# Patient Record
Sex: FEMALE | Race: WHITE | NOT HISPANIC OR LATINO | Employment: OTHER | URBAN - NONMETROPOLITAN AREA
[De-identification: names, ages, dates, MRNs, and addresses within clinical notes are randomized per-mention and may not be internally consistent; named-entity substitution may affect disease eponyms.]

---

## 2017-01-05 ENCOUNTER — APPOINTMENT (OUTPATIENT)
Dept: LAB | Facility: HOSPITAL | Age: 62
End: 2017-01-05
Payer: COMMERCIAL

## 2017-01-05 ENCOUNTER — GENERIC CONVERSION - ENCOUNTER (OUTPATIENT)
Dept: OTHER | Facility: OTHER | Age: 62
End: 2017-01-05

## 2017-01-05 ENCOUNTER — TRANSCRIBE ORDERS (OUTPATIENT)
Dept: ADMINISTRATIVE | Facility: HOSPITAL | Age: 62
End: 2017-01-05

## 2017-01-05 DIAGNOSIS — G89.4 CHRONIC PAIN SYNDROME: ICD-10-CM

## 2017-01-05 DIAGNOSIS — E55.9 VITAMIN D DEFICIENCY: ICD-10-CM

## 2017-01-05 DIAGNOSIS — E03.9 HYPOTHYROIDISM: ICD-10-CM

## 2017-01-05 DIAGNOSIS — E83.119 HEMOCHROMATOSIS: ICD-10-CM

## 2017-01-05 DIAGNOSIS — R16.0 HEPATOMEGALY, NOT ELSEWHERE CLASSIFIED: ICD-10-CM

## 2017-01-05 LAB
25(OH)D3 SERPL-MCNC: 32 NG/ML (ref 30–100)
ALBUMIN SERPL BCP-MCNC: 3.9 G/DL (ref 3.5–5)
ALP SERPL-CCNC: 61 U/L (ref 46–116)
ALT SERPL W P-5'-P-CCNC: 29 U/L (ref 12–78)
ANION GAP SERPL CALCULATED.3IONS-SCNC: 9 MMOL/L (ref 4–13)
AST SERPL W P-5'-P-CCNC: 19 U/L (ref 5–45)
BASOPHILS # BLD AUTO: 0.02 THOUSANDS/ΜL (ref 0–0.1)
BASOPHILS NFR BLD AUTO: 0 % (ref 0–1)
BILIRUB SERPL-MCNC: 0.5 MG/DL (ref 0.2–1)
BUN SERPL-MCNC: 15 MG/DL (ref 5–25)
CALCIUM SERPL-MCNC: 8.8 MG/DL (ref 8.3–10.1)
CHLORIDE SERPL-SCNC: 105 MMOL/L (ref 100–108)
CHOLEST SERPL-MCNC: 223 MG/DL (ref 50–200)
CO2 SERPL-SCNC: 29 MMOL/L (ref 21–32)
CREAT SERPL-MCNC: 0.92 MG/DL (ref 0.6–1.3)
EOSINOPHIL # BLD AUTO: 0.14 THOUSAND/ΜL (ref 0–0.61)
EOSINOPHIL NFR BLD AUTO: 3 % (ref 0–6)
ERYTHROCYTE [DISTWIDTH] IN BLOOD BY AUTOMATED COUNT: 12.6 % (ref 11.6–15.1)
GFR SERPL CREATININE-BSD FRML MDRD: >60 ML/MIN/1.73SQ M
GLUCOSE SERPL-MCNC: 85 MG/DL (ref 65–140)
HCT VFR BLD AUTO: 42.5 % (ref 34.8–46.1)
HDLC SERPL-MCNC: 54 MG/DL (ref 40–60)
HGB BLD-MCNC: 14.1 G/DL (ref 11.5–15.4)
LDLC SERPL CALC-MCNC: 146 MG/DL (ref 0–100)
LYMPHOCYTES # BLD AUTO: 1.42 THOUSANDS/ΜL (ref 0.6–4.47)
LYMPHOCYTES NFR BLD AUTO: 28 % (ref 14–44)
MCH RBC QN AUTO: 33.3 PG (ref 26.8–34.3)
MCHC RBC AUTO-ENTMCNC: 33.2 G/DL (ref 31.4–37.4)
MCV RBC AUTO: 101 FL (ref 82–98)
MONOCYTES # BLD AUTO: 0.61 THOUSAND/ΜL (ref 0.17–1.22)
MONOCYTES NFR BLD AUTO: 12 % (ref 4–12)
NEUTROPHILS # BLD AUTO: 2.91 THOUSANDS/ΜL (ref 1.85–7.62)
NEUTS SEG NFR BLD AUTO: 57 % (ref 43–75)
PLATELET # BLD AUTO: 202 THOUSANDS/UL (ref 149–390)
PMV BLD AUTO: 9.4 FL (ref 8.9–12.7)
POTASSIUM SERPL-SCNC: 4.6 MMOL/L (ref 3.5–5.3)
PROT SERPL-MCNC: 7.1 G/DL (ref 6.4–8.2)
RBC # BLD AUTO: 4.23 MILLION/UL (ref 3.81–5.12)
SODIUM SERPL-SCNC: 143 MMOL/L (ref 136–145)
TRIGL SERPL-MCNC: 114 MG/DL
TSH SERPL DL<=0.05 MIU/L-ACNC: 3.22 UIU/ML (ref 0.36–3.74)
WBC # BLD AUTO: 5.1 THOUSAND/UL (ref 4.31–10.16)

## 2017-01-05 PROCEDURE — 82306 VITAMIN D 25 HYDROXY: CPT

## 2017-01-05 PROCEDURE — 84443 ASSAY THYROID STIM HORMONE: CPT

## 2017-01-05 PROCEDURE — 36415 COLL VENOUS BLD VENIPUNCTURE: CPT

## 2017-01-05 PROCEDURE — 80053 COMPREHEN METABOLIC PANEL: CPT

## 2017-01-05 PROCEDURE — 85025 COMPLETE CBC W/AUTO DIFF WBC: CPT

## 2017-01-05 PROCEDURE — 80061 LIPID PANEL: CPT

## 2017-01-09 ENCOUNTER — ALLSCRIPTS OFFICE VISIT (OUTPATIENT)
Dept: OTHER | Facility: OTHER | Age: 62
End: 2017-01-09

## 2017-01-09 DIAGNOSIS — E55.9 VITAMIN D DEFICIENCY: ICD-10-CM

## 2017-01-09 DIAGNOSIS — E03.9 HYPOTHYROIDISM: ICD-10-CM

## 2017-01-09 DIAGNOSIS — M19.90 OSTEOARTHRITIS: ICD-10-CM

## 2017-06-30 ENCOUNTER — TRANSCRIBE ORDERS (OUTPATIENT)
Dept: ADMINISTRATIVE | Facility: HOSPITAL | Age: 62
End: 2017-06-30

## 2017-06-30 ENCOUNTER — APPOINTMENT (OUTPATIENT)
Dept: LAB | Facility: HOSPITAL | Age: 62
End: 2017-06-30
Payer: COMMERCIAL

## 2017-06-30 DIAGNOSIS — M19.90 OSTEOARTHRITIS: ICD-10-CM

## 2017-06-30 DIAGNOSIS — E03.9 HYPOTHYROIDISM: ICD-10-CM

## 2017-06-30 DIAGNOSIS — E55.9 VITAMIN D DEFICIENCY: ICD-10-CM

## 2017-06-30 LAB
25(OH)D3 SERPL-MCNC: 35.6 NG/ML (ref 30–100)
ALBUMIN SERPL BCP-MCNC: 3.7 G/DL (ref 3.5–5)
ALP SERPL-CCNC: 61 U/L (ref 46–116)
ALT SERPL W P-5'-P-CCNC: 21 U/L (ref 12–78)
ANION GAP SERPL CALCULATED.3IONS-SCNC: 8 MMOL/L (ref 4–13)
AST SERPL W P-5'-P-CCNC: 19 U/L (ref 5–45)
BASOPHILS # BLD AUTO: 0.02 THOUSANDS/ΜL (ref 0–0.1)
BASOPHILS NFR BLD AUTO: 0 % (ref 0–1)
BILIRUB SERPL-MCNC: 0.5 MG/DL (ref 0.2–1)
BUN SERPL-MCNC: 21 MG/DL (ref 5–25)
CALCIUM SERPL-MCNC: 8.7 MG/DL (ref 8.3–10.1)
CHLORIDE SERPL-SCNC: 109 MMOL/L (ref 100–108)
CHOLEST SERPL-MCNC: 205 MG/DL (ref 50–200)
CO2 SERPL-SCNC: 28 MMOL/L (ref 21–32)
CREAT SERPL-MCNC: 0.94 MG/DL (ref 0.6–1.3)
EOSINOPHIL # BLD AUTO: 0.14 THOUSAND/ΜL (ref 0–0.61)
EOSINOPHIL NFR BLD AUTO: 3 % (ref 0–6)
ERYTHROCYTE [DISTWIDTH] IN BLOOD BY AUTOMATED COUNT: 12.5 % (ref 11.6–15.1)
GFR SERPL CREATININE-BSD FRML MDRD: >60 ML/MIN/1.73SQ M
GLUCOSE P FAST SERPL-MCNC: 87 MG/DL (ref 65–99)
HCT VFR BLD AUTO: 42.3 % (ref 34.8–46.1)
HDLC SERPL-MCNC: 54 MG/DL (ref 40–60)
HGB BLD-MCNC: 14 G/DL (ref 11.5–15.4)
LDLC SERPL CALC-MCNC: 127 MG/DL (ref 0–100)
LYMPHOCYTES # BLD AUTO: 1.38 THOUSANDS/ΜL (ref 0.6–4.47)
LYMPHOCYTES NFR BLD AUTO: 26 % (ref 14–44)
MCH RBC QN AUTO: 33.3 PG (ref 26.8–34.3)
MCHC RBC AUTO-ENTMCNC: 33.1 G/DL (ref 31.4–37.4)
MCV RBC AUTO: 101 FL (ref 82–98)
MONOCYTES # BLD AUTO: 0.58 THOUSAND/ΜL (ref 0.17–1.22)
MONOCYTES NFR BLD AUTO: 11 % (ref 4–12)
NEUTROPHILS # BLD AUTO: 3.13 THOUSANDS/ΜL (ref 1.85–7.62)
NEUTS SEG NFR BLD AUTO: 60 % (ref 43–75)
PLATELET # BLD AUTO: 183 THOUSANDS/UL (ref 149–390)
PMV BLD AUTO: 9.9 FL (ref 8.9–12.7)
POTASSIUM SERPL-SCNC: 4.6 MMOL/L (ref 3.5–5.3)
PROT SERPL-MCNC: 7.1 G/DL (ref 6.4–8.2)
RBC # BLD AUTO: 4.2 MILLION/UL (ref 3.81–5.12)
SODIUM SERPL-SCNC: 145 MMOL/L (ref 136–145)
TRIGL SERPL-MCNC: 120 MG/DL
TSH SERPL DL<=0.05 MIU/L-ACNC: 3.9 UIU/ML (ref 0.36–3.74)
WBC # BLD AUTO: 5.25 THOUSAND/UL (ref 4.31–10.16)

## 2017-06-30 PROCEDURE — 80053 COMPREHEN METABOLIC PANEL: CPT

## 2017-06-30 PROCEDURE — 36415 COLL VENOUS BLD VENIPUNCTURE: CPT

## 2017-06-30 PROCEDURE — 82306 VITAMIN D 25 HYDROXY: CPT

## 2017-06-30 PROCEDURE — 84443 ASSAY THYROID STIM HORMONE: CPT

## 2017-06-30 PROCEDURE — 85025 COMPLETE CBC W/AUTO DIFF WBC: CPT

## 2017-06-30 PROCEDURE — 80061 LIPID PANEL: CPT

## 2017-07-03 ENCOUNTER — GENERIC CONVERSION - ENCOUNTER (OUTPATIENT)
Dept: OTHER | Facility: OTHER | Age: 62
End: 2017-07-03

## 2017-07-07 DIAGNOSIS — Z13.820 ENCOUNTER FOR SCREENING FOR OSTEOPOROSIS: ICD-10-CM

## 2017-07-07 DIAGNOSIS — Z12.31 ENCOUNTER FOR SCREENING MAMMOGRAM FOR MALIGNANT NEOPLASM OF BREAST: ICD-10-CM

## 2017-07-10 ENCOUNTER — ALLSCRIPTS OFFICE VISIT (OUTPATIENT)
Dept: OTHER | Facility: OTHER | Age: 62
End: 2017-07-10

## 2017-08-07 ENCOUNTER — TRANSCRIBE ORDERS (OUTPATIENT)
Dept: ADMINISTRATIVE | Facility: HOSPITAL | Age: 62
End: 2017-08-07

## 2017-08-07 DIAGNOSIS — Z12.31 ENCOUNTER FOR SCREENING MAMMOGRAM FOR MALIGNANT NEOPLASM OF BREAST: Primary | ICD-10-CM

## 2017-08-14 ENCOUNTER — APPOINTMENT (OUTPATIENT)
Dept: LAB | Facility: HOSPITAL | Age: 62
End: 2017-08-14
Payer: COMMERCIAL

## 2017-08-14 ENCOUNTER — GENERIC CONVERSION - ENCOUNTER (OUTPATIENT)
Dept: OTHER | Facility: OTHER | Age: 62
End: 2017-08-14

## 2017-08-14 DIAGNOSIS — E03.9 HYPOTHYROIDISM: ICD-10-CM

## 2017-08-14 LAB — TSH SERPL DL<=0.05 MIU/L-ACNC: 1.97 UIU/ML (ref 0.36–3.74)

## 2017-08-14 PROCEDURE — 36415 COLL VENOUS BLD VENIPUNCTURE: CPT

## 2017-08-14 PROCEDURE — 84443 ASSAY THYROID STIM HORMONE: CPT

## 2017-08-22 ENCOUNTER — HOSPITAL ENCOUNTER (OUTPATIENT)
Dept: BONE DENSITY | Facility: HOSPITAL | Age: 62
Discharge: HOME | End: 2017-08-22
Payer: COMMERCIAL

## 2017-08-22 ENCOUNTER — GENERIC CONVERSION - ENCOUNTER (OUTPATIENT)
Dept: OTHER | Facility: OTHER | Age: 62
End: 2017-08-22

## 2017-08-22 ENCOUNTER — HOSPITAL ENCOUNTER (OUTPATIENT)
Dept: MAMMOGRAPHY | Facility: HOSPITAL | Age: 62
Discharge: HOME | End: 2017-08-22
Payer: COMMERCIAL

## 2017-08-22 DIAGNOSIS — Z13.820 ENCOUNTER FOR SCREENING FOR OSTEOPOROSIS: ICD-10-CM

## 2017-08-22 DIAGNOSIS — Z12.31 ENCOUNTER FOR SCREENING MAMMOGRAM FOR MALIGNANT NEOPLASM OF BREAST: ICD-10-CM

## 2017-08-22 PROCEDURE — G0202 SCR MAMMO BI INCL CAD: HCPCS

## 2017-08-22 PROCEDURE — 77080 DXA BONE DENSITY AXIAL: CPT

## 2017-08-22 PROCEDURE — 77063 BREAST TOMOSYNTHESIS BI: CPT

## 2017-10-14 DIAGNOSIS — M19.90 OSTEOARTHRITIS: ICD-10-CM

## 2017-10-14 DIAGNOSIS — K59.00 CONSTIPATION: ICD-10-CM

## 2017-10-14 DIAGNOSIS — E78.5 HYPERLIPIDEMIA: ICD-10-CM

## 2017-10-14 DIAGNOSIS — E03.9 HYPOTHYROIDISM: ICD-10-CM

## 2017-10-14 DIAGNOSIS — E55.9 VITAMIN D DEFICIENCY: ICD-10-CM

## 2017-11-26 ENCOUNTER — TRANSCRIBE ORDERS (OUTPATIENT)
Dept: ADMINISTRATIVE | Facility: HOSPITAL | Age: 62
End: 2017-11-26

## 2017-11-26 ENCOUNTER — APPOINTMENT (OUTPATIENT)
Dept: LAB | Facility: HOSPITAL | Age: 62
End: 2017-11-26
Payer: COMMERCIAL

## 2017-11-26 DIAGNOSIS — M19.90 OSTEOARTHRITIS: ICD-10-CM

## 2017-11-26 DIAGNOSIS — E78.5 HYPERLIPIDEMIA: ICD-10-CM

## 2017-11-26 DIAGNOSIS — K59.00 CONSTIPATION: ICD-10-CM

## 2017-11-26 DIAGNOSIS — E03.9 HYPOTHYROIDISM: ICD-10-CM

## 2017-11-26 DIAGNOSIS — E55.9 VITAMIN D DEFICIENCY: ICD-10-CM

## 2017-11-26 LAB
25(OH)D3 SERPL-MCNC: 30.3 NG/ML (ref 30–100)
ALBUMIN SERPL BCP-MCNC: 3.7 G/DL (ref 3.5–5)
ALP SERPL-CCNC: 63 U/L (ref 46–116)
ALT SERPL W P-5'-P-CCNC: 29 U/L (ref 12–78)
ANION GAP SERPL CALCULATED.3IONS-SCNC: 7 MMOL/L (ref 4–13)
AST SERPL W P-5'-P-CCNC: 17 U/L (ref 5–45)
BASOPHILS # BLD AUTO: 0.02 THOUSANDS/ΜL (ref 0–0.1)
BASOPHILS NFR BLD AUTO: 0 % (ref 0–1)
BILIRUB SERPL-MCNC: 0.5 MG/DL (ref 0.2–1)
BUN SERPL-MCNC: 19 MG/DL (ref 5–25)
CALCIUM SERPL-MCNC: 9 MG/DL (ref 8.3–10.1)
CHLORIDE SERPL-SCNC: 105 MMOL/L (ref 100–108)
CHOLEST SERPL-MCNC: 219 MG/DL (ref 50–200)
CO2 SERPL-SCNC: 30 MMOL/L (ref 21–32)
CREAT SERPL-MCNC: 0.88 MG/DL (ref 0.6–1.3)
EOSINOPHIL # BLD AUTO: 0.17 THOUSAND/ΜL (ref 0–0.61)
EOSINOPHIL NFR BLD AUTO: 3 % (ref 0–6)
ERYTHROCYTE [DISTWIDTH] IN BLOOD BY AUTOMATED COUNT: 12.7 % (ref 11.6–15.1)
GFR SERPL CREATININE-BSD FRML MDRD: 70 ML/MIN/1.73SQ M
GLUCOSE P FAST SERPL-MCNC: 104 MG/DL (ref 65–99)
HCT VFR BLD AUTO: 42.5 % (ref 34.8–46.1)
HDLC SERPL-MCNC: 54 MG/DL (ref 40–60)
HGB BLD-MCNC: 14.4 G/DL (ref 11.5–15.4)
LDLC SERPL CALC-MCNC: 134 MG/DL (ref 0–100)
LYMPHOCYTES # BLD AUTO: 1.6 THOUSANDS/ΜL (ref 0.6–4.47)
LYMPHOCYTES NFR BLD AUTO: 28 % (ref 14–44)
MCH RBC QN AUTO: 33.9 PG (ref 26.8–34.3)
MCHC RBC AUTO-ENTMCNC: 33.9 G/DL (ref 31.4–37.4)
MCV RBC AUTO: 100 FL (ref 82–98)
MONOCYTES # BLD AUTO: 0.73 THOUSAND/ΜL (ref 0.17–1.22)
MONOCYTES NFR BLD AUTO: 13 % (ref 4–12)
NEUTROPHILS # BLD AUTO: 3.27 THOUSANDS/ΜL (ref 1.85–7.62)
NEUTS SEG NFR BLD AUTO: 56 % (ref 43–75)
PLATELET # BLD AUTO: 196 THOUSANDS/UL (ref 149–390)
PMV BLD AUTO: 8.9 FL (ref 8.9–12.7)
POTASSIUM SERPL-SCNC: 4.4 MMOL/L (ref 3.5–5.3)
PROT SERPL-MCNC: 7.2 G/DL (ref 6.4–8.2)
RBC # BLD AUTO: 4.25 MILLION/UL (ref 3.81–5.12)
SODIUM SERPL-SCNC: 142 MMOL/L (ref 136–145)
TRIGL SERPL-MCNC: 153 MG/DL
TSH SERPL DL<=0.05 MIU/L-ACNC: 3.26 UIU/ML (ref 0.36–3.74)
WBC # BLD AUTO: 5.79 THOUSAND/UL (ref 4.31–10.16)

## 2017-11-26 PROCEDURE — 80053 COMPREHEN METABOLIC PANEL: CPT

## 2017-11-26 PROCEDURE — 84443 ASSAY THYROID STIM HORMONE: CPT

## 2017-11-26 PROCEDURE — 85025 COMPLETE CBC W/AUTO DIFF WBC: CPT

## 2017-11-26 PROCEDURE — 82306 VITAMIN D 25 HYDROXY: CPT

## 2017-11-26 PROCEDURE — 80061 LIPID PANEL: CPT

## 2017-11-26 PROCEDURE — 36415 COLL VENOUS BLD VENIPUNCTURE: CPT

## 2017-11-27 ENCOUNTER — GENERIC CONVERSION - ENCOUNTER (OUTPATIENT)
Dept: OTHER | Facility: OTHER | Age: 62
End: 2017-11-27

## 2017-12-11 ENCOUNTER — ALLSCRIPTS OFFICE VISIT (OUTPATIENT)
Dept: OTHER | Facility: OTHER | Age: 62
End: 2017-12-11

## 2017-12-12 NOTE — PROGRESS NOTES
Assessment    1  Hypothyroidism (244 9) (E03 9)   2  Hyperlipidemia (272 4) (E78 5)   3  Osteoarthritis (715 90) (M19 90)   4  Constipation (564 00) (K59 00)    Plan  Constipation    · Lactulose 10 GM/15ML Oral Solution   · Linzess 145 MCG Oral Capsule; Take one capsule daily   · Call [phone] if: You have bowel movement accidents ; Status:Complete;   Done:11Cxx7598   · Call [phone] if: You have not had a normal bowel movement in 2 days  ;Status:Complete;   Done: 42OAW1588   · Call [phone] if: You have pain around the rectum ; Status:Complete;   Done:94Mbw9431   · Call [phone] if: You see any blood in the stool ; Status:Complete;   Done:47Tme5490   · Call [phone] if: Your constipation is getting worse ; Status:Complete;   Done:10Nde6465   · Call [phone] if: Your temperature is higher than 101F ; Status:Complete;   Done:13Imb3380   · Seek Immediate Medical Attention if: You have pain in your abdomen ; Status:Complete;  Done: 30AJV4798   · Seek Immediate Medical Attention if: You start vomiting ; Status:Complete;   Done:96Mvi8664   · Seek Immediate Medical Attention if: Your abdomen is getting large and round without anincrease in the amount of food you are eating ; Status:Complete;   Done: 98RLT3461   · Drink at least 6 glasses of water or juice a day ; Status:Complete;   Done: 55YQX7222   · Start eating more fiber ; Status:Complete;   Done: 95VTW7367  Constipation, Hemochromatosis, Hyperlipidemia, Hypothyroidism, Osteoarthritis,Vitamin D deficiency    · (1) CBC/PLT/DIFF; Status:Active; Requested for:84Sfn4679;    · (1) COMPREHENSIVE METABOLIC PANEL; Status:Active; Requested for:24Tys7356;    · (1) IRON PANEL; Status:Active; Requested for:42Mpx0978;    · (1) LIPID PANEL, FASTING; Status:Active; Requested for:88Vev5424;    · (1) TSH; Status:Active; Requested for:41Ghu1586;    · (1) VITAMIN D 25-HYDROXY; Status:Active;  Requested for:37Fzm0348;   Hyperlipidemia    · Begin or continue regular aerobic exercise  Gradually work up to at least 3 sessions of30 minutes of exercise a week ; Status:Complete;   Done: 80CWY5311   · Eat no more than 30 grams of fat per day ; Status:Complete;   Done: 38LRF3170   · There are many exercise options for seniors ; Status:Complete;   Done: 59SUD2888   · Call [phone] if: You have muscle cramps ; Status:Complete;   Done: 00NNF6595   · Call [phone] if: You have pain in the stomach area ; Status:Complete;   Done:52Hga2656   · Call 911 if: You experience a new kind of chest pain (angina) or pressure  ;Status:Complete;   Done: 11PSG8321   · Call 911 if: You have any symptoms of a stroke ; Status:Complete;   Done: 69SJT5550  Hypothyroidism    · Levothyroxine Sodium 100 MCG Oral Tablet; TAKE 1 TABLET BY MOUTH ONCEDAILY   · (1) TSH; Status:Active; Requested for:83Ckl4496; Influenza vaccine needed    · Stop: Fluzone Quadrivalent 0 5 ML Intramuscular Suspension PrefilledSyringe  Osteoarthritis    · Avoid lifting anything over 10 pounds ; Status:Complete;   Done: 44MMS0367   · Call [phone] if: The pain seems worse ; Status:Complete;   Done: 58SYG8476   · Call [phone] if: The symptoms seem worse ; Status:Complete;   Done:43Eft7362    Discussion/Summary    Reviewed recent laboratory testing with her  Cholesterol remains higher than goal  She would like to avoid statin medication  Watch diet more closely  Increase fiber  She is going to try to lose weight again  TSH is increased somewhat  Will increase dose of levothyroxine slightly to 100 mcg on a daily basis  Recheck TSH in about 2 months  Continues with overall myalgias and arthritis symptoms  Continue with the Cymbalta, gabapentin, and meloxicam  Discussed possibly increasing the gabapentin  She wishes to hold at present  Continues with constipation symptoms  Lactulose had improved her symptoms somewhat  She feels that the Aranda Furnish has been more effective  Will restart this   Stay well hydrated  She is up-to-date on her mammogram and bone density  She is up-to-date on her colonoscopy  Will have her follow up in about 4-6 months or sooner if needed  Possible side effects of new medications were reviewed with the patient/guardian today  The treatment plan was reviewed with the patient/guardian  The patient/guardian understands and agrees with the treatment plan      Chief Complaint  CC patient here today for routine visit  No refills  whole body aches  History of Present Illness  She presents for routine follow-up  She has been having increasing pain throughout her body  Very achy  Much more tired  Feels that her arthritis and generalized pain is worsening overall  Is not sure that the Cymbalta, gabapentin, and meloxicam ours effective  Has been taking them regularly  Is unsure whether some of these medications may be making her more tired  Continues to take her levothyroxine without difficulty  Feels that some of her aching this is related to her gaining weight back  Had felt better when she had lost weight  Still continues with issues with constipation despite the lactulose  Lactulose has helped her symptoms somewhat but feels that the Linzess was more effective  She would like to try this again if possible  Denies any chest pain or palpitations  Denies any significant shortness of breath  Denies any nausea or vomiting  Denies any current urinary symptoms  Usually sleeps relatively well  Review of Systems   Constitutional: feeling tired, but-- no fever-- and-- no chills  Eyes: no eyesight problems  ENT: no sore throat-- and-- no hoarseness  Cardiovascular: no chest pain-- and-- no palpitations  Respiratory: no shortness of breath-- and-- no shortness of breath during exertion  Gastrointestinal: no nausea-- and-- no diarrhea  Musculoskeletal: arthralgias-- and-- joint stiffness  Neurological: no headache  Psychiatric: no sleep disturbances  ROS reviewed        Active Problems  1  Adenomatous colon polyp (211 3) (D12 6)   2  Baker's cyst of knee (727 51) (M71 20)   3  Colon, diverticulosis (562 10) (K57 30)   4  Constipation (564 00) (K59 00)   5  Esophagitis (530 10) (K20 9)   6  Gastroesophageal reflux disease (530 81) (K21 9)   7  Hemochromatosis (275 03) (E83 119)   8  Hot flashes (627 2) (N95 1)   9  Hyperlipidemia (272 4) (E78 5)   10  Hypothyroidism (244 9) (E03 9)   11  Liver enlargement (789 1) (R16 0)   12  Lower back pain (724 2) (M54 5)   13  Myalgia And Myositis (729 1)   14  Nonalcoholic steatohepatitis (571 8) (K75 81)   15  Osteoarthritis (715 90) (M19 90)   16  Pain syndrome, chronic (338 4) (G89 4)   17  Peripheral neuropathy (356 9) (G62 9)   18  Post-menopausal (V49 81) (Z78 0)   19  Right knee pain (719 46) (M25 561)   20  Splenic cyst (289 59) (D73 4)   21  Tendonitis of elbow, left (727 09) (M77 8)   22  Vitamin D deficiency (268 9) (E55 9)    Past Medical History  1  History of Acute Osteomyelitis Of The Ankle (730 07)   2  History of Contusion of face (920) (S00 83XA)   3  History of Contusion of hand, left (923 20) (S60 222A)   4  History of Contusion of knee, left (924 11) (S80 02XA)   5  History of Contusion of knee, right (924 11) (S80 01XA)   6  History of screening mammography (V15 89) (Z92 89)   7  History of Laceration of thumb, right (883 0) (S61 011A)   8  History of Osteoarthritis (715 90) (M19 90)   9  History of Rheumatoid arthritis (714 0) (M06 9)   10  History of Screening for osteoporosis (V82 81) (Z13 820)   11  History of Urinary tract infection (599 0) (N39 0)   12  History of Visit for pre-operative examination (V72 84) (R80 574)    The active problems and past medical history were reviewed and updated today  Surgical History    1  History of Cholecystectomy Laparoscopic   2  History of ERCP With Insertion Of Tube Into Bile / Pancreatic Duct   3  History of Facial Surgery   4  History of Foot Surgery   5   History of Knee Replacement   6  History of Shoulder Surgery    The surgical history was reviewed and updated today  Family History  Mother    1  Family history of Dementia   2  Family history of Diabetes Mellitus (V18 0)  Father    3  Family history of Coronary Artery Disease (V17 49)  Sister    4  Family history of Hemochromatosis  Family History    5  Family history of Diabetes Mellitus (V18 0)   6  Family history of Hypertension (V17 49)    The family history was reviewed and updated today  Social History     · Former smoker (V58 29) (W89 353)   · Stopped Drinking Alcohol  The social history was reviewed and updated today  Current Meds   1  Jesse Low Dose 81 MG Oral Tablet Delayed Release; TAKE 1 TABLET DAILY; Therapy: 32QSS9025 to Recorded   2  DULoxetine HCl - 20 MG Oral Capsule Delayed Release Particles; take 3 capsules daily; Therapy: 70WPB4308 to (Last Rx:75Exb6990)  Requested for: 23Wyx3887 Ordered   3  Gabapentin 100 MG Oral Capsule; take 1 capsule three times a day; Therapy: 98PNJ3400 to (Last Rx:96Lld7681)  Requested for: 78Lwl4399 Ordered   4  Lactulose 10 GM/15ML Oral Solution; TAKE 1-2 TABLESPOONFUL DAILY AS NEEDED FOR CONSTIPATION; Therapy: 80DCG1653 to (Last Rx:20Cag9817)  Requested for: 12ECY3266 Ordered   5  Levothyroxine Sodium 88 MCG Oral Tablet; take 1 tablet by mouth once daily; Therapy: 49ZUP1831 to (Evaluate:97Dga7390)  Requested for: 77Tch7151; Last Rx:83Vzb0533 Ordered   6  Meloxicam 7 5 MG Oral Tablet; TAKE 1 TABLET TWICE A DAY WITH FOOD; Therapy: 48MFS2372 to (Last Rx:41Pzj4423)  Requested for: 76Fle5728 Ordered   7  Vitamin D 2000 UNIT Oral Capsule; take one capsule by mouth daily; Therapy: 68NCS3075 to (Last Rx:99Ixv1070) Ordered    The medication list was reviewed and updated today  Allergies  1   No Known Drug Allergies    Vitals  Vital Signs    Recorded: 95RAJ5768 07:07AM   Temperature 96 7 F, Temporal   Heart Rate 78   Respiration 16   Systolic 704, LUE, Sitting   Diastolic 78, LUE, Sitting   Height 5 ft 5 in   Weight 290 lb    BMI Calculated 48 26   BSA Calculated 2 32   O2 Saturation 97     Results/Data  (1) VITAMIN D 25-HYDROXY 58SSV1745 07:52AM [employer] Order Number: [id number]     Test Name Result Flag Reference   VIT D 25-HYDROX 30 3 ng/mL  30 0-100 0     This assay is a certified procedure of the CDC Vitamin D Standardization Certification Program (VDSCP)   Deficiency <20ng/ml  Insufficiency 20-30ng/ml  Sufficient  ng/ml   *Patients undergoing fluorescein dye angiography may retain small amounts of fluorescein in the body for 48-72 hours post procedure  Samples containing fluorescein can produce falsely elevated Vitamin D values  If the patient had this procedure, a specimen should be resubmitted post fluorescein clearance  (1) TSH 27TYV6389 07:52AM [employer] Order Number: [id number]     Test Name Result Flag Reference   TSH 3 256 uIU/mL  0 358-3 740     Patients undergoing fluorescein dye angiography may retain small amounts of fluorescein in the body for 48-72 hours post procedure  Samples containing fluorescein can produce falsely depressed TSH values  If the patient had this procedure,a specimen should be resubmitted post fluorescein clearance  The recommended reference ranges for TSH during pregnancy are as follows: First trimester 0 1 to 2 5 uIU/mL Second trimester  0 2 to 3 0 uIU/mL Third trimester 0 3 to 3 0 uIU/m     (1) LIPID PANEL, FASTING 26Nov2017 07:52AM [employer] Order Number: [id number]     Test Name Result Flag Reference   CHOLESTEROL 219 mg/dL H    HDL,DIRECT 54 mg/dL  40-60   Specimen collection should occur prior to Metamizole administration due to the potential for falsley depressed results     LDL CHOLESTEROL CALCULATED 134 mg/dL H 0-100     Triglyceride:       Normal <150 mg/dl  Borderline High 150-199 mg/dl  High 200-499 mg/dl  Very High >499 mg/dl   Cholesterol: Desirable <200 mg/dl   Borderline High 200-239 mg/dl   High >239 mg/dl   HDL Cholesterol:      High>59 mg/dL   Low <41 mg/dL   This screening LDL is a calculated result  It does not have the accuracy of the Direct Measured LDL in the monitoring of patients with hyperlipidemia and/or statin therapy  Direct Measure LDL (QXG453) must be ordered separately in these patients  TRIGLYCERIDES 153 mg/dL H <=150   Specimen collection should occur prior to N-Acetylcysteine or Metamizole administration due to the potential for falsely depressed results  (1) COMPREHENSIVE METABOLIC PANEL 20PIT0356 88:21OU Kathy Bernal Order Number: [id number]     Test Name Result Flag Reference   SODIUM 142 mmol/L  136-145   POTASSIUM 4 4 mmol/L  3 5-5 3   CHLORIDE 105 mmol/L  100-108   CARBON DIOXIDE 30 mmol/L  21-32   ANION GAP (CALC) 7 mmol/L  4-13   BLOOD UREA NITROGEN 19 mg/dL  5-25   CREATININE 0 88 mg/dL  0 60-1 30   Standardized to IDMS reference method   CALCIUM 9 0 mg/dL  8 3-10 1   BILI, TOTAL 0 50 mg/dL  0 20-1 00   ALK PHOSPHATAS 63 U/L     ALT (SGPT) 29 U/L  12-78   Specimen collection should occur prior to Sulfasalazine administration due to the potential for falsely depressed results  AST(SGOT) 17 U/L  5-45   Specimen collection should occur prior to Sulfasalazine administration due to the potential for falsely depressed results  ALBUMIN 3 7 g/dL  3 5-5 0   TOTAL PROTEIN 7 2 g/dL  6 4-8 2   eGFR 70 ml/min/1 73sq [facility] Disease Education Program recommendations are as follows: GFR calculation is accurate only with a steady state creatinine Chronic Kidney disease less than 60 ml/min/1 73 sq  meters Kidney failure less than 15 ml/min/1 73 sq  meters  GLUCOSE FASTING 104 mg/dL H 65-99   Specimen collection should occur prior to Sulfasalazine administration due to the potential for falsely depressed results   Specimen collection should occur prior to Sulfapyridine administration due to the potential for falsely elevated results  (1) CBC/PLT/DIFF 16QIR4831 07:52AM Av Villeda Order Number: [id number]     Test Name Result Flag Reference   WBC COUNT 5 79 Thousand/uL  4 31-10 16   RBC COUNT 4 25 Million/uL  3 81-5 12   HEMOGLOBIN 14 4 g/dL  11 5-15 4   HEMATOCRIT 42 5 %  34 8-46  1    fL H 82-98   MCH 33 9 pg  26 8-34 3   MCHC 33 9 g/dL  31 4-37 4   RDW 12 7 %  11 6-15 1   MPV 8 9 fL  8 9-12 7   PLATELET COUNT 978 Thousands/uL  149-390   NEUTROPHILS RELATIVE PERCENT 56 %  43-75   LYMPHOCYTES RELATIVE PERCENT 28 %  14-44   MONOCYTES RELATIVE PERCENT 13 % H 4-12   EOSINOPHILS RELATIVE PERCENT 3 %  0-6   BASOPHILS RELATIVE PERCENT 0 %  0-1   NEUTROPHILS ABSOLUTE COUNT 3 27 Thousands/? ??L  1 85-7 62   LYMPHOCYTES ABSOLUTE COUNT 1 60 Thousands/? ??L  0 60-4 47   MONOCYTES ABSOLUTE COUNT 0 73 Thousand/? ??L  0 17-1 22   EOSINOPHILS ABSOLUTE COUNT 0 17 Thousand/? ??L  0 00-0 61   BASOPHILS ABSOLUTE COUNT 0 02 Thousands/? ??L  0 00-0 10     * DXA BONE DENSITY SPINE HIP AND PELVIS 72Zkt6071 09:41AM Av Villeda Order Number: [id number]   - Patient Instructions: To schedule this appointment, please contact Central Scheduling at [phone]  Test Name Result Flag Reference   DXA BONE DENSITY SPINE HIP AND PELVIS (Report)       CENTRAL DXA SCAN   CLINICAL HISTORY:  60year old post-menopausal  female risk factors include estrogen deficient, follow-up  Hypothyroidism  TECHNIQUE: Bone densitometry was performed using a H[employer] Discovery A bone densitometer  Regions of interest appear properly placed  There are no obvious fractures or other confounding variables which could limit the study  COMPARISON: 2014   RESULTS:   LUMBAR SPINE: L1-L4:  BMD 1 062 gm/cm2  T-score 0 1  Z-score 1 8   LEFT TOTAL HIP:  BMD 1 113 gm/cm2  T-score 1 4  Z-score 2 5   LEFT FEMORAL NECK:  BMD 0 902 gm/cm2  T-score 0 5  Z-score 1 9        IMPRESSION:  1  Based on the [facility] classification, this study is normal and the patient is considered at low risk for fracture  2  The 10 year risk of hip fracture is 0 1 %, with the 10 year risk of major osteoporotic fracture being 5 2 %, as calculated by the WHO fracture risk assessment tool (FRAX)  The current NOF guidelines recommend treating patients with FRAX 10 year risk  score of >3% for hip fracture and >20% for major osteoporotic fracture  3  Since the prior study, there has been decrease in lumbar BMD of -0 039 Gm/cm2 or -3 6%  This decrease exceeds our own least significant change and, therefore, is statistically significant within 95% confidence level  4  A daily intake of calcium of at least 1200 mg and vitamin D, 800-1000 IU, as well as weight bearing and muscle strengthening exercise, fall prevention and avoidance of tobacco and excessive alcohol intake  5  Repeat DXA in 18 - 24 months, on the same machine, as clinically indicated  WHO CLASSIFICATION:  Normal (a T-score of -1 0 or higher)  Low bone mineral density (a T-score of less than -1 0 but higher than -2 5)  Osteoporosis (a T-score of -2 5 or less)  Severe osteoporosis (a T-score of -2 5 or less with a fragility fracture)            Workstation performed: [id number]   Signed by:  Brandon Jerry MD  8/22/17     MAMMO SCREENING BILATERAL W 3D & CAD 18Ngx3248 09:06AM Graylon Yahir     Test Name Result Flag Reference   MAMMO SCREENING BILATERAL W 3D & CAD (Report)       Patient History:  Patient is postmenopausal   Family history of breast cancer in maternal aunt, breast cancer   in maternal cousin, endometrial cancer in mother  Benign excisional biopsy, 1995  Patient has never smoked  Patient's BMI is 49 8  Reason for exam: screening, asymptomatic  Mammo Screening Bilateral W DBT and CAD: August 22, 2017 - Check   In #: [de-identified]  2D/3D Procedure  3D views: Bilateral MLO view(s) were taken  2D views: Bilateral CC view(s) were taken   MLO view(s) were   taken of the right breast     Technologist: ANA Houes (R)(M)  Prior study comparison: August 15, 2016, mammo screening   bilateral W DBT and CAD performed at [address] [facility]  August 10, 2015, bilateral digital screening mammogram   performed at [facility]  June 18, 2014,   bilateral digital screening mammogram performed at [address] [place] [address]  May 29, 2013, bilateral digital   screening mammogram performed at [facility]  February 26, 2011, bilateral digital screening mammogram  performed at [address] [facility]  The breast tissue is heterogeneously dense, potentially limiting   the sensitivity of mammography  Patient risk, included in this   report, assists in determining the appropriate screening regimen   (such as 3-D mammography or the inclusion of automated breast   ultrasound or MRI)  3-D mammography may also remain indicated as   screening  A combination of mediolateral oblique 3D tomographic slices as   well as standard two-dimensional orthogonal images were obtained  No dominant soft tissue mass, architectural distortion or   suspicious calcifications are noted in either breast   The skin   and nipple structures are within normal limits  Scattered benign  appearing calcifications are noted  No significant changes when compared with prior studies  ACR BI-RADSï¾® Assessments: BiRad:2 - Benign   Recommendation:  Routine screening mammogram of both breasts in 1 year  A   reminder letter will be scheduled  The patient is scheduled in a reminder system  8-10% of cancers will be missed on mammography  Management of a   palpable abnormality must be based on clinical grounds  Patients   will be notified of their results via letter from our facility  Accredited by [employer] of [employer] and FDA     Transcription Location: ANA [address]: [id number]   Risk Value(s):  Vincent 10 Year: 3 800%, [place] Lifetime: 8 500%,   Myriad Table: 1 5%, JOSE MANUEL 5 Year: 1 6%, NCI Lifetime: 6 9%       Health Management  Constipation   COLONOSCOPY; every 3 years; Last 66VAA1751; Next Due: 00YRE0333; Overdue  Post-menopausal   * Dexa Scan Axial Skel (Hip, Pelvis, Spine); every 2 years; Last 10LSJ7124; Next Due: 87EYK8149; Franck White 23; every 2 years; Next Due: 78DKD1781;  Overdue    Signatures   Electronically signed by : SENG Moody ; Dec 11 2017  9:10PM EST                       (Author)

## 2018-01-10 NOTE — RESULT NOTES
Verified Results  (1) VITAMIN D 25-HYDROXY [id number] 07:52AM Sakina Kelley Order Number: [id number]     Test Name Result Flag Reference   VIT D 25-HYDROX 30 3 ng/mL  30 0-100 0   This assay is a certified procedure of the CDC Vitamin D Standardization Certification Program (VDSCP)     Deficiency <20ng/ml   Insufficiency 20-30ng/ml   Sufficient  ng/ml     *Patients undergoing fluorescein dye angiography may retain small amounts of fluorescein in the body for 48-72 hours post procedure  Samples containing fluorescein can produce falsely elevated Vitamin D values  If the patient had this procedure, a specimen should be resubmitted post fluorescein clearance  (1) TSH [id number] 07:52AM Sakina Kelley Order Number: [id number]     Test Name Result Flag Reference   TSH 3 256 uIU/mL  0 358-3 740   Patients undergoing fluorescein dye angiography may retain small amounts of fluorescein in the body for 48-72 hours post procedure  Samples containing fluorescein can produce falsely depressed TSH values  If the patient had this procedure,a specimen should be resubmitted post fluorescein clearance  The recommended reference ranges for TSH during pregnancy are as follows:  First trimester 0 1 to 2 5 uIU/mL  Second trimester  0 2 to 3 0 uIU/mL  Third trimester 0 3 to 3 0 uIU/m     (1) LIPID PANEL, FASTING 26Nov2017 07:52AM Sakina Kelley Order Number: [id number]     Test Name Result Flag Reference   CHOLESTEROL 219 mg/dL H    HDL,DIRECT 54 mg/dL  40-60   Specimen collection should occur prior to Metamizole administration due to the potential for falsley depressed results     LDL CHOLESTEROL CALCULATED 134 mg/dL H 0-100   Triglyceride:        Normal <150 mg/dl   Borderline High 150-199 mg/dl   High 200-499 mg/dl   Very High >499 mg/dl      Cholesterol:       Desirable <200 mg/dl    Borderline High 200-239 mg/dl    High >239 mg/dl      HDL Cholesterol: High>59 mg/dL    Low <41 mg/dL      This screening LDL is a calculated result  It does not have the accuracy of the Direct Measured LDL in the monitoring of patients with hyperlipidemia and/or statin therapy  Direct Measure LDL (CWZ096) must be ordered separately in these patients  TRIGLYCERIDES 153 mg/dL H <=150   Specimen collection should occur prior to N-Acetylcysteine or Metamizole administration due to the potential for falsely depressed results  (1) COMPREHENSIVE METABOLIC PANEL [id number] 76:63MW Zahida Pompa Order Number: [id number]     Test Name Result Flag Reference   SODIUM 142 mmol/L  136-145   POTASSIUM 4 4 mmol/L  3 5-5 3   CHLORIDE 105 mmol/L  100-108   CARBON DIOXIDE 30 mmol/L  21-32   ANION GAP (CALC) 7 mmol/L  4-13   BLOOD UREA NITROGEN 19 mg/dL  5-25   CREATININE 0 88 mg/dL  0 60-1 30   Standardized to IDMS reference method   CALCIUM 9 0 mg/dL  8 3-10 1   BILI, TOTAL 0 50 mg/dL  0 20-1 00   ALK PHOSPHATAS 63 U/L     ALT (SGPT) 29 U/L  12-78   Specimen collection should occur prior to Sulfasalazine administration due to the potential for falsely depressed results  AST(SGOT) 17 U/L  5-45   Specimen collection should occur prior to Sulfasalazine administration due to the potential for falsely depressed results  ALBUMIN 3 7 g/dL  3 5-5 0   TOTAL PROTEIN 7 2 g/dL  6 4-8 2   eGFR 70 ml/min/1 73sq m     [facility] Disease Education Program recommendations are as follows:  GFR calculation is accurate only with a steady state creatinine  Chronic Kidney disease less than 60 ml/min/1 73 sq  meters  Kidney failure less than 15 ml/min/1 73 sq  meters  GLUCOSE FASTING 104 mg/dL H 65-99   Specimen collection should occur prior to Sulfasalazine administration due to the potential for falsely depressed results  Specimen collection should occur prior to Sulfapyridine administration due to the potential for falsely elevated results       (1) CBC/PLT/DIFF [id number] 07: Dontae Thompson Order Number: [id number]     Test Name Result Flag Reference   WBC COUNT 5 79 Thousand/uL  4 31-10 16   RBC COUNT 4 25 Million/uL  3 81-5 12   HEMOGLOBIN 14 4 g/dL  11 5-15 4   HEMATOCRIT 42 5 %  34 8-46  1    fL H 82-98   MCH 33 9 pg  26 8-34 3   MCHC 33 9 g/dL  31 4-37 4   RDW 12 7 %  11 6-15 1   MPV 8 9 fL  8 9-12 7   PLATELET COUNT 129 Thousands/uL  149-390   NEUTROPHILS RELATIVE PERCENT 56 %  43-75   LYMPHOCYTES RELATIVE PERCENT 28 %  14-44   MONOCYTES RELATIVE PERCENT 13 % H 4-12   EOSINOPHILS RELATIVE PERCENT 3 %  0-6   BASOPHILS RELATIVE PERCENT 0 %  0-1   NEUTROPHILS ABSOLUTE COUNT 3 27 Thousands/? ??L  1 85-7 62   LYMPHOCYTES ABSOLUTE COUNT 1 60 Thousands/? ??L  0 60-4 47   MONOCYTES ABSOLUTE COUNT 0 73 Thousand/? ??L  0 17-1 22   EOSINOPHILS ABSOLUTE COUNT 0 17 Thousand/? ??L  0 00-0 61   BASOPHILS ABSOLUTE COUNT 0 02 Thousands/? ??L  0 00-0 10

## 2018-01-10 NOTE — RESULT NOTES
Verified Results  (1) TSH [id number] 07:47AM Kyle Mckay Order Number: [id number]     Test Name Result Flag Reference   TSH 1 971 uIU/mL  0 358-3 740   Patients undergoing fluorescein dye angiography may retain small amounts of fluorescein in the body for 48-72 hours post procedure  Samples containing fluorescein can produce falsely depressed TSH values  If the patient had this procedure,a specimen should be resubmitted post fluorescein clearance            The recommended reference ranges for TSH during pregnancy are as follows:  First trimester 0 1 to 2 5 uIU/mL  Second trimester  0 2 to 3 0 uIU/mL  Third trimester 0 3 to 3 0 uIU/m

## 2018-01-10 NOTE — RESULT NOTES
Verified Results  * DXA BONE DENSITY SPINE HIP AND PELVIS [id number] 09:41AM Derek Durán Order Number: [id number]    - Patient Instructions: To schedule this appointment, please contact Central Scheduling at [phone]  Test Name Result Flag Reference   DXA BONE DENSITY SPINE HIP AND PELVIS (Report)     CENTRAL DXA SCAN     CLINICAL HISTORY:  60year old post-menopausal  female risk factors include estrogen deficient, follow-up  Hypothyroidism  TECHNIQUE: Bone densitometry was performed using a [employer] Discovery A bone densitometer  Regions of interest appear properly placed  There are no obvious fractures or other confounding variables which could limit the study  COMPARISON: 2014     RESULTS:    LUMBAR SPINE: L1-L4:   BMD 1 062 gm/cm2   T-score 0 1   Z-score 1 8     LEFT TOTAL HIP:   BMD 1 113 gm/cm2   T-score 1 4   Z-score 2 5     LEFT FEMORAL NECK:   BMD 0 902 gm/cm2   T-score 0 5   Z-score 1 9             IMPRESSION:   1  Based on the [facility] classification, this study is normal and the patient is considered at low risk for fracture  2  The 10 year risk of hip fracture is 0 1 %, with the 10 year risk of major osteoporotic fracture being 5 2 %, as calculated by the WHO fracture risk assessment tool (FRAX)  The current NOF guidelines recommend treating patients with FRAX 10 year risk   score of >3% for hip fracture and >20% for major osteoporotic fracture  3  Since the prior study, there has been decrease in lumbar BMD of -0 039 Gm/cm2 or -3 6%  This decrease exceeds our own least significant change and, therefore, is statistically significant within 95% confidence level  4  A daily intake of calcium of at least 1200 mg and vitamin D, 800-1000 IU, as well as weight bearing and muscle strengthening exercise, fall prevention and avoidance of tobacco and excessive alcohol intake  5  Repeat DXA in 18 - 24 months, on the same machine, as clinically indicated       WHO CLASSIFICATION:   Normal (a T-score of -1 0 or higher)   Low bone mineral density (a T-score of less than -1 0 but higher than -2 5)   Osteoporosis (a T-score of -2 5 or less)   Severe osteoporosis (a T-score of -2 5 or less with a fragility fracture)                    Workstation performed: [id number]     Signed by:   Katherne Lesch Raynard Farrow, MD   8/22/17     MAMMO SCREENING BILATERAL W 3D & CAD [id number] 09:06AM Roscoe Eden     Test Name Result Flag Reference   MAMMO SCREENING BILATERAL W 3D & CAD (Report)     Patient History:   Patient is postmenopausal    Family history of breast cancer in maternal aunt, breast cancer    in maternal cousin, endometrial cancer in mother  Benign excisional biopsy, 1995  Patient has never smoked  Patient's BMI is 49 8  Reason for exam: screening, asymptomatic  Mammo Screening Bilateral W DBT and CAD: August 22, 2017 - Check    In #: [de-identified]   2D/3D Procedure   3D views: Bilateral MLO view(s) were taken  2D views: Bilateral CC view(s) were taken  MLO view(s) were    taken of the right breast        Technologist: ANA Hays (R)(M)   Prior study comparison: August 15, 2016, mammo screening    bilateral W DBT and CAD performed at [address] [place] [address]  August 10, 2015, bilateral digital screening mammogram    performed at [address] [place] [address]  June 18, 2014,    bilateral digital screening mammogram performed at [address] [place] [address]  May 29, 2013, bilateral digital    screening mammogram performed at [address] [place] [address]  February 26, 2011, bilateral digital screening mammogram   performed at [address] [place] [address]  The breast tissue is heterogeneously dense, potentially limiting    the sensitivity of mammography   Patient risk, included in this    report, assists in determining the appropriate screening regimen    (such as 3-D mammography or the inclusion of automated breast    ultrasound or MRI)  3-D mammography may also remain indicated as    screening  A combination of mediolateral oblique 3D tomographic slices as    well as standard two-dimensional orthogonal images were obtained  No dominant soft tissue mass, architectural distortion or    suspicious calcifications are noted in either breast   The skin    and nipple structures are within normal limits  Scattered benign   appearing calcifications are noted  No significant changes when compared with prior studies  ACR BI-RADSï¾® Assessments: BiRad:2 - Benign     Recommendation:   Routine screening mammogram of both breasts in 1 year  A    reminder letter will be scheduled  The patient is scheduled in a reminder system  8-10% of cancers will be missed on mammography  Management of a    palpable abnormality must be based on clinical grounds  Patients    will be notified of their results via letter from our facility  Accredited by [employer] and FDA  Transcription Location: [address] [place] [address]: [id number]     Risk Value(s):   Tyrer-Cuzick 10 Year: 3 800%, Tyrer-Cuzick Lifetime: 8 500%,    Myriad Table: 1 5%, JOSE MANUEL 5 Year: 1 6%, NCI Lifetime: 6 9%       Plan  Post-menopausal    · * Dexa Scan Axial Skel (Hip, Pelvis, Spine) ; every 2 years;  Last [id number];  Status:Active

## 2018-01-12 VITALS
TEMPERATURE: 97.1 F | RESPIRATION RATE: 16 BRPM | HEART RATE: 74 BPM | BODY MASS INDEX: 45.82 KG/M2 | DIASTOLIC BLOOD PRESSURE: 90 MMHG | WEIGHT: 275 LBS | OXYGEN SATURATION: 96 % | HEIGHT: 65 IN | SYSTOLIC BLOOD PRESSURE: 142 MMHG

## 2018-01-12 NOTE — RESULT NOTES
Verified Results  (1) CBC/PLT/DIFF [id number] 07:03AM Mai Garcia Order Number: [id number]     Test Name Result Flag Reference   WBC COUNT 5 25 Thousand/uL  4 31-10 16   RBC COUNT 4 20 Million/uL  3 81-5 12   HEMOGLOBIN 14 0 g/dL  11 5-15 4   HEMATOCRIT 42 3 %  34 8-46  1    fL H 82-98   MCH 33 3 pg  26 8-34 3   MCHC 33 1 g/dL  31 4-37 4   RDW 12 5 %  11 6-15 1   MPV 9 9 fL  8 9-12 7   PLATELET COUNT 460 Thousands/uL  149-390   NEUTROPHILS RELATIVE PERCENT 60 %  43-75   LYMPHOCYTES RELATIVE PERCENT 26 %  14-44   MONOCYTES RELATIVE PERCENT 11 %  4-12   EOSINOPHILS RELATIVE PERCENT 3 %  0-6   BASOPHILS RELATIVE PERCENT 0 %  0-1   NEUTROPHILS ABSOLUTE COUNT 3 13 Thousands/? ??L  1 85-7 62   LYMPHOCYTES ABSOLUTE COUNT 1 38 Thousands/? ??L  0 60-4 47   MONOCYTES ABSOLUTE COUNT 0 58 Thousand/? ??L  0 17-1 22   EOSINOPHILS ABSOLUTE COUNT 0 14 Thousand/? ??L  0 00-0 61   BASOPHILS ABSOLUTE COUNT 0 02 Thousands/? ??L  0 00-0 10   - Patient Instructions: This bloodwork is non-fasting  Please drink two glasses of water morning of bloodwork       (1) COMPREHENSIVE METABOLIC PANEL [id number] 56:87VC Mai Garcia Order Number: [id number]     Test Name Result Flag Reference   SODIUM 145 mmol/L  136-145   POTASSIUM 4 6 mmol/L  3 5-5 3   CHLORIDE 109 mmol/L H 100-108   CARBON DIOXIDE 28 mmol/L  21-32   ANION GAP (CALC) 8 mmol/L  4-13   BLOOD UREA NITROGEN 21 mg/dL  5-25   CREATININE 0 94 mg/dL  0 60-1 30   Standardized to IDMS reference method   CALCIUM 8 7 mg/dL  8 3-10 1   BILI, TOTAL 0 50 mg/dL  0 20-1 00   ALK PHOSPHATAS 61 U/L     ALT (SGPT) 21 U/L  12-78   AST(SGOT) 19 U/L  5-45   ALBUMIN 3 7 g/dL  3 5-5 0   TOTAL PROTEIN 7 1 g/dL  6 4-8 2   eGFR Non-African American      >60 0 ml/min/1 73sq [facility] Disease Education Program recommendations are as follows:  GFR calculation is accurate only with a steady state creatinine  Chronic Kidney disease less than 60 ml/min/1 73 sq  meters  Kidney failure less than 15 ml/min/1 73 sq  meters  GLUCOSE FASTING 87 mg/dL  65-99     (1) LIPID PANEL, FASTING [id number] 07:03AM Jose Elida Order Number: [id number]     Test Name Result Flag Reference   CHOLESTEROL 205 mg/dL H    HDL,DIRECT 54 mg/dL  40-60   Specimen collection should occur prior to Metamizole administration due to the potential for falsely depressed results  LDL CHOLESTEROL CALCULATED 127 mg/dL H 0-100   - Patient Instructions: This is a fasting blood test  Water,black tea or black  coffee only after 9:00pm the night before test   Drink 2 glasses of water the morning of test       Triglyceride:         Normal              <150 mg/dl       Borderline High    150-199 mg/dl       High               200-499 mg/dl       Very High          >499 mg/dl  Cholesterol:         Desirable        <200 mg/dl      Borderline High  200-239 mg/dl      High             >239 mg/dl  HDL Cholesterol:        High    >59 mg/dL      Low     <41 mg/dL  LDL CALCULATED:    This screening LDL is a calculated result  It does not have the accuracy of the Direct Measured LDL in the monitoring of patients with hyperlipidemia and/or statin therapy  Direct Measure LDL (KOZ665) must be ordered separately in these patients  TRIGLYCERIDES 120 mg/dL  <=150   Specimen collection should occur prior to N-Acetylcysteine or Metamizole administration due to the potential for falsely depressed results  (1) TSH [id number] 07:03AM Jose Torresena Order Number: [id number]     Test Name Result Flag Reference   TSH 3 899 uIU/mL H 0 358-3 740   - Patient Instructions: This bloodwork is non-fasting  Please drink two glasses of water morning of bloodwork  Patients undergoing fluorescein dye angiography may retain small amounts of fluorescein in the body for 48-72 hours post procedure  Samples containing fluorescein can produce falsely depressed TSH values   If the patient had this procedure,a specimen should be resubmitted post fluorescein clearance  The recommended reference ranges for TSH during pregnancy are as follows:  First trimester 0 1 to 2 5 uIU/mL  Second trimester  0 2 to 3 0 uIU/mL  Third trimester 0 3 to 3 0 uIU/m     (1) VITAMIN D 25-HYDROXY [id number] 07:03AM Shivam Pearl Order Number: [id number]     Test Name Result Flag Reference   VIT D 25-HYDROX 35 6 ng/mL  30 0-100 0   This assay is a certified procedure of the CDC Vitamin D Standardization Certification Program (VDSCP)     Deficiency <20ng/ml   Insufficiency 20-30ng/ml   Sufficient  ng/ml     *Patients undergoing fluorescein dye angiography may retain small amounts of fluorescein in the body for 48-72 hours post procedure  Samples containing fluorescein can produce falsely elevated Vitamin D values  If the patient had this procedure, a specimen should be resubmitted post fluorescein clearance

## 2018-01-13 NOTE — MISCELLANEOUS
To Whom It May Concern:       Shreya Rothman has significant joint and muscle issues  Because of these issues she is very sensitive to drafty areas especially near events or doors  Please allow her to be away  from any areas that have drafts or rapidly moving air or significant changes in temperature  Sincerely,                    Davia Saint, M D  Electronically signed Germaine NDIAYE    Apr 28 2016  5:32AM EST Author

## 2018-01-13 NOTE — RESULT NOTES
Verified Results  (1) CBC/PLT/DIFF [id number] 07:55AM Neil [employer]     Test Name Result Flag Reference   WBC COUNT 6 32 Thousand/uL  4 31-10 16   RBC COUNT 4 23 Million/uL  3 81-5 12   HEMOGLOBIN 14 0 g/dL  11 5-15 4   HEMATOCRIT 42 0 %  34 8-46  1   MCV 99 fL H 82-98   MCH 33 1 pg  26 8-34 3   MCHC 33 3 g/dL  31 4-37 4   RDW 12 9 %  11 6-15 1   MPV 10 0 fL  8 9-12 7   PLATELET COUNT 635 Thousands/uL  149-390   NEUTROPHILS RELATIVE PERCENT 58 %  43-75   LYMPHOCYTES RELATIVE PERCENT 21 %  14-44   MONOCYTES RELATIVE PERCENT 9 %  4-12   EOSINOPHILS RELATIVE PERCENT 12 % H 0-6   BASOPHILS RELATIVE PERCENT 0 %  0-1   NEUTROPHILS ABSOLUTE COUNT 3 68 Thousands/?L  1 85-7 62   LYMPHOCYTES ABSOLUTE COUNT 1 32 Thousands/?L  0 60-4 47   MONOCYTES ABSOLUTE COUNT 0 54 Thousand/?L  0 17-1 22   EOSINOPHILS ABSOLUTE COUNT 0 76 Thousand/?L H 0 00-0 61   BASOPHILS ABSOLUTE COUNT 0 02 Thousands/?L  0 00-0 10     (1) COMPREHENSIVE METABOLIC PANEL [id number] 00:47EK Neil Hernandez     Test Name Result Flag Reference   GLUCOSE,RANDM 100 mg/dL     If the patient is fasting, the ADA then defines impaired fasting glucose as > 100 mg/dL and diabetes as > or equal to 123 mg/dL     SODIUM 143 mmol/L  136-145   POTASSIUM 4 2 mmol/L  3 5-5 3   CHLORIDE 107 mmol/L  100-108   CARBON DIOXIDE 28 mmol/L  21-32   ANION GAP (CALC) 8 mmol/L  4-13   BLOOD UREA NITROGEN 20 mg/dL  5-25   CREATININE 0 90 mg/dL  0 60-1 30   Standardized to IDMS reference method   CALCIUM 8 8 mg/dL  8 3-10 1   BILI, TOTAL 0 30 mg/dL  0 20-1 00   ALK PHOSPHATAS 80 U/L     ALT (SGPT) 28 U/L  12-78   AST(SGOT) 17 U/L  5-45   ALBUMIN 3 7 g/dL  3 5-5 0   TOTAL PROTEIN 7 1 g/dL  6 4-8 2   eGFR Non-African American      >60 0 ml/min/1 73sq [facility] Disease Education Program recommendations are as follows:  GFR calculation is accurate only with a steady state creatinine  Chronic Kidney disease less than 60 ml/min/1 73 sq  meters  Kidney failure less than 15 ml/min/1 73 sq  meters  (1) TSH [id number] 07:55AM Infomous Luciano     Test Name Result Flag Reference   TSH 2 887 uIU/mL  0 358-3 740   Patients undergoing fluorescein dye angiography may retain small amounts of fluorescein in the body for 48-72 hours post procedure  Samples containing fluorescein can produce falsely depressed TSH values  If the patient had this procedure,a specimen should be resubmitted post fluorescein clearance          The recommended reference ranges for TSH during pregnancy are as follows:  First trimester 0 1 to 2 5 uIU/mL  Second trimester  0 2 to 3 0 uIU/mL  Third trimester 0 3 to 3 0 uIU/m     (1) VITAMIN D 25-HYDROXY [id number] 07:55AM [employer]     Test Name Result Flag Reference   VIT D 25-HYDROX 30 9 ng/mL  30 0-100 0

## 2018-01-14 VITALS
DIASTOLIC BLOOD PRESSURE: 70 MMHG | OXYGEN SATURATION: 98 % | BODY MASS INDEX: 45.54 KG/M2 | SYSTOLIC BLOOD PRESSURE: 128 MMHG | HEART RATE: 72 BPM | TEMPERATURE: 97.8 F | WEIGHT: 273.31 LBS | HEIGHT: 65 IN

## 2018-01-17 NOTE — RESULT NOTES
Verified Results  MAMMO SCREENING BILATERAL W 3D & CAD [id number] 07:50AM Hi Marrero Order Number: [id number]     Test Name Result Flag Reference   MAMMO SCREENING BILATERAL W 3D & CAD (Report)     Patient History:   Patient is postmenopausal    Family history of endometrial cancer in mother, breast cancer in    maternal aunt, and breast cancer in maternal cousin  Patient has never smoked  Patient's BMI is 49 8  Reason for exam: screening (asymptomatic)  Mammo Screening Bilateral W DBT and CAD: August 15, 2016 - Check    In #: [de-identified]   2D/3D Procedure   3D views: Bilateral MLO view(s) were taken  XCCL view(s) were    taken of the right breast    2D views: Bilateral CC view(s) were taken  MLO view(s) were    taken of the right breast        Technologist: ANA Saeed (ANA)(M)   Prior study comparison: August 10, 2015, bilateral digital    screening mammogram performed at [address] [place] [address]  June 18, 2014, bilateral digital screening mammogram    performed at [address] [place] [address]  May 29, 2013,    bilateral digital screening mammogram performed at [address] [place] [address]  February 26, 2011, bilateral digital    screening mammogram performed at [address] [place] [address]  February 6, 2009, bilateral DIGITAL SCREENING MAMMOGRAM    performed at [address] [place] [address]  There are scattered fibroglandular densities  A combination of    mediolateral oblique 3-D tomographic slices as well as standard    two-dimensional orthogonal images were obtained  No dominant soft tissue mass, architectural distortion or    suspicious calcifications are noted  The skin and nipple    contours are within normal limits  No evidence of malignancy  No significant changes   when compared with prior studies  ASSESSMENT: BiRad:1 - Negative     Recommendation:   Routine screening mammogram of both breasts in 1 year   A    reminder letter will be sent  8-10% of cancers will be missed on mammography  Management of a    palpable abnormality must be based on clinical grounds  Patients    will be notified of their results via letter from our facility  Accredited by [employer] and FDA  Transcription Location: ANA [address]: [id number]     Risk Value(s):   Tyrer-Cuzick 10 Year: 3 826%, Tyrer-Cuzick Lifetime: 8 804%,    Myriad Table: 1 5%, JOSE MANUEL 5 Year: 1 2%, NCI Lifetime: 5 7%   Signed by:    Dash Azar MD   8/15/16

## 2018-01-18 NOTE — RESULT NOTES
Verified Results  (1) CBC/PLT/DIFF [id number] 08:26AM [facility] Order Number: [id number]     Test Name Result Flag Reference   WBC COUNT 5 10 Thousand/uL  4 31-10 16   RBC COUNT 4 23 Million/uL  3 81-5 12   HEMOGLOBIN 14 1 g/dL  11 5-15 4   HEMATOCRIT 42 5 %  34 8-46  1    fL H 82-98   MCH 33 3 pg  26 8-34 3   MCHC 33 2 g/dL  31 4-37 4   RDW 12 6 %  11 6-15 1   MPV 9 4 fL  8 9-12 7   PLATELET COUNT 761 Thousands/uL  149-390   NEUTROPHILS RELATIVE PERCENT 57 %  43-75   LYMPHOCYTES RELATIVE PERCENT 28 %  14-44   MONOCYTES RELATIVE PERCENT 12 %  4-12   EOSINOPHILS RELATIVE PERCENT 3 %  0-6   BASOPHILS RELATIVE PERCENT 0 %  0-1   NEUTROPHILS ABSOLUTE COUNT 2 91 Thousands/?L  1 85-7 62   LYMPHOCYTES ABSOLUTE COUNT 1 42 Thousands/?L  0 60-4 47   MONOCYTES ABSOLUTE COUNT 0 61 Thousand/?L  0 17-1 22   EOSINOPHILS ABSOLUTE COUNT 0 14 Thousand/?L  0 00-0 61   BASOPHILS ABSOLUTE COUNT 0 02 Thousands/?L  0 00-0 10     (1) COMPREHENSIVE METABOLIC PANEL [id number] 86:20OP [facility] Order Number: [id number]     Test Name Result Flag Reference   GLUCOSE,RANDM 85 mg/dL     If the patient is fasting, the ADA then defines impaired fasting glucose as > 100 mg/dL and diabetes as > or equal to 123 mg/dL     SODIUM 143 mmol/L  136-145   POTASSIUM 4 6 mmol/L  3 5-5 3   CHLORIDE 105 mmol/L  100-108   CARBON DIOXIDE 29 mmol/L  21-32   ANION GAP (CALC) 9 mmol/L  4-13   BLOOD UREA NITROGEN 15 mg/dL  5-25   CREATININE 0 92 mg/dL  0 60-1 30   Standardized to IDMS reference method   CALCIUM 8 8 mg/dL  8 3-10 1   BILI, TOTAL 0 50 mg/dL  0 20-1 00   ALK PHOSPHATAS 61 U/L     ALT (SGPT) 29 U/L  12-78   AST(SGOT) 19 U/L  5-45   ALBUMIN 3 9 g/dL  3 5-5 0   TOTAL PROTEIN 7 1 g/dL  6 4-8 2   eGFR Non-African American      >60 0 ml/min/1 73sq m   [employer] Disease Education Program recommendations are as follows:  GFR calculation is accurate only with a steady state creatinine  Chronic Kidney disease less than 60 ml/min/1 73 sq  meters  Kidney failure less than 15 ml/min/1 73 sq  meters  (1) LIPID PANEL, FASTING [id number] 08:26AM Earl De Anda Order Number: [id number]     Test Name Result Flag Reference   CHOLESTEROL 223 mg/dL H    HDL,DIRECT 54 mg/dL  40-60   Specimen collection should occur prior to Metamizole administration due to the potential for falsely depressed results  LDL CHOLESTEROL CALCULATED 146 mg/dL H 0-100   Triglyceride:         Normal              <150 mg/dl       Borderline High    150-199 mg/dl       High               200-499 mg/dl       Very High          >499 mg/dl  Cholesterol:         Desirable        <200 mg/dl      Borderline High  200-239 mg/dl      High             >239 mg/dl  HDL Cholesterol:        High    >59 mg/dL      Low     <41 mg/dL  LDL CALCULATED:    This screening LDL is a calculated result  It does not have the accuracy of the Direct Measured LDL in the monitoring of patients with hyperlipidemia and/or statin therapy  Direct Measure LDL (NTL706) must be ordered separately in these patients  TRIGLYCERIDES 114 mg/dL  <=150   Specimen collection should occur prior to N-Acetylcysteine or Metamizole administration due to the potential for falsely depressed results  (1) TSH [id number] 08:26AM Earl De Anda Order Number: [id number]     Test Name Result Flag Reference   TSH 3 216 uIU/mL  0 358-3 740   Patients undergoing fluorescein dye angiography may retain small amounts of fluorescein in the body for 48-72 hours post procedure  Samples containing fluorescein can produce falsely depressed TSH values  If the patient had this procedure,a specimen should be resubmitted post fluorescein clearance            The recommended reference ranges for TSH during pregnancy are as follows:  First trimester 0 1 to 2 5 uIU/mL  Second trimester  0 2 to 3 0 uIU/mL  Third trimester 0 3 to 3 0 uIU/m (1) VITAMIN D 25-HYDROXY [id number] 08:26AM Mai Garcia Order Number: [id number]     Test Name Result Flag Reference   VIT D 25-HYDROX 32 0 ng/mL  30 0-100 0   This assay is a certified procedure of the CDC Vitamin D Standardization Certification Program (VDSCP)     Deficiency <20ng/ml   Insufficiency 20-30ng/ml   Sufficient  ng/ml     *Patients undergoing fluorescein dye angiography may retain small amounts of fluorescein in the body for 48-72 hours post procedure  Samples containing fluorescein can produce falsely elevated Vitamin D values  If the patient had this procedure, a specimen should be resubmitted post fluorescein clearance

## 2018-01-22 VITALS
TEMPERATURE: 96.7 F | WEIGHT: 290 LBS | DIASTOLIC BLOOD PRESSURE: 78 MMHG | RESPIRATION RATE: 16 BRPM | OXYGEN SATURATION: 97 % | SYSTOLIC BLOOD PRESSURE: 130 MMHG | HEART RATE: 78 BPM | HEIGHT: 65 IN | BODY MASS INDEX: 48.32 KG/M2

## 2018-02-11 DIAGNOSIS — E03.9 HYPOTHYROIDISM: ICD-10-CM

## 2018-02-13 ENCOUNTER — APPOINTMENT (OUTPATIENT)
Dept: LAB | Facility: HOSPITAL | Age: 63
End: 2018-02-13
Payer: COMMERCIAL

## 2018-02-13 DIAGNOSIS — E03.9 HYPOTHYROIDISM: ICD-10-CM

## 2018-02-13 LAB — TSH SERPL DL<=0.05 MIU/L-ACNC: 2.17 UIU/ML (ref 0.36–3.74)

## 2018-02-13 PROCEDURE — 84443 ASSAY THYROID STIM HORMONE: CPT

## 2018-02-13 PROCEDURE — 36415 COLL VENOUS BLD VENIPUNCTURE: CPT

## 2018-04-11 DIAGNOSIS — E83.119 HEMOCHROMATOSIS: ICD-10-CM

## 2018-04-11 DIAGNOSIS — E55.9 VITAMIN D DEFICIENCY: ICD-10-CM

## 2018-04-11 DIAGNOSIS — M19.90 OSTEOARTHRITIS: ICD-10-CM

## 2018-04-11 DIAGNOSIS — K59.00 CONSTIPATION: ICD-10-CM

## 2018-04-11 DIAGNOSIS — E03.9 HYPOTHYROIDISM: ICD-10-CM

## 2018-04-11 DIAGNOSIS — E78.5 HYPERLIPIDEMIA: ICD-10-CM

## 2018-04-24 DIAGNOSIS — M19.90 ARTHRITIS: Primary | ICD-10-CM

## 2018-04-24 DIAGNOSIS — E08.00 DIABETES MELLITUS DUE TO UNDERLYING CONDITION WITH HYPEROSMOLARITY WITHOUT COMA, WITHOUT LONG-TERM CURRENT USE OF INSULIN (HCC): Primary | ICD-10-CM

## 2018-04-24 RX ORDER — GABAPENTIN 100 MG/1
100 CAPSULE ORAL 3 TIMES DAILY
Qty: 270 CAPSULE | Refills: 3 | Status: SHIPPED | OUTPATIENT
Start: 2018-04-24 | End: 2018-06-13

## 2018-04-24 RX ORDER — MELOXICAM 7.5 MG/1
7.5 TABLET ORAL DAILY
Qty: 90 TABLET | Refills: 1 | Status: SHIPPED | OUTPATIENT
Start: 2018-04-24 | End: 2018-06-13 | Stop reason: SDUPTHER

## 2018-04-24 RX ORDER — MELOXICAM 7.5 MG/1
1 TABLET ORAL
COMMUNITY
Start: 2014-09-23 | End: 2018-04-24 | Stop reason: SDUPTHER

## 2018-06-09 ENCOUNTER — APPOINTMENT (OUTPATIENT)
Dept: LAB | Facility: HOSPITAL | Age: 63
End: 2018-06-09
Payer: COMMERCIAL

## 2018-06-09 ENCOUNTER — TRANSCRIBE ORDERS (OUTPATIENT)
Dept: ADMINISTRATIVE | Facility: HOSPITAL | Age: 63
End: 2018-06-09

## 2018-06-09 DIAGNOSIS — E78.5 HYPERLIPIDEMIA: ICD-10-CM

## 2018-06-09 DIAGNOSIS — E55.9 VITAMIN D DEFICIENCY: ICD-10-CM

## 2018-06-09 DIAGNOSIS — E83.119 HEMOCHROMATOSIS: ICD-10-CM

## 2018-06-09 DIAGNOSIS — K59.00 CONSTIPATION: ICD-10-CM

## 2018-06-09 DIAGNOSIS — E03.9 HYPOTHYROIDISM: ICD-10-CM

## 2018-06-09 DIAGNOSIS — M19.90 OSTEOARTHRITIS: ICD-10-CM

## 2018-06-09 LAB
25(OH)D3 SERPL-MCNC: 52 NG/ML (ref 30–100)
ALBUMIN SERPL BCP-MCNC: 3.8 G/DL (ref 3.5–5)
ALP SERPL-CCNC: 64 U/L (ref 46–116)
ALT SERPL W P-5'-P-CCNC: 33 U/L (ref 12–78)
ANION GAP SERPL CALCULATED.3IONS-SCNC: 10 MMOL/L (ref 4–13)
AST SERPL W P-5'-P-CCNC: 23 U/L (ref 5–45)
BASOPHILS # BLD AUTO: 0.02 THOUSANDS/ΜL (ref 0–0.1)
BASOPHILS NFR BLD AUTO: 1 % (ref 0–1)
BILIRUB SERPL-MCNC: 0.6 MG/DL (ref 0.2–1)
BUN SERPL-MCNC: 18 MG/DL (ref 5–25)
CALCIUM SERPL-MCNC: 8.9 MG/DL (ref 8.3–10.1)
CHLORIDE SERPL-SCNC: 106 MMOL/L (ref 100–108)
CHOLEST SERPL-MCNC: 165 MG/DL (ref 50–200)
CO2 SERPL-SCNC: 27 MMOL/L (ref 21–32)
CREAT SERPL-MCNC: 0.81 MG/DL (ref 0.6–1.3)
EOSINOPHIL # BLD AUTO: 0.14 THOUSAND/ΜL (ref 0–0.61)
EOSINOPHIL NFR BLD AUTO: 3 % (ref 0–6)
ERYTHROCYTE [DISTWIDTH] IN BLOOD BY AUTOMATED COUNT: 13 % (ref 11.6–15.1)
FERRITIN SERPL-MCNC: 138 NG/ML (ref 8–388)
GFR SERPL CREATININE-BSD FRML MDRD: 77 ML/MIN/1.73SQ M
GLUCOSE P FAST SERPL-MCNC: 90 MG/DL (ref 65–99)
HCT VFR BLD AUTO: 41.2 % (ref 34.8–46.1)
HDLC SERPL-MCNC: 54 MG/DL (ref 40–60)
HGB BLD-MCNC: 14.1 G/DL (ref 11.5–15.4)
IRON SATN MFR SERPL: 37 %
IRON SERPL-MCNC: 98 UG/DL (ref 50–170)
LDLC SERPL CALC-MCNC: 95 MG/DL (ref 0–100)
LYMPHOCYTES # BLD AUTO: 1.2 THOUSANDS/ΜL (ref 0.6–4.47)
LYMPHOCYTES NFR BLD AUTO: 29 % (ref 14–44)
MCH RBC QN AUTO: 34 PG (ref 26.8–34.3)
MCHC RBC AUTO-ENTMCNC: 34.2 G/DL (ref 31.4–37.4)
MCV RBC AUTO: 99 FL (ref 82–98)
MONOCYTES # BLD AUTO: 0.52 THOUSAND/ΜL (ref 0.17–1.22)
MONOCYTES NFR BLD AUTO: 13 % (ref 4–12)
NEUTROPHILS # BLD AUTO: 2.23 THOUSANDS/ΜL (ref 1.85–7.62)
NEUTS SEG NFR BLD AUTO: 54 % (ref 43–75)
NONHDLC SERPL-MCNC: 111 MG/DL
PLATELET # BLD AUTO: 169 THOUSANDS/UL (ref 149–390)
PMV BLD AUTO: 9.8 FL (ref 8.9–12.7)
POTASSIUM SERPL-SCNC: 4.5 MMOL/L (ref 3.5–5.3)
PROT SERPL-MCNC: 6.9 G/DL (ref 6.4–8.2)
RBC # BLD AUTO: 4.15 MILLION/UL (ref 3.81–5.12)
SODIUM SERPL-SCNC: 143 MMOL/L (ref 136–145)
TIBC SERPL-MCNC: 265 UG/DL (ref 250–450)
TRIGL SERPL-MCNC: 80 MG/DL
TSH SERPL DL<=0.05 MIU/L-ACNC: 1.77 UIU/ML (ref 0.36–3.74)
WBC # BLD AUTO: 4.11 THOUSAND/UL (ref 4.31–10.16)

## 2018-06-09 PROCEDURE — 84443 ASSAY THYROID STIM HORMONE: CPT

## 2018-06-09 PROCEDURE — 83540 ASSAY OF IRON: CPT

## 2018-06-09 PROCEDURE — 36415 COLL VENOUS BLD VENIPUNCTURE: CPT

## 2018-06-09 PROCEDURE — 82728 ASSAY OF FERRITIN: CPT

## 2018-06-09 PROCEDURE — 80061 LIPID PANEL: CPT

## 2018-06-09 PROCEDURE — 83550 IRON BINDING TEST: CPT

## 2018-06-09 PROCEDURE — 85025 COMPLETE CBC W/AUTO DIFF WBC: CPT

## 2018-06-09 PROCEDURE — 80053 COMPREHEN METABOLIC PANEL: CPT

## 2018-06-09 PROCEDURE — 82306 VITAMIN D 25 HYDROXY: CPT

## 2018-06-12 ENCOUNTER — TELEPHONE (OUTPATIENT)
Dept: INTERNAL MEDICINE CLINIC | Facility: CLINIC | Age: 63
End: 2018-06-12

## 2018-06-12 NOTE — TELEPHONE ENCOUNTER
Received a call from [facility] requesting a letter for her to give to work requesting that the [employer] is not on all the time  She state it causes her bones to hurt  [facility] did say that she had a note done previously for this in the past        She will be in the office tomorrow morning  Please advise

## 2018-06-13 ENCOUNTER — OFFICE VISIT (OUTPATIENT)
Dept: INTERNAL MEDICINE CLINIC | Facility: CLINIC | Age: 63
End: 2018-06-13
Payer: COMMERCIAL

## 2018-06-13 VITALS
TEMPERATURE: 97.9 F | WEIGHT: 270 LBS | OXYGEN SATURATION: 97 % | HEART RATE: 66 BPM | DIASTOLIC BLOOD PRESSURE: 60 MMHG | SYSTOLIC BLOOD PRESSURE: 110 MMHG | HEIGHT: 65 IN | BODY MASS INDEX: 44.98 KG/M2

## 2018-06-13 DIAGNOSIS — K59.00 CONSTIPATION, UNSPECIFIED CONSTIPATION TYPE: ICD-10-CM

## 2018-06-13 DIAGNOSIS — G89.4 PAIN SYNDROME, CHRONIC: ICD-10-CM

## 2018-06-13 DIAGNOSIS — E78.2 MIXED HYPERLIPIDEMIA: ICD-10-CM

## 2018-06-13 DIAGNOSIS — M15.9 PRIMARY OSTEOARTHRITIS INVOLVING MULTIPLE JOINTS: Primary | ICD-10-CM

## 2018-06-13 DIAGNOSIS — M19.90 ARTHRITIS: ICD-10-CM

## 2018-06-13 DIAGNOSIS — E03.9 HYPOTHYROIDISM, UNSPECIFIED TYPE: ICD-10-CM

## 2018-06-13 DIAGNOSIS — K75.81 NONALCOHOLIC STEATOHEPATITIS: ICD-10-CM

## 2018-06-13 PROBLEM — E78.5 HYPERLIPIDEMIA: Status: ACTIVE | Noted: 2017-01-09

## 2018-06-13 PROCEDURE — 99214 OFFICE O/P EST MOD 30 MIN: CPT | Performed by: FAMILY MEDICINE

## 2018-06-13 PROCEDURE — 3008F BODY MASS INDEX DOCD: CPT | Performed by: FAMILY MEDICINE

## 2018-06-13 RX ORDER — GABAPENTIN 100 MG/1
100 CAPSULE ORAL 3 TIMES DAILY
Qty: 270 CAPSULE | Refills: 1 | Status: SHIPPED | OUTPATIENT
Start: 2018-06-13 | End: 2018-12-18 | Stop reason: SDUPTHER

## 2018-06-13 RX ORDER — MELOXICAM 7.5 MG/1
TABLET ORAL
Qty: 180 TABLET | Refills: 1 | Status: SHIPPED | OUTPATIENT
Start: 2018-06-13 | End: 2018-12-18 | Stop reason: SDUPTHER

## 2018-06-13 RX ORDER — LEVOTHYROXINE SODIUM 0.1 MG/1
1 TABLET ORAL DAILY
COMMUNITY
Start: 2017-07-10 | End: 2018-08-24 | Stop reason: SDUPTHER

## 2018-06-13 RX ORDER — DULOXETIN HYDROCHLORIDE 20 MG/1
3 CAPSULE, DELAYED RELEASE ORAL DAILY
COMMUNITY
Start: 2015-03-16 | End: 2018-12-18 | Stop reason: SDUPTHER

## 2018-06-13 RX ORDER — MULTIVIT-MIN/IRON/FOLIC ACID/K 18-600-40
1 CAPSULE ORAL DAILY
COMMUNITY
Start: 2014-12-04 | End: 2020-05-12

## 2018-06-13 NOTE — LETTER
To Whom It May Concern:     Due to her significant osteoarthritis and her chronic pain, air conditioning worsens her symptoms significantly  She is managed by medications but environmental adjustments, such as limiting exposure to cold air, is also necessary for adequate pain control  Thank you for your consideration             Sincerely,             SENG Ruffin

## 2018-06-13 NOTE — PROGRESS NOTES
Assessment/Plan:    No problem-specific Assessment & Plan notes found for this encounter  Diagnoses and all orders for this visit:    Primary osteoarthritis involving multiple joints  -     CBC and differential; Future  -     Comprehensive metabolic panel; Future  -     Lipid panel; Future  -     TSH, 3rd generation; Future    Arthritis  -     meloxicam (MOBIC) 7 5 mg tablet; Take one pill twice daily as needed for pain  -     CBC and differential; Future  -     Comprehensive metabolic panel; Future  -     Lipid panel; Future  -     TSH, 3rd generation; Future    Pain syndrome, chronic  -     CBC and differential; Future  -     Comprehensive metabolic panel; Future  -     Lipid panel; Future  -     TSH, 3rd generation; Future  -     gabapentin (NEURONTIN) 100 mg capsule; Take 1 capsule (100 mg total) by mouth 3 (three) times a day    Mixed hyperlipidemia  -     CBC and differential; Future  -     Comprehensive metabolic panel; Future  -     Lipid panel; Future  -     TSH, 3rd generation; Future    Hypothyroidism, unspecified type  -     CBC and differential; Future  -     Comprehensive metabolic panel; Future  -     Lipid panel; Future  -     TSH, 3rd generation; Future    Nonalcoholic steatohepatitis  -     CBC and differential; Future  -     Comprehensive metabolic panel; Future  -     Lipid panel; Future  -     TSH, 3rd generation; Future    Constipation, unspecified constipation type  -     CBC and differential; Future  -     Comprehensive metabolic panel; Future  -     Lipid panel; Future  -     TSH, 3rd generation; Future        Orders and recommendations as noted above  Recent laboratory testing was reviewed with her  Cholesterol is significantly improved with the dietary changes  Continue watch diet  Continue to increase fiber in diet  Increase activity level of tolerates  Continue Cymbalta, Neurontin and Mobic for the chronic pain complaints  Try to remain as active as possible    Vitamin-D level is stable  Continue with the vitamin-D supplementation as previously  Continue with aspirin on a daily basis  TSH is stable  Continue same dose of the levothyroxine  Reviewed her medication list with her  She will be due for her mammogram again in August   She is up-to-date on her bone density  She is up-to-date on her colonoscopy  Will have her follow up in about 6 months with laboratory testing prior to that visit  Follow up sooner if needed  Letter was given for work regarding her intolerance to air conditioning with her joint symptoms  Subjective:      Patient ID: Maykel Hitchcock is a 58 y o  female  She presents for routine follow-up  Has generally been doing relatively well  Has been trying to be more active and watching her diet more closely  Has lost about 20 lb over the last 2 months  Has been eating more chopped knots and has noticed this is helped with her constipation symptoms  Is now having a bowel movement every few days  Is not taking anything over-the-counter for this on a regular basis  Still continues with pain especially into her back and legs  This is worse when she is in air conditioning or with changes in temperature  Continues with the Mobic on a daily basis  Continues with Neurontin and Cymbalta  Denies any recent falls  Has been trying to increase her fiber and watch her cholesterol intake  Denies any chest pain or palpitations  Denies any significant shortness of breath  Appetite has been generally stable  Denies any nausea, vomiting, or diarrhea  Denies any significant headaches  Energy level has been stable and actually improved over the past 6 weeks or so with the dietary changes and weight loss  Denies any recent illnesses  Some burning and pain into her legs times  Denies any weakness          The following portions of the patient's history were reviewed and updated as appropriate:   She  has a past medical history of Disease of thyroid gland; Fibromyalgia; Osteoarthritis; Osteomyelitis of ankle ([facility] [address] ); Rheumatoid arthritis ([facility] [address] ); and RSD (reflex sympathetic dystrophy)  She   Patient Active Problem List    Diagnosis Date Noted    Hyperlipidemia 01/09/2017    Osteoarthritis 01/09/2017    Baker's cyst of knee 08/12/2015    Tendonitis of elbow, left 08/12/2015    Hot flashes due to menopause 04/07/2015    Adenomatous colon polyp 12/12/2014    Gastroesophageal reflux disease 10/10/2014    Pain syndrome, chronic 09/04/2014    Peripheral neuropathy 06/03/2014    Esophagitis 04/29/2014    Hemochromatosis 02/28/2014    Colon, diverticulosis 02/12/2014    Constipation 02/12/2014    Hypothyroidism 02/12/2014    Lower back pain 23/70/0679    Nonalcoholic steatohepatitis 70/84/2746    Splenic cyst 02/12/2014    Vitamin D deficiency 02/12/2014     She  has a past surgical history that includes Carpal tunnel release; Joint replacement; Cholecystectomy; Facial reconstruction surgery; Ankle Fusion (Right); ERCP; and Shoulder surgery  Her family history includes Coronary artery disease in her father; Dementia in her mother; Diabetes in her family and mother; Hemochromatosis in her sister  She  reports that she has quit smoking  She does not have any smokeless tobacco history on file  She reports that she does not drink alcohol or use drugs  Current Outpatient Prescriptions   Medication Sig Dispense Refill    aspirin 81 MG tablet Take 1 tablet by mouth daily      Cholecalciferol (VITAMIN D) 2000 units CAPS Take 1 capsule by mouth daily      DULoxetine (CYMBALTA) 20 mg capsule Take 3 capsules by mouth daily      levothyroxine 100 mcg tablet Take 1 tablet by mouth daily      meloxicam (MOBIC) 7 5 mg tablet Take one pill twice daily as needed for pain 180 tablet 1    gabapentin (NEURONTIN) 100 mg capsule Take 1 capsule (100 mg total) by mouth 3 (three) times a day 270 capsule 1     No current facility-administered medications for this visit  Current Outpatient Prescriptions on File Prior to Visit   Medication Sig    [DISCONTINUED] gabapentin (NEURONTIN) 100 mg capsule Take 1 capsule (100 mg total) by mouth 3 (three) times a day    [DISCONTINUED] meloxicam (MOBIC) 7 5 mg tablet Take 1 tablet (7 5 mg total) by mouth daily    [DISCONTINUED] Pregabalin (LYRICA PO) Take 1 tablet by mouth 3 (three) times a day   [DISCONTINUED] Levothyroxine Sodium (SYNTHROID PO) Take 1 tablet by mouth daily   [DISCONTINUED] oxyCODONE-acetaminophen (PERCOCET) 5-325 mg per tablet Take 1 tablet by mouth every 6 (six) hours as needed for moderate pain for up to 20 doses  Max Daily Amount: 4 tablets     No current facility-administered medications on file prior to visit  She has No Known Allergies       Review of Systems   Constitutional: Negative for activity change, appetite change, chills and fever  HENT: Negative for congestion and rhinorrhea  Eyes: Negative for visual disturbance  Respiratory: Negative for chest tightness and shortness of breath  Cardiovascular: Negative for chest pain and palpitations  Gastrointestinal: Negative for abdominal pain, blood in stool, diarrhea, nausea and vomiting  Endocrine: Negative for polydipsia, polyphagia and polyuria  Genitourinary: Negative for dysuria, frequency and urgency  Musculoskeletal: Positive for arthralgias, back pain and neck stiffness  Negative for gait problem  Skin: Negative for color change  Neurological: Negative for dizziness and headaches  Hematological: Does not bruise/bleed easily  Psychiatric/Behavioral: Negative for confusion and sleep disturbance  The patient is not nervous/anxious            Objective:      /60 (BP Location: Left arm, Patient Position: Sitting, Cuff Size: Large)   Pulse 66   Temp 97 9 °F (36 6 °C) (Temporal)   Ht 5' 4 5" (1 638 m)   Wt 122 kg (270 lb)   SpO2 97%   BMI 45 63 kg/m²          Physical Exam   Constitutional: She is oriented to person, place, and time  She is cooperative  No distress  HENT:   Head: Normocephalic and atraumatic  Mouth/Throat: Oropharynx is clear and moist    Slight cerumen   Eyes: Conjunctivae are normal  Pupils are equal, round, and reactive to light  Right eye exhibits no discharge  Left eye exhibits no discharge  Cardiovascular: Normal rate, regular rhythm and normal heart sounds  Exam reveals no gallop and no friction rub  No murmur heard  Pulmonary/Chest: No respiratory distress  She has no wheezes  She has no rales  Abdominal: Bowel sounds are normal  She exhibits no distension  There is no tenderness  Musculoskeletal: She exhibits no edema  Degenerative changes bilateral knees and ankles   Lymphadenopathy:     She has no cervical adenopathy  Neurological: She is alert and oriented to person, place, and time  Skin: Skin is warm and dry  Psychiatric: She has a normal mood and affect  Her behavior is normal    Nursing note and vitals reviewed  Below is the patient's most recent value for Albumin, ALT, AST, BUN, Calcium, Chloride, Cholesterol, CO2, Creatinine, GFR, Glucose, HDL, Hematocrit, Hemoglobin, Hemoglobin A1C, LDL, Magnesium, Phosphorus, Platelets, Potassium, PSA, Sodium, Triglycerides, and WBC  Lab Results   Component Value Date    ALT 33 06/09/2018    AST 23 06/09/2018    BUN 18 06/09/2018    CALCIUM 8 9 06/09/2018     06/09/2018    CHOL 165 06/09/2018    CO2 27 06/09/2018    CREATININE 0 81 06/09/2018    HDL 54 06/09/2018    HCT 41 2 06/09/2018    HGB 14 1 06/09/2018    MG 2 1 03/10/2014    PHOS 2 5 03/10/2014     06/09/2018    K 4 5 06/09/2018     06/09/2018    TRIG 80 06/09/2018    WBC 4 11 (L) 06/09/2018     Note: for a comprehensive list of the patient's lab results, access the Results Review activity

## 2018-08-24 DIAGNOSIS — E03.9 HYPOTHYROIDISM, UNSPECIFIED TYPE: Primary | ICD-10-CM

## 2018-08-24 RX ORDER — LEVOTHYROXINE SODIUM 0.1 MG/1
100 TABLET ORAL DAILY
Qty: 90 TABLET | Refills: 1 | Status: SHIPPED | OUTPATIENT
Start: 2018-08-24 | End: 2018-12-18 | Stop reason: SDUPTHER

## 2018-08-24 NOTE — TELEPHONE ENCOUNTER
Patient called requesting a refill on Levothyroxine 100mcg, which she takes once daily  She would like this sent to [facility] in [facility]

## 2018-11-19 ENCOUNTER — TRANSCRIBE ORDERS (OUTPATIENT)
Dept: ADMINISTRATIVE | Facility: HOSPITAL | Age: 63
End: 2018-11-19

## 2018-11-19 DIAGNOSIS — Z12.39 SCREENING BREAST EXAMINATION: Primary | ICD-10-CM

## 2018-11-30 ENCOUNTER — HOSPITAL ENCOUNTER (OUTPATIENT)
Dept: MAMMOGRAPHY | Facility: HOSPITAL | Age: 63
Discharge: HOME | End: 2018-11-30
Payer: COMMERCIAL

## 2018-11-30 DIAGNOSIS — Z12.39 SCREENING BREAST EXAMINATION: ICD-10-CM

## 2018-11-30 PROCEDURE — 77063 BREAST TOMOSYNTHESIS BI: CPT

## 2018-11-30 PROCEDURE — 77067 SCR MAMMO BI INCL CAD: CPT

## 2018-12-03 ENCOUNTER — TRANSCRIBE ORDERS (OUTPATIENT)
Dept: ADMINISTRATIVE | Facility: HOSPITAL | Age: 63
End: 2018-12-03

## 2018-12-03 DIAGNOSIS — N63.20 BREAST MASS, LEFT: Primary | ICD-10-CM

## 2018-12-04 ENCOUNTER — HOSPITAL ENCOUNTER (OUTPATIENT)
Dept: MAMMOGRAPHY | Facility: HOSPITAL | Age: 63
Discharge: HOME | End: 2018-12-04
Payer: COMMERCIAL

## 2018-12-04 ENCOUNTER — HOSPITAL ENCOUNTER (OUTPATIENT)
Dept: ULTRASOUND IMAGING | Facility: HOSPITAL | Age: 63
Discharge: HOME | End: 2018-12-04
Payer: COMMERCIAL

## 2018-12-04 VITALS — WEIGHT: 270 LBS | BODY MASS INDEX: 44.98 KG/M2 | HEIGHT: 65 IN

## 2018-12-04 DIAGNOSIS — N63.20 BREAST MASS, LEFT: ICD-10-CM

## 2018-12-04 PROCEDURE — 77065 DX MAMMO INCL CAD UNI: CPT

## 2018-12-04 PROCEDURE — G0279 TOMOSYNTHESIS, MAMMO: HCPCS

## 2018-12-04 PROCEDURE — 76642 ULTRASOUND BREAST LIMITED: CPT

## 2018-12-09 ENCOUNTER — APPOINTMENT (OUTPATIENT)
Dept: LAB | Facility: HOSPITAL | Age: 63
End: 2018-12-09
Payer: COMMERCIAL

## 2018-12-09 DIAGNOSIS — G89.4 PAIN SYNDROME, CHRONIC: ICD-10-CM

## 2018-12-09 DIAGNOSIS — E03.9 HYPOTHYROIDISM, UNSPECIFIED TYPE: ICD-10-CM

## 2018-12-09 DIAGNOSIS — K75.81 NONALCOHOLIC STEATOHEPATITIS: ICD-10-CM

## 2018-12-09 DIAGNOSIS — K59.00 CONSTIPATION, UNSPECIFIED CONSTIPATION TYPE: ICD-10-CM

## 2018-12-09 DIAGNOSIS — M19.90 ARTHRITIS: ICD-10-CM

## 2018-12-09 DIAGNOSIS — M15.9 PRIMARY OSTEOARTHRITIS INVOLVING MULTIPLE JOINTS: ICD-10-CM

## 2018-12-09 DIAGNOSIS — E78.2 MIXED HYPERLIPIDEMIA: ICD-10-CM

## 2018-12-09 LAB
ALBUMIN SERPL BCP-MCNC: 3.6 G/DL (ref 3.5–5)
ALP SERPL-CCNC: 72 U/L (ref 46–116)
ALT SERPL W P-5'-P-CCNC: 27 U/L (ref 12–78)
ANION GAP SERPL CALCULATED.3IONS-SCNC: 7 MMOL/L (ref 4–13)
AST SERPL W P-5'-P-CCNC: 21 U/L (ref 5–45)
BASOPHILS # BLD AUTO: 0.03 THOUSANDS/ΜL (ref 0–0.1)
BASOPHILS NFR BLD AUTO: 1 % (ref 0–1)
BILIRUB SERPL-MCNC: 0.4 MG/DL (ref 0.2–1)
BUN SERPL-MCNC: 18 MG/DL (ref 5–25)
CALCIUM SERPL-MCNC: 8.8 MG/DL (ref 8.3–10.1)
CHLORIDE SERPL-SCNC: 108 MMOL/L (ref 100–108)
CHOLEST SERPL-MCNC: 187 MG/DL (ref 50–200)
CO2 SERPL-SCNC: 29 MMOL/L (ref 21–32)
CREAT SERPL-MCNC: 0.8 MG/DL (ref 0.6–1.3)
EOSINOPHIL # BLD AUTO: 0.17 THOUSAND/ΜL (ref 0–0.61)
EOSINOPHIL NFR BLD AUTO: 3 % (ref 0–6)
ERYTHROCYTE [DISTWIDTH] IN BLOOD BY AUTOMATED COUNT: 12.6 % (ref 11.6–15.1)
GFR SERPL CREATININE-BSD FRML MDRD: 78 ML/MIN/1.73SQ M
GLUCOSE P FAST SERPL-MCNC: 82 MG/DL (ref 65–99)
HCT VFR BLD AUTO: 41.5 % (ref 34.8–46.1)
HDLC SERPL-MCNC: 56 MG/DL (ref 40–60)
HGB BLD-MCNC: 13.8 G/DL (ref 11.5–15.4)
IMM GRANULOCYTES # BLD AUTO: 0.01 THOUSAND/UL (ref 0–0.2)
IMM GRANULOCYTES NFR BLD AUTO: 0 % (ref 0–2)
LDLC SERPL CALC-MCNC: 117 MG/DL (ref 0–100)
LYMPHOCYTES # BLD AUTO: 1.73 THOUSANDS/ΜL (ref 0.6–4.47)
LYMPHOCYTES NFR BLD AUTO: 32 % (ref 14–44)
MCH RBC QN AUTO: 33.9 PG (ref 26.8–34.3)
MCHC RBC AUTO-ENTMCNC: 33.3 G/DL (ref 31.4–37.4)
MCV RBC AUTO: 102 FL (ref 82–98)
MONOCYTES # BLD AUTO: 0.55 THOUSAND/ΜL (ref 0.17–1.22)
MONOCYTES NFR BLD AUTO: 10 % (ref 4–12)
NEUTROPHILS # BLD AUTO: 2.91 THOUSANDS/ΜL (ref 1.85–7.62)
NEUTS SEG NFR BLD AUTO: 54 % (ref 43–75)
NONHDLC SERPL-MCNC: 131 MG/DL
NRBC BLD AUTO-RTO: 0 /100 WBCS
PLATELET # BLD AUTO: 182 THOUSANDS/UL (ref 149–390)
PMV BLD AUTO: 9.7 FL (ref 8.9–12.7)
POTASSIUM SERPL-SCNC: 4.2 MMOL/L (ref 3.5–5.3)
PROT SERPL-MCNC: 6.8 G/DL (ref 6.4–8.2)
RBC # BLD AUTO: 4.07 MILLION/UL (ref 3.81–5.12)
SODIUM SERPL-SCNC: 144 MMOL/L (ref 136–145)
TRIGL SERPL-MCNC: 69 MG/DL
TSH SERPL DL<=0.05 MIU/L-ACNC: 2.43 UIU/ML (ref 0.36–3.74)
WBC # BLD AUTO: 5.4 THOUSAND/UL (ref 4.31–10.16)

## 2018-12-09 PROCEDURE — 80053 COMPREHEN METABOLIC PANEL: CPT

## 2018-12-09 PROCEDURE — 80061 LIPID PANEL: CPT

## 2018-12-09 PROCEDURE — 85025 COMPLETE CBC W/AUTO DIFF WBC: CPT

## 2018-12-09 PROCEDURE — 36415 COLL VENOUS BLD VENIPUNCTURE: CPT

## 2018-12-09 PROCEDURE — 84443 ASSAY THYROID STIM HORMONE: CPT

## 2018-12-18 ENCOUNTER — OFFICE VISIT (OUTPATIENT)
Dept: INTERNAL MEDICINE CLINIC | Facility: CLINIC | Age: 63
End: 2018-12-18
Payer: COMMERCIAL

## 2018-12-18 VITALS
BODY MASS INDEX: 38.15 KG/M2 | HEIGHT: 65 IN | OXYGEN SATURATION: 98 % | HEART RATE: 65 BPM | SYSTOLIC BLOOD PRESSURE: 100 MMHG | TEMPERATURE: 97.7 F | WEIGHT: 229 LBS | DIASTOLIC BLOOD PRESSURE: 60 MMHG

## 2018-12-18 DIAGNOSIS — E03.9 HYPOTHYROIDISM, UNSPECIFIED TYPE: ICD-10-CM

## 2018-12-18 DIAGNOSIS — N63.20 LEFT BREAST MASS: ICD-10-CM

## 2018-12-18 DIAGNOSIS — G89.4 PAIN SYNDROME, CHRONIC: ICD-10-CM

## 2018-12-18 DIAGNOSIS — K59.00 CONSTIPATION, UNSPECIFIED CONSTIPATION TYPE: ICD-10-CM

## 2018-12-18 DIAGNOSIS — E55.9 VITAMIN D DEFICIENCY: ICD-10-CM

## 2018-12-18 DIAGNOSIS — E78.2 MIXED HYPERLIPIDEMIA: Primary | ICD-10-CM

## 2018-12-18 DIAGNOSIS — M19.90 ARTHRITIS: ICD-10-CM

## 2018-12-18 PROCEDURE — 99214 OFFICE O/P EST MOD 30 MIN: CPT | Performed by: FAMILY MEDICINE

## 2018-12-18 RX ORDER — DULOXETIN HYDROCHLORIDE 20 MG/1
60 CAPSULE, DELAYED RELEASE ORAL DAILY
Qty: 90 CAPSULE | Refills: 1 | Status: SHIPPED | OUTPATIENT
Start: 2018-12-18 | End: 2020-05-12

## 2018-12-18 RX ORDER — GABAPENTIN 100 MG/1
100 CAPSULE ORAL 3 TIMES DAILY
Qty: 270 CAPSULE | Refills: 0 | Status: SHIPPED | OUTPATIENT
Start: 2018-12-18 | End: 2020-05-12

## 2018-12-18 RX ORDER — MELOXICAM 7.5 MG/1
TABLET ORAL
Qty: 180 TABLET | Refills: 0 | Status: SHIPPED | OUTPATIENT
Start: 2018-12-18 | End: 2020-05-12

## 2018-12-18 RX ORDER — LEVOTHYROXINE SODIUM 0.1 MG/1
100 TABLET ORAL DAILY
Qty: 90 TABLET | Refills: 3 | Status: SHIPPED | OUTPATIENT
Start: 2018-12-18 | End: 2020-05-12

## 2018-12-18 NOTE — PROGRESS NOTES
Assessment/Plan:    Hypothyroidism  TSH is stable  Continue levothyroxine 100 mcg on a daily basis  Continue follow TSH prior to next visit in about 6-9 months  Constipation  Constipation symptoms controlled  Continue with the dietary changes that have significantly help this  Continue with the crushed knots which have helped significantly  Stay well hydrated  Hyperlipidemia  Cholesterol relatively well controlled  LDL slightly higher than goal   Continue to watch diet  Increase fiber in diet  Continue to increase activity level  Pain syndrome, chronic  She is unsure whether the gabapentin and the Cymbalta are helping significantly  She would like to try trial off of these  Advised her that she can gradually try to wean one of these at a time  Discussed the weaning process with her  Watch for any worsening of symptoms  Weight loss and continued activity may help with this as well  Vitamin D deficiency  Continue with vitamin-D supplementation  Will continue follow vitamin-D levels about once to twice a year  Left breast mass  Follow-up with surgeon this afternoon for biopsy  Advised her to contact our office after she gets to results  Diagnoses and all orders for this visit:    Mixed hyperlipidemia  -     Comprehensive metabolic panel; Future  -     Lipid panel; Future  -     TSH, 3rd generation; Future    Pain syndrome, chronic  -     DULoxetine (CYMBALTA) 20 mg capsule; Take 3 capsules (60 mg total) by mouth daily  -     gabapentin (NEURONTIN) 100 mg capsule; Take 1 capsule (100 mg total) by mouth 3 (three) times a day    Hypothyroidism, unspecified type  -     levothyroxine 100 mcg tablet; Take 1 tablet (100 mcg total) by mouth daily  -     TSH, 3rd generation; Future    Arthritis  -     DULoxetine (CYMBALTA) 20 mg capsule; Take 3 capsules (60 mg total) by mouth daily  -     meloxicam (MOBIC) 7 5 mg tablet;  Take one pill twice daily as needed for pain    Vitamin D deficiency  - Vitamin D 25 hydroxy; Future    Constipation, unspecified constipation type    Left breast mass        Orders and recommendations as noted above  Recent laboratory testing reviewed with her  Continue to follow up with the breast specialist as scheduled  Proceed with biopsy today as scheduled  She is up-to-date on her bone density  She is up-to-date on her colonoscopy  Declines immunizations  Will have her follow up in about 5-6 months with laboratory testing prior to that visit  Follow up sooner if needed  Subjective:      Patient ID: Liban Spence is a 58 y o  female  She presents for routine follow-up  Has generally been doing relatively well  Has had some additional stressors recently  Mammogram was abnormal and is going for a biopsy with the surgeon in [address] today  She is dealing with this relatively well  Has had some additional stressors with her sister battling pancreatic cancer  She has been a major supporter for her as well  Work is going relatively well  Still has significant issues with pain especially into the neck and back area  She is unsure whether the gabapentin and the Cymbalta is doing much  She is considering trying a trial off of these  Has been watching her diet closely  Has lost about 70 lb over the past here  Has been doing the Slim-Fast for breakfast and lunch and normal supper  Appetite has been generally good  Denies any nausea, vomiting, or diarrhea  Constipation symptoms have been controlled  She continues to grind cashews and walnuts which have helped her significantly  Usually sleeps relatively well  Tolerating her levothyroxine without difficulty  Energy level has been good  Has been trying to exercise on an exercise bike on a nightly basis  Vision has been stable  Denies any nausea, vomiting, or diarrhea  Denies any chest pain or palpitations  Denies any significant shortness of breath  Denies any recent reflux symptoms    Weight loss has helped with this  Some stiffness into the knees at times  The following portions of the patient's history were reviewed and updated as appropriate:   She  has a past medical history of Disease of thyroid gland; Fibromyalgia; Osteoarthritis; Osteomyelitis of ankle ([address] ); Rheumatoid arthritis ([address] ); and RSD (reflex sympathetic dystrophy)  She   Patient Active Problem List    Diagnosis Date Noted    Left breast mass 12/18/2018    Hyperlipidemia 01/09/2017    Osteoarthritis 01/09/2017    Baker's cyst of knee 08/12/2015    Tendonitis of elbow, left 08/12/2015    Hot flashes due to menopause 04/07/2015    Adenomatous colon polyp 12/12/2014    Gastroesophageal reflux disease 10/10/2014    Pain syndrome, chronic 09/04/2014    Peripheral neuropathy 06/03/2014    Esophagitis 04/29/2014    Hemochromatosis 02/28/2014    Colon, diverticulosis 02/12/2014    Constipation 02/12/2014    Hypothyroidism 02/12/2014    Lower back pain 66/44/6136    Nonalcoholic steatohepatitis 33/20/8118    Splenic cyst 02/12/2014    Vitamin D deficiency 02/12/2014     She  has a past surgical history that includes Carpal tunnel release; Joint replacement; Cholecystectomy; Facial reconstruction surgery; Ankle Fusion (Right); ERCP; and Shoulder surgery  Her family history includes Breast cancer in her cousin and maternal aunt; Coronary artery disease in her father; Dementia in her mother; Diabetes in her family and mother; Hemochromatosis in her sister  She  reports that she has quit smoking  She does not have any smokeless tobacco history on file  She reports that she does not drink alcohol or use drugs    Current Outpatient Prescriptions   Medication Sig Dispense Refill    aspirin 81 MG tablet Take 1 tablet by mouth daily      Cholecalciferol (VITAMIN D) 2000 units CAPS Take 1 capsule by mouth daily      DULoxetine (CYMBALTA) 20 mg capsule Take 3 capsules (60 mg total) by mouth daily 90 capsule 1    gabapentin (NEURONTIN) 100 mg capsule Take 1 capsule (100 mg total) by mouth 3 (three) times a day 270 capsule 0    levothyroxine 100 mcg tablet Take 1 tablet (100 mcg total) by mouth daily 90 tablet 3    meloxicam (MOBIC) 7 5 mg tablet Take one pill twice daily as needed for pain 180 tablet 0     No current facility-administered medications for this visit  Current Outpatient Prescriptions on File Prior to Visit   Medication Sig    aspirin 81 MG tablet Take 1 tablet by mouth daily    Cholecalciferol (VITAMIN D) 2000 units CAPS Take 1 capsule by mouth daily     No current facility-administered medications on file prior to visit  She has No Known Allergies       Review of Systems   Constitutional: Positive for activity change  Negative for appetite change, chills, fatigue and fever  HENT: Negative for congestion and rhinorrhea  Eyes: Negative for visual disturbance  Respiratory: Negative for chest tightness, shortness of breath and wheezing  Cardiovascular: Negative for chest pain and palpitations  Gastrointestinal: Negative for abdominal pain, blood in stool, diarrhea, nausea and vomiting  Endocrine: Negative for polydipsia, polyphagia and polyuria  Genitourinary: Negative for dysuria, frequency and urgency  Musculoskeletal: Positive for arthralgias, back pain and neck stiffness  Negative for gait problem  Skin: Negative for color change  Left breast with mammogram abnormality as noted in HPI  Neurological: Negative for dizziness and headaches  Hematological: Does not bruise/bleed easily  Psychiatric/Behavioral: Negative for confusion and sleep disturbance  The patient is not nervous/anxious           As per HPI         Objective:      /60 (BP Location: Right arm, Patient Position: Sitting, Cuff Size: Large)   Pulse 65   Temp 97 7 °F (36 5 °C) (Temporal)   Ht 5' 4 5" (1 638 m)   Wt 104 kg (229 lb)   SpO2 98%   BMI 38 70 kg/m²          Physical Exam   Constitutional: She is oriented to person, place, and time  She is cooperative  No distress  HENT:   Head: Normocephalic and atraumatic  Mouth/Throat: Oropharynx is clear and moist    Slight cerumen   Eyes: Pupils are equal, round, and reactive to light  Conjunctivae are normal  Right eye exhibits no discharge  Left eye exhibits no discharge  Neck: No JVD present  Carotid bruit is not present  Cardiovascular: Normal rate, regular rhythm and normal heart sounds  Exam reveals no gallop and no friction rub  No murmur heard  Pulmonary/Chest: No respiratory distress  She has no wheezes  She has no rales  Abdominal: Bowel sounds are normal  She exhibits no distension  There is no tenderness  Musculoskeletal: She exhibits no edema  Degenerative changes bilateral knees and ankles   Lymphadenopathy:     She has no cervical adenopathy  Right: No supraclavicular adenopathy present  Left: No supraclavicular adenopathy present  Neurological: She is alert and oriented to person, place, and time  Skin: Skin is warm and dry  Psychiatric: Her behavior is normal  Her mood appears anxious  Nursing note and vitals reviewed  Below is the patient's most recent value for Albumin, ALT, AST, BUN, Calcium, Chloride, Cholesterol, CO2, Creatinine, GFR, Glucose, HDL, Hematocrit, Hemoglobin, Hemoglobin A1C, LDL, Magnesium, Phosphorus, Platelets, Potassium, PSA, Sodium, Triglycerides, and WBC     Lab Results   Component Value Date    ALT 27 12/09/2018    AST 21 12/09/2018    BUN 18 12/09/2018    CALCIUM 8 8 12/09/2018     12/09/2018    CHOL 190 11/26/2014    CO2 29 12/09/2018    CREATININE 0 80 12/09/2018    HDL 56 12/09/2018    HCT 41 5 12/09/2018    HGB 13 8 12/09/2018    MG 2 1 03/10/2014    PHOS 2 5 03/10/2014     12/09/2018    K 4 2 12/09/2018     06/23/2014    TRIG 69 12/09/2018    WBC 5 40 12/09/2018     Note: for a comprehensive list of the patient's lab results, access the Results Review activity

## 2018-12-18 NOTE — ASSESSMENT & PLAN NOTE
Continue with vitamin-D supplementation  Will continue follow vitamin-D levels about once to twice a year

## 2018-12-18 NOTE — ASSESSMENT & PLAN NOTE
She is unsure whether the gabapentin and the Cymbalta are helping significantly  She would like to try trial off of these  Advised her that she can gradually try to wean one of these at a time  Discussed the weaning process with her  Watch for any worsening of symptoms  Weight loss and continued activity may help with this as well

## 2018-12-18 NOTE — ASSESSMENT & PLAN NOTE
Follow-up with surgeon this afternoon for biopsy  Advised her to contact our office after she gets to results

## 2018-12-18 NOTE — ASSESSMENT & PLAN NOTE
Cholesterol relatively well controlled  LDL slightly higher than goal   Continue to watch diet  Increase fiber in diet  Continue to increase activity level

## 2018-12-18 NOTE — ASSESSMENT & PLAN NOTE
TSH is stable  Continue levothyroxine 100 mcg on a daily basis  Continue follow TSH prior to next visit in about 6-9 months

## 2018-12-18 NOTE — ASSESSMENT & PLAN NOTE
Constipation symptoms controlled  Continue with the dietary changes that have significantly help this  Continue with the crushed knots which have helped significantly  Stay well hydrated

## 2018-12-18 NOTE — PATIENT INSTRUCTIONS
Hyperlipidemia   WHAT YOU NEED TO KNOW:   What is hyperlipidemia? Hyperlipidemia is a high level of lipids (fats) in your blood  These lipids include cholesterol or triglycerides  Lipids are made by your body  They also come from the foods you eat  Your body needs lipids to work properly, but high levels increase your risk for heart disease, heart attack, and stroke  What increases my risk for hyperlipidemia? · Family history of high lipid levels    · Diet high in saturated fats, cholesterol, or calories     · High alcohol intake or smoking    · Lack of regular physical activity    · Medical conditions such as hypothyroidism, obesity, or type 2 diabetes    · Certain medicines, such as blood pressure medicines, hormones, and steroids  How is hyperlipidemia managed and treated? Your healthcare provider may first recommend that you make lifestyle changes to help decrease your lipid levels  You may also need to take medicine to lower your lipid levels  Some of the lifestyle changes you may need to make include the following:  · Maintain a healthy weight  Ask your healthcare provider how much you should weigh  Ask him or her to help you create a weight loss plan if you are overweight  Weight loss can decrease your cholesterol and triglyceride levels  · Exercise as directed  Exercise lowers your cholesterol levels and helps you maintain a healthy weight  Get 40 minutes or more of moderate exercise 3 to 4 days each week  You can split your exercise into four 10-minute workouts instead of 40 minutes at one time  Examples of moderate exercises include walking briskly, swimming, or riding a bike  Work with your healthcare provider to plan the best exercise program for you  · Do not smoke  Nicotine and other chemicals in cigarettes and cigars can increase your risk for a heart attack and stroke  Ask your healthcare provider for information if you currently smoke and need help to quit   E-cigarettes or smokeless tobacco still contain nicotine  Talk to your healthcare provider before you use these products  · Eat heart-healthy foods  Talk to your dietitian about a heart-healthy diet  The following will help you manage hyperlipidemia:     ¨ Decrease the total amount of fat you eat  Choose lean meats, fat-free or 1% fat milk, and low-fat dairy products, such as yogurt and cheese  Limit or do not eat red meat  Red meats are high in fat and cholesterol  ¨ Replace unhealthy fats with healthy fats  Unhealthy fats include saturated fat, trans fat, and cholesterol  Choose soft margarines that are low in saturated fat and have little or no trans fat  Monounsaturated fats are healthy fats  These are found in olive oil, canola oil, avocado, and nuts  Polyunsaturated fats are also healthy  These are found in fish, flaxseed, walnuts, and soybeans  ¨ Eat fruits and vegetables every day  They are low in calories and fat and a good source of essential vitamins  Include dark green, red, and orange vegetables  Examples include spinach, kale, broccoli, and carrots  ¨ Eat foods high in fiber  Choose whole grain, high-fiber foods  Good choices include whole-wheat breads or cereals, beans, peas, fruits, and vegetables  · Ask your healthcare provider if it is safe for you to drink alcohol  Alcohol can increase your cholesterol and triglyceride levels  A drink of alcohol is 12 ounces of beer, 5 ounces of wine, or 1½ ounces of liquor    Call 911 for any of the following:   · You have any of the following signs of a heart attack:      ¨ Squeezing, pressure, or pain in your chest that lasts longer than 5 minutes or returns    ¨ Discomfort or pain in your back, neck, jaw, stomach, or arm     ¨ Trouble breathing    ¨ Nausea or vomiting    ¨ Lightheadedness or a sudden cold sweat, especially with chest pain or trouble breathing    · You have any of the following signs of a stroke:      ¨ Numbness or drooping on one side of your face     ¨ Weakness in an arm or leg    ¨ Confusion or difficulty speaking    ¨ Dizziness, a severe headache, or vision loss  When should I contact my healthcare provider? · You have questions or concerns about your condition or care  CARE AGREEMENT:   You have the right to help plan your care  Learn about your health condition and how it may be treated  Discuss treatment options with your caregivers to decide what care you want to receive  You always have the right to refuse treatment  The above information is an  only  It is not intended as medical advice for individual conditions or treatments  Talk to your doctor, nurse or pharmacist before following any medical regimen to see if it is safe and effective for you  © 2017 [facility] Information is for End User's use only and may not be sold, redistributed or otherwise used for commercial purposes  All illustrations and images included in CareNotes® are the copyrighted property of A [employer] M [employer]  or [employer]

## 2019-08-12 ENCOUNTER — TELEPHONE (OUTPATIENT)
Dept: INTERNAL MEDICINE CLINIC | Facility: CLINIC | Age: 64
End: 2019-08-12

## 2019-08-12 NOTE — TELEPHONE ENCOUNTER
Per Darci Feldman- pt needs to schedule a f/u  I called and left a msg for pt to call us back to schedule

## 2019-08-12 NOTE — TELEPHONE ENCOUNTER
I called and spoke to patient asked her about making a follow up appointment but patient is dealing with sister in hospice and will call to schedule when appointment later

## 2020-05-12 ENCOUNTER — TELEMEDICINE (OUTPATIENT)
Dept: INTERNAL MEDICINE CLINIC | Facility: CLINIC | Age: 65
End: 2020-05-12
Payer: MEDICARE

## 2020-05-12 VITALS — WEIGHT: 220 LBS | BODY MASS INDEX: 36.65 KG/M2 | HEIGHT: 65 IN

## 2020-05-12 DIAGNOSIS — Z11.59 NEED FOR HEPATITIS C SCREENING TEST: ICD-10-CM

## 2020-05-12 DIAGNOSIS — G89.4 PAIN SYNDROME, CHRONIC: ICD-10-CM

## 2020-05-12 DIAGNOSIS — M15.9 PRIMARY OSTEOARTHRITIS INVOLVING MULTIPLE JOINTS: ICD-10-CM

## 2020-05-12 DIAGNOSIS — I83.813 VARICOSE VEINS OF BOTH LOWER EXTREMITIES WITH PAIN: ICD-10-CM

## 2020-05-12 DIAGNOSIS — Z78.0 ASYMPTOMATIC POSTMENOPAUSAL STATE: ICD-10-CM

## 2020-05-12 DIAGNOSIS — E83.110 HEREDITARY HEMOCHROMATOSIS (HCC): ICD-10-CM

## 2020-05-12 DIAGNOSIS — E55.9 VITAMIN D DEFICIENCY: ICD-10-CM

## 2020-05-12 DIAGNOSIS — M25.552 LEFT HIP PAIN: ICD-10-CM

## 2020-05-12 DIAGNOSIS — Z00.00 WELCOME TO MEDICARE PREVENTIVE VISIT: ICD-10-CM

## 2020-05-12 DIAGNOSIS — E78.2 MIXED HYPERLIPIDEMIA: ICD-10-CM

## 2020-05-12 DIAGNOSIS — M79.89 LEG SWELLING: ICD-10-CM

## 2020-05-12 DIAGNOSIS — Z12.31 ENCOUNTER FOR SCREENING MAMMOGRAM FOR BREAST CANCER: ICD-10-CM

## 2020-05-12 DIAGNOSIS — Z01.419 ENCOUNTER FOR GYNECOLOGICAL EXAMINATION: ICD-10-CM

## 2020-05-12 DIAGNOSIS — D12.6 ADENOMATOUS POLYP OF COLON, UNSPECIFIED PART OF COLON: ICD-10-CM

## 2020-05-12 DIAGNOSIS — E03.9 HYPOTHYROIDISM, UNSPECIFIED TYPE: Primary | ICD-10-CM

## 2020-05-12 DIAGNOSIS — R26.9 GAIT DISTURBANCE: ICD-10-CM

## 2020-05-12 DIAGNOSIS — Z00.00 MEDICARE ANNUAL WELLNESS VISIT, INITIAL: ICD-10-CM

## 2020-05-12 PROBLEM — N63.20 LEFT BREAST MASS: Status: RESOLVED | Noted: 2018-12-18 | Resolved: 2020-05-12

## 2020-05-12 PROCEDURE — 3008F BODY MASS INDEX DOCD: CPT | Performed by: FAMILY MEDICINE

## 2020-05-12 PROCEDURE — 1036F TOBACCO NON-USER: CPT | Performed by: FAMILY MEDICINE

## 2020-05-12 PROCEDURE — 1125F AMNT PAIN NOTED PAIN PRSNT: CPT | Performed by: FAMILY MEDICINE

## 2020-05-12 PROCEDURE — 99214 OFFICE O/P EST MOD 30 MIN: CPT | Performed by: FAMILY MEDICINE

## 2020-05-12 PROCEDURE — 1170F FXNL STATUS ASSESSED: CPT | Performed by: FAMILY MEDICINE

## 2020-05-12 PROCEDURE — G0402 INITIAL PREVENTIVE EXAM: HCPCS | Performed by: FAMILY MEDICINE

## 2020-05-12 PROCEDURE — 1160F RVW MEDS BY RX/DR IN RCRD: CPT | Performed by: FAMILY MEDICINE

## 2020-05-13 ENCOUNTER — APPOINTMENT (OUTPATIENT)
Dept: LAB | Facility: CLINIC | Age: 65
End: 2020-05-13
Payer: MEDICARE

## 2020-05-13 ENCOUNTER — TELEPHONE (OUTPATIENT)
Dept: INTERNAL MEDICINE CLINIC | Facility: CLINIC | Age: 65
End: 2020-05-13

## 2020-05-13 DIAGNOSIS — E78.2 MIXED HYPERLIPIDEMIA: ICD-10-CM

## 2020-05-13 DIAGNOSIS — G89.4 PAIN SYNDROME, CHRONIC: ICD-10-CM

## 2020-05-13 DIAGNOSIS — E83.110 HEREDITARY HEMOCHROMATOSIS (HCC): ICD-10-CM

## 2020-05-13 DIAGNOSIS — I83.813 VARICOSE VEINS OF BOTH LOWER EXTREMITIES WITH PAIN: ICD-10-CM

## 2020-05-13 DIAGNOSIS — M15.9 PRIMARY OSTEOARTHRITIS INVOLVING MULTIPLE JOINTS: Primary | ICD-10-CM

## 2020-05-13 DIAGNOSIS — M15.9 PRIMARY OSTEOARTHRITIS INVOLVING MULTIPLE JOINTS: ICD-10-CM

## 2020-05-13 DIAGNOSIS — Z11.59 NEED FOR HEPATITIS C SCREENING TEST: ICD-10-CM

## 2020-05-13 DIAGNOSIS — E03.9 HYPOTHYROIDISM, UNSPECIFIED TYPE: ICD-10-CM

## 2020-05-13 DIAGNOSIS — E55.9 VITAMIN D DEFICIENCY: ICD-10-CM

## 2020-05-13 DIAGNOSIS — M79.89 LEG SWELLING: ICD-10-CM

## 2020-05-13 LAB
25(OH)D3 SERPL-MCNC: 52.4 NG/ML (ref 30–100)
ALBUMIN SERPL BCP-MCNC: 3.8 G/DL (ref 3.5–5)
ALP SERPL-CCNC: 64 U/L (ref 46–116)
ALT SERPL W P-5'-P-CCNC: 26 U/L (ref 12–78)
ANION GAP SERPL CALCULATED.3IONS-SCNC: 2 MMOL/L (ref 4–13)
AST SERPL W P-5'-P-CCNC: 18 U/L (ref 5–45)
BASOPHILS # BLD AUTO: 0.04 THOUSANDS/ΜL (ref 0–0.1)
BASOPHILS NFR BLD AUTO: 1 % (ref 0–1)
BILIRUB SERPL-MCNC: 0.42 MG/DL (ref 0.2–1)
BUN SERPL-MCNC: 22 MG/DL (ref 5–25)
CALCIUM SERPL-MCNC: 9 MG/DL (ref 8.3–10.1)
CHLORIDE SERPL-SCNC: 110 MMOL/L (ref 100–108)
CHOLEST SERPL-MCNC: 172 MG/DL (ref 50–200)
CO2 SERPL-SCNC: 31 MMOL/L (ref 21–32)
CREAT SERPL-MCNC: 0.75 MG/DL (ref 0.6–1.3)
CRP SERPL QL: <3 MG/L
EOSINOPHIL # BLD AUTO: 0.1 THOUSAND/ΜL (ref 0–0.61)
EOSINOPHIL NFR BLD AUTO: 2 % (ref 0–6)
ERYTHROCYTE [DISTWIDTH] IN BLOOD BY AUTOMATED COUNT: 11.9 % (ref 11.6–15.1)
GFR SERPL CREATININE-BSD FRML MDRD: 83 ML/MIN/1.73SQ M
GLUCOSE SERPL-MCNC: 88 MG/DL (ref 65–140)
HCT VFR BLD AUTO: 42.1 % (ref 34.8–46.1)
HCV AB SER QL: NORMAL
HDLC SERPL-MCNC: 57 MG/DL
HGB BLD-MCNC: 13.9 G/DL (ref 11.5–15.4)
IMM GRANULOCYTES # BLD AUTO: 0.02 THOUSAND/UL (ref 0–0.2)
IMM GRANULOCYTES NFR BLD AUTO: 0 % (ref 0–2)
LDLC SERPL CALC-MCNC: 89 MG/DL (ref 0–100)
LYMPHOCYTES # BLD AUTO: 1.41 THOUSANDS/ΜL (ref 0.6–4.47)
LYMPHOCYTES NFR BLD AUTO: 29 % (ref 14–44)
MCH RBC QN AUTO: 33.3 PG (ref 26.8–34.3)
MCHC RBC AUTO-ENTMCNC: 33 G/DL (ref 31.4–37.4)
MCV RBC AUTO: 101 FL (ref 82–98)
MONOCYTES # BLD AUTO: 0.51 THOUSAND/ΜL (ref 0.17–1.22)
MONOCYTES NFR BLD AUTO: 11 % (ref 4–12)
NEUTROPHILS # BLD AUTO: 2.73 THOUSANDS/ΜL (ref 1.85–7.62)
NEUTS SEG NFR BLD AUTO: 57 % (ref 43–75)
NONHDLC SERPL-MCNC: 115 MG/DL
NRBC BLD AUTO-RTO: 0 /100 WBCS
PLATELET # BLD AUTO: 158 THOUSANDS/UL (ref 149–390)
PMV BLD AUTO: 10 FL (ref 8.9–12.7)
POTASSIUM SERPL-SCNC: 4.7 MMOL/L (ref 3.5–5.3)
PROT SERPL-MCNC: 7.1 G/DL (ref 6.4–8.2)
RBC # BLD AUTO: 4.17 MILLION/UL (ref 3.81–5.12)
SODIUM SERPL-SCNC: 143 MMOL/L (ref 136–145)
TRIGL SERPL-MCNC: 130 MG/DL
TSH SERPL DL<=0.05 MIU/L-ACNC: 3.21 UIU/ML (ref 0.36–3.74)
WBC # BLD AUTO: 4.81 THOUSAND/UL (ref 4.31–10.16)

## 2020-05-13 PROCEDURE — 84443 ASSAY THYROID STIM HORMONE: CPT

## 2020-05-13 PROCEDURE — 36415 COLL VENOUS BLD VENIPUNCTURE: CPT

## 2020-05-13 PROCEDURE — 86140 C-REACTIVE PROTEIN: CPT

## 2020-05-13 PROCEDURE — 85025 COMPLETE CBC W/AUTO DIFF WBC: CPT

## 2020-05-13 PROCEDURE — 80061 LIPID PANEL: CPT

## 2020-05-13 PROCEDURE — 86803 HEPATITIS C AB TEST: CPT

## 2020-05-13 PROCEDURE — 80053 COMPREHEN METABOLIC PANEL: CPT

## 2020-05-13 PROCEDURE — 82306 VITAMIN D 25 HYDROXY: CPT

## 2020-05-18 ENCOUNTER — HOSPITAL ENCOUNTER (OUTPATIENT)
Dept: NON INVASIVE DIAGNOSTICS | Facility: HOSPITAL | Age: 65
Discharge: HOME | End: 2020-05-18
Payer: MEDICARE

## 2020-05-18 DIAGNOSIS — I83.813 VARICOSE VEINS OF BOTH LOWER EXTREMITIES WITH PAIN: ICD-10-CM

## 2020-05-18 DIAGNOSIS — M79.89 LEG SWELLING: ICD-10-CM

## 2020-05-18 PROCEDURE — 93970 EXTREMITY STUDY: CPT | Performed by: SURGERY

## 2020-05-18 PROCEDURE — 93970 EXTREMITY STUDY: CPT

## 2020-05-29 ENCOUNTER — TELEPHONE (OUTPATIENT)
Dept: INTERNAL MEDICINE CLINIC | Facility: CLINIC | Age: 65
End: 2020-05-29

## 2020-06-08 ENCOUNTER — TELEMEDICINE (OUTPATIENT)
Dept: GASTROENTEROLOGY | Facility: MEDICAL CENTER | Age: 65
End: 2020-06-08
Payer: MEDICARE

## 2020-06-08 DIAGNOSIS — K57.30 COLON, DIVERTICULOSIS: Primary | ICD-10-CM

## 2020-06-08 DIAGNOSIS — Z86.010 PERSONAL HISTORY OF COLONIC POLYPS: ICD-10-CM

## 2020-06-08 DIAGNOSIS — D12.6 ADENOMATOUS POLYP OF COLON, UNSPECIFIED PART OF COLON: ICD-10-CM

## 2020-06-08 DIAGNOSIS — K75.81 NONALCOHOLIC STEATOHEPATITIS: ICD-10-CM

## 2020-06-08 PROCEDURE — 99202 OFFICE O/P NEW SF 15 MIN: CPT | Performed by: INTERNAL MEDICINE

## 2020-07-24 ENCOUNTER — HOSPITAL ENCOUNTER (OUTPATIENT)
Dept: NON INVASIVE DIAGNOSTICS | Facility: HOSPITAL | Age: 65
Discharge: HOME | End: 2020-07-24
Payer: MEDICARE

## 2020-07-24 ENCOUNTER — TELEPHONE (OUTPATIENT)
Dept: INTERNAL MEDICINE CLINIC | Facility: CLINIC | Age: 65
End: 2020-07-24

## 2020-07-24 ENCOUNTER — TELEPHONE (OUTPATIENT)
Dept: GASTROENTEROLOGY | Facility: CLINIC | Age: 65
End: 2020-07-24

## 2020-07-24 DIAGNOSIS — I83.813 VARICOSE VEINS OF BOTH LOWER EXTREMITIES WITH PAIN: Primary | ICD-10-CM

## 2020-07-24 DIAGNOSIS — I83.813 VARICOSE VEINS OF BOTH LOWER EXTREMITIES WITH PAIN: ICD-10-CM

## 2020-07-24 PROCEDURE — 93970 EXTREMITY STUDY: CPT

## 2020-07-24 NOTE — TELEPHONE ENCOUNTER
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

## 2020-07-24 NOTE — TELEPHONE ENCOUNTER
Patients GI provider:  Dr Tye Ramos    Number to return call: [phone]    Reason for call: Pt calling to sched her colon procedure    Scheduled procedure/appointment date if applicable: NA

## 2020-07-25 PROCEDURE — 93970 EXTREMITY STUDY: CPT | Performed by: SURGERY

## 2020-08-10 ENCOUNTER — ANESTHESIA (OUTPATIENT)
Dept: GASTROENTEROLOGY | Facility: HOSPITAL | Age: 65
End: 2020-08-10

## 2020-08-10 ENCOUNTER — ANESTHESIA EVENT (OUTPATIENT)
Dept: GASTROENTEROLOGY | Facility: HOSPITAL | Age: 65
End: 2020-08-10

## 2020-08-10 ENCOUNTER — HOSPITAL ENCOUNTER (OUTPATIENT)
Dept: GASTROENTEROLOGY | Facility: HOSPITAL | Age: 65
Setting detail: OUTPATIENT SURGERY
Discharge: HOME | End: 2020-08-10
Attending: INTERNAL MEDICINE
Payer: MEDICARE

## 2020-08-10 VITALS
OXYGEN SATURATION: 98 % | BODY MASS INDEX: 36.65 KG/M2 | TEMPERATURE: 98.4 F | RESPIRATION RATE: 20 BRPM | WEIGHT: 220 LBS | SYSTOLIC BLOOD PRESSURE: 118 MMHG | HEART RATE: 82 BPM | DIASTOLIC BLOOD PRESSURE: 64 MMHG | HEIGHT: 65 IN

## 2020-08-10 DIAGNOSIS — Z86.010 PERSONAL HISTORY OF COLONIC POLYPS: ICD-10-CM

## 2020-08-10 PROCEDURE — 45385 COLONOSCOPY W/LESION REMOVAL: CPT | Performed by: INTERNAL MEDICINE

## 2020-08-10 PROCEDURE — 88305 TISSUE EXAM BY PATHOLOGIST: CPT | Performed by: PATHOLOGY

## 2020-08-10 PROCEDURE — 45380 COLONOSCOPY AND BIOPSY: CPT | Performed by: INTERNAL MEDICINE

## 2020-08-10 RX ORDER — SODIUM CHLORIDE, SODIUM LACTATE, POTASSIUM CHLORIDE, CALCIUM CHLORIDE 600; 310; 30; 20 MG/100ML; MG/100ML; MG/100ML; MG/100ML
125 INJECTION, SOLUTION INTRAVENOUS CONTINUOUS
Status: DISCONTINUED | OUTPATIENT
Start: 2020-08-10 | End: 2020-08-14 | Stop reason: HOSPADM

## 2020-08-10 RX ORDER — PROPOFOL 10 MG/ML
INJECTION, EMULSION INTRAVENOUS AS NEEDED
Status: DISCONTINUED | OUTPATIENT
Start: 2020-08-10 | End: 2020-08-10

## 2020-08-10 RX ORDER — LIDOCAINE HYDROCHLORIDE 10 MG/ML
INJECTION, SOLUTION EPIDURAL; INFILTRATION; INTRACAUDAL; PERINEURAL AS NEEDED
Status: DISCONTINUED | OUTPATIENT
Start: 2020-08-10 | End: 2020-08-10

## 2020-08-10 RX ADMIN — SIMETHICONE: 20 SUSPENSION/ DROPS ORAL at 14:08

## 2020-08-10 RX ADMIN — LIDOCAINE HYDROCHLORIDE 50 MG: 10 INJECTION, SOLUTION EPIDURAL; INFILTRATION; INTRACAUDAL; PERINEURAL at 13:50

## 2020-08-10 RX ADMIN — PROPOFOL 50 MG: 10 INJECTION, EMULSION INTRAVENOUS at 14:09

## 2020-08-10 RX ADMIN — SODIUM CHLORIDE, SODIUM LACTATE, POTASSIUM CHLORIDE, AND CALCIUM CHLORIDE: .6; .31; .03; .02 INJECTION, SOLUTION INTRAVENOUS at 13:14

## 2020-08-10 RX ADMIN — PROPOFOL 50 MG: 10 INJECTION, EMULSION INTRAVENOUS at 14:00

## 2020-08-10 RX ADMIN — PROPOFOL 50 MG: 10 INJECTION, EMULSION INTRAVENOUS at 14:14

## 2020-08-10 RX ADMIN — PROPOFOL 50 MG: 10 INJECTION, EMULSION INTRAVENOUS at 14:19

## 2020-08-10 RX ADMIN — PROPOFOL 100 MG: 10 INJECTION, EMULSION INTRAVENOUS at 13:51

## 2020-08-10 RX ADMIN — PROPOFOL 50 MG: 10 INJECTION, EMULSION INTRAVENOUS at 13:56

## 2020-08-10 RX ADMIN — SODIUM CHLORIDE, SODIUM LACTATE, POTASSIUM CHLORIDE, AND CALCIUM CHLORIDE 125 ML/HR: .6; .31; .03; .02 INJECTION, SOLUTION INTRAVENOUS at 11:54

## 2020-08-10 NOTE — DISCHARGE INSTRUCTIONS
Colonoscopy   WHAT YOU NEED TO KNOW:   A colonoscopy is a procedure to examine the inside of your colon (intestine) with a scope  Polyps or tissue growths may have been removed during your colonoscopy  It is normal to feel bloated and to have some abdominal discomfort  You should be passing gas  If you have hemorrhoids or you had polyps removed, you may have a small amount of bleeding  DISCHARGE INSTRUCTIONS:   Seek care immediately if:   · You have a large amount of bright red blood in your bowel movements  · Your abdomen is hard and firm and you have severe pain  · You have sudden trouble breathing  Contact your healthcare provider if:   · You develop a rash or hives  · You have a fever within 24 hours of your procedure  · You have not had a bowel movement for 3 days after your procedure  · You have questions or concerns about your condition or care  Activity:   · Do not lift, strain, or run  for 3 days after your procedure  · Rest after your procedure  You have been given medicine to relax you  Do not  drive or make important decisions until the day after your procedure  Return to your normal activity as directed  · Relieve gas and discomfort from bloating  by lying on your right side with a heating pad on your abdomen  You may need to take short walks to help the gas move out  Eat small meals until bloating is relieved  If you had polyps removed: For 7 days after your procedure:  · Do not  take aspirin  · Do not  go on long car rides  Help prevent constipation:   · Eat a variety of healthy foods  Healthy foods include fruit, vegetables, whole-grain breads, low-fat dairy products, beans, lean meat, and fish  Ask if you need to be on a special diet  Your healthcare provider may recommend that you eat high-fiber foods such as cooked beans  Fiber helps you have regular bowel movements  · Drink liquids as directed    Adults should drink between 9 and 13 eight-ounce cups of liquid every day  Ask what amount is best for you  For most people, good liquids to drink are water, juice, and milk  · Exercise as directed  Talk to your healthcare provider about the best exercise plan for you  Exercise can help prevent constipation, decrease your blood pressure and improve your health  Follow up with your healthcare provider as directed:  Write down your questions so you remember to ask them during your visits  © 2017 [address] [employer] Information is for End User's use only and may not be sold, redistributed or otherwise used for commercial purposes  All illustrations and images included in CareNotes® are the copyrighted property of [employer]  or [employer]  The above information is an  only  It is not intended as medical advice for individual conditions or treatments  Talk to your doctor, nurse or pharmacist before following any medical regimen to see if it is safe and effective for you  Colorectal Polyps   WHAT YOU NEED TO KNOW:   Colorectal polyps are small growths of tissue in the lining of the colon and rectum  Most polyps are hyperplastic polyps and are usually benign (noncancerous)  Certain types of polyps, called adenomatous polyps, may turn into cancer  DISCHARGE INSTRUCTIONS:   Follow up with your healthcare provider or gastroenterologist as directed: You may need to return for more tests, such as another colonoscopy  Write down your questions so you remember to ask them during your visits  Reduce your risk for colorectal polyps:   · Eat a variety of healthy foods:  Healthy foods include fruit, vegetables, whole-grain breads, low-fat dairy products, beans, lean meat, and fish  Ask if you need to be on a special diet  · Maintain a healthy weight:  Ask your healthcare provider if you need to lose weight and how much you need to lose   Ask for help with a weight loss program     · Exercise:  Begin to exercise slowly and do more as you get stronger  Talk with your healthcare provider before you start an exercise program      · Limit alcohol:  Your risk for polyps increases the more you drink  · Do not smoke: If you smoke, it is never too late to quit  Ask for information about how to stop  For support and more information:   · Aquilino Baires ([facility])  [address] , [place] [address]  Phone: 6- 580 - [phone]  Web Address: www digestive  niddk nih gov  Contact your healthcare provider or gastroenterologist if:   · You have a fever  · You have chills, a cough, or feel weak and achy  · You have abdominal pain that does not go away or gets worse after you take medicine  · Your abdomen is swollen  · You are losing weight without trying  · You have questions or concerns about your condition or care  Seek care immediately or call 911 if:   · You have sudden shortness of breath  · You have a fast heart rate, fast breathing, or are too dizzy to stand up  · You have severe abdominal pain  · You see blood in your bowel movement  © 2017 [address] [facility] Information is for End User's use only and may not be sold, redistributed or otherwise used for commercial purposes  All illustrations and images included in CareNotes® are the copyrighted property of [employer]  or [employer]  The above information is an  only  It is not intended as medical advice for individual conditions or treatments  Talk to your doctor, nurse or pharmacist before following any medical regimen to see if it is safe and effective for you

## 2020-08-10 NOTE — H&P
History and Physical - [facility]  Tasha Gentile 76 y o  female MRN: [id number]                  HPI: Tasha Gentile is a 76y o  year old female who presents for colonoscopy  She was seen by Dr Bal Burnett and was set up for colonoscopy  I did review his office note June 8, 2020  Patient denies any change in her bowel pattern  She has had chronic constipation is maintained on MiraLax  Occasionally she will have a loose bowel movement  There has been no bleeding  There has been no dysphagia, nausea, vomiting, or early satiety  She informed me that her sister had pancreatic cancer brothers no currently being evaluated for pancreatic lesion  REVIEW OF SYSTEMS: Per the HPI, and otherwise unremarkable      Historical Information   Past Medical History:   Diagnosis Date    Arthritis     Fibromyalgia     Osteoarthritis     Osteomyelitis of ankle (HCC)     Rheumatoid arthritis ([address] )     RSD (reflex sympathetic dystrophy)      Past Surgical History:   Procedure Laterality Date    ANKLE FUSION Right     CARPAL TUNNEL RELEASE      CHOLECYSTECTOMY      ERCP      with insertion of tube into bile/pancreatic duct    FACIAL RECONSTRUCTION SURGERY      JOINT REPLACEMENT      tkr left     SHOULDER SURGERY       Social History   Social History     Substance and Sexual Activity   Alcohol Use No     Social History     Substance and Sexual Activity   Drug Use No     Social History     Tobacco Use   Smoking Status Former Smoker   Smokeless Tobacco Never Used     Family History   Problem Relation Age of Onset    Dementia Mother     Diabetes Mother     Coronary artery disease Father     Hemochromatosis Sister     Cancer Sister     Diabetes Family     Breast cancer Maternal Aunt     Breast cancer Cousin     Cancer Brother     Hypertension Neg Hx        Meds/Allergies     (Not in a hospital admission)      No Known Allergies    Objective     /64   Pulse 64   Temp 98 5 °F (36 9 °C) (Temporal)   Resp 18   Ht 5' 4 5" (1 638 m)   Wt 99 8 kg (220 lb)   SpO2 95%   BMI 37 18 kg/m²       PHYSICAL EXAM    Gen: NAD  CV: RRR  CHEST: Clear  ABD: soft, NT/ND    Obese abdomen  EXT: no edema    LABS  Appointment on 05/13/2020   Component Date Value Ref Range Status    Hepatitis C Ab 05/13/2020 Non-reactive  Non-reactive Final    WBC 05/13/2020 4 81  4 31 - 10 16 Thousand/uL Final    RBC 05/13/2020 4 17  3 81 - 5 12 Million/uL Final    Hemoglobin 05/13/2020 13 9  11 5 - 15 4 g/dL Final    Hematocrit 05/13/2020 42 1  34 8 - 46 1 % Final    MCV 05/13/2020 101* 82 - 98 fL Final    MCH 05/13/2020 33 3  26 8 - 34 3 pg Final    MCHC 05/13/2020 33 0  31 4 - 37 4 g/dL Final    RDW 05/13/2020 11 9  11 6 - 15 1 % Final    MPV 05/13/2020 10 0  8 9 - 12 7 fL Final    Platelets 61/27/9870 158  149 - 390 Thousands/uL Final    nRBC 05/13/2020 0  /100 WBCs Final    Neutrophils Relative 05/13/2020 57  43 - 75 % Final    Immat GRANS % 05/13/2020 0  0 - 2 % Final    Lymphocytes Relative 05/13/2020 29  14 - 44 % Final    Monocytes Relative 05/13/2020 11  4 - 12 % Final    Eosinophils Relative 05/13/2020 2  0 - 6 % Final    Basophils Relative 05/13/2020 1  0 - 1 % Final    Neutrophils Absolute 05/13/2020 2 73  1 85 - 7 62 Thousands/µL Final    Immature Grans Absolute 05/13/2020 0 02  0 00 - 0 20 Thousand/uL Final    Lymphocytes Absolute 05/13/2020 1 41  0 60 - 4 47 Thousands/µL Final    Monocytes Absolute 05/13/2020 0 51  0 17 - 1 22 Thousand/µL Final    Eosinophils Absolute 05/13/2020 0 10  0 00 - 0 61 Thousand/µL Final    Basophils Absolute 05/13/2020 0 04  0 00 - 0 10 Thousands/µL Final    Sodium 05/13/2020 143  136 - 145 mmol/L Final    Potassium 05/13/2020 4 7  3 5 - 5 3 mmol/L Final    Chloride 05/13/2020 110* 100 - 108 mmol/L Final    CO2 05/13/2020 31  21 - 32 mmol/L Final    ANION GAP 05/13/2020 2* 4 - 13 mmol/L Final    BUN 05/13/2020 22  5 - 25 mg/dL Final    Creatinine 05/13/2020 0 75  0 60 - 1 30 mg/dL Final    Glucose 05/13/2020 88  65 - 140 mg/dL Final    Calcium 05/13/2020 9 0  8 3 - 10 1 mg/dL Final    AST 05/13/2020 18  5 - 45 U/L Final    ALT 05/13/2020 26  12 - 78 U/L Final    Alkaline Phosphatase 05/13/2020 64  46 - 116 U/L Final    Total Protein 05/13/2020 7 1  6 4 - 8 2 g/dL Final    Albumin 05/13/2020 3 8  3 5 - 5 0 g/dL Final    Total Bilirubin 05/13/2020 0 42  0 20 - 1 00 mg/dL Final    eGFR 05/13/2020 83  ml/min/1 73sq m Final    Cholesterol 05/13/2020 172  50 - 200 mg/dL Final    Triglycerides 05/13/2020 130  <=150 mg/dL Final    HDL, Direct 05/13/2020 57  >=40 mg/dL Final    LDL Calculated 05/13/2020 89  0 - 100 mg/dL Final    Non-HDL-Chol (CHOL-HDL) 05/13/2020 115  mg/dl Final    TSH 3RD GENERATON 05/13/2020 3 210  0 358 - 3 740 uIU/mL Final    CRP 05/13/2020 <3 0  <3 0 mg/L Final    Vit D, 25-Hydroxy 05/13/2020 52 4  30 0 - 100 0 ng/mL Final        RADIOLOGY         ASSESSMENT/PLAN:  This is a 76y o  year old female here for colonoscopy, and she is stable and optimized for her procedure  She should address her mildly elevated MCV of her primary care doctor  Consider checking B12 folate if not done

## 2020-08-10 NOTE — ANESTHESIA PREPROCEDURE EVALUATION
Procedure:  COLONOSCOPY    Relevant Problems   CARDIO   (+) Hyperlipidemia      ENDO   (+) Hypothyroidism      GI/HEPATIC   (+) Gastroesophageal reflux disease   (+) Nonalcoholic steatohepatitis      MUSCULOSKELETAL   (+) Lower back pain   (+) Osteoarthritis      NEURO/PSYCH   (+) Pain syndrome, chronic      Other   (+) Splenic cyst        Physical Exam    Airway    Mallampati score: II  TM Distance: >3 FB  Neck ROM: full     Dental   No notable dental hx     Cardiovascular  Rhythm: regular, Rate: normal, Cardiovascular exam normal    Pulmonary  Pulmonary exam normal Breath sounds clear to auscultation,     Other Findings        Anesthesia Plan  ASA Score- 2     Anesthesia Type- IV sedation with anesthesia with ASA Monitors  Additional Monitors:   Airway Plan:           Plan Factors-Exercise tolerance (METS): >4 METS  Chart reviewed  Induction- intravenous  Postoperative Plan- Plan for postoperative opioid use  Informed Consent- Anesthetic plan and risks discussed with patient  I personally reviewed this patient with the CRNA  Discussed and agreed on the Anesthesia Plan with the CRNA  Devon Oleary

## 2020-08-13 NOTE — RESULT ENCOUNTER NOTE
Please inform patient that colon polyp that was recovered is a benign growing type of polyp  I would recommend repeating colonoscopy in 1 year with a 2 day preparation as outlined in my procedure report  She should follow up with either me or Dr Yuliet Rose sometime next 3-4 months especially if her brother is diagnosed with pancreatic cancer    Thank you

## 2020-09-14 ENCOUNTER — TELEPHONE (OUTPATIENT)
Dept: OBGYN CLINIC | Facility: HOSPITAL | Age: 65
End: 2020-09-14

## 2020-10-21 ENCOUNTER — HOSPITAL ENCOUNTER (OUTPATIENT)
Dept: BONE DENSITY | Facility: HOSPITAL | Age: 65
Discharge: HOME | End: 2020-10-21
Payer: MEDICARE

## 2020-10-21 ENCOUNTER — HOSPITAL ENCOUNTER (OUTPATIENT)
Dept: RADIOLOGY | Facility: HOSPITAL | Age: 65
Discharge: HOME | End: 2020-10-21
Payer: MEDICARE

## 2020-10-21 ENCOUNTER — HOSPITAL ENCOUNTER (OUTPATIENT)
Dept: MAMMOGRAPHY | Facility: HOSPITAL | Age: 65
Discharge: HOME | End: 2020-10-21
Payer: MEDICARE

## 2020-10-21 VITALS — WEIGHT: 220 LBS | HEIGHT: 65 IN | BODY MASS INDEX: 36.65 KG/M2

## 2020-10-21 DIAGNOSIS — M25.552 LEFT HIP PAIN: ICD-10-CM

## 2020-10-21 DIAGNOSIS — Z12.31 ENCOUNTER FOR SCREENING MAMMOGRAM FOR BREAST CANCER: ICD-10-CM

## 2020-10-21 DIAGNOSIS — Z78.0 ASYMPTOMATIC POSTMENOPAUSAL STATE: ICD-10-CM

## 2020-10-21 DIAGNOSIS — M15.9 PRIMARY OSTEOARTHRITIS INVOLVING MULTIPLE JOINTS: ICD-10-CM

## 2020-10-21 PROCEDURE — 73522 X-RAY EXAM HIPS BI 3-4 VIEWS: CPT

## 2020-10-21 PROCEDURE — 77063 BREAST TOMOSYNTHESIS BI: CPT

## 2020-10-21 PROCEDURE — 77080 DXA BONE DENSITY AXIAL: CPT

## 2020-10-21 PROCEDURE — 77067 SCR MAMMO BI INCL CAD: CPT

## 2020-10-26 ENCOUNTER — TELEPHONE (OUTPATIENT)
Dept: INTERNAL MEDICINE CLINIC | Facility: CLINIC | Age: 65
End: 2020-10-26

## 2020-11-03 DIAGNOSIS — M16.0 PRIMARY OSTEOARTHRITIS OF BOTH HIPS: Primary | ICD-10-CM

## 2020-11-05 ENCOUNTER — CONSULT (OUTPATIENT)
Dept: OBGYN CLINIC | Facility: CLINIC | Age: 65
End: 2020-11-05
Payer: MEDICARE

## 2020-11-05 VITALS
TEMPERATURE: 98.8 F | DIASTOLIC BLOOD PRESSURE: 80 MMHG | WEIGHT: 221 LBS | BODY MASS INDEX: 36.82 KG/M2 | HEIGHT: 65 IN | SYSTOLIC BLOOD PRESSURE: 111 MMHG | HEART RATE: 80 BPM

## 2020-11-05 DIAGNOSIS — M70.61 TROCHANTERIC BURSITIS OF RIGHT HIP: ICD-10-CM

## 2020-11-05 DIAGNOSIS — M16.0 PRIMARY OSTEOARTHRITIS OF BOTH HIPS: ICD-10-CM

## 2020-11-05 DIAGNOSIS — M70.62 TROCHANTERIC BURSITIS OF LEFT HIP: Primary | ICD-10-CM

## 2020-11-05 PROCEDURE — 20610 DRAIN/INJ JOINT/BURSA W/O US: CPT | Performed by: ORTHOPAEDIC SURGERY

## 2020-11-05 PROCEDURE — 99203 OFFICE O/P NEW LOW 30 MIN: CPT | Performed by: ORTHOPAEDIC SURGERY

## 2020-11-05 RX ORDER — METHYLPREDNISOLONE ACETATE 40 MG/ML
2 INJECTION, SUSPENSION INTRA-ARTICULAR; INTRALESIONAL; INTRAMUSCULAR; SOFT TISSUE
Status: COMPLETED | OUTPATIENT
Start: 2020-11-05 | End: 2020-11-05

## 2020-11-05 RX ORDER — BUPIVACAINE HYDROCHLORIDE 2.5 MG/ML
4 INJECTION, SOLUTION INFILTRATION; PERINEURAL
Status: COMPLETED | OUTPATIENT
Start: 2020-11-05 | End: 2020-11-05

## 2020-11-05 RX ADMIN — BUPIVACAINE HYDROCHLORIDE 4 ML: 2.5 INJECTION, SOLUTION INFILTRATION; PERINEURAL at 08:15

## 2020-11-05 RX ADMIN — METHYLPREDNISOLONE ACETATE 2 ML: 40 INJECTION, SUSPENSION INTRA-ARTICULAR; INTRALESIONAL; INTRAMUSCULAR; SOFT TISSUE at 08:15

## 2020-12-29 ENCOUNTER — CLINICAL SUPPORT (OUTPATIENT)
Dept: BARIATRICS | Facility: CLINIC | Age: 65
End: 2020-12-29

## 2020-12-29 VITALS
DIASTOLIC BLOOD PRESSURE: 60 MMHG | WEIGHT: 247 LBS | SYSTOLIC BLOOD PRESSURE: 102 MMHG | HEART RATE: 62 BPM | TEMPERATURE: 98.2 F | BODY MASS INDEX: 41.15 KG/M2 | HEIGHT: 65 IN

## 2020-12-29 DIAGNOSIS — E66.01 MORBID OBESITY (HCC): Primary | ICD-10-CM

## 2020-12-29 DIAGNOSIS — E66.9 OBESITY, CLASS I, BMI 30-34.9: Primary | ICD-10-CM

## 2020-12-29 PROCEDURE — RECHECK: Performed by: DIETITIAN, REGISTERED

## 2021-01-07 ENCOUNTER — OFFICE VISIT (OUTPATIENT)
Dept: OBGYN CLINIC | Facility: CLINIC | Age: 66
End: 2021-01-07
Payer: MEDICARE

## 2021-01-07 VITALS — BODY MASS INDEX: 41.15 KG/M2 | HEIGHT: 65 IN | WEIGHT: 247 LBS

## 2021-01-07 DIAGNOSIS — M70.61 TROCHANTERIC BURSITIS OF RIGHT HIP: ICD-10-CM

## 2021-01-07 DIAGNOSIS — M70.62 TROCHANTERIC BURSITIS OF LEFT HIP: Primary | ICD-10-CM

## 2021-01-07 PROCEDURE — 99213 OFFICE O/P EST LOW 20 MIN: CPT | Performed by: ORTHOPAEDIC SURGERY

## 2021-01-07 PROCEDURE — 20610 DRAIN/INJ JOINT/BURSA W/O US: CPT | Performed by: ORTHOPAEDIC SURGERY

## 2021-01-07 RX ORDER — METHYLPREDNISOLONE ACETATE 40 MG/ML
2 INJECTION, SUSPENSION INTRA-ARTICULAR; INTRALESIONAL; INTRAMUSCULAR; SOFT TISSUE
Status: COMPLETED | OUTPATIENT
Start: 2021-01-07 | End: 2021-01-07

## 2021-01-07 RX ORDER — BUPIVACAINE HYDROCHLORIDE 2.5 MG/ML
4 INJECTION, SOLUTION INFILTRATION; PERINEURAL
Status: COMPLETED | OUTPATIENT
Start: 2021-01-07 | End: 2021-01-07

## 2021-01-07 RX ADMIN — METHYLPREDNISOLONE ACETATE 2 ML: 40 INJECTION, SUSPENSION INTRA-ARTICULAR; INTRALESIONAL; INTRAMUSCULAR; SOFT TISSUE at 08:48

## 2021-01-07 RX ADMIN — BUPIVACAINE HYDROCHLORIDE 4 ML: 2.5 INJECTION, SOLUTION INFILTRATION; PERINEURAL at 08:48

## 2021-01-07 NOTE — PROGRESS NOTES
Assessment/Plan:    No problem-specific Assessment & Plan notes found for this encounter  Diagnoses and all orders for this visit:    Trochanteric bursitis of left hip    Trochanteric bursitis of right hip          Both hips were injected with Depo-Medrol and Marcaine  She tolerated procedures quite well  Return back in 3 months for strength and motion check  Subjective:      Patient ID: Diana Noriega is a 76 y o  female  HPI       The patient has a history of degenerative joint disease of her bilateral hips with trochanteric bursitis  She desires to have her bursa injected again  She states the injections do not last for an extended period time but they did help  She states she is considering having bariatric surgery  The following portions of the patient's history were reviewed and updated as appropriate: allergies, current medications, past family history, past medical history, past social history, past surgical history and problem list     Review of Systems   Constitutional: Negative for chills, fever and unexpected weight change  HENT: Negative for hearing loss, nosebleeds and sore throat  Eyes: Negative for pain, redness and visual disturbance  Respiratory: Negative for cough, shortness of breath and wheezing  Cardiovascular: Negative for chest pain, palpitations and leg swelling  Gastrointestinal: Negative for abdominal pain, nausea and vomiting  Endocrine: Negative for polydipsia and polyuria  Genitourinary: Negative for dysuria and hematuria  Musculoskeletal: Positive for arthralgias and gait problem  Negative for back pain, joint swelling, myalgias, neck pain and neck stiffness  As noted in HPI   Skin: Negative for rash and wound  Neurological: Negative for dizziness, numbness and headaches  Psychiatric/Behavioral: Negative for decreased concentration and suicidal ideas  The patient is not nervous/anxious            Objective:      Ht 5' 4 5" (1 638 m) Wt 112 kg (247 lb)   BMI 41 74 kg/m²          Physical Exam        Spine was midline  There is no gross tenderness in her spine  There is a negative straight leg and a negative contralateral straight leg raising test   Bilateral lower extremities are neurovascular intact  Toes are pink and mobile  Compartments are soft  Range of motion of her hips for fairly intact  There is point tenderness along the trochanteric bursa  There is pain down the iliotibial band  No groin pain  Rotation intact  Large joint arthrocentesis: bilateral greater trochanteric bursa  Universal Protocol:  Consent: Verbal consent obtained  Risks and benefits: risks, benefits and alternatives were discussed  Consent given by: patient  Time out: Immediately prior to procedure a "time out" was called to verify the correct patient, procedure, equipment, support staff and site/side marked as required  Patient understanding: patient states understanding of the procedure being performed  Relevant documents: relevant documents present and verified  Test results: test results available and properly labeled  Site marked: the operative site was marked  Radiology Images displayed and confirmed   If images not available, report reviewed: imaging studies available  Patient identity confirmed: verbally with patient    Supporting Documentation  Indications: pain   Procedure Details  Location: hip - bilateral greater trochanteric bursa  Needle size: 22 G  Ultrasound guidance: no  Approach: lateral    Medications (Right): 4 mL bupivacaine 0 25 %; 2 mL methylPREDNISolone acetate 40 mg/mLMedications (Left): 4 mL bupivacaine 0 25 %; 2 mL methylPREDNISolone acetate 40 mg/mL   Patient tolerance: patient tolerated the procedure well with no immediate complications  Dressing:  Sterile dressing applied

## 2021-03-03 ENCOUNTER — OFFICE VISIT (OUTPATIENT)
Dept: GASTROENTEROLOGY | Facility: CLINIC | Age: 66
End: 2021-03-03
Payer: MEDICARE

## 2021-03-03 ENCOUNTER — TELEPHONE (OUTPATIENT)
Dept: SURGERY | Facility: CLINIC | Age: 66
End: 2021-03-03

## 2021-03-03 VITALS
HEIGHT: 65 IN | WEIGHT: 243 LBS | TEMPERATURE: 100.3 F | SYSTOLIC BLOOD PRESSURE: 138 MMHG | OXYGEN SATURATION: 98 % | BODY MASS INDEX: 40.48 KG/M2 | DIASTOLIC BLOOD PRESSURE: 80 MMHG | HEART RATE: 74 BPM

## 2021-03-03 DIAGNOSIS — Z80.0 FAMILY HISTORY OF MALIGNANT NEOPLASM OF PANCREAS: ICD-10-CM

## 2021-03-03 DIAGNOSIS — K75.81 NONALCOHOLIC STEATOHEPATITIS: ICD-10-CM

## 2021-03-03 DIAGNOSIS — D12.6 ADENOMATOUS POLYP OF COLON, UNSPECIFIED PART OF COLON: Primary | ICD-10-CM

## 2021-03-03 PROCEDURE — 99214 OFFICE O/P EST MOD 30 MIN: CPT | Performed by: PHYSICIAN ASSISTANT

## 2021-03-03 RX ORDER — MULTIVITAMIN
1 TABLET ORAL DAILY
COMMUNITY

## 2021-03-03 NOTE — PROGRESS NOTES
Assessment/Plan:     Diagnoses and all orders for this visit:    Adenomatous polyp of colon, unspecified part of colon  Patient was found to have 1 tubular adenomatous polyp on her endoscopy last August however the prep was poor and was recommended to be repeated in 1 year's time  Given her constipation has improved she likely will not need a 2 day prep  -     CT abdomen pelvis w contrast; Future  -     Colonoscopy; Future    Nonalcoholic steatohepatitis    She has a diagnosis of fatty liver disease  She states someone had told her this many years ago  Her LFTs are within normal limits  There is no recent imaging  Would recommend CT scan for further evaluation  -     CT abdomen pelvis w contrast; Future    Family history of malignant neoplasm of pancreas    She has a family history of pancreatic cancer in her sister  She has not had any recent imaging and therefore would recommend CT scan at this time for further evaluation  -     CT abdomen pelvis w contrast; Future      Will see her back after her procedure discuss all results  Subjective:      Patient ID: Pamela Gonzalez is a 78 y o  female  HPI       Patient is here to follow-up from her colonoscopy  She denies any heartburn, reflux, nausea, vomiting, abdominal pain  She does have a history of constipation and uses MiraLax as needed as well as a fiber supplement daily  She states her bowels have been much better recently and she typically moves them daily  Her colonoscopy was performed in August however was a poor prep likely secondary to her constipation  Was recommended that she have this repeated in 1 year's time  She was found to have  A tubular adenomatous polyp  She has a family history of pancreatic cancer in her sister who has since passed away  She has had no abdominal imaging recently      Patient Active Problem List   Diagnosis    Adenomatous colon polyp    Baker's cyst of knee    Colon, diverticulosis    Constipation  Hemochromatosis    Hot flashes due to menopause    Hyperlipidemia    Hypothyroidism    Lower back pain    Nonalcoholic steatohepatitis    Osteoarthritis    Pain syndrome, chronic    Peripheral neuropathy    Splenic cyst    Tendonitis of elbow, left    Vitamin D deficiency    Varicose veins of both lower extremities with pain    Left hip pain    Leg swelling    Trochanteric bursitis of left hip    Family history of malignant neoplasm of pancreas     No Known Allergies  Current Outpatient Medications on File Prior to Visit   Medication Sig    Calcium-Phosphorus-Vitamin D (VITAMIN D3/CALCIUM/PHOSPHORUS PO) Take by mouth    Multiple Vitamin (multivitamin) tablet Take 1 tablet by mouth daily     No current facility-administered medications on file prior to visit        Family History   Problem Relation Age of Onset    Dementia Mother     Diabetes Mother     Coronary artery disease Father     Hemochromatosis Sister     Cancer Sister     Pancreatic cancer Sister     Diabetes Family     Breast cancer Maternal Aunt     Breast cancer Cousin     Cancer Brother     No Known Problems Maternal Grandmother     No Known Problems Maternal Grandfather     No Known Problems Paternal Grandmother     No Known Problems Paternal Grandfather     Hypertension Neg Hx      Past Medical History:   Diagnosis Date    Arthritis     Fibromyalgia     GERD (gastroesophageal reflux disease)     Hyperlipemia     Hypothyroid     Osteoarthritis     Osteomyelitis of ankle (HCC)     Rheumatoid arthritis (HCC)     RSD (reflex sympathetic dystrophy)      Social History     Socioeconomic History    Marital status:      Spouse name: None    Number of children: None    Years of education: None    Highest education level: None   Occupational History    None   Social Needs    Financial resource strain: None    Food insecurity     Worry: None     Inability: None    Transportation needs     Medical: None Non-medical: None   Tobacco Use    Smoking status: Former Smoker     Quit date:      Years since quittin 1    Smokeless tobacco: Never Used   Substance and Sexual Activity    Alcohol use: No    Drug use: No    Sexual activity: None   Lifestyle    Physical activity     Days per week: None     Minutes per session: None    Stress: None   Relationships    Social connections     Talks on phone: None     Gets together: None     Attends [employer] service: None     Active member of club or organization: None     Attends meetings of clubs or organizations: None     Relationship status: None    Intimate partner violence     Fear of current or ex partner: None     Emotionally abused: None     Physically abused: None     Forced sexual activity: None   Other Topics Concern    None   Social History Narrative    None     Past Surgical History:   Procedure Laterality Date    ANKLE FUSION Right     BREAST BIOPSY Left 2018    US guided; benign     BREAST EXCISIONAL BIOPSY Left     benign     CARPAL TUNNEL RELEASE      CHOLECYSTECTOMY      ERCP      with insertion of tube into bile/pancreatic duct    FACIAL RECONSTRUCTION SURGERY      JOINT REPLACEMENT      tkr left     SHOULDER SURGERY      US GUIDED BREAST BIOPSY LEFT COMPLETE Left 2018    Duct ectasia, fibrosis, focal usual ductal hyperplasia         Review of Systems   All other systems reviewed and are negative  Objective:      /80   Pulse 74   Temp 100 3 °F (37 9 °C) (Tympanic)   Ht 5' 4 5" (1 638 m)   Wt 110 kg (243 lb)   SpO2 98%   BMI 41 07 kg/m²          Physical Exam  Constitutional:       Appearance: Normal appearance  She is well-developed  HENT:      Head: Normocephalic and atraumatic  Eyes:      Conjunctiva/sclera: Conjunctivae normal    Neck:      Musculoskeletal: Normal range of motion  Cardiovascular:      Rate and Rhythm: Normal rate and regular rhythm     Pulmonary:      Effort: Pulmonary effort is normal       Breath sounds: Normal breath sounds  Abdominal:      General: Bowel sounds are normal  There is no distension  Palpations: Abdomen is soft  Tenderness: There is no abdominal tenderness  Musculoskeletal: Normal range of motion  Skin:     General: Skin is warm and dry  Neurological:      Mental Status: She is alert and oriented to person, place, and time     Psychiatric:         Mood and Affect: Mood normal          Behavior: Behavior normal

## 2021-03-09 ENCOUNTER — HOSPITAL ENCOUNTER (OUTPATIENT)
Dept: CT IMAGING | Facility: HOSPITAL | Age: 66
Discharge: HOME | End: 2021-03-09
Payer: MEDICARE

## 2021-03-09 DIAGNOSIS — Z80.0 FAMILY HISTORY OF MALIGNANT NEOPLASM OF PANCREAS: ICD-10-CM

## 2021-03-09 DIAGNOSIS — D12.6 ADENOMATOUS POLYP OF COLON, UNSPECIFIED PART OF COLON: ICD-10-CM

## 2021-03-09 DIAGNOSIS — K75.81 NONALCOHOLIC STEATOHEPATITIS: ICD-10-CM

## 2021-03-09 PROCEDURE — 74177 CT ABD & PELVIS W/CONTRAST: CPT

## 2021-03-09 PROCEDURE — G1004 CDSM NDSC: HCPCS

## 2021-03-09 RX ADMIN — IOHEXOL 100 ML: 350 INJECTION, SOLUTION INTRAVENOUS at 07:54

## 2021-03-17 ENCOUNTER — TELEPHONE (OUTPATIENT)
Dept: GASTROENTEROLOGY | Facility: MEDICAL CENTER | Age: 66
End: 2021-03-17

## 2021-03-17 ENCOUNTER — TELEPHONE (OUTPATIENT)
Dept: INTERNAL MEDICINE CLINIC | Facility: CLINIC | Age: 66
End: 2021-03-17

## 2021-03-17 NOTE — TELEPHONE ENCOUNTER
GI should have contacted our office to tell us to deal with the results  Some splenic lesions  Most are unchanged in about 7 years  They are recommending possibly MRI in 6-12 months

## 2021-03-17 NOTE — TELEPHONE ENCOUNTER
----- Message from Jackie Alvarez PA-C sent at 3/17/2021 10:19 AM EDT -----  CT shows liver appears WNL's, no fatty liver  She does have small lesions in the spleen was are chronic but she can have a MRI in 6-9 months for further eval  I would recommend she f/u with her PCP for this    [facility]

## 2021-04-08 ENCOUNTER — OFFICE VISIT (OUTPATIENT)
Dept: OBGYN CLINIC | Facility: CLINIC | Age: 66
End: 2021-04-08
Payer: MEDICARE

## 2021-04-08 VITALS
DIASTOLIC BLOOD PRESSURE: 72 MMHG | BODY MASS INDEX: 40.48 KG/M2 | WEIGHT: 243 LBS | SYSTOLIC BLOOD PRESSURE: 118 MMHG | HEART RATE: 75 BPM | HEIGHT: 65 IN

## 2021-04-08 DIAGNOSIS — M70.61 TROCHANTERIC BURSITIS OF RIGHT HIP: ICD-10-CM

## 2021-04-08 DIAGNOSIS — M70.62 TROCHANTERIC BURSITIS OF LEFT HIP: Primary | ICD-10-CM

## 2021-04-08 PROCEDURE — 99213 OFFICE O/P EST LOW 20 MIN: CPT | Performed by: ORTHOPAEDIC SURGERY

## 2021-04-08 PROCEDURE — 20610 DRAIN/INJ JOINT/BURSA W/O US: CPT | Performed by: ORTHOPAEDIC SURGERY

## 2021-04-08 RX ORDER — BUPIVACAINE HYDROCHLORIDE 2.5 MG/ML
4 INJECTION, SOLUTION INFILTRATION; PERINEURAL
Status: COMPLETED | OUTPATIENT
Start: 2021-04-08 | End: 2021-04-08

## 2021-04-08 RX ORDER — METHYLPREDNISOLONE ACETATE 40 MG/ML
2 INJECTION, SUSPENSION INTRA-ARTICULAR; INTRALESIONAL; INTRAMUSCULAR; SOFT TISSUE
Status: COMPLETED | OUTPATIENT
Start: 2021-04-08 | End: 2021-04-08

## 2021-04-08 RX ADMIN — METHYLPREDNISOLONE ACETATE 2 ML: 40 INJECTION, SUSPENSION INTRA-ARTICULAR; INTRALESIONAL; INTRAMUSCULAR; SOFT TISSUE at 08:09

## 2021-04-08 RX ADMIN — BUPIVACAINE HYDROCHLORIDE 4 ML: 2.5 INJECTION, SOLUTION INFILTRATION; PERINEURAL at 08:09

## 2021-04-08 RX ADMIN — METHYLPREDNISOLONE ACETATE 2 ML: 40 INJECTION, SUSPENSION INTRA-ARTICULAR; INTRALESIONAL; INTRAMUSCULAR; SOFT TISSUE at 08:21

## 2021-04-08 RX ADMIN — BUPIVACAINE HYDROCHLORIDE 4 ML: 2.5 INJECTION, SOLUTION INFILTRATION; PERINEURAL at 08:21

## 2021-04-08 NOTE — PROGRESS NOTES
Assessment/Plan:  Assessment/Plan   Diagnoses and all orders for this visit:    Trochanteric bursitis of left hip  -     Large joint arthrocentesis    Trochanteric bursitis of right hip  -     Large joint arthrocentesis        Patient was provided with Marcaine and Depo-Medrol injections to the trochanteric bursae of the bilateral hips  Tolerated treatment well  He is cleared to resume activity as tolerated without limitations or restrictions  She will be seen for follow-up in 2-3 months, however symptoms recur prior to that time, she can contact the office to schedule  a sooner appointment  both hips were injected with Depo-Medrol and Marcaine  She tolerated procedures quite well  Return back in 3 months for strength and motion check  Subjective:   Patient ID: Alex Vigil is a 78 y o  female  HPI    Patient presents today for follow-up evaluation of bilateral trochanteric bursitis of the hips  She was last seen in regards to this issue on 1/7/2021, at which time she was provided bilateral CS injections  She states that she had significant relief following injections  Her symptoms began to return approximately 2 weeks ago  Today's presentation she expresses pain with walking, and lying on her sides  She denies any recent bruising, swelling, numbness, or tingling      The following portions of the patient's history were reviewed and updated as appropriate: allergies, current medications, past family history, past medical history, past social history, past surgical history and problem list     Past Medical History:   Diagnosis Date    Arthritis     Fibromyalgia     GERD (gastroesophageal reflux disease)     Hyperlipemia     Hypothyroid     Osteoarthritis     Osteomyelitis of ankle ([address]     Rheumatoid arthritis ([address]     RSD (reflex sympathetic dystrophy)      Past Surgical History:   Procedure Laterality Date    ANKLE FUSION Right     BREAST BIOPSY Left 12/18/2018    US guided; benign     BREAST EXCISIONAL BIOPSY Left     benign     CARPAL TUNNEL RELEASE      CHOLECYSTECTOMY      ERCP      with insertion of tube into bile/pancreatic duct    FACIAL RECONSTRUCTION SURGERY      JOINT REPLACEMENT      tkr left     SHOULDER SURGERY      US GUIDED BREAST BIOPSY LEFT COMPLETE Left 2018    Duct ectasia, fibrosis, focal usual ductal hyperplasia     Family History   Problem Relation Age of Onset    Dementia Mother     Diabetes Mother     Coronary artery disease Father     Hemochromatosis Sister     Cancer Sister     Pancreatic cancer Sister     Diabetes Family     Breast cancer Maternal Aunt     Breast cancer Cousin     Cancer Brother     No Known Problems Maternal Grandmother     No Known Problems Maternal Grandfather     No Known Problems Paternal Grandmother     No Known Problems Paternal Grandfather     Hypertension Neg Hx      Social History     Socioeconomic History    Marital status:      Spouse name: None    Number of children: None    Years of education: None    Highest education level: None   Occupational History    None   Social Needs    Financial resource strain: None    Food insecurity     Worry: None     Inability: None    Transportation needs     Medical: None     Non-medical: None   Tobacco Use    Smoking status: Former Smoker     Quit date:      Years since quittin 2    Smokeless tobacco: Never Used   Substance and Sexual Activity    Alcohol use: No    Drug use: No    Sexual activity: None   Lifestyle    Physical activity     Days per week: None     Minutes per session: None    Stress: None   Relationships    Social connections     Talks on phone: None     Gets together: None     Attends [employer] service: None     Active member of club or organization: None     Attends meetings of clubs or organizations: None     Relationship status: None    Intimate partner violence     Fear of current or ex partner: None Emotionally abused: None     Physically abused: None     Forced sexual activity: None   Other Topics Concern    None   Social History Narrative    None       Current Outpatient Medications:     Calcium-Phosphorus-Vitamin D (VITAMIN D3/CALCIUM/PHOSPHORUS PO), Take by mouth, Disp: , Rfl:     Multiple Vitamin (multivitamin) tablet, Take 1 tablet by mouth daily, Disp: , Rfl:     No Known Allergies    Review of Systems   Constitutional: Negative for chills, fever and unexpected weight change  HENT: Negative for hearing loss, nosebleeds and sore throat  Eyes: Negative for pain, redness and visual disturbance  Respiratory: Negative for cough, shortness of breath and wheezing  Cardiovascular: Negative for chest pain, palpitations and leg swelling  Gastrointestinal: Negative for abdominal pain, nausea and vomiting  Endocrine: Negative for polydipsia and polyuria  Genitourinary: Negative for dysuria and hematuria  Musculoskeletal:        As noted in HPI   Skin: Negative for rash and wound  Neurological: Negative for dizziness, numbness and headaches  Psychiatric/Behavioral: Negative for decreased concentration and suicidal ideas  The patient is not nervous/anxious          Objective:  /72 (BP Location: Right arm, Patient Position: Sitting, Cuff Size: Large)   Pulse 75   Ht 5' 4 5" (1 638 m)   Wt 110 kg (243 lb)   BMI 41 07 kg/m²     Ortho Exam    bilateral hips -    no obvious anatomical deformity   patient ambulates with smooth gait pattern and no assistive device   Skin is warm and dry to touch with no signs of erythema, ecchymosis, infection   exquisitely tender to palpation over right and left trochanteric bursa   no crepitus with passive motion   5/5 MMT throughout  -  CAROLIN  -  FADIR   2+ TP and DP pulses with brisk capillary refill to the toes   Sural, saphenous, tibial, superficial and deep peroneal motor and sensory distributions intact  Sensation light touch intact distally    Physical Exam  Constitutional:       Appearance: She is well-developed  HENT:      Right Ear: External ear normal       Left Ear: External ear normal       Nose: Nose normal    Eyes:      Conjunctiva/sclera: Conjunctivae normal       Pupils: Pupils are equal, round, and reactive to light  Neck:      Musculoskeletal: Normal range of motion  Pulmonary:      Effort: Pulmonary effort is normal    Musculoskeletal: Normal range of motion  Skin:     General: Skin is warm and dry  Neurological:      Mental Status: She is alert and oriented to person, place, and time  Psychiatric:         Behavior: Behavior normal          Thought Content: Thought content normal          Judgment: Judgment normal          Imaging:  No new imaging reviewed this visit     Large joint arthrocentesis: L greater trochanteric bursa  Universal Protocol:  Procedure performed by: Geri Uriarte, BARBARA, JI)  Consent: Verbal consent obtained  Risks and benefits: risks, benefits and alternatives were discussed  Consent given by: patient  Time out: Immediately prior to procedure a "time out" was called to verify the correct patient, procedure, equipment, support staff and site/side marked as required    Timeout called at: 4/8/2021 8:07 AM   Patient understanding: patient states understanding of the procedure being performed  Site marked: the operative site was marked  Patient identity confirmed: verbally with patient    Supporting Documentation  Indications: pain and joint swelling   Procedure Details  Location: hip - L greater trochanteric bursa  Preparation: Patient was prepped and draped in the usual sterile fashion  Needle size: 22 G  Ultrasound guidance: no  Approach: lateral  Medications administered: 4 mL bupivacaine 0 25 %; 2 mL methylPREDNISolone acetate 40 mg/mL    Patient tolerance: patient tolerated the procedure well with no immediate complications  Dressing:  Sterile dressing applied    Large joint arthrocentesis: R greater trochanteric bursa  Universal Protocol:  Procedure performed by: BARBARA Gaspar ATC)  Consent: Verbal consent obtained  Risks and benefits: risks, benefits and alternatives were discussed  Consent given by: patient  Time out: Immediately prior to procedure a "time out" was called to verify the correct patient, procedure, equipment, support staff and site/side marked as required    Timeout called at: 4/8/2021 8:07 AM   Patient understanding: patient states understanding of the procedure being performed  Site marked: the operative site was marked  Patient identity confirmed: verbally with patient    Supporting Documentation  Indications: pain and joint swelling   Procedure Details  Location: hip - R greater trochanteric bursa  Preparation: Patient was prepped and draped in the usual sterile fashion  Needle size: 22 G  Ultrasound guidance: no  Approach: lateral  Medications administered: 4 mL bupivacaine 0 25 %; 2 mL methylPREDNISolone acetate 40 mg/mL    Patient tolerance: patient tolerated the procedure well with no immediate complications  Dressing:  Sterile dressing applied          Scribe Attestation    I,:  Inna Szymanski am acting as a scribe while in the presence of the attending physician :       I,:  Ally Haider DO personally performed the services described in this documentation    as scribed in my presence :

## 2021-05-27 ENCOUNTER — TELEPHONE (OUTPATIENT)
Dept: GASTROENTEROLOGY | Facility: CLINIC | Age: 66
End: 2021-05-27

## 2021-05-27 ENCOUNTER — PREP FOR PROCEDURE (OUTPATIENT)
Dept: SURGERY | Facility: CLINIC | Age: 66
End: 2021-05-27

## 2021-05-27 DIAGNOSIS — D12.6 ADENOMATOUS POLYP OF COLON, UNSPECIFIED PART OF COLON: Primary | ICD-10-CM

## 2021-05-27 NOTE — TELEPHONE ENCOUNTER
Scheduled repeat colonoscopy as per recall with Dr Kusum Breaux 8/10/21  Miralax/Dulcolax directions sent via mail  Patient will contact our office to go over all directions

## 2021-05-28 ENCOUNTER — PREP FOR PROCEDURE (OUTPATIENT)
Dept: SURGERY | Facility: CLINIC | Age: 66
End: 2021-05-28

## 2021-06-10 ENCOUNTER — OFFICE VISIT (OUTPATIENT)
Dept: OBGYN CLINIC | Facility: CLINIC | Age: 66
End: 2021-06-10
Payer: MEDICARE

## 2021-06-10 VITALS
HEART RATE: 76 BPM | BODY MASS INDEX: 40.48 KG/M2 | WEIGHT: 243 LBS | DIASTOLIC BLOOD PRESSURE: 66 MMHG | SYSTOLIC BLOOD PRESSURE: 115 MMHG | HEIGHT: 65 IN

## 2021-06-10 DIAGNOSIS — M70.62 TROCHANTERIC BURSITIS OF LEFT HIP: Primary | ICD-10-CM

## 2021-06-10 DIAGNOSIS — M70.61 TROCHANTERIC BURSITIS OF RIGHT HIP: ICD-10-CM

## 2021-06-10 PROCEDURE — 99213 OFFICE O/P EST LOW 20 MIN: CPT | Performed by: ORTHOPAEDIC SURGERY

## 2021-06-10 PROCEDURE — 20610 DRAIN/INJ JOINT/BURSA W/O US: CPT | Performed by: ORTHOPAEDIC SURGERY

## 2021-06-10 RX ORDER — METHYLPREDNISOLONE ACETATE 40 MG/ML
2 INJECTION, SUSPENSION INTRA-ARTICULAR; INTRALESIONAL; INTRAMUSCULAR; SOFT TISSUE
Status: COMPLETED | OUTPATIENT
Start: 2021-06-10 | End: 2021-06-10

## 2021-06-10 RX ORDER — BUPIVACAINE HYDROCHLORIDE 2.5 MG/ML
4 INJECTION, SOLUTION INFILTRATION; PERINEURAL
Status: COMPLETED | OUTPATIENT
Start: 2021-06-10 | End: 2021-06-10

## 2021-06-10 RX ADMIN — BUPIVACAINE HYDROCHLORIDE 4 ML: 2.5 INJECTION, SOLUTION INFILTRATION; PERINEURAL at 08:35

## 2021-06-10 RX ADMIN — METHYLPREDNISOLONE ACETATE 2 ML: 40 INJECTION, SUSPENSION INTRA-ARTICULAR; INTRALESIONAL; INTRAMUSCULAR; SOFT TISSUE at 08:35

## 2021-06-10 RX ADMIN — BUPIVACAINE HYDROCHLORIDE 4 ML: 2.5 INJECTION, SOLUTION INFILTRATION; PERINEURAL at 08:31

## 2021-06-10 RX ADMIN — METHYLPREDNISOLONE ACETATE 2 ML: 40 INJECTION, SUSPENSION INTRA-ARTICULAR; INTRALESIONAL; INTRAMUSCULAR; SOFT TISSUE at 08:31

## 2021-06-10 NOTE — PROGRESS NOTES
Assessment/Plan:  Assessment/Plan   Diagnoses and all orders for this visit:    Trochanteric bursitis of left hip  -     Large joint arthrocentesis    Trochanteric bursitis of right hip  -     Large joint arthrocentesis          Discussed with patient that today's physical exam is consistent with recurrence of trochanteric bursitis of the bilateral hips  Patient was offered, and accepted,   Depo-Medrol and Marcaine injections to the  Bilateral trochanteric bursae for relief of pain and inflammation  Patient tolerated treatment well  She can resume all activities as tolerated without limitations or restrictions  She can continue NSAIDs / Tylenol as needed for pain and soreness  She will be seen for follow-up in 2-3 months for re-evaluation and consideration for repeat injections  Patient is in agreement with this treatment plan  the bilateral trochanteric bursa was injected with Depo-Medrol and Marcaine  She tolerated procedure quite well  Return back on 3 months for re-evaluation  If there are any problems sooner, she will not hesitate to let us know physical exam shows point tenderness along the trochanteric bursa  No groin pain  Full range of motion along the hips  Neurologically intact distally    Subjective:   Patient ID: Jany Sahni is a 78 y o  female  HPI    Patient presents for follow-up evaluation of trochanteric bursitis of the  bilateral hips  She was last seen in regards to this issue on 4/8/2021, at which time she received corticosteroid injection  She reports that she had significant relief following these injections, but notes that her pain began to return approximately 3 weeks ago  On today's presentation she reports point tenderness in the lateral aspects of the hips that is exacerbated with direct pressure, or with getting up from seated position  She denies any groin pain    She denies any associated bruising, swelling, numbness, tingling, or feelings of instability      The following portions of the patient's history were reviewed and updated as appropriate: allergies, current medications, past family history, past medical history, past social history, past surgical history and problem list     Past Medical History:   Diagnosis Date    Arthritis     Fibromyalgia     GERD (gastroesophageal reflux disease)     Hyperlipemia     Hypothyroid     Osteoarthritis     [address] [address] [address]     Rheumatoid arthritis [address] [address]     RSD (reflex sympathetic dystrophy)      Past Surgical History:   Procedure Laterality Date    ANKLE FUSION Right     BREAST BIOPSY Left 12/18/2018    US guided; benign     BREAST EXCISIONAL BIOPSY Left     benign     CARPAL TUNNEL RELEASE      CHOLECYSTECTOMY      ERCP      with insertion of tube into bile/pancreatic duct    FACIAL RECONSTRUCTION SURGERY      JOINT REPLACEMENT      tkr left     SHOULDER SURGERY      US GUIDED BREAST BIOPSY LEFT COMPLETE Left 12/18/2018    Duct ectasia, fibrosis, focal usual ductal hyperplasia     Family History   Problem Relation Age of Onset    Dementia Mother     Diabetes Mother     Coronary artery disease Father     Hemochromatosis Sister     Cancer Sister     Pancreatic cancer Sister     Diabetes Family     Breast cancer Maternal Aunt     Breast cancer Cousin     Cancer Brother     No Known Problems Maternal Grandmother     No Known Problems Maternal Grandfather     No Known Problems Paternal Grandmother     No Known Problems Paternal Grandfather     Hypertension Neg Hx      Social History     Socioeconomic History    Marital status:      Spouse name: None    Number of children: None    Years of education: None    Highest education level: None   Occupational History    None   Social Needs    Financial resource strain: None    Food insecurity     Worry: None     Inability: None    Transportation needs     Medical: None     Non-medical: None   Tobacco Use    Smoking status: Former Smoker     Quit date:      Years since quittin 4    Smokeless tobacco: Never Used   Substance and Sexual Activity    Alcohol use: No    Drug use: No    Sexual activity: None   Lifestyle    Physical activity     Days per week: None     Minutes per session: None    Stress: None   Relationships    Social connections     Talks on phone: None     Gets together: None     Attends Jew service: None     Active member of club or organization: None     Attends meetings of clubs or organizations: None     Relationship status: None    Intimate partner violence     Fear of current or ex partner: None     Emotionally abused: None     Physically abused: None     Forced sexual activity: None   Other Topics Concern    None   Social History Narrative    None       Current Outpatient Medications:     Calcium-Phosphorus-Vitamin D (VITAMIN D3/CALCIUM/PHOSPHORUS PO), Take by mouth, Disp: , Rfl:     Multiple Vitamin (multivitamin) tablet, Take 1 tablet by mouth daily, Disp: , Rfl:     No Known Allergies    Review of Systems   Constitutional: Negative for chills, fever and unexpected weight change  HENT: Negative for hearing loss, nosebleeds and sore throat  Eyes: Negative for pain, redness and visual disturbance  Respiratory: Negative for cough, shortness of breath and wheezing  Cardiovascular: Negative for chest pain, palpitations and leg swelling  Gastrointestinal: Negative for abdominal pain, nausea and vomiting  Endocrine: Negative for polydipsia and polyuria  Genitourinary: Negative for dysuria and hematuria  Musculoskeletal:        As noted in HPI   Skin: Negative for rash and wound  Neurological: Negative for dizziness, numbness and headaches  Psychiatric/Behavioral: Negative for decreased concentration and suicidal ideas  The patient is not nervous/anxious          Objective:  /66 Comment: unable to obtain  Pulse 76   Ht 5' 4 5" (1 638 m)   Wt 110 kg (243 lb)   BMI 41 07 kg/m²     Ortho Exam     Bilateral hips -    patient presents with no obvious anatomical deformity   ambulates with normal gait pattern and no assistive device   Skin is warm dry to touch with no signs of erythema, ecchymosis, or infection   Exquisite tenderness to palpation over right and left trochanteric bursa   This with passive motion   5/5 MMT throughout  -  CAROLIN  -  FADIR   2+ TP and DP pulses with brisk capillary refill to the toes   Sural, saphenous, tibial, superficial and deep peroneal motor and sensory distributions intact  Sensation light touch intact distally    Physical Exam  Constitutional:       Appearance: She is well-developed  HENT:      Right Ear: External ear normal       Left Ear: External ear normal       Nose: Nose normal    Eyes:      Conjunctiva/sclera: Conjunctivae normal       Pupils: Pupils are equal, round, and reactive to light  Neck:      Musculoskeletal: Normal range of motion  Pulmonary:      Effort: Pulmonary effort is normal    Musculoskeletal: Normal range of motion  Skin:     General: Skin is warm and dry  Neurological:      Mental Status: She is alert and oriented to person, place, and time  Psychiatric:         Behavior: Behavior normal          Thought Content: Thought content normal          Judgment: Judgment normal        Imaging:  No new imaging reviewed this visit      Large joint arthrocentesis: R greater trochanteric bursa  Universal Protocol:  Procedure performed by: BARBARA Ramirez, ATC)  Consent: Verbal consent obtained  Risks and benefits: risks, benefits and alternatives were discussed  Consent given by: patient  Time out: Immediately prior to procedure a "time out" was called to verify the correct patient, procedure, equipment, support staff and site/side marked as required    Timeout called at: 6/10/2021 8:30 AM   Patient understanding: patient states understanding of the procedure being performed  Site marked: the operative site was marked  Patient identity confirmed: verbally with patient    Supporting Documentation  Indications: pain and joint swelling   Procedure Details  Location: hip - R greater trochanteric bursa  Preparation: Patient was prepped and draped in the usual sterile fashion  Needle size: 22 G  Ultrasound guidance: no  Approach: lateral  Medications administered: 4 mL bupivacaine 0 25 %; 2 mL methylPREDNISolone acetate 40 mg/mL    Patient tolerance: patient tolerated the procedure well with no immediate complications  Dressing:  Sterile dressing applied    Large joint arthrocentesis: L greater trochanteric bursa  Universal Protocol:  Procedure performed by: Manpreet Dobbs, LAT, ATC)  Consent: Verbal consent obtained  Risks and benefits: risks, benefits and alternatives were discussed  Consent given by: patient  Time out: Immediately prior to procedure a "time out" was called to verify the correct patient, procedure, equipment, support staff and site/side marked as required    Timeout called at: 6/10/2021 8:30 AM   Patient understanding: patient states understanding of the procedure being performed  Site marked: the operative site was marked  Patient identity confirmed: verbally with patient    Supporting Documentation  Indications: pain and joint swelling   Procedure Details  Location: hip - L greater trochanteric bursa  Preparation: Patient was prepped and draped in the usual sterile fashion  Needle size: 22 G  Ultrasound guidance: no  Approach: lateral  Medications administered: 4 mL bupivacaine 0 25 %; 2 mL methylPREDNISolone acetate 40 mg/mL    Patient tolerance: patient tolerated the procedure well with no immediate complications  Dressing:  Sterile dressing applied          Scribe Attestation    I,:  Manpreet Dobbs am acting as a scribe while in the presence of the attending physician :       I,:  Preet Guevara DO personally performed the services described in this documentation    as scribed in my presence :

## 2021-06-14 NOTE — PROGRESS NOTES
Assessment/Plan:    No problem-specific Assessment & Plan notes found for this encounter  Diagnoses and all orders for this visit:    Varicose veins of both lower extremities with pain  -     CBC and differential; Future  -     Ambulatory referral to Vascular Surgery; Future    Hypothyroidism, unspecified type  -     CBC and differential; Future  -     TSH, 3rd generation; Future    Mixed hyperlipidemia  -     CBC and differential; Future  -     Comprehensive metabolic panel; Future  -     Lipid panel; Future  -     TSH, 3rd generation; Future    Vitamin D deficiency  -     CBC and differential; Future  -     Vitamin D 25 hydroxy; Future    Hereditary hemochromatosis ([facility] [address] )  -     CBC and differential; Future  -     Iron Panel (Includes Ferritin, Iron Sat%, Iron, and TIBC); Future    Visit for screening mammogram  -     Mammo screening bilateral w 3d & cad; Future    Chronic embolism and thrombosis of other specified deep vein of right lower extremity ([facility] [address] )    Obesity, morbid ([facility] [address] )     Orders recommendations as noted above  Referral given for vascular for the very large varicose veins which are increasingly painful  Try to remain as active as possible  Watch for any increased swelling or pain in the area of the varicosities  Slip given for laboratory testing  It has been quite awhile since she has had laboratory testing completed  Will be due for mammogram in the fall  Up-to-date on bone density  Has a history of hyperlipidemia  Will check lipid panel  Will check LFTs with her as well  Will check iron panel with her history of the hereditary hemochromatosis  Continue with vitamin-D supplementation  Will check vitamin-D level with upcoming laboratory testing  Will have her follow up in about 6-12 months depending on her laboratory testing  Follow up sooner if needed  Subjective:      Patient ID: Isis Akbar is a 78 y o  female        She presents for routine follow-up as well as Medicare wellness visit  She has generally been doing well  She has now retired  She has had some family issues with her granddaughter having some psychiatric issues and some issues with suicidal ideation  She wanted to be there for her family  Her legs have worsened with increased pain into the legs related to the varicose veins  Left leg is much worse than right  Denies any recent redness or increased swelling  Pain is constant  She has not had any recent laboratory testing  Continues to try to watch her diet  Has maintained her previous weight loss for the most part  Appetite has been stable  Denies any nausea, vomiting, or diarrhea  Denies any changes in bowel movements  Usually sleeps relatively well  Denies any vision changes  Denies any hearing problems  Denies any chest pain or palpitations  Denies any significant shortness of breath  Some stiffness and swelling into the left knee at times  No recent illnesses  She has not had any significant illnesses in years  She declines any immunizations including the COVID vaccination  The following portions of the patient's history were reviewed and updated as appropriate:   She  has a past medical history of Arthritis, Fibromyalgia, GERD (gastroesophageal reflux disease), Hyperlipemia, Hypothyroid, Osteoarthritis, Osteomyelitis of ankle ([address] ), Rheumatoid arthritis ([address] ), and RSD (reflex sympathetic dystrophy)    She   Patient Active Problem List    Diagnosis Date Noted    Chronic embolism and thrombosis of other specified deep vein of right lower extremity ([address] ) 06/15/2021    Obesity, morbid ([address] ) 06/15/2021    Family history of malignant neoplasm of pancreas 03/03/2021    Trochanteric bursitis of left hip 11/05/2020    Varicose veins of both lower extremities with pain 05/12/2020    Left hip pain 05/12/2020    Leg swelling 05/12/2020    Hyperlipidemia 01/09/2017    Osteoarthritis 01/09/2017    Baker's cyst of knee 08/12/2015    Tendonitis of elbow, left 08/12/2015    Hot flashes due to menopause 04/07/2015    Adenomatous colon polyp 12/12/2014    Pain syndrome, chronic 09/04/2014    Peripheral neuropathy 06/03/2014    Hemochromatosis 02/28/2014    Colon, diverticulosis 02/12/2014    Constipation 02/12/2014    Hypothyroidism 02/12/2014    Lower back pain 61/45/0718    Nonalcoholic steatohepatitis 48/24/3742    Splenic cyst 02/12/2014    Vitamin D deficiency 02/12/2014     She  has a past surgical history that includes Carpal tunnel release; Cholecystectomy; Facial reconstruction surgery; Ankle Fusion (Right); ERCP; Shoulder surgery; Joint replacement; Breast excisional biopsy (Left); Breast biopsy (Left, 12/18/2018); and US guided breast biopsy left complete (Left, 12/18/2018)  Her family history includes Breast cancer in her cousin and maternal aunt; Cancer in her brother and sister; Coronary artery disease in her father; Dementia in her mother; Diabetes in her family and mother; Hemochromatosis in her sister; No Known Problems in her maternal grandfather, maternal grandmother, paternal grandfather, and paternal grandmother; Pancreatic cancer in her sister  She  reports that she quit smoking about 23 years ago  She has never used smokeless tobacco  She reports that she does not drink alcohol and does not use drugs  Current Outpatient Medications   Medication Sig Dispense Refill    Calcium-Phosphorus-Vitamin D (VITAMIN D3/CALCIUM/PHOSPHORUS PO) Take by mouth      Multiple Vitamin (multivitamin) tablet Take 1 tablet by mouth daily       No current facility-administered medications for this visit  Current Outpatient Medications on File Prior to Visit   Medication Sig    Calcium-Phosphorus-Vitamin D (VITAMIN D3/CALCIUM/PHOSPHORUS PO) Take by mouth    Multiple Vitamin (multivitamin) tablet Take 1 tablet by mouth daily     No current facility-administered medications on file prior to visit       She has No Known Allergies       Review of Systems   Constitutional: Negative for activity change, appetite change, chills and fever  HENT: Negative for congestion and rhinorrhea  Eyes: Negative for visual disturbance  Respiratory: Negative for chest tightness and shortness of breath  Cardiovascular: Positive for leg swelling  Negative for chest pain and palpitations  Gastrointestinal: Negative for abdominal pain, blood in stool, diarrhea, nausea and vomiting  Endocrine: Negative for polydipsia, polyphagia and polyuria  Genitourinary: Negative for dysuria, frequency and urgency  Musculoskeletal: Positive for arthralgias, gait problem and joint swelling  Skin: Negative for color change  Neurological: Negative for dizziness and headaches  Hematological: Does not bruise/bleed easily  Psychiatric/Behavioral: Negative for confusion, decreased concentration, dysphoric mood and sleep disturbance  The patient is not nervous/anxious  Objective:      /74 (BP Location: Left arm, Patient Position: Sitting, Cuff Size: Large)   Pulse 66   Temp 97 6 °F (36 4 °C)   Ht 5' 4 5" (1 638 m)   Wt 112 kg (246 lb 4 8 oz)   SpO2 98%   BMI 41 62 kg/m²          Physical Exam  Vitals and nursing note reviewed  Constitutional:       General: She is not in acute distress  Appearance: She is well-developed and well-groomed  She is morbidly obese  HENT:      Head: Normocephalic and atraumatic  Eyes:      General:         Right eye: No discharge  Left eye: No discharge  Conjunctiva/sclera: Conjunctivae normal       Pupils: Pupils are equal, round, and reactive to light  Cardiovascular:      Rate and Rhythm: Normal rate and regular rhythm  Heart sounds: Normal heart sounds  No murmur heard  No friction rub  No gallop  Pulmonary:      Effort: No respiratory distress  Breath sounds: No wheezing or rales  Abdominal:      General: Bowel sounds are normal  There is no distension  Tenderness: There is no abdominal tenderness  Musculoskeletal:      Comments:   Postsurgical changes left knee   Lymphadenopathy:      Cervical: No cervical adenopathy  Skin:     General: Skin is warm and dry  Comments:  Varicose veins bilaterally with left leg being significantly worse than the right   Neurological:      Mental Status: She is alert and oriented to person, place, and time  Psychiatric:         Mood and Affect: Mood and affect normal          Speech: Speech normal          Behavior: Behavior normal  Behavior is cooperative  BMI Counseling: Body mass index is 41 62 kg/m²  The BMI is above normal  Nutrition recommendations include decreasing portion sizes, encouraging healthy choices of fruits and vegetables, decreasing fast food intake, consuming healthier snacks, limiting drinks that contain sugar, moderation in carbohydrate intake and reducing intake of cholesterol  Exercise recommendations include exercising 3-5 times per week  Below is the patient's most recent value for Albumin, ALT, AST, BUN, Calcium, Chloride, Cholesterol, CO2, Creatinine, GFR, Glucose, HDL, Hematocrit, Hemoglobin, Hemoglobin A1C, LDL, Magnesium, Phosphorus, Platelets, Potassium, PSA, Sodium, Triglycerides, and WBC  Lab Results   Component Value Date    ALT 26 05/13/2020    AST 18 05/13/2020    BUN 22 05/13/2020    CALCIUM 9 0 05/13/2020     (H) 05/13/2020    CHOL 190 11/26/2014    CO2 31 05/13/2020    CREATININE 0 75 05/13/2020    HDL 57 05/13/2020    HCT 42 1 05/13/2020    HGB 13 9 05/13/2020    MG 2 1 03/10/2014    PHOS 2 5 03/10/2014     05/13/2020    K 4 7 05/13/2020     06/23/2014    TRIG 130 05/13/2020    WBC 4 81 05/13/2020     Note: for a comprehensive list of the patient's lab results, access the Results Review activity

## 2021-06-15 ENCOUNTER — OFFICE VISIT (OUTPATIENT)
Dept: INTERNAL MEDICINE CLINIC | Facility: CLINIC | Age: 66
End: 2021-06-15
Payer: MEDICARE

## 2021-06-15 VITALS
WEIGHT: 246.3 LBS | OXYGEN SATURATION: 98 % | BODY MASS INDEX: 41.04 KG/M2 | TEMPERATURE: 97.6 F | DIASTOLIC BLOOD PRESSURE: 74 MMHG | HEIGHT: 65 IN | HEART RATE: 66 BPM | SYSTOLIC BLOOD PRESSURE: 122 MMHG

## 2021-06-15 DIAGNOSIS — Z12.31 VISIT FOR SCREENING MAMMOGRAM: ICD-10-CM

## 2021-06-15 DIAGNOSIS — I82.591 CHRONIC EMBOLISM AND THROMBOSIS OF OTHER SPECIFIED DEEP VEIN OF RIGHT LOWER EXTREMITY (HCC): ICD-10-CM

## 2021-06-15 DIAGNOSIS — E78.2 MIXED HYPERLIPIDEMIA: ICD-10-CM

## 2021-06-15 DIAGNOSIS — E03.9 HYPOTHYROIDISM, UNSPECIFIED TYPE: ICD-10-CM

## 2021-06-15 DIAGNOSIS — E83.110 HEREDITARY HEMOCHROMATOSIS (HCC): ICD-10-CM

## 2021-06-15 DIAGNOSIS — E55.9 VITAMIN D DEFICIENCY: ICD-10-CM

## 2021-06-15 DIAGNOSIS — E66.01 OBESITY, MORBID (HCC): ICD-10-CM

## 2021-06-15 DIAGNOSIS — Z00.00 MEDICARE ANNUAL WELLNESS VISIT, SUBSEQUENT: ICD-10-CM

## 2021-06-15 DIAGNOSIS — I83.813 VARICOSE VEINS OF BOTH LOWER EXTREMITIES WITH PAIN: Primary | ICD-10-CM

## 2021-06-15 PROCEDURE — G0438 PPPS, INITIAL VISIT: HCPCS | Performed by: FAMILY MEDICINE

## 2021-06-15 PROCEDURE — 1123F ACP DISCUSS/DSCN MKR DOCD: CPT | Performed by: FAMILY MEDICINE

## 2021-06-15 PROCEDURE — 99214 OFFICE O/P EST MOD 30 MIN: CPT | Performed by: FAMILY MEDICINE

## 2021-06-15 NOTE — PATIENT INSTRUCTIONS
Medicare Preventive Visit Patient Instructions  Thank you for completing your Welcome to Medicare Visit or Medicare Annual Wellness Visit today  Your next wellness visit will be due in one year (6/16/2022)  The screening/preventive services that you may require over the next 5-10 years are detailed below  Some tests may not apply to you based off risk factors and/or age  Screening tests ordered at today's visit but not completed yet may show as past due  Also, please note that scanned in results may not display below  Preventive Screenings:  Service Recommendations Previous Testing/Comments   Colorectal Cancer Screening  * Colonoscopy    * Fecal Occult Blood Test (FOBT)/Fecal Immunochemical Test (FIT)  * Fecal DNA/Cologuard Test  * Flexible Sigmoidoscopy Age: 54-65 years old   Colonoscopy: every 10 years (may be performed more frequently if at higher risk)  OR  FOBT/FIT: every 1 year  OR  Cologuard: every 3 years  OR  Sigmoidoscopy: every 5 years  Screening may be recommended earlier than age 48 if at higher risk for colorectal cancer  Also, an individualized decision between you and your healthcare provider will decide whether screening between the ages of 74-80 would be appropriate  Colonoscopy: 08/10/2020  FOBT/FIT: Not on file  Cologuard: Not on file  Sigmoidoscopy: Not on file    Screening Current     Breast Cancer Screening Age: 36 years old  Frequency: every 1-2 years  Not required if history of left and right mastectomy Mammogram: 10/21/2020    Screening Current   Cervical Cancer Screening Between the ages of 21-29, pap smear recommended once every 3 years  Between the ages of 33-67, can perform pap smear with HPV co-testing every 5 years     Recommendations may differ for women with a history of total hysterectomy, cervical cancer, or abnormal pap smears in past  Pap Smear: 07/16/2015    Screening Not Indicated   Hepatitis C Screening Once for adults born between 1945 and 1965  More frequently in patients at high risk for Hepatitis C Hep C Antibody: 05/13/2020    Screening Current   Diabetes Screening 1-2 times per year if you're at risk for diabetes or have pre-diabetes Fasting glucose: 82 mg/dL   A1C: No results in last 5 years        Cholesterol Screening Once every 5 years if you don't have a lipid disorder  May order more often based on risk factors  Lipid panel: 05/13/2020    Screening Not Indicated  History Lipid Disorder     Other Preventive Screenings Covered by Medicare:  1  Abdominal Aortic Aneurysm (AAA) Screening: covered once if your at risk  You're considered to be at risk if you have a family history of AAA  2  Lung Cancer Screening: covers low dose CT scan once per year if you meet all of the following conditions: (1) Age 50-69; (2) No signs or symptoms of lung cancer; (3) Current smoker or have quit smoking within the last 15 years; (4) You have a tobacco smoking history of at least 30 pack years (packs per day multiplied by number of years you smoked); (5) You get a written order from a healthcare provider  3  Glaucoma Screening: covered annually if you're considered high risk: (1) You have diabetes OR (2) Family history of glaucoma OR (3)  aged 48 and older OR (3)  American aged 72 and older  3  Osteoporosis Screening: covered every 2 years if you meet one of the following conditions: (1) You're estrogen deficient and at risk for osteoporosis based off medical history and other findings; (2) Have a vertebral abnormality; (3) On glucocorticoid therapy for more than 3 months; (4) Have primary hyperparathyroidism; (5) On osteoporosis medications and need to assess response to drug therapy  · Last bone density test (DXA Scan): 10/21/2020   5  HIV Screening: covered annually if you're between the age of 15-65  Also covered annually if you are younger than 13 and older than 72 with risk factors for HIV infection   For pregnant patients, it is covered up to 3 times per pregnancy  Immunizations:  Immunization Recommendations   Influenza Vaccine Annual influenza vaccination during flu season is recommended for all persons aged >= 6 months who do not have contraindications   Pneumococcal Vaccine (Prevnar and Pneumovax)  * Prevnar = PCV13  * Pneumovax = PPSV23   Adults 25-60 years old: 1-3 doses may be recommended based on certain risk factors  Adults 72 years old: Prevnar (PCV13) vaccine recommended followed by Pneumovax (PPSV23) vaccine  If already received PPSV23 since turning 65, then PCV13 recommended at least one year after PPSV23 dose  Hepatitis B Vaccine 3 dose series if at intermediate or high risk (ex: diabetes, end stage renal disease, liver disease)   Tetanus (Td) Vaccine - COST NOT COVERED BY MEDICARE PART B Following completion of primary series, a booster dose should be given every 10 years to maintain immunity against tetanus  Td may also be given as tetanus wound prophylaxis  Tdap Vaccine - COST NOT COVERED BY MEDICARE PART B Recommended at least once for all adults  For pregnant patients, recommended with each pregnancy  Shingles Vaccine (Shingrix) - COST NOT COVERED BY MEDICARE PART B  2 shot series recommended in those aged 48 and above     Health Maintenance Due:      Topic Date Due    Colorectal Cancer Screening  08/10/2021    Cervical Cancer Screening  06/15/2022 (Originally 7/16/2018)    MAMMOGRAM  10/21/2021    DXA SCAN  10/21/2022    Hepatitis C Screening  Completed     Immunizations Due:      Topic Date Due    Influenza Vaccine (Season Ended) 09/01/2021     Advance Directives   What are advance directives? Advance directives are legal documents that state your wishes and plans for medical care  These plans are made ahead of time in case you lose your ability to make decisions for yourself  Advance directives can apply to any medical decision, such as the treatments you want, and if you want to donate organs     What are the types of advance directives? There are many types of advance directives, and each state has rules about how to use them  You may choose a combination of any of the following:  · Living will: This is a written record of the treatment you want  You can also choose which treatments you do not want, which to limit, and which to stop at a certain time  This includes surgery, medicine, IV fluid, and tube feedings  · Durable power of  for healthcare [facility]): This is a written record that states who you want to make healthcare choices for you when you are unable to make them for yourself  This person, called a proxy, is usually a family member or a friend  You may choose more than 1 proxy  · Do not resuscitate (DNR) order:  A DNR order is used in case your heart stops beating or you stop breathing  It is a request not to have certain forms of treatment, such as CPR  A DNR order may be included in other types of advance directives  · Medical directive: This covers the care that you want if you are in a coma, near death, or unable to make decisions for yourself  You can list the treatments you want for each condition  Treatment may include pain medicine, surgery, blood transfusions, dialysis, IV or tube feedings, and a ventilator (breathing machine)  · Values history: This document has questions about your views, beliefs, and how you feel and think about life  This information can help others choose the care that you would choose  Why are advance directives important? An advance directive helps you control your care  Although spoken wishes may be used, it is better to have your wishes written down  Spoken wishes can be misunderstood, or not followed  Treatments may be given even if you do not want them  An advance directive may make it easier for your family to make difficult choices about your care     Weight Management   Why it is important to manage your weight:  Being overweight increases your risk of health conditions such as heart disease, high blood pressure, type 2 diabetes, and certain types of cancer  It can also increase your risk for osteoarthritis, sleep apnea, and other respiratory problems  Aim for a slow, steady weight loss  Even a small amount of weight loss can lower your risk of health problems  How to lose weight safely:  A safe and healthy way to lose weight is to eat fewer calories and get regular exercise  You can lose up about 1 pound a week by decreasing the number of calories you eat by 500 calories each day  Healthy meal plan for weight management:  A healthy meal plan includes a variety of foods, contains fewer calories, and helps you stay healthy  A healthy meal plan includes the following:  · Eat whole-grain foods more often  A healthy meal plan should contain fiber  Fiber is the part of grains, fruits, and vegetables that is not broken down by your body  Whole-grain foods are healthy and provide extra fiber in your diet  Some examples of whole-grain foods are whole-wheat breads and pastas, oatmeal, brown rice, and bulgur  · Eat a variety of vegetables every day  Include dark, leafy greens such as spinach, kale, neha greens, and mustard greens  Eat yellow and orange vegetables such as carrots, sweet potatoes, and winter squash  · Eat a variety of fruits every day  Choose fresh or canned fruit (canned in its own juice or light syrup) instead of juice  Fruit juice has very little or no fiber  · Eat low-fat dairy foods  Drink fat-free (skim) milk or 1% milk  Eat fat-free yogurt and low-fat cottage cheese  Try low-fat cheeses such as mozzarella and other reduced-fat cheeses  · Choose meat and other protein foods that are low in fat  Choose beans or other legumes such as split peas or lentils  Choose fish, skinless poultry (chicken or turkey), or lean cuts of red meat (beef or pork)  Before you cook meat or poultry, cut off any visible fat  · Use less fat and oil    Try baking foods instead of frying them  Add less fat, such as margarine, sour cream, regular salad dressing and mayonnaise to foods  Eat fewer high-fat foods  Some examples of high-fat foods include french fries, doughnuts, ice cream, and cakes  · Eat fewer sweets  Limit foods and drinks that are high in sugar  This includes candy, cookies, regular soda, and sweetened drinks  Exercise:  Exercise at least 30 minutes per day on most days of the week  Some examples of exercise include walking, biking, dancing, and swimming  You can also fit in more physical activity by taking the stairs instead of the elevator or parking farther away from stores  Ask your healthcare provider about the best exercise plan for you  © Copyright [employer] 2018 Information is for End User's use only and may not be sold, redistributed or otherwise used for commercial purposes   All illustrations and images included in CareNotes® are the copyrighted property of [employer]  or [address] [place] [address]

## 2021-06-15 NOTE — PROGRESS NOTES
Assessment and Plan:     Problem List Items Addressed This Visit        Endocrine    Hypothyroidism    Relevant Orders    CBC and differential    TSH, 3rd generation       Cardiovascular and Mediastinum    Varicose veins of both lower extremities with pain - Primary    Relevant Orders    CBC and differential    Ambulatory referral to Vascular Surgery    Chronic embolism and thrombosis of other specified deep vein of right lower extremity (HCC)       Other    Hemochromatosis    Relevant Orders    CBC and differential    Iron Panel (Includes Ferritin, Iron Sat%, Iron, and TIBC)    Hyperlipidemia    Relevant Orders    CBC and differential    Comprehensive metabolic panel    Lipid panel    TSH, 3rd generation    Vitamin D deficiency    Relevant Orders    CBC and differential    Vitamin D 25 hydroxy    Obesity, morbid ([address] )      Other Visit Diagnoses     Visit for screening mammogram        Relevant Orders    Mammo screening bilateral w 3d & cad    Medicare annual wellness visit, subsequent            BMI Counseling: Body mass index is 41 62 kg/m²  The BMI is above normal  Nutrition recommendations include decreasing portion sizes, encouraging healthy choices of fruits and vegetables, decreasing fast food intake, consuming healthier snacks, limiting drinks that contain sugar, moderation in carbohydrate intake and reducing intake of cholesterol  Exercise recommendations include exercising 3-5 times per week  Preventive health issues were discussed with patient, and age appropriate screening tests were ordered as noted in patient's After Visit Summary  Personalized health advice and appropriate referrals for health education or preventive services given if needed, as noted in patient's After Visit Summary       History of Present Illness:     Patient presents for Medicare Annual Wellness visit    Patient Care Team:  Vega White MD as PCP - General  MD Viet Patrick MD Silver Carrie, MD     Problem List:     Patient Active Problem List   Diagnosis    Adenomatous colon polyp    Baker's cyst of knee    Colon, diverticulosis    Constipation    Hemochromatosis    Hot flashes due to menopause    Hyperlipidemia    Hypothyroidism    Lower back pain    Nonalcoholic steatohepatitis    Osteoarthritis    Pain syndrome, chronic    Peripheral neuropathy    Splenic cyst    Tendonitis of elbow, left    Vitamin D deficiency    Varicose veins of both lower extremities with pain    Left hip pain    Leg swelling    Trochanteric bursitis of left hip    Family history of malignant neoplasm of pancreas    Chronic embolism and thrombosis of other specified deep vein of right lower extremity (HCC)    Obesity, morbid ([address] )      Past Medical and Surgical History:     Past Medical History:   Diagnosis Date    Arthritis     Fibromyalgia     GERD (gastroesophageal reflux disease)     Hyperlipemia     Hypothyroid     Osteoarthritis     Osteomyelitis of ankle (HCC)     Rheumatoid arthritis (HCC)     RSD (reflex sympathetic dystrophy)      Past Surgical History:   Procedure Laterality Date    ANKLE FUSION Right     BREAST BIOPSY Left 12/18/2018    US guided; benign     BREAST EXCISIONAL BIOPSY Left     benign     CARPAL TUNNEL RELEASE      CHOLECYSTECTOMY      ERCP      with insertion of tube into bile/pancreatic duct    FACIAL RECONSTRUCTION SURGERY      JOINT REPLACEMENT      tkr left     SHOULDER SURGERY      US GUIDED BREAST BIOPSY LEFT COMPLETE Left 12/18/2018    Duct ectasia, fibrosis, focal usual ductal hyperplasia      Family History:     Family History   Problem Relation Age of Onset    Dementia Mother     Diabetes Mother     Coronary artery disease Father     Hemochromatosis Sister     Cancer Sister     Pancreatic cancer Sister     Diabetes Family     Breast cancer Maternal Aunt     Breast cancer Cousin     Cancer Brother     No Known Problems Maternal Grandmother  No Known Problems Maternal Grandfather     No Known Problems Paternal Grandmother     No Known Problems Paternal Grandfather     Hypertension Neg Hx       Social History:     Social History     Socioeconomic History    Marital status:      Spouse name: None    Number of children: None    Years of education: None    Highest education level: None   Occupational History    None   Tobacco Use    Smoking status: Former Smoker     Quit date:      Years since quittin 4    Smokeless tobacco: Never Used   Vaping Use    Vaping Use: Never used   Substance and Sexual Activity    Alcohol use: No    Drug use: No    Sexual activity: None   Other Topics Concern    None   Social History Narrative    None     Social Determinants of Health     Financial Resource Strain:     Difficulty of Paying Living Expenses:    Food Insecurity:     Worried About Running Out of Food in the Last Year:     Ran Out of Food in the Last Year:    Transportation Needs:     Lack of Transportation (Medical):  Lack of Transportation (Non-Medical):    Physical Activity:     Days of Exercise per Week:     Minutes of Exercise per Session:    Stress:     Feeling of Stress :    Social Connections:     Frequency of Communication with Friends and Family:     Frequency of Social Gatherings with Friends and Family:     Attends [employer] Services:     Active Member of Clubs or Organizations:     Attends Club or Organization Meetings:     Marital Status:    Intimate Partner Violence:     Fear of Current or Ex-Partner:     Emotionally Abused:     Physically Abused:     Sexually Abused:       Medications and Allergies:     Current Outpatient Medications   Medication Sig Dispense Refill    Calcium-Phosphorus-Vitamin D (VITAMIN D3/CALCIUM/PHOSPHORUS PO) Take by mouth      Multiple Vitamin (multivitamin) tablet Take 1 tablet by mouth daily       No current facility-administered medications for this visit       No Known Allergies   Immunizations: There is no immunization history for the selected administration types on file for this patient  Health Maintenance:         Topic Date Due    Colorectal Cancer Screening  08/10/2021    Cervical Cancer Screening  06/15/2022 (Originally 7/16/2018)    MAMMOGRAM  10/21/2021    DXA SCAN  10/21/2022    Hepatitis C Screening  Completed         Topic Date Due    Influenza Vaccine (Season Ended) 09/01/2021      Medicare Health Risk Assessment:     /74 (BP Location: Left arm, Patient Position: Sitting, Cuff Size: Large)   Pulse 66   Temp 97 6 °F (36 4 °C)   Ht 5' 4 5" (1 638 m)   Wt 112 kg (246 lb 4 8 oz)   SpO2 98%   BMI 41 62 kg/m²      Janie Carballo is here for her Subsequent Wellness visit  Last Medicare Wellness visit information reviewed, patient interviewed and updates made to the record today  Health Risk Assessment:   Patient rates overall health as good  Patient feels that their physical health rating is same  Patient is very satisfied with their life  Eyesight was rated as same  Hearing was rated as same  Patient feels that their emotional and mental health rating is same  Patients states they are never, rarely angry  Patient states they are never, rarely unusually tired/fatigued  Pain experienced in the last 7 days has been some  Patient's pain rating has been 4/10  Patient states that she has experienced no weight loss or gain in last 6 months  Depression Screening:   PHQ-2 Score: 0      Fall Risk Screening: In the past year, patient has experienced: no history of falling in past year      Urinary Incontinence Screening:   Patient has not leaked urine accidently in the last six months  Home Safety:  Patient has trouble with stairs inside or outside of their home  Patient has working smoke alarms and has no working carbon monoxide detector  Home safety hazards include: none  Nutrition:   Current diet is Regular       Medications:   Patient is currently taking over-the-counter supplements  OTC medications include: see medication list  Patient is able to manage medications  Activities of Daily Living (ADLs)/Instrumental Activities of Daily Living (IADLs):   Walk and transfer into and out of bed and chair?: Yes  Dress and groom yourself?: Yes    Bathe or shower yourself?: Yes    Feed yourself? Yes  Do your laundry/housekeeping?: Yes  Manage your money, pay your bills and track your expenses?: Yes  Make your own meals?: Yes    Do your own shopping?: Yes    Previous Hospitalizations:   Any hospitalizations or ED visits within the last 12 months?: No      Advance Care Planning:   Living will: No    Durable POA for healthcare: No    Advanced directive: No    Five wishes given: Yes      Cognitive Screening:   Provider or family/friend/caregiver concerned regarding cognition?: No    PREVENTIVE SCREENINGS      Cardiovascular Screening:    General: Screening Not Indicated, History Lipid Disorder and Risks and Benefits Discussed    Due for: Lipid Panel      Diabetes Screening:     General: Risks and Benefits Discussed    Due for: Blood Glucose      Colorectal Cancer Screening:     General: Screening Current      Breast Cancer Screening:     General: Screening Current      Cervical Cancer Screening:    General: Screening Not Indicated      Osteoporosis Screening:    General: Screening Current      Abdominal Aortic Aneurysm (AAA) Screening:        General: Screening Not Indicated      Lung Cancer Screening:     General: Screening Not Indicated      Hepatitis C Screening:    General: Screening Current    Screening, Brief Intervention, and Referral to Treatment (SBIRT)    Screening  Typical number of drinks in a day: 0  Typical number of drinks in a week: 0  Interpretation: Low risk drinking behavior      AUDIT-C Screenin) How often did you have a drink containing alcohol in the past year? never  2) How many drinks did you have on a typical day when you were drinking in the past year? 0  3) How often did you have 6 or more drinks on one occasion in the past year? never    AUDIT-C Score: 0  Interpretation: Score 0-2 (female): Negative screen for alcohol misuse    Single Item Drug Screening:  How often have you used an illegal drug (including marijuana) or a prescription medication for non-medical reasons in the past year? never    Single Item Drug Screen Score: 0  Interpretation: Negative screen for possible drug use disorder    Brief Intervention  Alcohol & drug use screenings were reviewed  No concerns regarding substance use disorder identified         Ita Goff MD

## 2021-07-21 ENCOUNTER — APPOINTMENT (OUTPATIENT)
Dept: LAB | Facility: HOSPITAL | Age: 66
End: 2021-07-21
Payer: MEDICARE

## 2021-07-21 ENCOUNTER — CONSULT (OUTPATIENT)
Dept: VASCULAR SURGERY | Facility: HOSPITAL | Age: 66
End: 2021-07-21
Payer: MEDICARE

## 2021-07-21 VITALS
BODY MASS INDEX: 39.99 KG/M2 | SYSTOLIC BLOOD PRESSURE: 128 MMHG | DIASTOLIC BLOOD PRESSURE: 76 MMHG | HEART RATE: 84 BPM | WEIGHT: 240 LBS | HEIGHT: 65 IN | TEMPERATURE: 98.6 F

## 2021-07-21 DIAGNOSIS — I83.813 VARICOSE VEINS OF BOTH LOWER EXTREMITIES WITH PAIN: ICD-10-CM

## 2021-07-21 DIAGNOSIS — E03.9 HYPOTHYROIDISM, UNSPECIFIED TYPE: ICD-10-CM

## 2021-07-21 DIAGNOSIS — E83.110 HEREDITARY HEMOCHROMATOSIS (HCC): ICD-10-CM

## 2021-07-21 DIAGNOSIS — E55.9 VITAMIN D DEFICIENCY: ICD-10-CM

## 2021-07-21 DIAGNOSIS — E78.2 MIXED HYPERLIPIDEMIA: Primary | ICD-10-CM

## 2021-07-21 DIAGNOSIS — E78.2 MIXED HYPERLIPIDEMIA: ICD-10-CM

## 2021-07-21 LAB
25(OH)D3 SERPL-MCNC: 59.5 NG/ML (ref 30–100)
ALBUMIN SERPL BCP-MCNC: 4 G/DL (ref 3.5–5)
ALP SERPL-CCNC: 60 U/L (ref 46–116)
ALT SERPL W P-5'-P-CCNC: 22 U/L (ref 12–78)
ANION GAP SERPL CALCULATED.3IONS-SCNC: 6 MMOL/L (ref 4–13)
AST SERPL W P-5'-P-CCNC: 17 U/L (ref 5–45)
BASOPHILS # BLD AUTO: 0.04 THOUSANDS/ΜL (ref 0–0.1)
BASOPHILS NFR BLD AUTO: 1 % (ref 0–1)
BILIRUB SERPL-MCNC: 0.71 MG/DL (ref 0.2–1)
BUN SERPL-MCNC: 14 MG/DL (ref 5–25)
CALCIUM SERPL-MCNC: 9 MG/DL (ref 8.3–10.1)
CHLORIDE SERPL-SCNC: 107 MMOL/L (ref 100–108)
CHOLEST SERPL-MCNC: 218 MG/DL (ref 50–200)
CO2 SERPL-SCNC: 27 MMOL/L (ref 21–32)
CREAT SERPL-MCNC: 0.92 MG/DL (ref 0.6–1.3)
EOSINOPHIL # BLD AUTO: 0.08 THOUSAND/ΜL (ref 0–0.61)
EOSINOPHIL NFR BLD AUTO: 1 % (ref 0–6)
ERYTHROCYTE [DISTWIDTH] IN BLOOD BY AUTOMATED COUNT: 11.9 % (ref 11.6–15.1)
FERRITIN SERPL-MCNC: 194 NG/ML (ref 8–388)
GFR SERPL CREATININE-BSD FRML MDRD: 65 ML/MIN/1.73SQ M
GLUCOSE P FAST SERPL-MCNC: 103 MG/DL (ref 65–99)
HCT VFR BLD AUTO: 44.5 % (ref 34.8–46.1)
HDLC SERPL-MCNC: 57 MG/DL
HGB BLD-MCNC: 14.4 G/DL (ref 11.5–15.4)
IMM GRANULOCYTES # BLD AUTO: 0.01 THOUSAND/UL (ref 0–0.2)
IMM GRANULOCYTES NFR BLD AUTO: 0 % (ref 0–2)
IRON SATN MFR SERPL: 45 %
IRON SERPL-MCNC: 131 UG/DL (ref 50–170)
LDLC SERPL CALC-MCNC: 135 MG/DL (ref 0–100)
LYMPHOCYTES # BLD AUTO: 1.71 THOUSANDS/ΜL (ref 0.6–4.47)
LYMPHOCYTES NFR BLD AUTO: 27 % (ref 14–44)
MCH RBC QN AUTO: 33.3 PG (ref 26.8–34.3)
MCHC RBC AUTO-ENTMCNC: 32.4 G/DL (ref 31.4–37.4)
MCV RBC AUTO: 103 FL (ref 82–98)
MONOCYTES # BLD AUTO: 0.55 THOUSAND/ΜL (ref 0.17–1.22)
MONOCYTES NFR BLD AUTO: 9 % (ref 4–12)
NEUTROPHILS # BLD AUTO: 4 THOUSANDS/ΜL (ref 1.85–7.62)
NEUTS SEG NFR BLD AUTO: 62 % (ref 43–75)
NONHDLC SERPL-MCNC: 161 MG/DL
NRBC BLD AUTO-RTO: 0 /100 WBCS
PLATELET # BLD AUTO: 168 THOUSANDS/UL (ref 149–390)
PMV BLD AUTO: 9.3 FL (ref 8.9–12.7)
POTASSIUM SERPL-SCNC: 4.2 MMOL/L (ref 3.5–5.3)
PROT SERPL-MCNC: 7.6 G/DL (ref 6.4–8.2)
RBC # BLD AUTO: 4.33 MILLION/UL (ref 3.81–5.12)
SODIUM SERPL-SCNC: 140 MMOL/L (ref 136–145)
TIBC SERPL-MCNC: 289 UG/DL (ref 250–450)
TRIGL SERPL-MCNC: 130 MG/DL
TSH SERPL DL<=0.05 MIU/L-ACNC: 2.35 UIU/ML (ref 0.36–3.74)
WBC # BLD AUTO: 6.39 THOUSAND/UL (ref 4.31–10.16)

## 2021-07-21 PROCEDURE — 84443 ASSAY THYROID STIM HORMONE: CPT

## 2021-07-21 PROCEDURE — 83550 IRON BINDING TEST: CPT

## 2021-07-21 PROCEDURE — 82728 ASSAY OF FERRITIN: CPT

## 2021-07-21 PROCEDURE — 80061 LIPID PANEL: CPT

## 2021-07-21 PROCEDURE — 82306 VITAMIN D 25 HYDROXY: CPT

## 2021-07-21 PROCEDURE — 80053 COMPREHEN METABOLIC PANEL: CPT

## 2021-07-21 PROCEDURE — 83540 ASSAY OF IRON: CPT

## 2021-07-21 PROCEDURE — 99204 OFFICE O/P NEW MOD 45 MIN: CPT | Performed by: PHYSICIAN ASSISTANT

## 2021-07-21 PROCEDURE — 36415 COLL VENOUS BLD VENIPUNCTURE: CPT

## 2021-07-21 PROCEDURE — 85025 COMPLETE CBC W/AUTO DIFF WBC: CPT

## 2021-07-21 NOTE — ASSESSMENT & PLAN NOTE
78year old female former smoker w/ HTN, HLD, hypothyroidism, PÉREZ, OA, hx of DVT of the LLE s/p venogram, presenting with painful truncal varicose veins of the bilateral lower extremities, L>R      -Symptomatic varicose veins of the LLE  Patient has not been wearing compression stockings but has been elevating her legs at night   -Symptoms include swelling, pain, aching and pruritis   -Has been exercising and has lost 5 pounds  -Discussed pathophysiology and indications for treatment of varicose veins  -Recommend 3mo trial of compression stockings, lower extremity elevation, exercise and weight loss    -Will obtain reflux study in 3mo and bring patient back into the office for review w/surgeon

## 2021-07-21 NOTE — PATIENT INSTRUCTIONS
Varicose Veins   AMBULATORY CARE:   Varicose veins  are veins that become large, twisted, and swollen  They are common on the back of the calves, knees, and thighs  Varicose veins are caused by valves in your veins that do not work properly  This causes blood to collect and increase pressure in the veins of your legs  The increased pressure causes your veins to stretch, get larger, swell, and twist      Common symptoms include the following: Your symptoms may be worse after you stand or sit for long periods of time  You may have any of the following:  · Blue, purple, or bulging veins in your legs     · Pain, swelling, or muscle cramps in your legs    · Feeling of fatigue or heaviness in your legs    · Cramping in your legs    Seek care immediately if:   · You have a wound that does not heal or is infected  · You have an injury that has broken your skin and caused your varicose veins to bleed  · Your leg is swollen and hard  · You notice that your legs or feet are turning blue or black  · Your leg feels warm, tender, and painful  It may look swollen and red  Contact your healthcare provider if:   · You have pain in your leg that does not go away or gets worse  · You notice sudden large bruising on your legs  · You have a rash on your leg  · Your symptoms keep you from doing your daily activities  · You have questions or concerns about your condition or care  Treatment of varicose veins  aims to decrease symptoms, improve appearance, and prevent further problems  Treatment will depend on which veins are affected and how severe your condition is  You may need procedures to treat or remove your varicose veins  For example, your healthcare provider may inject a solution or use a laser to close the varicose veins  Surgery to remove long veins may also be done  Ask your healthcare provider for more information about procedures used to treat varicose veins    Manage varicose veins:   · Do not sit or stand for long periods of time  This can cause the blood to collect in your legs and make your symptoms worse  Bend or rotate your ankles several times every hour  Walk around for a few minutes every hour to get blood moving in your legs  · Do not cross your legs when you sit  This decreases blood flow to your feet and can make your symptoms worse  · Do not wear tight clothing or shoes  Do not wear high-heeled shoes  Do not wear clothes that are tight around the waist or knees  · Maintain a healthy weight  Being overweight or obese can make your varicose veins worse  Ask your healthcare provider how much you should weigh  Ask him or her to help you create a weight loss plan if you are overweight  · Wear pressure stockings as directed  The stockings are tight and put pressure on your legs  They improve blood flow and help prevent clots  · Elevate your legs  Keep them above the level of your heart for 15 to 30 minutes several times a day  You can also prop the end of your bed up slightly to elevate your legs while you sleep  This will help blood to flow back to your heart  · Get regular exercise  Talk to your healthcare provider about the best exercise plan for you  Exercise can improve blood flow to your legs and feet  Follow up with your healthcare provider as directed:  Write down your questions so you remember to ask them during your visits  © Copyright [employer] 2021 Information is for End User's use only and may not be sold, redistributed or otherwise used for commercial purposes  All illustrations and images included in CareNotes® are the copyrighted property of [employer]  or Prabhakar Bronson   The above information is an  only  It is not intended as medical advice for individual conditions or treatments  Talk to your doctor, nurse or pharmacist before following any medical regimen to see if it is safe and effective for you

## 2021-07-21 NOTE — PROGRESS NOTES
Assessment/Plan:    Varicose veins of both lower extremities with pain  78year old female former smoker w/ HTN, HLD, hypothyroidism, PÉREZ, OA, hx of DVT of the LLE s/p venogram, presenting with painful truncal varicose veins of the bilateral lower extremities, L>R      -Symptomatic varicose veins of the LLE  Patient has not been wearing compression stockings but has been elevating her legs at night   -Symptoms include swelling, pain, aching and pruritis   -Has been exercising and has lost 5 pounds  -Discussed pathophysiology and indications for treatment of varicose veins  -Recommend 3mo trial of compression stockings, lower extremity elevation, exercise and weight loss  -Will obtain reflux study in 3mo and bring patient back into the office for review w/surgeon    Hyperlipidemia  -Stable  -Encourage low cholesterol, low fat diet  -Medical management per PCP    Hypothyroidism  -Stable  -Regular TSH checks  -Medical management per PCP       Diagnoses and all orders for this visit:    Mixed hyperlipidemia    Varicose veins of both lower extremities with pain  -     Ambulatory referral to Vascular Surgery  -     Compression Stocking  -     VAS reflux lower limb venous duplex study with reflux assesment, unilateral; Future    Hypothyroidism, unspecified type          Subjective:      Patient ID: Catracho Alvarenga is a 78 y o  female  Patient is new and is referred by PCP  Patient presents Varicose Veins that cause burning pain, swelling, and itching Lt>Rt  Patient c/o her Lt foot feeling cold  Patient has a h/o DVT  Patient denies wearing compression  69-year-old female presents with symptomatic varicose veins of the left lower extremity  Patient does have varicosities of the right lower extremity however these  Remain asymptomatic  Patient's symptoms of the right lower extremity and include heaviness, aching, pain, itching and swelling    Patient has not been wearing compression stockings however does elevate her legs at night  Patient has been exercising regularly  Patient does have a history of DVT of the left lower extremity and had a venogram an outside facility in the past   Patient denies claudication or rest pain      The following portions of the patient's history were reviewed and updated as appropriate: allergies, current medications, past family history, past medical history, past social history, past surgical history and problem list     Review of Systems   Constitutional: Negative  HENT: Negative  Eyes: Negative  Respiratory: Negative  Cardiovascular: Positive for leg swelling (Lt>Rt)  Painful Veins   Gastrointestinal: Negative  Endocrine: Negative  Genitourinary: Negative  Musculoskeletal: Negative  Allergic/Immunologic: Negative  Neurological: Negative  Hematological: Negative  Psychiatric/Behavioral: Negative  I have reviewed and made appropriate changes to the review of systems input by the medical assistant      Vitals:    07/21/21 1040   BP: 128/76   BP Location: Right arm   Patient Position: Sitting   Cuff Size: Standard   Pulse: 84   Temp: 98 6 °F (37 °C)   TempSrc: Tympanic   Weight: 109 kg (240 lb)   Height: 5' 4 5" (1 638 m)       Patient Active Problem List   Diagnosis    Adenomatous colon polyp    Baker's cyst of knee    Colon, diverticulosis    Constipation    Hemochromatosis    Hot flashes due to menopause    Hyperlipidemia    Hypothyroidism    Lower back pain    Nonalcoholic steatohepatitis    Osteoarthritis    Pain syndrome, chronic    Peripheral neuropathy    Splenic cyst    Tendonitis of elbow, left    Vitamin D deficiency    Varicose veins of both lower extremities with pain    Left hip pain    Leg swelling    Trochanteric bursitis of left hip    Family history of malignant neoplasm of pancreas    Chronic embolism and thrombosis of other specified deep vein of right lower extremity (HCC)    Obesity, morbid (HCC) Past Surgical History:   Procedure Laterality Date    ANKLE FUSION Right     BREAST BIOPSY Left 2018    US guided; benign     BREAST EXCISIONAL BIOPSY Left     benign     CARPAL TUNNEL RELEASE      CHOLECYSTECTOMY      ERCP      with insertion of tube into bile/pancreatic duct    FACIAL RECONSTRUCTION SURGERY      JOINT REPLACEMENT      tkr left     SHOULDER SURGERY      US GUIDED BREAST BIOPSY LEFT COMPLETE Left 2018    Duct ectasia, fibrosis, focal usual ductal hyperplasia       Family History   Problem Relation Age of Onset    Dementia Mother     Diabetes Mother     Coronary artery disease Father     Hemochromatosis Sister     Cancer Sister     Pancreatic cancer Sister     Diabetes Family     Breast cancer Maternal Aunt     Breast cancer Cousin     Cancer Brother     No Known Problems Maternal Grandmother     No Known Problems Maternal Grandfather     No Known Problems Paternal Grandmother     No Known Problems Paternal Grandfather     Hypertension Neg Hx        Social History     Socioeconomic History    Marital status:      Spouse name: Not on file    Number of children: Not on file    Years of education: Not on file    Highest education level: Not on file   Occupational History    Not on file   Tobacco Use    Smoking status: Former Smoker     Quit date:      Years since quittin 5    Smokeless tobacco: Never Used   Vaping Use    Vaping Use: Never used   Substance and Sexual Activity    Alcohol use: No    Drug use: No    Sexual activity: Not on file   Other Topics Concern    Not on file   Social History Narrative    Not on file     Social Determinants of Health     Financial Resource Strain:     Difficulty of Paying Living Expenses:    Food Insecurity:     Worried About Running Out of Food in the Last Year:     [address] in the Last Year:    Transportation Needs:     Lack of Transportation (Medical):      Lack of Transportation (Non-Medical):    Physical Activity:     Days of Exercise per Week:     Minutes of Exercise per Session:    Stress:     Feeling of Stress :    Social Connections:     Frequency of Communication with Friends and Family:     Frequency of Social Gatherings with Friends and Family:     Attends [employer] Services:     Active Member of Clubs or Organizations:     Attends Club or Organization Meetings:     Marital Status:    Intimate Partner Violence:     Fear of Current or Ex-Partner:     Emotionally Abused:     Physically Abused:     Sexually Abused:        No Known Allergies      Current Outpatient Medications:     Calcium-Phosphorus-Vitamin D (VITAMIN D3/CALCIUM/PHOSPHORUS PO), Take by mouth, Disp: , Rfl:     Multiple Vitamin (multivitamin) tablet, Take 1 tablet by mouth daily, Disp: , Rfl:     Objective:      /76 (BP Location: Right arm, Patient Position: Sitting, Cuff Size: Standard)   Pulse 84   Temp 98 6 °F (37 °C) (Tympanic)   Ht 5' 4 5" (1 638 m)   Wt 109 kg (240 lb)   BMI 40 56 kg/m²          Physical Exam  Constitutional:       General: She is not in acute distress  Appearance: Normal appearance  She is not ill-appearing, toxic-appearing or diaphoretic  HENT:      Head: Normocephalic and atraumatic  Right Ear: External ear normal       Left Ear: External ear normal       Nose: Nose normal       Mouth/Throat:      Mouth: Mucous membranes are moist       Pharynx: Oropharynx is clear  Eyes:      General: No scleral icterus  Extraocular Movements: Extraocular movements intact  Conjunctiva/sclera: Conjunctivae normal    Neck:      Vascular: No carotid bruit  Cardiovascular:      Rate and Rhythm: Normal rate and regular rhythm  Pulses:           Radial pulses are 2+ on the right side and 2+ on the left side  Dorsalis pedis pulses are 2+ on the right side and 2+ on the left side  Heart sounds: Normal heart sounds     Pulmonary:      Effort: Pulmonary effort is normal  No respiratory distress  Breath sounds: Normal breath sounds  Abdominal:      General: Abdomen is flat  Palpations: Abdomen is soft  Tenderness: There is no abdominal tenderness  Musculoskeletal:         General: Normal range of motion  Cervical back: Normal range of motion and neck supple  Skin:     General: Skin is warm and dry  Capillary Refill: Capillary refill takes less than 2 seconds  Comments: Truncal varicosities of the left lower extremity  Chronic skin changes noted  No evidence of wounds or lesions   Neurological:      General: No focal deficit present  Mental Status: She is alert and oriented to person, place, and time  Mental status is at baseline  Cranial Nerves: No cranial nerve deficit  Sensory: No sensory deficit  Motor: No weakness     Psychiatric:         Mood and Affect: Mood normal          Behavior: Behavior normal

## 2021-08-09 ENCOUNTER — TELEPHONE (OUTPATIENT)
Dept: SURGERY | Facility: HOSPITAL | Age: 66
End: 2021-08-09

## 2021-08-10 ENCOUNTER — ANESTHESIA (OUTPATIENT)
Dept: PERIOP | Facility: HOSPITAL | Age: 66
End: 2021-08-10

## 2021-08-10 ENCOUNTER — ANESTHESIA EVENT (OUTPATIENT)
Dept: PERIOP | Facility: HOSPITAL | Age: 66
End: 2021-08-10

## 2021-08-10 ENCOUNTER — HOSPITAL ENCOUNTER (OUTPATIENT)
Dept: PERIOP | Facility: HOSPITAL | Age: 66
Setting detail: OUTPATIENT SURGERY
Discharge: HOME | End: 2021-08-10
Attending: INTERNAL MEDICINE | Admitting: INTERNAL MEDICINE
Payer: MEDICARE

## 2021-08-10 VITALS
TEMPERATURE: 98.2 F | OXYGEN SATURATION: 96 % | HEART RATE: 74 BPM | BODY MASS INDEX: 40.97 KG/M2 | RESPIRATION RATE: 20 BRPM | WEIGHT: 240 LBS | HEIGHT: 64 IN | SYSTOLIC BLOOD PRESSURE: 116 MMHG | DIASTOLIC BLOOD PRESSURE: 70 MMHG

## 2021-08-10 DIAGNOSIS — D12.6 ADENOMATOUS POLYP OF COLON, UNSPECIFIED PART OF COLON: ICD-10-CM

## 2021-08-10 PROCEDURE — 45378 DIAGNOSTIC COLONOSCOPY: CPT | Performed by: INTERNAL MEDICINE

## 2021-08-10 RX ORDER — PROPOFOL 10 MG/ML
INJECTION, EMULSION INTRAVENOUS CONTINUOUS PRN
Status: DISCONTINUED | OUTPATIENT
Start: 2021-08-10 | End: 2021-08-10

## 2021-08-10 RX ORDER — ONDANSETRON 2 MG/ML
4 INJECTION INTRAMUSCULAR; INTRAVENOUS ONCE AS NEEDED
Status: DISCONTINUED | OUTPATIENT
Start: 2021-08-10 | End: 2021-08-14 | Stop reason: HOSPADM

## 2021-08-10 RX ORDER — PROPOFOL 10 MG/ML
INJECTION, EMULSION INTRAVENOUS AS NEEDED
Status: DISCONTINUED | OUTPATIENT
Start: 2021-08-10 | End: 2021-08-10

## 2021-08-10 RX ORDER — LIDOCAINE HYDROCHLORIDE 10 MG/ML
INJECTION, SOLUTION EPIDURAL; INFILTRATION; INTRACAUDAL; PERINEURAL AS NEEDED
Status: DISCONTINUED | OUTPATIENT
Start: 2021-08-10 | End: 2021-08-10

## 2021-08-10 RX ORDER — SODIUM CHLORIDE, SODIUM LACTATE, POTASSIUM CHLORIDE, CALCIUM CHLORIDE 600; 310; 30; 20 MG/100ML; MG/100ML; MG/100ML; MG/100ML
125 INJECTION, SOLUTION INTRAVENOUS CONTINUOUS
Status: DISCONTINUED | OUTPATIENT
Start: 2021-08-10 | End: 2021-08-14 | Stop reason: HOSPADM

## 2021-08-10 RX ORDER — SODIUM CHLORIDE, SODIUM LACTATE, POTASSIUM CHLORIDE, CALCIUM CHLORIDE 600; 310; 30; 20 MG/100ML; MG/100ML; MG/100ML; MG/100ML
125 INJECTION, SOLUTION INTRAVENOUS CONTINUOUS
Status: CANCELLED | OUTPATIENT
Start: 2021-08-10

## 2021-08-10 RX ADMIN — PROPOFOL 90 MG: 10 INJECTION, EMULSION INTRAVENOUS at 12:00

## 2021-08-10 RX ADMIN — LIDOCAINE HYDROCHLORIDE 50 MG: 10 INJECTION, SOLUTION EPIDURAL; INFILTRATION; INTRACAUDAL; PERINEURAL at 12:00

## 2021-08-10 RX ADMIN — SODIUM CHLORIDE, SODIUM LACTATE, POTASSIUM CHLORIDE, AND CALCIUM CHLORIDE: .6; .31; .03; .02 INJECTION, SOLUTION INTRAVENOUS at 11:50

## 2021-08-10 RX ADMIN — PROPOFOL 120 MCG/KG/MIN: 10 INJECTION, EMULSION INTRAVENOUS at 12:00

## 2021-08-10 NOTE — H&P
History and Physical - [facility]  Anitra Hidalgo 78 y o  female MRN: [id number]                  HPI: Anitra Hidalgo is a 78y o  year old female who presents for CRC screening given prior polyps      REVIEW OF SYSTEMS: Per the HPI, and otherwise unremarkable      Historical Information   Past Medical History:   Diagnosis Date    Arthritis     Fibromyalgia     GERD (gastroesophageal reflux disease)     Hyperlipemia     Hypothyroid     Osteoarthritis     Osteomyelitis of ankle (HCC)     Rheumatoid arthritis (HCC)     RSD (reflex sympathetic dystrophy)      Past Surgical History:   Procedure Laterality Date    ANKLE FUSION Right     BREAST BIOPSY Left 2018    US guided; benign     BREAST EXCISIONAL BIOPSY Left     benign     CARPAL TUNNEL RELEASE      CHOLECYSTECTOMY      ERCP      with insertion of tube into bile/pancreatic duct    FACIAL RECONSTRUCTION SURGERY      JOINT REPLACEMENT      tkr left     SHOULDER SURGERY      US GUIDED BREAST BIOPSY LEFT COMPLETE Left 2018    Duct ectasia, fibrosis, focal usual ductal hyperplasia     Social History   Social History     Substance and Sexual Activity   Alcohol Use No     Social History     Substance and Sexual Activity   Drug Use No     Social History     Tobacco Use   Smoking Status Former Smoker    Quit date:     Years since quittin 6   Smokeless Tobacco Never Used     Family History   Problem Relation Age of Onset    Dementia Mother     Diabetes Mother     Coronary artery disease Father     Hemochromatosis Sister     Cancer Sister     Pancreatic cancer Sister     Diabetes Family     Breast cancer Maternal Aunt     Breast cancer Cousin     Cancer Brother     No Known Problems Maternal Grandmother     No Known Problems Maternal Grandfather     No Known Problems Paternal Grandmother     No Known Problems Paternal Grandfather     Hypertension Neg Hx        Meds/Allergies     (Not in a hospital admission)      No Known Allergies    Objective     Blood pressure 139/89, pulse 78, temperature 98 9 °F (37 2 °C), temperature source Tympanic, resp  rate 18, height 5' 4" (1 626 m), weight 109 kg (240 lb), SpO2 96 %  PHYSICAL EXAMINATION:    General Appearance:   Alert, cooperative, no distress   HEENT:  Normocephalic, atraumatic, anicteric  Neck supple, symmetrical, trachea midline  Lungs:   Equal chest rise and unlabored breathing, normal effort, no coughing  Cardiovascular:   No visualized JVD  Abdomen:   No abdominal distension  Skin:   No jaundice, rashes, or lesions  Musculoskeletal:   Normal range of motion visualized  Psych:  Normal affect and normal insight  Neuro:  Alert and appropriate  ASSESSMENT/PLAN:  This is a 78y o  year old female here for colonosocpy, and she is stable and optimized for her procedure

## 2021-08-10 NOTE — ANESTHESIA PREPROCEDURE EVALUATION
Procedure:  COLONOSCOPY    Relevant Problems   CARDIO   (+) Chronic embolism and thrombosis of other specified deep vein of right lower extremity (HCC)   (+) Hyperlipidemia      ENDO   (+) Hypothyroidism      GI/HEPATIC   (+) Nonalcoholic steatohepatitis      MUSCULOSKELETAL   (+) Lower back pain   (+) Osteoarthritis      NEURO/PSYCH   (+) Pain syndrome, chronic      Other   (+) Obesity, morbid (HCC)   (+) Splenic cyst        Physical Exam    Airway    Mallampati score: II  TM Distance: >3 FB  Neck ROM: full     Dental       Cardiovascular      Pulmonary      Other Findings        Anesthesia Plan  ASA Score- 3     Anesthesia Type- IV sedation with anesthesia with ASA Monitors  Additional Monitors:   Airway Plan:           Plan Factors-Exercise tolerance (METS): >4 METS  Chart reviewed  Existing labs reviewed  Patient summary reviewed  Induction- intravenous  Postoperative Plan-     Informed Consent- Anesthetic plan and risks discussed with patient  I personally reviewed this patient with the CRNA  Discussed and agreed on the Anesthesia Plan with the CRNA  Mauri Pizano

## 2021-08-12 ENCOUNTER — OFFICE VISIT (OUTPATIENT)
Dept: OBGYN CLINIC | Facility: CLINIC | Age: 66
End: 2021-08-12
Payer: MEDICARE

## 2021-08-12 VITALS
BODY MASS INDEX: 40.97 KG/M2 | HEART RATE: 67 BPM | SYSTOLIC BLOOD PRESSURE: 101 MMHG | HEIGHT: 64 IN | DIASTOLIC BLOOD PRESSURE: 62 MMHG | WEIGHT: 240 LBS

## 2021-08-12 DIAGNOSIS — M70.61 TROCHANTERIC BURSITIS OF RIGHT HIP: ICD-10-CM

## 2021-08-12 DIAGNOSIS — M70.62 TROCHANTERIC BURSITIS OF LEFT HIP: Primary | ICD-10-CM

## 2021-08-12 PROCEDURE — 20610 DRAIN/INJ JOINT/BURSA W/O US: CPT | Performed by: ORTHOPAEDIC SURGERY

## 2021-08-12 PROCEDURE — 99213 OFFICE O/P EST LOW 20 MIN: CPT | Performed by: ORTHOPAEDIC SURGERY

## 2021-08-12 RX ORDER — BUPIVACAINE HYDROCHLORIDE 2.5 MG/ML
4 INJECTION, SOLUTION INFILTRATION; PERINEURAL
Status: COMPLETED | OUTPATIENT
Start: 2021-08-12 | End: 2021-08-12

## 2021-08-12 RX ORDER — METHYLPREDNISOLONE ACETATE 40 MG/ML
2 INJECTION, SUSPENSION INTRA-ARTICULAR; INTRALESIONAL; INTRAMUSCULAR; SOFT TISSUE
Status: COMPLETED | OUTPATIENT
Start: 2021-08-12 | End: 2021-08-12

## 2021-08-12 RX ADMIN — METHYLPREDNISOLONE ACETATE 2 ML: 40 INJECTION, SUSPENSION INTRA-ARTICULAR; INTRALESIONAL; INTRAMUSCULAR; SOFT TISSUE at 08:19

## 2021-08-12 RX ADMIN — BUPIVACAINE HYDROCHLORIDE 4 ML: 2.5 INJECTION, SOLUTION INFILTRATION; PERINEURAL at 08:19

## 2021-08-12 NOTE — PROGRESS NOTES
Assessment/Plan:    No problem-specific Assessment & Plan notes found for this encounter  Diagnoses and all orders for this visit:    Trochanteric bursitis of left hip    Trochanteric bursitis of right hip           both hips were injected with Depo-Medrol and Marcaine  She tolerated procedures quite well  Return back  In 2 months for re-evaluation  If her condition changes, she will not hesitate to let us know  Subjective:      Patient ID: Aldair Mcmahon is a 78 y o  female  HPI       The patient has a history of trochanteric bursitis for bilateral hips  She presents for  Her by monthly injections  The injections do help her significantly  She denies any numbness or tingling  She denies any fever or chills  She denies any groin pain  She denies any hip pain  The following portions of the patient's history were reviewed and updated as appropriate: allergies, current medications, past social history, past surgical history and problem list     Review of Systems   Constitutional: Negative for chills, fever and unexpected weight change  HENT: Negative for hearing loss, nosebleeds and sore throat  Eyes: Negative for pain, redness and visual disturbance  Respiratory: Negative for cough, shortness of breath and wheezing  Cardiovascular: Negative for chest pain, palpitations and leg swelling  Gastrointestinal: Negative for abdominal pain, nausea and vomiting  Endocrine: Negative for polydipsia and polyuria  Genitourinary: Negative for dysuria and hematuria  Musculoskeletal: Positive for arthralgias, gait problem and myalgias  Negative for back pain, joint swelling, neck pain and neck stiffness  As noted in HPI   Skin: Negative for rash and wound  Neurological: Negative for dizziness, numbness and headaches  Psychiatric/Behavioral: Negative for decreased concentration and suicidal ideas  The patient is not nervous/anxious            Objective:      /62   Pulse 67 Ht 5' 4" (1 626 m)   Wt 109 kg (240 lb)   BMI 41 20 kg/m²          Physical Exam          Spine is midline  There is no gross tenderness in her spine  There is a negative straight leg and a negative contralateral straight leg raising test   Bilateral lower extremities are neurovascular intact  Toes are pink and mobile  Compartments are soft  Range of motion both her hips are fairly intact  No groin pain  There is trochanteric pain which radiates down the iliotibial band  Rotation fairly intact along the hips  Neurologically intact distally  Negative Homans  Large joint arthrocentesis: bilateral greater trochanteric bursa  Universal Protocol:  Consent: Verbal consent obtained  Risks and benefits: risks, benefits and alternatives were discussed  Consent given by: patient  Time out: Immediately prior to procedure a "time out" was called to verify the correct patient, procedure, equipment, support staff and site/side marked as required  Patient understanding: patient states understanding of the procedure being performed  Test results: test results available and properly labeled  Site marked: the operative site was marked  Radiology Images displayed and confirmed   If images not available, report reviewed: imaging studies available  Patient identity confirmed: verbally with patient    Supporting Documentation  Indications: pain   Procedure Details  Location: hip - bilateral greater trochanteric bursa  Needle size: 22 G  Ultrasound guidance: no  Approach: lateral    Medications (Right): 4 mL bupivacaine 0 25 %; 2 mL methylPREDNISolone acetate 40 mg/mLMedications (Left): 4 mL bupivacaine 0 25 %; 2 mL methylPREDNISolone acetate 40 mg/mL   Patient tolerance: patient tolerated the procedure well with no immediate complications  Dressing:  Sterile dressing applied

## 2021-10-14 ENCOUNTER — OFFICE VISIT (OUTPATIENT)
Dept: OBGYN CLINIC | Facility: CLINIC | Age: 66
End: 2021-10-14
Payer: MEDICARE

## 2021-10-14 VITALS
DIASTOLIC BLOOD PRESSURE: 64 MMHG | WEIGHT: 226 LBS | BODY MASS INDEX: 38.58 KG/M2 | HEART RATE: 69 BPM | HEIGHT: 64 IN | SYSTOLIC BLOOD PRESSURE: 97 MMHG

## 2021-10-14 DIAGNOSIS — M70.62 TROCHANTERIC BURSITIS OF LEFT HIP: Primary | ICD-10-CM

## 2021-10-14 DIAGNOSIS — M70.61 TROCHANTERIC BURSITIS OF RIGHT HIP: ICD-10-CM

## 2021-10-14 PROCEDURE — 20610 DRAIN/INJ JOINT/BURSA W/O US: CPT | Performed by: ORTHOPAEDIC SURGERY

## 2021-10-14 PROCEDURE — 99213 OFFICE O/P EST LOW 20 MIN: CPT | Performed by: ORTHOPAEDIC SURGERY

## 2021-10-14 RX ORDER — METHYLPREDNISOLONE ACETATE 40 MG/ML
2 INJECTION, SUSPENSION INTRA-ARTICULAR; INTRALESIONAL; INTRAMUSCULAR; SOFT TISSUE
Status: COMPLETED | OUTPATIENT
Start: 2021-10-14 | End: 2021-10-14

## 2021-10-14 RX ORDER — BUPIVACAINE HYDROCHLORIDE 2.5 MG/ML
4 INJECTION, SOLUTION INFILTRATION; PERINEURAL
Status: COMPLETED | OUTPATIENT
Start: 2021-10-14 | End: 2021-10-14

## 2021-10-14 RX ADMIN — METHYLPREDNISOLONE ACETATE 2 ML: 40 INJECTION, SUSPENSION INTRA-ARTICULAR; INTRALESIONAL; INTRAMUSCULAR; SOFT TISSUE at 08:25

## 2021-10-14 RX ADMIN — BUPIVACAINE HYDROCHLORIDE 4 ML: 2.5 INJECTION, SOLUTION INFILTRATION; PERINEURAL at 08:25

## 2021-10-22 ENCOUNTER — HOSPITAL ENCOUNTER (OUTPATIENT)
Dept: MAMMOGRAPHY | Facility: HOSPITAL | Age: 66
Discharge: HOME | End: 2021-10-22
Payer: MEDICARE

## 2021-10-22 VITALS — HEIGHT: 64 IN | BODY MASS INDEX: 38.58 KG/M2 | WEIGHT: 226 LBS

## 2021-10-22 DIAGNOSIS — Z12.31 VISIT FOR SCREENING MAMMOGRAM: ICD-10-CM

## 2021-10-22 PROCEDURE — 77067 SCR MAMMO BI INCL CAD: CPT

## 2021-10-22 PROCEDURE — 77063 BREAST TOMOSYNTHESIS BI: CPT

## 2021-10-25 ENCOUNTER — TELEPHONE (OUTPATIENT)
Dept: INTERNAL MEDICINE CLINIC | Facility: CLINIC | Age: 66
End: 2021-10-25

## 2021-10-27 ENCOUNTER — HOSPITAL ENCOUNTER (OUTPATIENT)
Dept: NON INVASIVE DIAGNOSTICS | Facility: HOSPITAL | Age: 66
Discharge: HOME | End: 2021-10-27
Payer: MEDICARE

## 2021-10-27 DIAGNOSIS — I83.813 VARICOSE VEINS OF BOTH LOWER EXTREMITIES WITH PAIN: ICD-10-CM

## 2021-10-27 PROCEDURE — 93971 EXTREMITY STUDY: CPT

## 2021-10-28 PROCEDURE — 93971 EXTREMITY STUDY: CPT | Performed by: SURGERY

## 2021-11-17 ENCOUNTER — OFFICE VISIT (OUTPATIENT)
Dept: VASCULAR SURGERY | Facility: HOSPITAL | Age: 66
End: 2021-11-17
Payer: MEDICARE

## 2021-11-17 ENCOUNTER — TELEPHONE (OUTPATIENT)
Dept: VASCULAR SURGERY | Facility: HOSPITAL | Age: 66
End: 2021-11-17

## 2021-11-17 VITALS
SYSTOLIC BLOOD PRESSURE: 137 MMHG | BODY MASS INDEX: 38.07 KG/M2 | WEIGHT: 223 LBS | RESPIRATION RATE: 18 BRPM | HEART RATE: 71 BPM | DIASTOLIC BLOOD PRESSURE: 65 MMHG | HEIGHT: 64 IN

## 2021-11-17 DIAGNOSIS — I83.813 VARICOSE VEINS OF BOTH LOWER EXTREMITIES WITH PAIN: Primary | ICD-10-CM

## 2021-11-17 PROCEDURE — 99214 OFFICE O/P EST MOD 30 MIN: CPT | Performed by: SURGERY

## 2021-11-17 RX ORDER — CHLORHEXIDINE GLUCONATE 0.12 MG/ML
15 RINSE ORAL ONCE
Status: CANCELLED | OUTPATIENT
Start: 2022-02-15 | End: 2021-11-17

## 2021-12-14 ENCOUNTER — PREP FOR PROCEDURE (OUTPATIENT)
Dept: VASCULAR SURGERY | Facility: CLINIC | Age: 66
End: 2021-12-14

## 2021-12-14 DIAGNOSIS — I83.813 VARICOSE VEINS OF BOTH LOWER EXTREMITIES WITH PAIN: Primary | ICD-10-CM

## 2021-12-23 ENCOUNTER — OFFICE VISIT (OUTPATIENT)
Dept: OBGYN CLINIC | Facility: CLINIC | Age: 66
End: 2021-12-23
Payer: MEDICARE

## 2021-12-23 VITALS
DIASTOLIC BLOOD PRESSURE: 62 MMHG | BODY MASS INDEX: 38.07 KG/M2 | HEART RATE: 82 BPM | WEIGHT: 223 LBS | SYSTOLIC BLOOD PRESSURE: 103 MMHG | HEIGHT: 64 IN

## 2021-12-23 DIAGNOSIS — M70.61 TROCHANTERIC BURSITIS OF RIGHT HIP: ICD-10-CM

## 2021-12-23 DIAGNOSIS — M70.62 TROCHANTERIC BURSITIS OF LEFT HIP: Primary | ICD-10-CM

## 2021-12-23 PROCEDURE — 99213 OFFICE O/P EST LOW 20 MIN: CPT | Performed by: PHYSICIAN ASSISTANT

## 2021-12-23 PROCEDURE — 20610 DRAIN/INJ JOINT/BURSA W/O US: CPT | Performed by: PHYSICIAN ASSISTANT

## 2021-12-23 RX ORDER — TRIAMCINOLONE ACETONIDE 40 MG/ML
80 INJECTION, SUSPENSION INTRA-ARTICULAR; INTRAMUSCULAR
Status: COMPLETED | OUTPATIENT
Start: 2021-12-23 | End: 2021-12-23

## 2021-12-23 RX ORDER — BUPIVACAINE HYDROCHLORIDE 5 MG/ML
3.5 INJECTION, SOLUTION PERINEURAL
Status: COMPLETED | OUTPATIENT
Start: 2021-12-23 | End: 2021-12-23

## 2021-12-23 RX ADMIN — TRIAMCINOLONE ACETONIDE 80 MG: 40 INJECTION, SUSPENSION INTRA-ARTICULAR; INTRAMUSCULAR at 09:37

## 2021-12-23 RX ADMIN — BUPIVACAINE HYDROCHLORIDE 3.5 ML: 5 INJECTION, SOLUTION PERINEURAL at 09:37

## 2022-02-04 ENCOUNTER — APPOINTMENT (OUTPATIENT)
Dept: LAB | Facility: HOSPITAL | Age: 67
End: 2022-02-04
Attending: SURGERY
Payer: MEDICARE

## 2022-02-04 ENCOUNTER — HOSPITAL ENCOUNTER (OUTPATIENT)
Dept: NON INVASIVE DIAGNOSTICS | Facility: HOSPITAL | Age: 67
Discharge: HOME | End: 2022-02-04
Attending: SURGERY
Payer: MEDICARE

## 2022-02-04 DIAGNOSIS — I83.813 VARICOSE VEINS OF BOTH LOWER EXTREMITIES WITH PAIN: ICD-10-CM

## 2022-02-04 LAB
ANION GAP SERPL CALCULATED.3IONS-SCNC: 6 MMOL/L (ref 4–13)
BUN SERPL-MCNC: 16 MG/DL (ref 5–25)
CALCIUM SERPL-MCNC: 9.2 MG/DL (ref 8.3–10.1)
CHLORIDE SERPL-SCNC: 107 MMOL/L (ref 100–108)
CO2 SERPL-SCNC: 27 MMOL/L (ref 21–32)
CREAT SERPL-MCNC: 0.77 MG/DL (ref 0.6–1.3)
ERYTHROCYTE [DISTWIDTH] IN BLOOD BY AUTOMATED COUNT: 12.5 % (ref 11.6–15.1)
GFR SERPL CREATININE-BSD FRML MDRD: 79 ML/MIN/1.73SQ M
GLUCOSE SERPL-MCNC: 95 MG/DL (ref 65–140)
HCT VFR BLD AUTO: 42.8 % (ref 34.8–46.1)
HGB BLD-MCNC: 14.2 G/DL (ref 11.5–15.4)
MCH RBC QN AUTO: 34.3 PG (ref 26.8–34.3)
MCHC RBC AUTO-ENTMCNC: 33.2 G/DL (ref 31.4–37.4)
MCV RBC AUTO: 103 FL (ref 82–98)
PLATELET # BLD AUTO: 187 THOUSANDS/UL (ref 149–390)
PMV BLD AUTO: 9.5 FL (ref 8.9–12.7)
POTASSIUM SERPL-SCNC: 4.2 MMOL/L (ref 3.5–5.3)
RBC # BLD AUTO: 4.14 MILLION/UL (ref 3.81–5.12)
SODIUM SERPL-SCNC: 140 MMOL/L (ref 136–145)
WBC # BLD AUTO: 6.15 THOUSAND/UL (ref 4.31–10.16)

## 2022-02-04 PROCEDURE — 36415 COLL VENOUS BLD VENIPUNCTURE: CPT

## 2022-02-04 PROCEDURE — 85027 COMPLETE CBC AUTOMATED: CPT

## 2022-02-04 PROCEDURE — 93005 ELECTROCARDIOGRAM TRACING: CPT

## 2022-02-04 PROCEDURE — 80048 BASIC METABOLIC PNL TOTAL CA: CPT

## 2022-02-07 ENCOUNTER — ANESTHESIA EVENT (OUTPATIENT)
Dept: PERIOP | Facility: HOSPITAL | Age: 67
End: 2022-02-07
Payer: MEDICARE

## 2022-02-07 NOTE — PRE-PROCEDURE INSTRUCTIONS
Pre-Surgery Instructions:   Medication Instructions    Calcium-Phosphorus-Vitamin D (VITAMIN D3/CALCIUM/PHOSPHORUS PO) Instructed patient per Anesthesia Guidelines  Stop 2/8    Multiple Vitamin (multivitamin) tablet Instructed patient per Anesthesia Guidelines  Stop 2/8   Covid screening negative as per patient  Reviewed pre op medicine and showering instructions with patient via phone call, verbalizes understanding  Advised patient to stop taking non prescribed vitamins and herbal meds 7 days pre op  Advised to stop taking NSAID's 3 days pre op but may take Tylenol products if needed        Advised NPO after MN (2/14) and surgical services will call (2/14) with scheduled time of hospital arrival

## 2022-02-08 LAB
ATRIAL RATE: 69 BPM
P AXIS: 17 DEGREES
PR INTERVAL: 148 MS
QRS AXIS: 41 DEGREES
QRSD INTERVAL: 88 MS
QT INTERVAL: 376 MS
QTC INTERVAL: 402 MS
T WAVE AXIS: 65 DEGREES
VENTRICULAR RATE: 69 BPM

## 2022-02-08 PROCEDURE — 93010 ELECTROCARDIOGRAM REPORT: CPT | Performed by: INTERNAL MEDICINE

## 2022-02-15 ENCOUNTER — HOSPITAL ENCOUNTER (OUTPATIENT)
Dept: NON INVASIVE DIAGNOSTICS | Facility: HOSPITAL | Age: 67
Discharge: HOME | End: 2022-02-15
Payer: MEDICARE

## 2022-02-15 ENCOUNTER — ANESTHESIA (OUTPATIENT)
Dept: PERIOP | Facility: HOSPITAL | Age: 67
End: 2022-02-15
Payer: MEDICARE

## 2022-02-15 ENCOUNTER — HOSPITAL ENCOUNTER (OUTPATIENT)
Facility: HOSPITAL | Age: 67
Setting detail: OUTPATIENT SURGERY
Discharge: HOME | End: 2022-02-15
Attending: SURGERY | Admitting: SURGERY
Payer: MEDICARE

## 2022-02-15 VITALS
RESPIRATION RATE: 16 BRPM | TEMPERATURE: 98.3 F | WEIGHT: 211.86 LBS | DIASTOLIC BLOOD PRESSURE: 78 MMHG | HEART RATE: 80 BPM | SYSTOLIC BLOOD PRESSURE: 128 MMHG | BODY MASS INDEX: 36.17 KG/M2 | HEIGHT: 64 IN | OXYGEN SATURATION: 96 %

## 2022-02-15 DIAGNOSIS — I83.812 VARICOSE VEINS OF LEG WITH PAIN, LEFT: ICD-10-CM

## 2022-02-15 DIAGNOSIS — I83.813 VARICOSE VEINS OF BOTH LOWER EXTREMITIES WITH PAIN: Primary | ICD-10-CM

## 2022-02-15 PROCEDURE — 36478 ENDOVENOUS LASER 1ST VEIN: CPT | Performed by: SURGERY

## 2022-02-15 PROCEDURE — NC001 PR NO CHARGE: Performed by: SURGERY

## 2022-02-15 PROCEDURE — 93971 EXTREMITY STUDY: CPT

## 2022-02-15 PROCEDURE — 99024 POSTOP FOLLOW-UP VISIT: CPT | Performed by: SURGERY

## 2022-02-15 PROCEDURE — 37765 STAB PHLEB VEINS XTR 10-20: CPT | Performed by: SURGERY

## 2022-02-15 RX ORDER — MAGNESIUM HYDROXIDE 1200 MG/15ML
LIQUID ORAL AS NEEDED
Status: DISCONTINUED | OUTPATIENT
Start: 2022-02-15 | End: 2022-02-15 | Stop reason: HOSPADM

## 2022-02-15 RX ORDER — ONDANSETRON 2 MG/ML
INJECTION INTRAMUSCULAR; INTRAVENOUS AS NEEDED
Status: DISCONTINUED | OUTPATIENT
Start: 2022-02-15 | End: 2022-02-15

## 2022-02-15 RX ORDER — OXYCODONE HYDROCHLORIDE AND ACETAMINOPHEN 5; 325 MG/1; MG/1
1 TABLET ORAL EVERY 4 HOURS PRN
Qty: 20 TABLET | Refills: 0 | Status: SHIPPED | OUTPATIENT
Start: 2022-02-15 | End: 2022-02-25

## 2022-02-15 RX ORDER — FENTANYL CITRATE 50 UG/ML
INJECTION, SOLUTION INTRAMUSCULAR; INTRAVENOUS AS NEEDED
Status: DISCONTINUED | OUTPATIENT
Start: 2022-02-15 | End: 2022-02-15

## 2022-02-15 RX ORDER — OXYCODONE HYDROCHLORIDE AND ACETAMINOPHEN 5; 325 MG/1; MG/1
1 TABLET ORAL EVERY 4 HOURS PRN
Status: DISCONTINUED | OUTPATIENT
Start: 2022-02-15 | End: 2022-02-15 | Stop reason: HOSPADM

## 2022-02-15 RX ORDER — ONDANSETRON 2 MG/ML
4 INJECTION INTRAMUSCULAR; INTRAVENOUS ONCE AS NEEDED
Status: DISCONTINUED | OUTPATIENT
Start: 2022-02-15 | End: 2022-02-15 | Stop reason: HOSPADM

## 2022-02-15 RX ORDER — DEXAMETHASONE SODIUM PHOSPHATE 4 MG/ML
INJECTION, SOLUTION INTRA-ARTICULAR; INTRALESIONAL; INTRAMUSCULAR; INTRAVENOUS; SOFT TISSUE AS NEEDED
Status: DISCONTINUED | OUTPATIENT
Start: 2022-02-15 | End: 2022-02-15

## 2022-02-15 RX ORDER — LIDOCAINE HYDROCHLORIDE 20 MG/ML
INJECTION, SOLUTION EPIDURAL; INFILTRATION; INTRACAUDAL; PERINEURAL AS NEEDED
Status: DISCONTINUED | OUTPATIENT
Start: 2022-02-15 | End: 2022-02-15

## 2022-02-15 RX ORDER — MEPERIDINE HYDROCHLORIDE 25 MG/ML
12.5 INJECTION INTRAMUSCULAR; INTRAVENOUS; SUBCUTANEOUS
Status: DISCONTINUED | OUTPATIENT
Start: 2022-02-15 | End: 2022-02-15 | Stop reason: HOSPADM

## 2022-02-15 RX ORDER — EPHEDRINE SULFATE 50 MG/ML
INJECTION INTRAVENOUS AS NEEDED
Status: DISCONTINUED | OUTPATIENT
Start: 2022-02-15 | End: 2022-02-15

## 2022-02-15 RX ORDER — CHLORHEXIDINE GLUCONATE 0.12 MG/ML
15 RINSE ORAL ONCE
Status: COMPLETED | OUTPATIENT
Start: 2022-02-15 | End: 2022-02-15

## 2022-02-15 RX ORDER — FENTANYL CITRATE/PF 50 MCG/ML
25 SYRINGE (ML) INJECTION
Status: DISCONTINUED | OUTPATIENT
Start: 2022-02-15 | End: 2022-02-15 | Stop reason: HOSPADM

## 2022-02-15 RX ORDER — PROPOFOL 10 MG/ML
INJECTION, EMULSION INTRAVENOUS AS NEEDED
Status: DISCONTINUED | OUTPATIENT
Start: 2022-02-15 | End: 2022-02-15

## 2022-02-15 RX ORDER — SODIUM CHLORIDE, SODIUM LACTATE, POTASSIUM CHLORIDE, CALCIUM CHLORIDE 600; 310; 30; 20 MG/100ML; MG/100ML; MG/100ML; MG/100ML
125 INJECTION, SOLUTION INTRAVENOUS CONTINUOUS
Status: DISCONTINUED | OUTPATIENT
Start: 2022-02-15 | End: 2022-02-15 | Stop reason: HOSPADM

## 2022-02-15 RX ORDER — HYDROMORPHONE HCL/PF 1 MG/ML
0.5 SYRINGE (ML) INJECTION
Status: DISCONTINUED | OUTPATIENT
Start: 2022-02-15 | End: 2022-02-15 | Stop reason: HOSPADM

## 2022-02-15 RX ORDER — KETAMINE HYDROCHLORIDE 50 MG/ML
INJECTION, SOLUTION, CONCENTRATE INTRAMUSCULAR; INTRAVENOUS AS NEEDED
Status: DISCONTINUED | OUTPATIENT
Start: 2022-02-15 | End: 2022-02-15

## 2022-02-15 RX ORDER — MIDAZOLAM HYDROCHLORIDE 2 MG/2ML
INJECTION, SOLUTION INTRAMUSCULAR; INTRAVENOUS AS NEEDED
Status: DISCONTINUED | OUTPATIENT
Start: 2022-02-15 | End: 2022-02-15

## 2022-02-15 RX ADMIN — SODIUM CHLORIDE, SODIUM LACTATE, POTASSIUM CHLORIDE, AND CALCIUM CHLORIDE: .6; .31; .03; .02 INJECTION, SOLUTION INTRAVENOUS at 08:26

## 2022-02-15 RX ADMIN — LIDOCAINE HYDROCHLORIDE 100 MG: 20 INJECTION, SOLUTION EPIDURAL; INFILTRATION; INTRACAUDAL; PERINEURAL at 07:58

## 2022-02-15 RX ADMIN — FENTANYL CITRATE 25 MCG: 50 INJECTION INTRAMUSCULAR; INTRAVENOUS at 08:43

## 2022-02-15 RX ADMIN — MIDAZOLAM 2 MG: 1 INJECTION INTRAMUSCULAR; INTRAVENOUS at 07:52

## 2022-02-15 RX ADMIN — FENTANYL CITRATE 25 MCG: 50 INJECTION INTRAMUSCULAR; INTRAVENOUS at 08:35

## 2022-02-15 RX ADMIN — OXYCODONE HYDROCHLORIDE AND ACETAMINOPHEN 1 TABLET: 5; 325 TABLET ORAL at 10:36

## 2022-02-15 RX ADMIN — PROPOFOL 200 MG: 10 INJECTION, EMULSION INTRAVENOUS at 07:58

## 2022-02-15 RX ADMIN — EPHEDRINE SULFATE 5 MG: 50 INJECTION, SOLUTION INTRAVENOUS at 08:16

## 2022-02-15 RX ADMIN — FENTANYL CITRATE 25 MCG: 50 INJECTION, SOLUTION INTRAMUSCULAR; INTRAVENOUS at 09:17

## 2022-02-15 RX ADMIN — FENTANYL CITRATE 25 MCG: 50 INJECTION INTRAMUSCULAR; INTRAVENOUS at 08:50

## 2022-02-15 RX ADMIN — ONDANSETRON 4 MG: 2 INJECTION INTRAMUSCULAR; INTRAVENOUS at 08:50

## 2022-02-15 RX ADMIN — FENTANYL CITRATE 25 MCG: 50 INJECTION INTRAMUSCULAR; INTRAVENOUS at 08:32

## 2022-02-15 RX ADMIN — FENTANYL CITRATE 25 MCG: 50 INJECTION, SOLUTION INTRAMUSCULAR; INTRAVENOUS at 09:05

## 2022-02-15 RX ADMIN — SODIUM CHLORIDE, SODIUM LACTATE, POTASSIUM CHLORIDE, AND CALCIUM CHLORIDE 125 ML/HR: .6; .31; .03; .02 INJECTION, SOLUTION INTRAVENOUS at 06:58

## 2022-02-15 RX ADMIN — FENTANYL CITRATE 25 MCG: 50 INJECTION INTRAMUSCULAR; INTRAVENOUS at 08:24

## 2022-02-15 RX ADMIN — FENTANYL CITRATE 50 MCG: 50 INJECTION INTRAMUSCULAR; INTRAVENOUS at 08:09

## 2022-02-15 RX ADMIN — KETAMINE HYDROCHLORIDE 50 MG: 50 INJECTION INTRAMUSCULAR; INTRAVENOUS at 07:58

## 2022-02-15 RX ADMIN — DEXAMETHASONE SODIUM PHOSPHATE 4 MG: 4 INJECTION INTRA-ARTICULAR; INTRALESIONAL; INTRAMUSCULAR; INTRAVENOUS; SOFT TISSUE at 07:58

## 2022-02-15 RX ADMIN — CHLORHEXIDINE GLUCONATE 0.12% ORAL RINSE 15 ML: 1.2 LIQUID ORAL at 06:47

## 2022-02-15 RX ADMIN — FENTANYL CITRATE 25 MCG: 50 INJECTION INTRAMUSCULAR; INTRAVENOUS at 08:46

## 2022-02-15 NOTE — ANESTHESIA POSTPROCEDURE EVALUATION
Post-Op Assessment Note    CV Status:  Stable    Pain management: adequate     Mental Status:  Awake   Hydration Status:  Stable   PONV Controlled:  None   Airway Patency:  Patent      Post Op Vitals Reviewed: Yes      Staff: Anesthesiologist         No complications documented      /60 (02/15/22 0950)    Temp 97 6 °F (36 4 °C) (02/15/22 0950)    Pulse 78 (02/15/22 0950)   Resp 12 (02/15/22 0950)    SpO2 98 % (02/15/22 0950)

## 2022-02-15 NOTE — INTERIM OP NOTE
EVLT left greater saphenous vein, MULTIPLE STAB PHLEBECTOMIES LEFT LEG  Postoperative Note  PATIENT NAME: Jyothi Kenny  : 1955  MRN: [id number]  AL [facility] 09    Surgery Date: 2/15/2022    Preop Diagnosis:  Varicose veins of both lower extremities with pain [I83 813]    Post-Op Diagnosis Codes:      * Varicose veins of both lower extremities with pain [I83 813]    Procedure(s) (LRB):  EVLT left greater saphenous vein (Left)  MULTIPLE STAB PHLEBECTOMIES LEFT LEG (Left)    Surgeon(s) and Role:     * Jenna Bradley DO - Primary    Specimens:  * No specimens in log *    Estimated Blood Loss:   Minimal    Anesthesia Type:   General     Findings:   Successful closure of left GSV from knee to SFJ  Post ablation L CFV easily compressible and free of thrombus  7W/207sec/1244J  Total stabs: 12  Complications:   None    SIGNATURE: Jenna Bradley DO   DATE: February 15, 2022   TIME: 8:59 AM

## 2022-02-15 NOTE — H&P
H & P    Plan:  L GSV EVLT + stab phlebectomies  Operative site and varicosities marked  /90   Pulse 71   Temp 98 1 °F (36 7 °C) (Temporal)   Resp 18   Ht 5' 4" (1 626 m)   Wt 96 1 kg (211 lb 13 8 oz)   SpO2 99%   BMI 36 37 kg/m²         Assessment/Plan:     Varicose veins of both lower extremities with pain  Dmitri Ng returns to the office to review results of her venous reflux study  She has had painful lower extremity varicosities despite trial of compression stockings  Venous reflux study does suggest left greater saphenous vein incompetence  Recommend left greater saphenous vein EVLT and stab phlebectomies  The procedure, risks, benefits, alternatives, anticipated postop course were discussed in detail  Patient was agreeable proceed  Written consent was obtained          Diagnoses and all orders for this visit:     Varicose veins of both lower extremities with pain  -     Case request operating room: ENDOVASCULAR LASER THERAPY (EVLT) + stab phlebectomies; Standing  -     Case request operating room: ENDOVASCULAR LASER THERAPY (EVLT) + stab phlebectomies            Subjective:       Patient ID: Shanell Snider is a 78 y o  female      Patient had a LEVDR on 10/27/21  Pt c/o LLE bulging veins, electrical pain, swelling, heaviness, and tiredness for about 20 years  Pt has been wearing compression stockings daily, elevating her legs, and walking 6 miles a day  Pt has noticed that the compression stocking and walking have helped         Waleska returns to the office review results of her venous reflux study  She reports symptoms persist despite trial of compression stockings    She continues to complain of leg heaviness, itching, and throbbing associated with her bulky left lower leg varicosities         The following portions of the patient's history were reviewed and updated as appropriate: allergies, current medications, past family history, past medical history, past social history, past surgical history and problem list      Review of Systems   Constitutional: Negative  HENT: Negative  Eyes: Negative  Respiratory: Negative  Cardiovascular: Positive for leg swelling  Painful veins   Gastrointestinal: Negative  Endocrine: Negative  Genitourinary: Negative  Musculoskeletal: Negative for arthralgias, gait problem and neck pain  LLE pain   Skin: Negative  Allergic/Immunologic: Negative  Neurological: Negative  Hematological: Negative  Psychiatric/Behavioral: Negative  I have personally reviewed the ROS entered by MA and agree as documented      Objective:        /65 (BP Location: Right arm, Patient Position: Sitting)   Pulse 71   Resp 18   Ht 5' 4" (1 626 m)   Wt 101 kg (223 lb)   BMI 38 28 kg/m²             Physical Exam  Constitutional:       General: She is not in acute distress  Appearance: She is well-developed  HENT:      Head: Normocephalic and atraumatic  Eyes:      General: No scleral icterus  Conjunctiva/sclera: Conjunctivae normal    Neck:      Trachea: No tracheal deviation  Cardiovascular:      Rate and Rhythm: Normal rate and regular rhythm  Heart sounds: Normal heart sounds  Pulmonary:      Effort: Pulmonary effort is normal       Breath sounds: Normal breath sounds  Abdominal:      General: There is no distension  Palpations: Abdomen is soft  There is no mass (no appreciable aortic pulsation/aneurysm)  Tenderness: There is no abdominal tenderness  There is no guarding or rebound  Musculoskeletal:         General: Normal range of motion  Cervical back: Normal range of motion and neck supple  Skin:     General: Skin is warm and dry  Comments: Bulky left lower leg varicosities  Venous stasis skin changes  No active ulceration   Neurological:      Mental Status: She is alert and oriented to person, place, and time     Psychiatric:         Mood and Affect: Mood normal  Behavior: Behavior normal          Thought Content: Thought content normal          Judgment: Judgment normal            venous reflux study:   CONCLUSION:     Impression:  LEFT LIMB:  Deep venous incompetence is noted in the common femoral vein  The great saphenous vein is incompetent  The great saphenous vein remains within the saphenous compartment in the thigh  The small saphenous vein is competent and does not communicate with the  popliteal vein  Varicosities of the small saphenous vein appear to communicate with  varicosities of the greater saphenous vein in the mid calf  There is evidence of an incompetent  in the mid calf    There is no evidence of deep vein thrombosis in the CFV, the proximal PFV, the  femoral vein and the popliteal vein

## 2022-02-15 NOTE — QUICK NOTE
Vascular Surgery    Paged by nursing in [facility] regarding increased bleeding and strike through on dressing after patient attempted to ambulate to the bathroom s/p L GSV EVLT w/stabs  Patient evaluated at bedside  Remains hemodynamically stable, complains of some pain of the LLE  Dressing with strike through at the posterior calf and behind the knee  Dressing taken down and pressure held for 15 minutes with resolution of venous bleeding  Leg rewrapped with pressure dressing  Continue to monitor  Would keep patient on bedrest x1 hour  Elevate LLE  Patient remains stable for discharge today

## 2022-02-15 NOTE — DISCHARGE INSTRUCTIONS
DISCHARGE INSTRUCTIONS  VARICOSE VEIN SURGERY    ACTIVITY:  On the day of your operation, take it easy  You can take short walks around the house  When sitting, the leg should be elevated  The preferred position is to have the leg at or above the level of the heart  Starting on the first day after surgery, light walking is encouraged as tolerated  After your ultrasound test, you can resume your normal activity, but no heavy lifting (do not lift more than 15 pounds) or strenuous exercise for 2 weeks  You should not drive a car until your bandages are removed and you are off all narcotic pain medication  You may ride in a car  DIET: Resume your normal diet  Good nutrition is important for healing of your incision  DRESSINGS/SURGICAL SITE:  When released from the hospital, you should have a compression bandage in place on the operated leg  This bandage should feel snug, but not too tight  If the bandage becomes blood soaked or painfully tight, elevate your leg and call the office ([phone])  You may have surgical glue on your incision sites  There are stitches present under the skin which will absorb on their own  The glue is used to cover the incision, assist in closure, and prevent contamination  This adhesive will darken and peel away on its own within one to two weeks  Do not pick at it  You should shower daily  Wash incisions daily with soap and water, but do not rub or scrub the incisions; rinse thoroughly and pat dry  If the operated leg becomes increasingly painful or swollen, or if there is increasing redness or pain around your incision, contact our office  Some bruising of the skin is common after varicose vein surgery  This can be lessened by elevation of the leg  Many patients will notice some numbness of the shin, ankle, calf, or the top of the foot  This usually improves with time but may be persistent    After surgery you can expect bruising, swelling and hard knots on your leg  As your body heals the bruising will fade and the swelling and knots will subside  Apply sunscreen with SPF 30 to incisions while sun bathing for up to one year after surgery to reduce the chances of your incisions darkening  FOLLOW UP STUDIES:  Your first post-operative appointment will be 2-3 days after your surgery  At this appointment your bandages will be removed, and you will be seen by a Vascular Surgeon, Nurse Practitioner, or Physician Assistant  An appointment for a follow up Doppler ultrasound study will be scheduled on the same day as your follow up appointment  FOLLOW UP APPOINTMENTS:  Making and keeping follow up appointments and ultrasound tests are important to your recovery  If you have difficulty making it to or keeping your follow up appointments, call the office  If you have increased pain, fever >101 5, increased drainage, redness or a bad smell at your surgery site, new coldness/numbness of your arm or leg, please call us immediately and GO directly to the ER  PLEASE CALL THE OFFICE IF YOU HAVE ANY QUESTIONS  [phone] Janie Fortune [phone] [facility] [phone]  [address] [place] [address] , [address], [place], [address]  [address], [address], [place], [address] [facility]  [address], [facility], [address] [place] [address]  [address] [facility], [address] [facility],[address], [facility], [address]    [address], [address], [address]  [facility] 19, [address] [facility], [address] [address] [facility]  [address] [facility], [address] [facility], [address]  [address] [facility], [address] [facility], [facility],[address], [address]  [address] [facility], [facility] [place], [address]  [address] [place], [place] [address]

## 2022-02-15 NOTE — OP NOTE
PERATIVE REPORT  PATIENT NAME: Isis Akbar    :  1955  MRN: [id number]  Pt Location: [facility] 09    SURGERY DATE: 2/15/2022    Surgeon(s) and Role:     * Jenna Bradley DO - Primary    Preop Diagnosis:  Varicose veins of both lower extremities with pain [I83 813]    Post-Op Diagnosis Codes: * Varicose veins of both lower extremities with pain [I83 813]    Procedure(s) (LRB):  EVLT left greater saphenous vein (Left)  MULTIPLE STAB PHLEBECTOMIES LEFT LEG (Left)    Specimen(s):  * No specimens in log *    Estimated Blood Loss:   Minimal    Drains:  * No LDAs found *    Anesthesia Type:   General    Operative Indications:  Varicose veins of both lower extremities with pain [I83 813]  Patient is 74 yo woman who presented to office with c/o painful left lower extremity varicosities  Despite trial of compression stocking her symptoms persisted  Venous reflux study demonstrated superficial venous reflux  Left GSV EVLT and stab phlebectomies recommended  Procedure, risks, benefits, alternatives, and anticipated postoperative course discussed in detail  All questions answered to his/her satisfaction  Patient was agreeable to proceed  Written consent was obtained  Operative Findings:  Successful closure of left GSV from knee to SFJ  Post ablation L CFV easily compressible and free of thrombus  7W/207sec/1244J  Total stabs: 12    Complications:   None      Procedure and Technique:  The patient was seen in the preop holding area  The patient's name, laterality and nature of procedure verified  The operative site as well as truncal varicosities marked  Patient was brought to the OR and positioned supine on the table  After adequate induction of general anesthesia via LMA the left leg was prepped and draped in usual fashion  The duplex ultrasound was brought to the field and the saphenous vein in the left leg was mapped from the knee to the groin   The vein was then cannulated  with the micropuncture needle and microsheath inserted  The J guidewire was advanced into the iliac vein under ultrasound guidance  The microsheath was exchanged out for the  laser sheath  The inner dilator and wire were removed  The laser fiber was then inserted through the sheath and tip positioned >2 5 cm from the saphenofemoral junction  Tumescent anesthesia was then carried out under ultrasound guidance  The laser was then activated at [facility] and withdrawn over the course of 207 seconds  Following ablation, the US was used to confirm successful closure of the treated segment of GSV  The CFV was easily compressible and free of thrombus  The patient was then placed in the Trendelenburg position and previously marked truncal varicosities on the left leg were avulsed through microphlebectomy incisions  A total of 12 stab incisions were performed  Hemostasis was secured with  Digital compression at each puncture site and the incisions were then re-approximated with steri strips  A sterile dressing consisting of gauze, kerlix,  Ace wrap and Coban from the foot to the groin  The patient was awakened, extubated and transferred to recovery in stable condition   I was present for the entire procedure and A qualified resident physician was not available   I was present for the entire procedure    Patient Disposition:  PACU       SIGNATURE: Angelito Ryan DO  DATE: February 15, 2022  TIME: 4:45 PM

## 2022-02-15 NOTE — ANESTHESIA PREPROCEDURE EVALUATION
Procedure:  EVLT (Left Leg Upper)  MULT STAB PHLEBS (Left Leg Upper)    Relevant Problems   CARDIO   (+) Chronic embolism and thrombosis of other specified deep vein of right lower extremity (HCC)   (+) Hyperlipidemia      ENDO   (+) Hypothyroidism      GI/HEPATIC   (+) Nonalcoholic steatohepatitis      MUSCULOSKELETAL   (+) Lower back pain   (+) Osteoarthritis      NEURO/PSYCH   (+) Pain syndrome, chronic      Other   (+) Hemochromatosis   (+) Peripheral neuropathy   (+) Splenic cyst        Physical Exam    Airway    Mallampati score: II  TM Distance: >3 FB  Neck ROM: full     Dental   upper dentures,     Cardiovascular  Cardiovascular exam normal    Pulmonary  Pulmonary exam normal     Other Findings        Anesthesia Plan  ASA Score- 2     Anesthesia Type- general with ASA Monitors  Additional Monitors:   Airway Plan:           Plan Factors-    Chart reviewed  Patient is not a current smoker  Patient instructed to abstain from smoking on day of procedure  Patient did not smoke on day of surgery  Obstructive sleep apnea risk education given perioperatively  Induction- intravenous  Postoperative Plan-     Informed Consent- Anesthetic plan and risks discussed with patient

## 2022-02-16 NOTE — PROGRESS NOTES
Assessment/Plan:    Varicose veins of both lower extremities with pain  Tommy Damon returns to the office for initial visit status post left EVLT and stab phlebectomies  She did require her dressing to be replaced in the recovery area following the procedure due to bleeding from the stab phlebectomy sites  She returns today in the office at which time the dressing has been removed  Minimal oozing from 1 of the phlebectomy sites  There is anticipated surrounding ecchymosis  She is scheduled for her post EVLT duplex later this afternoon  Patient prefers 6 week follow-up appointment at [facility] and all orders for this visit:    Varicose veins of both lower extremities with pain          Subjective:      Patient ID: Law Bassett is a 75 y o  female  Patient present for post op EVLT done on 2/15/22  Patient has a total of 12 stabs sites  Stab sites looks fine  Patient denies, fever chills or wounds  Pt has no issues or concerns  Tommy Damon returns to the office for initial postop visit following left greater saphenous vein EVLT and stab phlebectomy  Anticipated izabella phlebectomy site incision tenderness and ecchmyosis  The following portions of the patient's history were reviewed and updated as appropriate: allergies, current medications, past family history, past medical history, past social history, past surgical history and problem list     Review of Systems   Constitutional: Negative  HENT: Negative  Eyes: Negative  Respiratory: Negative  Cardiovascular: Negative  Gastrointestinal: Negative  Endocrine: Negative  Genitourinary: Negative  Musculoskeletal: Negative  Skin: Negative  Allergic/Immunologic: Negative  Neurological: Negative  Hematological: Negative  Psychiatric/Behavioral: Negative  I have personally reviewed the ROS entered by MA and agree as documented      Objective:      /70 (BP Location: Right arm, Patient Position: Sitting, Cuff Size: Adult)   Pulse 65   Ht 5' 4 5" (1 638 m)   Wt 98 3 kg (216 lb 11 2 oz)   BMI 36 62 kg/m²          Physical Exam  Constitutional:       General: She is not in acute distress  Appearance: She is well-developed  HENT:      Head: Normocephalic and atraumatic  Eyes:      General: No scleral icterus  Conjunctiva/sclera: Conjunctivae normal    Neck:      Trachea: No tracheal deviation  Cardiovascular:      Rate and Rhythm: Normal rate and regular rhythm  Heart sounds: Normal heart sounds  Pulmonary:      Effort: Pulmonary effort is normal       Breath sounds: Normal breath sounds  Abdominal:      General: There is no distension  Palpations: Abdomen is soft  There is no mass (no appreciable aortic pulsation/aneurysm)  Tenderness: There is no abdominal tenderness  There is no guarding or rebound  Musculoskeletal:         General: Normal range of motion  Cervical back: Normal range of motion and neck supple  Skin:     General: Skin is warm and dry  Comments: Steri strips in place and blood stained  No appreciable hematoma  Min oozing from one of the lower leg stab phlebectomy incision sites  Neurological:      Mental Status: She is alert and oriented to person, place, and time  Psychiatric:         Mood and Affect: Mood normal          Behavior: Behavior normal          Thought Content:  Thought content normal          Judgment: Judgment normal

## 2022-02-17 ENCOUNTER — HOSPITAL ENCOUNTER (OUTPATIENT)
Dept: NON INVASIVE DIAGNOSTICS | Facility: CLINIC | Age: 67
Discharge: HOME | End: 2022-02-17
Payer: MEDICARE

## 2022-02-17 ENCOUNTER — OFFICE VISIT (OUTPATIENT)
Dept: VASCULAR SURGERY | Facility: CLINIC | Age: 67
End: 2022-02-17

## 2022-02-17 VITALS
DIASTOLIC BLOOD PRESSURE: 70 MMHG | SYSTOLIC BLOOD PRESSURE: 108 MMHG | WEIGHT: 216.7 LBS | HEIGHT: 65 IN | HEART RATE: 65 BPM | BODY MASS INDEX: 36.1 KG/M2

## 2022-02-17 DIAGNOSIS — I83.813 VARICOSE VEINS OF BOTH LOWER EXTREMITIES WITH PAIN: ICD-10-CM

## 2022-02-17 DIAGNOSIS — I83.813 VARICOSE VEINS OF BOTH LOWER EXTREMITIES WITH PAIN: Primary | ICD-10-CM

## 2022-02-17 PROCEDURE — 99024 POSTOP FOLLOW-UP VISIT: CPT | Performed by: SURGERY

## 2022-02-17 PROCEDURE — 93971 EXTREMITY STUDY: CPT | Performed by: SURGERY

## 2022-02-17 PROCEDURE — 93971 EXTREMITY STUDY: CPT

## 2022-02-17 NOTE — ASSESSMENT & PLAN NOTE
Iggy Gamez returns to the office for initial visit status post left EVLT and stab phlebectomies  She did require her dressing to be replaced in the recovery area following the procedure due to bleeding from the stab phlebectomy sites  She returns today in the office at which time the dressing has been removed  Minimal oozing from 1 of the phlebectomy sites  There is anticipated surrounding ecchymosis  She is scheduled for her post EVLT duplex later this afternoon  Patient prefers 6 week follow-up appointment at [place]

## 2022-03-24 ENCOUNTER — OFFICE VISIT (OUTPATIENT)
Dept: OBGYN CLINIC | Facility: CLINIC | Age: 67
End: 2022-03-24
Payer: MEDICARE

## 2022-03-24 VITALS — WEIGHT: 216 LBS | BODY MASS INDEX: 35.99 KG/M2 | HEIGHT: 65 IN

## 2022-03-24 DIAGNOSIS — M70.61 TROCHANTERIC BURSITIS OF RIGHT HIP: ICD-10-CM

## 2022-03-24 DIAGNOSIS — M70.62 TROCHANTERIC BURSITIS OF LEFT HIP: Primary | ICD-10-CM

## 2022-03-24 PROCEDURE — 99213 OFFICE O/P EST LOW 20 MIN: CPT | Performed by: ORTHOPAEDIC SURGERY

## 2022-03-24 PROCEDURE — 20610 DRAIN/INJ JOINT/BURSA W/O US: CPT | Performed by: ORTHOPAEDIC SURGERY

## 2022-03-24 RX ORDER — BUPIVACAINE HYDROCHLORIDE 2.5 MG/ML
4 INJECTION, SOLUTION INFILTRATION; PERINEURAL
Status: COMPLETED | OUTPATIENT
Start: 2022-03-24 | End: 2022-03-24

## 2022-03-24 RX ORDER — TRIAMCINOLONE ACETONIDE 40 MG/ML
80 INJECTION, SUSPENSION INTRA-ARTICULAR; INTRAMUSCULAR
Status: COMPLETED | OUTPATIENT
Start: 2022-03-24 | End: 2022-03-24

## 2022-03-24 RX ADMIN — TRIAMCINOLONE ACETONIDE 80 MG: 40 INJECTION, SUSPENSION INTRA-ARTICULAR; INTRAMUSCULAR at 09:19

## 2022-03-24 RX ADMIN — BUPIVACAINE HYDROCHLORIDE 4 ML: 2.5 INJECTION, SOLUTION INFILTRATION; PERINEURAL at 09:19

## 2022-03-24 NOTE — PROGRESS NOTES
Assessment/Plan:    No problem-specific Assessment & Plan notes found for this encounter  Diagnoses and all orders for this visit:    Trochanteric bursitis of left hip    Trochanteric bursitis of right hip          Under aseptic technique, both hips were injected with Kenalog and Marcaine along the trochanteric region  She tolerated procedures quite well  Return back in 2 months evaluation  If her condition changes, she will not hesitate to let us know    Subjective:      Patient ID: Law Bassett is a 75 y o  female  HPI    The patient has a history of trochanteric bursitis of her bilateral hips  She presents for another set of corticosteroid injections  She does quite well with the injections  She denies any numbness or tingling  Unfortunate, these injections got delayed because of a recent vascular procedure    The following portions of the patient's history were reviewed and updated as appropriate: allergies, current medications, past family history, past medical history, past social history, past surgical history and problem list     Review of Systems   Constitutional: Negative for chills, fever and unexpected weight change  HENT: Negative for hearing loss, nosebleeds and sore throat  Eyes: Negative for pain, redness and visual disturbance  Respiratory: Negative for cough, shortness of breath and wheezing  Cardiovascular: Negative for chest pain, palpitations and leg swelling  Gastrointestinal: Negative for abdominal pain, nausea and vomiting  Endocrine: Negative for polydipsia and polyuria  Genitourinary: Negative for dysuria and hematuria  Musculoskeletal: Positive for arthralgias, gait problem and myalgias  Negative for back pain, joint swelling, neck pain and neck stiffness  As noted in HPI   Skin: Negative for rash and wound  Neurological: Negative for dizziness, numbness and headaches     Psychiatric/Behavioral: Negative for decreased concentration and suicidal ideas  The patient is not nervous/anxious  Objective:      Ht 5' 4 5" (1 638 m)   Wt 98 kg (216 lb)   BMI 36 50 kg/m²          Physical Exam        Bilateral lower extremities are neurovascular intact  Toes are pink and mobile  Compartments are soft  Range of motion her hips are intact  No groin pain  There is trochanteric pain  There is pain radiating down the iliotibial band  Remaining sensation, motor, deep tender reflexes were intact  Large joint arthrocentesis: bilateral greater trochanteric bursa  Universal Protocol:  Consent: Verbal consent obtained  Written consent not obtained  Risks and benefits: risks, benefits and alternatives were discussed  Consent given by: patient  Time out: Immediately prior to procedure a "time out" was called to verify the correct patient, procedure, equipment, support staff and site/side marked as required  Patient understanding: patient states understanding of the procedure being performed  Test results: test results available and properly labeled  Site marked: the operative site was marked  Radiology Images displayed and confirmed   If images not available, report reviewed: imaging studies available  Patient identity confirmed: verbally with patient    Supporting Documentation  Indications: pain   Procedure Details  Location: hip - bilateral greater trochanteric bursa  Preparation: Patient was prepped and draped in the usual sterile fashion  Needle size: 22 G  Ultrasound guidance: no  Approach: lateral    Medications (Right): 4 mL bupivacaine 0 25 %; 80 mg triamcinolone acetonide 40 mg/mLMedications (Left): 4 mL bupivacaine 0 25 %; 80 mg triamcinolone acetonide 40 mg/mL   Patient tolerance: patient tolerated the procedure well with no immediate complications  Dressing:  Sterile dressing applied

## 2022-05-23 ENCOUNTER — OFFICE VISIT (OUTPATIENT)
Dept: VASCULAR SURGERY | Facility: HOSPITAL | Age: 67
End: 2022-05-23
Payer: MEDICARE

## 2022-05-23 VITALS
SYSTOLIC BLOOD PRESSURE: 122 MMHG | TEMPERATURE: 98.6 F | WEIGHT: 197 LBS | DIASTOLIC BLOOD PRESSURE: 78 MMHG | BODY MASS INDEX: 32.82 KG/M2 | HEART RATE: 74 BPM | HEIGHT: 65 IN

## 2022-05-23 DIAGNOSIS — I83.813 VARICOSE VEINS OF BOTH LOWER EXTREMITIES WITH PAIN: Primary | ICD-10-CM

## 2022-05-23 DIAGNOSIS — E66.01 OBESITY, MORBID (HCC): ICD-10-CM

## 2022-05-23 DIAGNOSIS — Z01.419 ENCOUNTER FOR GYNECOLOGICAL EXAMINATION: ICD-10-CM

## 2022-05-23 PROCEDURE — 99213 OFFICE O/P EST LOW 20 MIN: CPT | Performed by: SURGERY

## 2022-05-23 NOTE — ASSESSMENT & PLAN NOTE
History of recent left greater saphenous vein EVLT and stab phlebectomies with good result  She now returns to the office for routine postoperative visit  The phlebectomy sites are well healed  She has bulky right lower leg varicosities which have been causing her increased pain  She wears compression stockings regularly with minimal relief  Will obtain a reflux study of the right lower extremity with return office visit to discuss results and possible intervention

## 2022-05-23 NOTE — PROGRESS NOTES
Assessment/Plan:    Varicose veins of both lower extremities with pain  History of recent left greater saphenous vein EVLT and stab phlebectomies with good result  She now returns to the office for routine postoperative visit  The phlebectomy sites are well healed  She has bulky right lower leg varicosities which have been causing her increased pain  She wears compression stockings regularly with minimal relief  Will obtain a reflux study of the right lower extremity with return office visit to discuss results and possible intervention  Diagnoses and all orders for this visit:    Varicose veins of both lower extremities with pain  -     VAS reflux lower limb venous duplex study with reflux assesment, unilateral; Future    Encounter for gynecological examination  -     Ambulatory referral to Obstetrics / Gynecology    Obesity, morbid ([facility] [address] )          Subjective:      Patient ID: Mina Horner is a 75 y o  female  Pt is here for a 4-6 week f/u s/p L EVLT GSV w/ multiple stab phlebs done on 2/15/22  Pt has been wearing compression as tolerated  Pt denies pain  Carley Rich returns to the office for routine scheduled visit status post left greater saphenous vein EVLT and stab phlebectomies  She is quite pleased with her results  She continues to complain of pain associated with the right lower extremity varicosities  The following portions of the patient's history were reviewed and updated as appropriate: allergies, current medications, past family history, past medical history, past social history, past surgical history and problem list     Review of Systems   Constitutional: Negative  HENT: Negative  Eyes: Negative  Respiratory: Negative  Cardiovascular: Negative  Gastrointestinal: Negative  Endocrine: Negative  Genitourinary: Negative  Musculoskeletal: Negative  Skin: Negative  Allergic/Immunologic: Negative  Neurological: Negative  Hematological: Negative  Psychiatric/Behavioral: Negative  I have personally reviewed the ROS entered by MA and agree as documented  Objective:      /78 (BP Location: Left arm, Patient Position: Sitting, Cuff Size: Standard)   Pulse 74   Temp 98 6 °F (37 °C) (Tympanic)   Ht 5' 4 5" (1 638 m)   Wt 89 4 kg (197 lb)   BMI 33 29 kg/m²          Physical Exam  Constitutional:       General: She is not in acute distress  Appearance: She is well-developed  HENT:      Head: Normocephalic and atraumatic  Eyes:      General: No scleral icterus  Conjunctiva/sclera: Conjunctivae normal    Neck:      Trachea: No tracheal deviation  Cardiovascular:      Rate and Rhythm: Normal rate and regular rhythm  Pulmonary:      Effort: Pulmonary effort is normal    Abdominal:      General: There is no distension  Palpations: Abdomen is soft  There is no mass (no appreciable aortic pulsation/aneurysm)  Tenderness: There is no abdominal tenderness  There is no guarding or rebound  Musculoskeletal:         General: Normal range of motion  Cervical back: Normal range of motion and neck supple  Skin:     General: Skin is warm and dry  Comments: Left lower extremity Phlebectomy sites are well healed   Neurological:      Mental Status: She is alert and oriented to person, place, and time  Psychiatric:         Mood and Affect: Mood normal          Behavior: Behavior normal          Thought Content:  Thought content normal          Judgment: Judgment normal

## 2022-05-26 ENCOUNTER — OFFICE VISIT (OUTPATIENT)
Dept: OBGYN CLINIC | Facility: CLINIC | Age: 67
End: 2022-05-26
Payer: MEDICARE

## 2022-05-26 ENCOUNTER — RA CDI HCC (OUTPATIENT)
Dept: OTHER | Facility: HOSPITAL | Age: 67
End: 2022-05-26

## 2022-05-26 VITALS — WEIGHT: 197 LBS | HEIGHT: 65 IN | BODY MASS INDEX: 32.82 KG/M2

## 2022-05-26 DIAGNOSIS — M70.61 TROCHANTERIC BURSITIS OF RIGHT HIP: ICD-10-CM

## 2022-05-26 DIAGNOSIS — M70.62 TROCHANTERIC BURSITIS OF LEFT HIP: Primary | ICD-10-CM

## 2022-05-26 PROCEDURE — 99213 OFFICE O/P EST LOW 20 MIN: CPT | Performed by: ORTHOPAEDIC SURGERY

## 2022-05-26 PROCEDURE — 20610 DRAIN/INJ JOINT/BURSA W/O US: CPT | Performed by: ORTHOPAEDIC SURGERY

## 2022-05-26 RX ORDER — BUPIVACAINE HYDROCHLORIDE 2.5 MG/ML
4 INJECTION, SOLUTION INFILTRATION; PERINEURAL
Status: COMPLETED | OUTPATIENT
Start: 2022-05-26 | End: 2022-05-26

## 2022-05-26 RX ORDER — TRIAMCINOLONE ACETONIDE 40 MG/ML
80 INJECTION, SUSPENSION INTRA-ARTICULAR; INTRAMUSCULAR
Status: COMPLETED | OUTPATIENT
Start: 2022-05-26 | End: 2022-05-26

## 2022-05-26 RX ADMIN — BUPIVACAINE HYDROCHLORIDE 4 ML: 2.5 INJECTION, SOLUTION INFILTRATION; PERINEURAL at 08:27

## 2022-05-26 RX ADMIN — TRIAMCINOLONE ACETONIDE 80 MG: 40 INJECTION, SUSPENSION INTRA-ARTICULAR; INTRAMUSCULAR at 08:27

## 2022-05-26 NOTE — PROGRESS NOTES
Assessment/Plan:    No problem-specific Assessment & Plan notes found for this encounter  Diagnoses and all orders for this visit:    Trochanteric bursitis of left hip    Trochanteric bursitis of right hip          Under aseptic technique, both trochanteric bursa were injected with Kenalog and Marcaine  She tolerated procedures quite well  Return back in 3 months evaluation  If her condition changes, she will not hesitate to let us know    Subjective:      Patient ID: Danya Campos is a 75 y o  female  HPI    The patient has a history of trochanteric bursitis of bilateral hips  She offers no major complaints of pain today  She denies any numbness or tingling  She denies any fever chills  She desires, however, to have her hips injected  She denies any groin or low back pain  The following portions of the patient's history were reviewed and updated as appropriate: allergies, current medications, past family history, past medical history, past social history, past surgical history and problem list     Review of Systems   Constitutional: Negative for chills, fever and unexpected weight change  HENT: Negative for hearing loss, nosebleeds and sore throat  Eyes: Negative for pain, redness and visual disturbance  Respiratory: Negative for cough, shortness of breath and wheezing  Cardiovascular: Negative for chest pain, palpitations and leg swelling  Gastrointestinal: Negative for abdominal pain, nausea and vomiting  Endocrine: Negative for polydipsia and polyuria  Genitourinary: Negative for dysuria and hematuria  Musculoskeletal: Positive for arthralgias, gait problem and myalgias  Negative for back pain, joint swelling, neck pain and neck stiffness  As noted in HPI   Skin: Negative for rash and wound  Neurological: Negative for dizziness, numbness and headaches  Psychiatric/Behavioral: Negative for decreased concentration and suicidal ideas   The patient is not nervous/anxious  Objective:      Ht 5' 4 5" (1 638 m)   Wt 89 4 kg (197 lb)   BMI 33 29 kg/m²          Physical Exam        Bilateral lower extremities are neurovascular act  Toes are pink and mobile  Compartments are soft  There is no low back pain  There is full range of motion along both her hips  There is no groin pain  There is trochanteric pain that does radiate down the iliotibial band  Remaining sensation, motor, and deep tender reflexes intact    Large joint arthrocentesis: bilateral greater trochanteric bursa  Universal Protocol:  Consent: Verbal consent obtained  Written consent not obtained  Risks and benefits: risks, benefits and alternatives were discussed  Consent given by: patient  Time out: Immediately prior to procedure a "time out" was called to verify the correct patient, procedure, equipment, support staff and site/side marked as required  Patient understanding: patient states understanding of the procedure being performed  Test results: test results available and properly labeled  Site marked: the operative site was marked  Radiology Images displayed and confirmed   If images not available, report reviewed: imaging studies available  Patient identity confirmed: verbally with patient    Supporting Documentation  Indications: pain   Procedure Details  Location: hip - bilateral greater trochanteric bursa  Needle size: 22 G  Ultrasound guidance: no  Approach: lateral    Medications (Right): 4 mL bupivacaine 0 25 %; 80 mg triamcinolone acetonide 40 mg/mLMedications (Left): 4 mL bupivacaine 0 25 %; 80 mg triamcinolone acetonide 40 mg/mL   Patient tolerance: patient tolerated the procedure well with no immediate complications  Dressing:  Sterile dressing applied

## 2022-05-26 NOTE — PROGRESS NOTES
[facility]  coding opportunities       Chart reviewed, no opportunity found:   [address]        Patients Insurance     Medicare Insurance: [employer]

## 2022-06-13 ENCOUNTER — OFFICE VISIT (OUTPATIENT)
Dept: OBGYN CLINIC | Facility: CLINIC | Age: 67
End: 2022-06-13
Payer: MEDICARE

## 2022-06-13 VITALS
SYSTOLIC BLOOD PRESSURE: 124 MMHG | BODY MASS INDEX: 31.25 KG/M2 | WEIGHT: 187.6 LBS | DIASTOLIC BLOOD PRESSURE: 72 MMHG | HEIGHT: 65 IN

## 2022-06-13 DIAGNOSIS — Z01.419 ROUTINE GYNECOLOGICAL EXAMINATION: Primary | ICD-10-CM

## 2022-06-13 DIAGNOSIS — Z78.0 POSTMENOPAUSAL: ICD-10-CM

## 2022-06-13 PROCEDURE — G0101 CA SCREEN;PELVIC/BREAST EXAM: HCPCS | Performed by: OBSTETRICS & GYNECOLOGY

## 2022-06-13 PROCEDURE — G0145 SCR C/V CYTO,THINLAYER,RESCR: HCPCS | Performed by: OBSTETRICS & GYNECOLOGY

## 2022-06-13 NOTE — PROGRESS NOTES
ASSESSMENT & PLAN: Mina Horner is a 75 y o  Tank Pickerel with normal gynecologic exam     1   Routine well woman exam done today  2  Pap and HPV:  The patient's last pap and hpv was 2018  It was normal     Pap without cotesting was done today  Current ASCCP Guidelines reviewed  3   Mammogram ordered by PCP  4  Colorectal cancer screening was notordered  5   The following were reviewed in today's visit: breast self exam, mammography screening ordered, menopause, osteoporosis, adequate intake of calcium and vitamin D, exercise, healthy diet and DEXA ordered  CC:  Annual Gynecologic Examination    HPI: Mina Horner is a 75 y o  Tank Pickerel who presents for annual gynecologic examination  She has the following concerns:  None     Health Maintenance:    She wears her seatbelt routinely  She does perform regular monthly self breast exams  She feels safe at home       Last PAP & HPV: 2018  Last mammogram: 2020   Last colonoscopy: 2021    Past Medical History:   Diagnosis Date    Arthritis     Colon polyp     DVT (deep venous thrombosis) (HCC)     Fibromyalgia     GERD (gastroesophageal reflux disease)     Hyperlipemia     Hypothyroid     Osteoarthritis     Osteomyelitis of ankle (HCC)     Rheumatoid arthritis (HCC)     RSD (reflex sympathetic dystrophy)        Past Surgical History:   Procedure Laterality Date    ANKLE FUSION Right     BREAST BIOPSY Left 12/18/2018    US guided; benign     BREAST EXCISIONAL BIOPSY Left     benign     CARPAL TUNNEL RELEASE      CHOLECYSTECTOMY      COLONOSCOPY      ERCP      with insertion of tube into bile/pancreatic duct    FACIAL RECONSTRUCTION SURGERY      JOINT REPLACEMENT      tkr left     KS ENDOVENOUS LASER, 1ST VEIN Left 2/15/2022    Procedure: EVLT left greater saphenous vein;  Surgeon: Bhavya Sosa DO;  Location: [facility];  Service: Vascular    KS PHLEB VEINS - EXTREM 20+ Left 2/15/2022    Procedure: MULTIPLE STAB PHLEBECTOMIES LEFT LEG;  Surgeon: Ryan Guthrie DO;  Location: [facility];  Service: Vascular    SHOULDER SURGERY Right     for frozen shoulder/RSD    US GUIDED BREAST BIOPSY LEFT COMPLETE Left 2018    Duct ectasia, fibrosis, focal usual ductal hyperplasia       Past OB/Gyn History:  OB History        1    Para   1    Term   1            AB        Living           SAB        IAB        Ectopic        Multiple        Live Births                   History of abnormal pap smears: No        Family History   Problem Relation Age of Onset    Dementia Mother     Diabetes Mother     Coronary artery disease Father     Hemochromatosis Sister    Emaline Folds Cancer Sister     Pancreatic cancer Sister     Pancreatitis Sister     Cancer Brother     Prostate cancer Brother     No Known Problems Maternal Grandmother     No Known Problems Maternal Grandfather     No Known Problems Paternal Grandmother     No Known Problems Paternal Grandfather     Breast cancer Maternal Aunt     Breast cancer Cousin     Diabetes Family     Hypertension Neg Hx        Social History:  Social History     Socioeconomic History    Marital status:      Spouse name: Not on file    Number of children: Not on file    Years of education: Not on file    Highest education level: Not on file   Occupational History    Not on file   Tobacco Use    Smoking status: Former Smoker     Quit date:      Years since quittin 4    Smokeless tobacco: Never Used   Vaping Use    Vaping Use: Never used   Substance and Sexual Activity    Alcohol use: No    Drug use: No    Sexual activity: Not Currently   Other Topics Concern    Not on file   Social History Narrative    Not on file     Social Determinants of Health     Financial Resource Strain: Not on file   Food Insecurity: Not on file   Transportation Needs: Not on file   Physical Activity: Not on file   Stress: Not on file   Social Connections: Not on file   Intimate Partner Violence: Not on file   Housing Stability: Not on file       No Known Allergies      Current Outpatient Medications:     Calcium-Phosphorus-Vitamin D (VITAMIN D3/CALCIUM/PHOSPHORUS PO), Take by mouth in the morning  , Disp: , Rfl:     Multiple Vitamin (multivitamin) tablet, Take 1 tablet by mouth daily, Disp: , Rfl:     Review of Systems  Constitutional :no fever, feels well, no tiredness, no recent weight gain or loss  ENT: no ear ache, no loss of hearing, no nosebleeds or nasal discharge, no sore throat or hoarseness  Cardiovascular: no complaints of slow or fast heart beat, no chest pain, no palpitations, no leg claudication or lower extremity edema  Respiratory: no complaints of shortness of shortness of breath, no GALVEZ  Breasts:no complaints of breast pain, breast lump, or nipple discharge  Gastrointestinal: no complaints of abdominal pain, constipation, nausea, vomiting, or diarrhea or bloody stools  Genitourinary : no complaints of dysuria, incontinence, pelvic pain, no dysmenorrhea, vaginal discharge or abnormal vaginal bleeding and as noted in HPI  Musculoskeletal: no complaints of arthralgia, no myalgia, no joint swelling or stiffness, no limb pain or swelling  Integumentary: no complaints of skin rash or lesion, itching or dry skin  Neurological: no complaints of headache, no confusion, no numbness or tingling, no dizziness or fainting    Objective      /72   Ht 5' 4 5" (1 638 m)   Wt 85 1 kg (187 lb 9 6 oz)   LMP  (LMP Unknown)   BMI 31 70 kg/m²   General:   appears stated age, cooperative, alert normal mood and affect   Lungs: Unlabored breathing     Breasts: normal appearance, no masses or tenderness   Abdomen: soft, non-tender, without masses or organomegaly   Vulva: normal   Vagina: vagina positive for atrophic mucosa   Urethra: normal   Cervix: Normal, no discharge  Nontender     Uterus: normal size, contour, position, consistency, mobility, non-tender   Adnexa: no mass, fullness, tenderness   Psychiatric orientation to person, place, and time: normal  mood and affect: normal

## 2022-06-16 ENCOUNTER — OFFICE VISIT (OUTPATIENT)
Dept: INTERNAL MEDICINE CLINIC | Facility: CLINIC | Age: 67
End: 2022-06-16
Payer: MEDICARE

## 2022-06-16 ENCOUNTER — APPOINTMENT (OUTPATIENT)
Dept: LAB | Facility: HOSPITAL | Age: 67
End: 2022-06-16
Payer: MEDICARE

## 2022-06-16 VITALS
SYSTOLIC BLOOD PRESSURE: 120 MMHG | OXYGEN SATURATION: 97 % | HEART RATE: 62 BPM | BODY MASS INDEX: 31.44 KG/M2 | HEIGHT: 65 IN | DIASTOLIC BLOOD PRESSURE: 78 MMHG | WEIGHT: 188.7 LBS | TEMPERATURE: 97.7 F

## 2022-06-16 DIAGNOSIS — Z00.00 MEDICARE ANNUAL WELLNESS VISIT, SUBSEQUENT: Primary | ICD-10-CM

## 2022-06-16 DIAGNOSIS — E83.110 HEREDITARY HEMOCHROMATOSIS (HCC): ICD-10-CM

## 2022-06-16 DIAGNOSIS — M15.9 PRIMARY OSTEOARTHRITIS INVOLVING MULTIPLE JOINTS: ICD-10-CM

## 2022-06-16 DIAGNOSIS — G62.9 PERIPHERAL POLYNEUROPATHY: ICD-10-CM

## 2022-06-16 DIAGNOSIS — I83.813 VARICOSE VEINS OF BOTH LOWER EXTREMITIES WITH PAIN: ICD-10-CM

## 2022-06-16 DIAGNOSIS — E03.9 HYPOTHYROIDISM, UNSPECIFIED TYPE: ICD-10-CM

## 2022-06-16 DIAGNOSIS — E55.9 VITAMIN D DEFICIENCY: ICD-10-CM

## 2022-06-16 DIAGNOSIS — E78.2 MIXED HYPERLIPIDEMIA: ICD-10-CM

## 2022-06-16 DIAGNOSIS — I82.591 CHRONIC EMBOLISM AND THROMBOSIS OF OTHER SPECIFIED DEEP VEIN OF RIGHT LOWER EXTREMITY (HCC): ICD-10-CM

## 2022-06-16 PROBLEM — E66.01 OBESITY, MORBID (HCC): Status: RESOLVED | Noted: 2021-06-15 | Resolved: 2022-06-16

## 2022-06-16 LAB
25(OH)D3 SERPL-MCNC: 61.7 NG/ML (ref 30–100)
ALBUMIN SERPL BCP-MCNC: 3.6 G/DL (ref 3.5–5)
ALP SERPL-CCNC: 50 U/L (ref 46–116)
ALT SERPL W P-5'-P-CCNC: 28 U/L (ref 12–78)
ANION GAP SERPL CALCULATED.3IONS-SCNC: 7 MMOL/L (ref 4–13)
AST SERPL W P-5'-P-CCNC: 14 U/L (ref 5–45)
BASOPHILS # BLD AUTO: 0.03 THOUSANDS/ΜL (ref 0–0.1)
BASOPHILS NFR BLD AUTO: 1 % (ref 0–1)
BILIRUB SERPL-MCNC: 0.81 MG/DL (ref 0.2–1)
BUN SERPL-MCNC: 19 MG/DL (ref 5–25)
CALCIUM SERPL-MCNC: 9.4 MG/DL (ref 8.3–10.1)
CHLORIDE SERPL-SCNC: 109 MMOL/L (ref 100–108)
CHOLEST SERPL-MCNC: 218 MG/DL
CO2 SERPL-SCNC: 28 MMOL/L (ref 21–32)
CREAT SERPL-MCNC: 0.9 MG/DL (ref 0.6–1.3)
EOSINOPHIL # BLD AUTO: 0.1 THOUSAND/ΜL (ref 0–0.61)
EOSINOPHIL NFR BLD AUTO: 2 % (ref 0–6)
ERYTHROCYTE [DISTWIDTH] IN BLOOD BY AUTOMATED COUNT: 13.8 % (ref 11.6–15.1)
FERRITIN SERPL-MCNC: 257 NG/ML (ref 8–388)
GFR SERPL CREATININE-BSD FRML MDRD: 65 ML/MIN/1.73SQ M
GLUCOSE P FAST SERPL-MCNC: 96 MG/DL (ref 65–99)
HCT VFR BLD AUTO: 43.5 % (ref 34.8–46.1)
HDLC SERPL-MCNC: 62 MG/DL
HGB BLD-MCNC: 14.3 G/DL (ref 11.5–15.4)
IMM GRANULOCYTES # BLD AUTO: 0.01 THOUSAND/UL (ref 0–0.2)
IMM GRANULOCYTES NFR BLD AUTO: 0 % (ref 0–2)
IRON SATN MFR SERPL: 45 % (ref 15–50)
IRON SERPL-MCNC: 136 UG/DL (ref 50–170)
LAB AP GYN PRIMARY INTERPRETATION: NORMAL
LDLC SERPL CALC-MCNC: 138 MG/DL (ref 0–100)
LYMPHOCYTES # BLD AUTO: 1.44 THOUSANDS/ΜL (ref 0.6–4.47)
LYMPHOCYTES NFR BLD AUTO: 25 % (ref 14–44)
Lab: NORMAL
MCH RBC QN AUTO: 33.8 PG (ref 26.8–34.3)
MCHC RBC AUTO-ENTMCNC: 32.9 G/DL (ref 31.4–37.4)
MCV RBC AUTO: 103 FL (ref 82–98)
MONOCYTES # BLD AUTO: 0.57 THOUSAND/ΜL (ref 0.17–1.22)
MONOCYTES NFR BLD AUTO: 10 % (ref 4–12)
NEUTROPHILS # BLD AUTO: 3.61 THOUSANDS/ΜL (ref 1.85–7.62)
NEUTS SEG NFR BLD AUTO: 62 % (ref 43–75)
NONHDLC SERPL-MCNC: 156 MG/DL
NRBC BLD AUTO-RTO: 0 /100 WBCS
PLATELET # BLD AUTO: 143 THOUSANDS/UL (ref 149–390)
PMV BLD AUTO: 10 FL (ref 8.9–12.7)
POTASSIUM SERPL-SCNC: 4.2 MMOL/L (ref 3.5–5.3)
PROT SERPL-MCNC: 6.8 G/DL (ref 6.4–8.2)
RBC # BLD AUTO: 4.23 MILLION/UL (ref 3.81–5.12)
SODIUM SERPL-SCNC: 144 MMOL/L (ref 136–145)
TIBC SERPL-MCNC: 303 UG/DL (ref 250–450)
TRIGL SERPL-MCNC: 89 MG/DL
TSH SERPL DL<=0.05 MIU/L-ACNC: 2.95 UIU/ML (ref 0.45–4.5)
WBC # BLD AUTO: 5.76 THOUSAND/UL (ref 4.31–10.16)

## 2022-06-16 PROCEDURE — 84443 ASSAY THYROID STIM HORMONE: CPT

## 2022-06-16 PROCEDURE — G0439 PPPS, SUBSEQ VISIT: HCPCS | Performed by: FAMILY MEDICINE

## 2022-06-16 PROCEDURE — 82306 VITAMIN D 25 HYDROXY: CPT

## 2022-06-16 PROCEDURE — 99214 OFFICE O/P EST MOD 30 MIN: CPT | Performed by: FAMILY MEDICINE

## 2022-06-16 PROCEDURE — 36415 COLL VENOUS BLD VENIPUNCTURE: CPT

## 2022-06-16 PROCEDURE — 82728 ASSAY OF FERRITIN: CPT

## 2022-06-16 PROCEDURE — 80061 LIPID PANEL: CPT

## 2022-06-16 PROCEDURE — 85025 COMPLETE CBC W/AUTO DIFF WBC: CPT

## 2022-06-16 PROCEDURE — 83540 ASSAY OF IRON: CPT

## 2022-06-16 PROCEDURE — 80053 COMPREHEN METABOLIC PANEL: CPT

## 2022-06-16 PROCEDURE — 83550 IRON BINDING TEST: CPT

## 2022-06-16 NOTE — PATIENT INSTRUCTIONS
Medicare Preventive Visit Patient Instructions  Thank you for completing your Welcome to Medicare Visit or Medicare Annual Wellness Visit today  Your next wellness visit will be due in one year (6/17/2023)  The screening/preventive services that you may require over the next 5-10 years are detailed below  Some tests may not apply to you based off risk factors and/or age  Screening tests ordered at today's visit but not completed yet may show as past due  Also, please note that scanned in results may not display below  Preventive Screenings:  Service Recommendations Previous Testing/Comments   Colorectal Cancer Screening  * Colonoscopy    * Fecal Occult Blood Test (FOBT)/Fecal Immunochemical Test (FIT)  * Fecal DNA/Cologuard Test  * Flexible Sigmoidoscopy Age: 54-65 years old   Colonoscopy: every 10 years (may be performed more frequently if at higher risk)  OR  FOBT/FIT: every 1 year  OR  Cologuard: every 3 years  OR  Sigmoidoscopy: every 5 years  Screening may be recommended earlier than age 48 if at higher risk for colorectal cancer  Also, an individualized decision between you and your healthcare provider will decide whether screening between the ages of 74-80 would be appropriate  Colonoscopy: 08/10/2021  FOBT/FIT: Not on file  Cologuard: Not on file  Sigmoidoscopy: Not on file    Screening Current     Breast Cancer Screening Age: 36 years old  Frequency: every 1-2 years  Not required if history of left and right mastectomy Mammogram: 10/22/2021    Screening Current   Cervical Cancer Screening Between the ages of 21-29, pap smear recommended once every 3 years  Between the ages of 33-67, can perform pap smear with HPV co-testing every 5 years     Recommendations may differ for women with a history of total hysterectomy, cervical cancer, or abnormal pap smears in past  Pap Smear: 06/13/2022    Screening Not Indicated   Hepatitis C Screening Once for adults born between 1945 and 1965  More frequently in patients at high risk for Hepatitis C Hep C Antibody: 05/13/2020    Screening Current   Diabetes Screening 1-2 times per year if you're at risk for diabetes or have pre-diabetes Fasting glucose: 103 mg/dL   A1C: No results in last 5 years    Screening Current   Cholesterol Screening Once every 5 years if you don't have a lipid disorder  May order more often based on risk factors  Lipid panel: 07/21/2021    Screening Not Indicated  History Lipid Disorder     Other Preventive Screenings Covered by Medicare:  1  Abdominal Aortic Aneurysm (AAA) Screening: covered once if your at risk  You're considered to be at risk if you have a family history of AAA  2  Lung Cancer Screening: covers low dose CT scan once per year if you meet all of the following conditions: (1) Age 50-69; (2) No signs or symptoms of lung cancer; (3) Current smoker or have quit smoking within the last 15 years; (4) You have a tobacco smoking history of at least 30 pack years (packs per day multiplied by number of years you smoked); (5) You get a written order from a healthcare provider  3  Glaucoma Screening: covered annually if you're considered high risk: (1) You have diabetes OR (2) Family history of glaucoma OR (3)  aged 48 and older OR (3)  American aged 72 and older  3  Osteoporosis Screening: covered every 2 years if you meet one of the following conditions: (1) You're estrogen deficient and at risk for osteoporosis based off medical history and other findings; (2) Have a vertebral abnormality; (3) On glucocorticoid therapy for more than 3 months; (4) Have primary hyperparathyroidism; (5) On osteoporosis medications and need to assess response to drug therapy  · Last bone density test (DXA Scan): 10/21/2020   5  HIV Screening: covered annually if you're between the age of 15-65  Also covered annually if you are younger than 13 and older than 72 with risk factors for HIV infection   For pregnant patients, it is covered up to 3 times per pregnancy  Immunizations:  Immunization Recommendations   Influenza Vaccine Annual influenza vaccination during flu season is recommended for all persons aged >= 6 months who do not have contraindications   Pneumococcal Vaccine (Prevnar and Pneumovax)  * Prevnar = PCV13  * Pneumovax = PPSV23   Adults 25-60 years old: 1-3 doses may be recommended based on certain risk factors  Adults 72 years old: Prevnar (PCV13) vaccine recommended followed by Pneumovax (PPSV23) vaccine  If already received PPSV23 since turning 65, then PCV13 recommended at least one year after PPSV23 dose  Hepatitis B Vaccine 3 dose series if at intermediate or high risk (ex: diabetes, end stage renal disease, liver disease)   Tetanus (Td) Vaccine - COST NOT COVERED BY MEDICARE PART B Following completion of primary series, a booster dose should be given every 10 years to maintain immunity against tetanus  Td may also be given as tetanus wound prophylaxis  Tdap Vaccine - COST NOT COVERED BY MEDICARE PART B Recommended at least once for all adults  For pregnant patients, recommended with each pregnancy  Shingles Vaccine (Shingrix) - COST NOT COVERED BY MEDICARE PART B  2 shot series recommended in those aged 48 and above     Health Maintenance Due:      Topic Date Due    Cervical Cancer Screening  07/16/2018    Colorectal Cancer Screening  08/10/2022    DXA SCAN  10/21/2022    Breast Cancer Screening: Mammogram  10/22/2022    Hepatitis C Screening  Completed     Immunizations Due:      Topic Date Due    COVID-19 Vaccine (1) Never done    DTaP,Tdap,and Td Vaccines (1 - Tdap) Never done    Pneumococcal Vaccine: 65+ Years (1 - PCV) Never done    Influenza Vaccine (Season Ended) 09/01/2022     Advance Directives   What are advance directives? Advance directives are legal documents that state your wishes and plans for medical care   These plans are made ahead of time in case you lose your ability to make decisions for yourself  Advance directives can apply to any medical decision, such as the treatments you want, and if you want to donate organs  What are the types of advance directives? There are many types of advance directives, and each state has rules about how to use them  You may choose a combination of any of the following:  · Living will: This is a written record of the treatment you want  You can also choose which treatments you do not want, which to limit, and which to stop at a certain time  This includes surgery, medicine, IV fluid, and tube feedings  · Durable power of  for healthcare [facility]): This is a written record that states who you want to make healthcare choices for you when you are unable to make them for yourself  This person, called a proxy, is usually a family member or a friend  You may choose more than 1 proxy  · Do not resuscitate (DNR) order:  A DNR order is used in case your heart stops beating or you stop breathing  It is a request not to have certain forms of treatment, such as CPR  A DNR order may be included in other types of advance directives  · Medical directive: This covers the care that you want if you are in a coma, near death, or unable to make decisions for yourself  You can list the treatments you want for each condition  Treatment may include pain medicine, surgery, blood transfusions, dialysis, IV or tube feedings, and a ventilator (breathing machine)  · Values history: This document has questions about your views, beliefs, and how you feel and think about life  This information can help others choose the care that you would choose  Why are advance directives important? An advance directive helps you control your care  Although spoken wishes may be used, it is better to have your wishes written down  Spoken wishes can be misunderstood, or not followed  Treatments may be given even if you do not want them   An advance directive may make it easier for your family to make difficult choices about your care  Weight Management   Why it is important to manage your weight:  Being overweight increases your risk of health conditions such as heart disease, high blood pressure, type 2 diabetes, and certain types of cancer  It can also increase your risk for osteoarthritis, sleep apnea, and other respiratory problems  Aim for a slow, steady weight loss  Even a small amount of weight loss can lower your risk of health problems  How to lose weight safely:  A safe and healthy way to lose weight is to eat fewer calories and get regular exercise  You can lose up about 1 pound a week by decreasing the number of calories you eat by 500 calories each day  Healthy meal plan for weight management:  A healthy meal plan includes a variety of foods, contains fewer calories, and helps you stay healthy  A healthy meal plan includes the following:  · Eat whole-grain foods more often  A healthy meal plan should contain fiber  Fiber is the part of grains, fruits, and vegetables that is not broken down by your body  Whole-grain foods are healthy and provide extra fiber in your diet  Some examples of whole-grain foods are whole-wheat breads and pastas, oatmeal, brown rice, and bulgur  · Eat a variety of vegetables every day  Include dark, leafy greens such as spinach, kale, neha greens, and mustard greens  Eat yellow and orange vegetables such as carrots, sweet potatoes, and winter squash  · Eat a variety of fruits every day  Choose fresh or canned fruit (canned in its own juice or light syrup) instead of juice  Fruit juice has very little or no fiber  · Eat low-fat dairy foods  Drink fat-free (skim) milk or 1% milk  Eat fat-free yogurt and low-fat cottage cheese  Try low-fat cheeses such as mozzarella and other reduced-fat cheeses  · Choose meat and other protein foods that are low in fat  Choose beans or other legumes such as split peas or lentils   Choose fish, skinless poultry (chicken or turkey), or lean cuts of red meat (beef or pork)  Before you cook meat or poultry, cut off any visible fat  · Use less fat and oil  Try baking foods instead of frying them  Add less fat, such as margarine, sour cream, regular salad dressing and mayonnaise to foods  Eat fewer high-fat foods  Some examples of high-fat foods include french fries, doughnuts, ice cream, and cakes  · Eat fewer sweets  Limit foods and drinks that are high in sugar  This includes candy, cookies, regular soda, and sweetened drinks  Exercise:  Exercise at least 30 minutes per day on most days of the week  Some examples of exercise include walking, biking, dancing, and swimming  You can also fit in more physical activity by taking the stairs instead of the elevator or parking farther away from stores  Ask your healthcare provider about the best exercise plan for you  © Copyright [employer] 2018 Information is for End User's use only and may not be sold, redistributed or otherwise used for commercial purposes   All illustrations and images included in CareNotes® are the copyrighted property of [employer]  or [address] [place] [address] [employer]

## 2022-06-16 NOTE — PROGRESS NOTES
Assessment and Plan:     Problem List Items Addressed This Visit        Endocrine    Hypothyroidism    Relevant Orders    CBC and differential (Completed)    TSH, 3rd generation (Completed)       Cardiovascular and Mediastinum    Varicose veins of both lower extremities with pain    Relevant Orders    CBC and differential (Completed)    Chronic embolism and thrombosis of other specified deep vein of right lower extremity (HCC)       Nervous and Auditory    Peripheral neuropathy    Relevant Orders    CBC and differential (Completed)       Musculoskeletal and Integument    Osteoarthritis    Relevant Orders    CBC and differential (Completed)       Other    Hemochromatosis    Relevant Orders    Iron Panel (Includes Ferritin, Iron Sat%, Iron, and TIBC) (Completed)    Hyperlipidemia    Relevant Orders    CBC and differential (Completed)    Comprehensive metabolic panel (Completed)    Lipid panel (Completed)    TSH, 3rd generation (Completed)    Vitamin D deficiency    Relevant Orders    CBC and differential (Completed)    Vitamin D 25 hydroxy (Completed)      Other Visit Diagnoses     Medicare annual wellness visit, subsequent    -  Primary        Orders recommendations as noted above  Continue follow-up with vascular regarding the legs  Continue with dietary changes and lifestyle changes for additional weight loss  Continue to walk regularly  Slip given for laboratory testing  Continue with mammograms yearly  Continue with bone densities every 2 years  Continue follow-up for regular colonoscopies  Declines immunizations  Coronavirus precautions discussed  Will have her follow-up in 1 year or sooner if needed depending on the results of her laboratory testing  BMI Counseling: Body mass index is 31 89 kg/m²   The BMI is above normal  Nutrition recommendations include encouraging healthy choices of fruits and vegetables, consuming healthier snacks, limiting drinks that contain sugar, moderation in carbohydrate intake and reducing intake of cholesterol  Exercise recommendations include exercising 3-5 times per week  Rationale for BMI follow-up plan is due to patient being overweight or obese  Depression Screening and Follow-up Plan: Patient was screened for depression during today's encounter  They screened negative with a PHQ-2 score of 0  Preventive health issues were discussed with patient, and age appropriate screening tests were ordered as noted in patient's After Visit Summary  Personalized health advice and appropriate referrals for health education or preventive services given if needed, as noted in patient's After Visit Summary  History of Present Illness:     Patient presents for a Medicare Wellness Visit    She presents for routine follow-up as well as Medicare wellness visit has generally done well  Had vascular procedure for the varicosities on the left leg and will likely proceed with the right leg as well  The continues to follow-up with vascular on a regular basis  Made significant lifestyle changes last year  Has lost about 70 lb  Has been watching her diet closely  Eating small but frequent meals  Exercising regularly with walking  Usually gets in at least [id number] steps a day  Often more than that  Persistent joint and muscle aches but these have improved with the weight loss  Bowels have become more normal   Has increase fiber in diet drastically  Drinking plenty of fluids  She states that her energy level is better  Did have some stressors over the last few years with her granddaughter going through significant depression  Patient Care Team:  Praneeth Sadler MD as PCP - General  MD Branden Cuenca MD Sherin Mcgill, MD     Review of Systems:     Review of Systems   Constitutional: Negative for activity change, appetite change, chills and fever  HENT: Negative for congestion and rhinorrhea  Eyes: Negative for visual disturbance     Respiratory: Negative for chest tightness and shortness of breath  Cardiovascular: Negative for chest pain and palpitations  Gastrointestinal: Negative for abdominal pain, blood in stool, diarrhea, nausea and vomiting  Endocrine: Negative for polydipsia, polyphagia and polyuria  Genitourinary: Negative for dysuria, frequency and urgency  Musculoskeletal: Negative for gait problem  Skin: Negative for color change  Neurological: Negative for dizziness and headaches  Hematological: Does not bruise/bleed easily  Psychiatric/Behavioral: Negative for confusion and sleep disturbance  The patient is not nervous/anxious           Problem List:     Patient Active Problem List   Diagnosis    Adenomatous colon polyp    Baker's cyst of knee    Colon, diverticulosis    Constipation    Hemochromatosis    Hot flashes due to menopause    Hyperlipidemia    Hypothyroidism    Lower back pain    Nonalcoholic steatohepatitis    Osteoarthritis    Pain syndrome, chronic    Peripheral neuropathy    Splenic cyst    Tendonitis of elbow, left    Vitamin D deficiency    Varicose veins of both lower extremities with pain    Left hip pain    Leg swelling    Trochanteric bursitis of left hip    Family history of malignant neoplasm of pancreas    Chronic embolism and thrombosis of other specified deep vein of right lower extremity (HCC)      Past Medical and Surgical History:     Past Medical History:   Diagnosis Date    Arthritis     Colon polyp     DVT (deep venous thrombosis) (HCC)     Fibromyalgia     GERD (gastroesophageal reflux disease)     Hyperlipemia     Hypothyroid     Osteoarthritis     Osteomyelitis of ankle (HCC)     Rheumatoid arthritis (HCC)     RSD (reflex sympathetic dystrophy)      Past Surgical History:   Procedure Laterality Date    ANKLE FUSION Right     BREAST BIOPSY Left 12/18/2018    US guided; benign     BREAST EXCISIONAL BIOPSY Left     benign     CARPAL TUNNEL RELEASE      CHOLECYSTECTOMY      COLONOSCOPY      ERCP      with insertion of tube into bile/pancreatic duct    FACIAL RECONSTRUCTION SURGERY      JOINT REPLACEMENT      tkr left     NM ENDOVENOUS LASER, 1ST VEIN Left 2/15/2022    Procedure: EVLT left greater saphenous vein;  Surgeon: Norma Chester DO;  Location: [facility];  Service: Vascular    NM PHLEB VEINS - EXTREM 20+ Left 2/15/2022    Procedure: MULTIPLE STAB PHLEBECTOMIES LEFT LEG;  Surgeon: Norma Chester DO;  Location: [facility];  Service: Vascular    SHOULDER SURGERY Right     for frozen shoulder/RSD    US GUIDED BREAST BIOPSY LEFT COMPLETE Left 2018    Duct ectasia, fibrosis, focal usual ductal hyperplasia      Family History:     Family History   Problem Relation Age of Onset    Dementia Mother     Diabetes Mother     Coronary artery disease Father     Hemochromatosis Sister     Cancer Sister     Pancreatic cancer Sister     Pancreatitis Sister     Cancer Brother     Prostate cancer Brother     No Known Problems Maternal Grandmother     No Known Problems Maternal Grandfather     No Known Problems Paternal Grandmother     No Known Problems Paternal Grandfather     Breast cancer Maternal Aunt     Breast cancer Cousin     Diabetes Family     Hypertension Neg Hx       Social History:     Social History     Socioeconomic History    Marital status:      Spouse name: None    Number of children: None    Years of education: None    Highest education level: None   Occupational History    None   Tobacco Use    Smoking status: Former Smoker     Quit date:      Years since quittin 4    Smokeless tobacco: Never Used   Vaping Use    Vaping Use: Never used   Substance and Sexual Activity    Alcohol use: No    Drug use: No    Sexual activity: Not Currently   Other Topics Concern    None   Social History Narrative    None     Social Determinants of Health     Financial Resource Strain: Not on file   Food Insecurity: Not on file   Transportation Needs: Not on file   Physical Activity: Not on file   Stress: Not on file   Social Connections: Not on file   Intimate Partner Violence: Not on file   Housing Stability: Not on file      Medications and Allergies:     Current Outpatient Medications   Medication Sig Dispense Refill    Calcium-Phosphorus-Vitamin D (VITAMIN D3/CALCIUM/PHOSPHORUS PO) Take by mouth in the morning        Multiple Vitamin (multivitamin) tablet Take 1 tablet by mouth daily       No current facility-administered medications for this visit  No Known Allergies   Immunizations: There is no immunization history for the selected administration types on file for this patient  Health Maintenance:         Topic Date Due    Colorectal Cancer Screening  08/10/2022    DXA SCAN  10/21/2022    Breast Cancer Screening: Mammogram  10/22/2022    Cervical Cancer Screening  06/13/2025    Hepatitis C Screening  Completed         Topic Date Due    COVID-19 Vaccine (1) Never done    DTaP,Tdap,and Td Vaccines (1 - Tdap) Never done    Pneumococcal Vaccine: 65+ Years (1 - PCV) Never done    Influenza Vaccine (Season Ended) 09/01/2022      Medicare Screening Tests and Risk Assessments:     Chris Rivera is here for her Subsequent Wellness visit  Last Medicare Wellness visit information reviewed, patient interviewed and updates made to the record today  Health Risk Assessment:   Patient rates overall health as good  Patient feels that their physical health rating is much better  Patient is satisfied with their life  Eyesight was rated as same  Hearing was rated as same  Patient feels that their emotional and mental health rating is same  Patients states they are never, rarely angry  Patient states they are never, rarely unusually tired/fatigued  Pain experienced in the last 7 days has been a lot  Patient's pain rating has been 5/10   Patient states that she has experienced no weight loss or gain in last 6 months  Depression Screening:   PHQ-2 Score: 0      Fall Risk Screening: In the past year, patient has experienced: no history of falling in past year      Urinary Incontinence Screening:   Patient has not leaked urine accidently in the last six months  Home Safety:  Patient does not have trouble with stairs inside or outside of their home  Patient has working smoke alarms and has no working carbon monoxide detector  Home safety hazards include: none  Nutrition:   Current diet is Regular  Medications:   Patient is currently taking over-the-counter supplements  OTC medications include: see medication list  Patient is able to manage medications  Activities of Daily Living (ADLs)/Instrumental Activities of Daily Living (IADLs):   Walk and transfer into and out of bed and chair?: Yes  Dress and groom yourself?: Yes    Bathe or shower yourself?: Yes    Feed yourself? Yes  Do your laundry/housekeeping?: Yes  Manage your money, pay your bills and track your expenses?: Yes  Make your own meals?: Yes    Do your own shopping?: Yes    Previous Hospitalizations:   Any hospitalizations or ED visits within the last 12 months?: Yes    How many hospitalizations have you had in the last year?: 1-2    Hospitalization Comments:  For leg procedure in Feb    Advance Care Planning:   Living will: No    Durable POA for healthcare: No    Advanced directive: No    Five wishes given: Yes      Cognitive Screening:   Provider or family/friend/caregiver concerned regarding cognition?: No    PREVENTIVE SCREENINGS      Cardiovascular Screening:    General: Screening Not Indicated, History Lipid Disorder and Risks and Benefits Discussed    Due for: Lipid Panel      Diabetes Screening:     General: Screening Current and Risks and Benefits Discussed    Due for: Blood Glucose      Colorectal Cancer Screening:     General: Screening Current      Breast Cancer Screening:     General: Screening Current      Cervical Cancer Screening:    General: Screening Not Indicated      Osteoporosis Screening:    General: Screening Current      Abdominal Aortic Aneurysm (AAA) Screening:        General: Screening Not Indicated      Lung Cancer Screening:     General: Screening Not Indicated      Hepatitis C Screening:    General: Screening Current    Screening, Brief Intervention, and Referral to Treatment (SBIRT)    Screening  Typical number of drinks in a day: 0  Typical number of drinks in a week: 0  Interpretation: Low risk drinking behavior  Single Item Drug Screening:  How often have you used an illegal drug (including marijuana) or a prescription medication for non-medical reasons in the past year? never    Single Item Drug Screen Score: 0  Interpretation: Negative screen for possible drug use disorder    Brief Intervention  Alcohol & drug use screenings were reviewed  No concerns regarding substance use disorder identified  No exam data present     Physical Exam:     /78 (BP Location: Left arm, Patient Position: Sitting, Cuff Size: Standard)   Pulse 62   Temp 97 7 °F (36 5 °C)   Ht 5' 4 5" (1 638 m)   Wt 85 6 kg (188 lb 11 2 oz)   LMP  (LMP Unknown)   SpO2 97%   BMI 31 89 kg/m²     Physical Exam  Vitals and nursing note reviewed  Constitutional:       General: She is not in acute distress  Appearance: She is well-developed, well-groomed and overweight  HENT:      Head: Normocephalic and atraumatic  Eyes:      Conjunctiva/sclera: Conjunctivae normal    Cardiovascular:      Rate and Rhythm: Normal rate and regular rhythm  Heart sounds: Murmur heard  Systolic murmur is present with a grade of 1/6  Pulmonary:      Effort: Pulmonary effort is normal  No respiratory distress  Breath sounds: Normal breath sounds  Abdominal:      Palpations: Abdomen is soft  Tenderness: There is no abdominal tenderness  Musculoskeletal:      Cervical back: Neck supple  Skin:     General: Skin is warm and dry  Neurological:      Mental Status: She is alert  Psychiatric:         Mood and Affect: Mood and affect normal          Speech: Speech normal          Behavior: Behavior is cooperative           Cognition and Memory: Cognition and memory normal           James Wheeler MD

## 2022-06-19 DIAGNOSIS — E78.2 MIXED HYPERLIPIDEMIA: Primary | ICD-10-CM

## 2022-06-19 RX ORDER — ATORVASTATIN CALCIUM 10 MG/1
10 TABLET, FILM COATED ORAL DAILY
Qty: 90 TABLET | Refills: 3 | Status: SHIPPED | OUTPATIENT
Start: 2022-06-19 | End: 2023-03-10

## 2022-06-20 ENCOUNTER — TELEPHONE (OUTPATIENT)
Dept: OBGYN CLINIC | Facility: CLINIC | Age: 67
End: 2022-06-20

## 2022-06-20 NOTE — TELEPHONE ENCOUNTER
The patient was made aware of her pap smear results being normal and she wanted to make sure that we knew that she was just placed on her Lipitor by her PCP    I did check her chart and it was already put in

## 2022-06-20 NOTE — TELEPHONE ENCOUNTER
----- Message from Shannan Kurtz MD sent at 6/16/2022  4:19 PM EDT -----  Please inform patient of normal results

## 2022-06-23 ENCOUNTER — HOSPITAL ENCOUNTER (OUTPATIENT)
Dept: NON INVASIVE DIAGNOSTICS | Facility: HOSPITAL | Age: 67
Discharge: HOME | End: 2022-06-23
Attending: SURGERY
Payer: MEDICARE

## 2022-06-23 DIAGNOSIS — I83.813 VARICOSE VEINS OF BOTH LOWER EXTREMITIES WITH PAIN: ICD-10-CM

## 2022-06-23 PROCEDURE — 93971 EXTREMITY STUDY: CPT | Performed by: SURGERY

## 2022-06-23 PROCEDURE — 93971 EXTREMITY STUDY: CPT

## 2022-07-11 ENCOUNTER — TELEPHONE (OUTPATIENT)
Dept: VASCULAR SURGERY | Facility: HOSPITAL | Age: 67
End: 2022-07-11

## 2022-07-11 ENCOUNTER — OFFICE VISIT (OUTPATIENT)
Dept: VASCULAR SURGERY | Facility: HOSPITAL | Age: 67
End: 2022-07-11
Payer: MEDICARE

## 2022-07-11 VITALS
BODY MASS INDEX: 31.16 KG/M2 | DIASTOLIC BLOOD PRESSURE: 76 MMHG | HEIGHT: 65 IN | SYSTOLIC BLOOD PRESSURE: 122 MMHG | WEIGHT: 187 LBS | HEART RATE: 71 BPM

## 2022-07-11 DIAGNOSIS — I83.813 VARICOSE VEINS OF BOTH LOWER EXTREMITIES WITH PAIN: Primary | ICD-10-CM

## 2022-07-11 PROCEDURE — 99214 OFFICE O/P EST MOD 30 MIN: CPT | Performed by: SURGERY

## 2022-07-11 RX ORDER — CEFAZOLIN SODIUM 2 G/50ML
2000 SOLUTION INTRAVENOUS ONCE
Status: CANCELLED | OUTPATIENT
Start: 2022-08-16 | End: 2022-07-11

## 2022-07-11 RX ORDER — CHLORHEXIDINE GLUCONATE 0.12 MG/ML
15 RINSE ORAL ONCE
Status: CANCELLED | OUTPATIENT
Start: 2022-08-16 | End: 2022-07-11

## 2022-07-11 NOTE — PROGRESS NOTES
Assessment/Plan:    Varicose veins of both lower extremities with pain  History of left greater saphenous vein EVLT and stab phlebectomies on 02/15/2022  Unfortunately she reports that she had some relief for approximately 1 month however her symptoms have returned  She describes water down the leg sensation as well as tightness and discoloration  She does have varicosities as well as clusters of reticular/spider veins throughout the left lower leg  She now returns to the office to discuss intervention for her right leg  She reports that the pain associated with varicosities are adversely affecting her overall quality life  She does have both deep and superficial venous incompetence  After a thorough discussion with patient regarding realistic expectations regarding her symptoms resolution following intervention she is agreeable to proceed with right greater saphenous vein EVLT and stab phlebectomies  The procedure, risks, benefits, alternatives, anticipated postop course were discussed in detail  Patient was agreeable to proceed  And written consent was obtained  Diagnoses and all orders for this visit:    Varicose veins of both lower extremities with pain  -     Case request operating room: ENDOVASCULAR LASER THERAPY (EVLT) + Stab phlebectomies; Standing  -     Basic metabolic panel; Future  -     CBC and Platelet; Future  -     Case request operating room: ENDOVASCULAR LASER THERAPY (EVLT) + Stab phlebectomies    Other orders  -     Diet NPO; Sips with meds; Standing  -     Void on call to OR; Standing  -     Insert peripheral IV; Standing  -     Nursing Communication CHG bath, have staff wash entire body (neck down) per pre op bathing protocol  Routine, evening prior to, and day of surgery ; Standing  -     Nursing Communication Swab both nares with Povidone-Iodine solution, EXCLUDE if patient has shellfish/Iodine allergy   Routine, day of surgery, on call to OR ; Standing  -     chlorhexidine (PERIDEX) 0 12 % oral rinse 15 mL  -     Place sequential compression device; Standing  -     ceFAZolin (ANCEF) IVPB (premix in dextrose) 2,000 mg 50 mL          Subjective:      Patient ID: Beulah Pate is a 75 y o  female  Pt is here tor ev LEVDR done on 6/23/22  Pt reports tightness and tingling in LLE  Pt c/o painful veins in RLE  Pt has h/o L GSV EVLT & stab phlebs done on 2/15/22  Pt has been wearing compression  Pt is taking atorvastatin  "Cookie" returns to the office discussed intervention of her right lower extremity varicosities  She previously underwent left greater saphenous vein EVLT and stab phlebectomies on 02/15/2022 with temporary relief lasting approximately 1 month  She continues to complain of water down leg sensation as well as tightness and discoloration  She does have some bulky right lower extremity varicosities along the medial aspect of the lower leg  She has worn compression stockings with minimal relief  Her symptoms worsen as the day progresses  She complains that the symptoms are adversely affecting her overall daily life activities  The following portions of the patient's history were reviewed and updated as appropriate: allergies, current medications, past family history, past medical history, past social history, past surgical history and problem list     Review of Systems   Constitutional: Negative  HENT: Negative  Eyes: Negative  Respiratory: Negative  Cardiovascular:        Painful Veins RLE   Gastrointestinal: Negative  Endocrine: Negative  Genitourinary: Negative  Musculoskeletal: Negative  Skin: Negative  Allergic/Immunologic: Negative  Neurological: Negative  Hematological: Negative  Psychiatric/Behavioral: Negative  I have personally reviewed the ROS entered by MA and agree as documented      Objective:      /76 (BP Location: Left arm, Patient Position: Sitting, Cuff Size: Standard)   Pulse 71   Ht 5' 4 5" (1 638 m)   Wt 84 8 kg (187 lb)   LMP  (LMP Unknown)   BMI 31 60 kg/m²          Physical Exam  Constitutional:       General: She is not in acute distress  Appearance: She is well-developed  HENT:      Head: Normocephalic and atraumatic  Eyes:      General: No scleral icterus  Conjunctiva/sclera: Conjunctivae normal    Neck:      Trachea: No tracheal deviation  Cardiovascular:      Rate and Rhythm: Normal rate and regular rhythm  Heart sounds: Normal heart sounds  Pulmonary:      Effort: Pulmonary effort is normal       Breath sounds: Normal breath sounds  Abdominal:      General: There is no distension  Palpations: Abdomen is soft  There is no mass (no appreciable aortic pulsation/aneurysm)  Tenderness: There is no abdominal tenderness  There is no guarding or rebound  Musculoskeletal:         General: Normal range of motion  Cervical back: Normal range of motion and neck supple  Skin:     General: Skin is warm and dry  Neurological:      Mental Status: She is alert and oriented to person, place, and time  Psychiatric:         Mood and Affect: Mood normal          Behavior: Behavior normal          Thought Content: Thought content normal          Judgment: Judgment normal        venous reflux study:  CONCLUSION:     Impression:  RIGHT LIMB:  Deep venous incompetence is noted in the proximal femoral vein and one of the  paired peroneal veins  The great saphenous vein is incompetent  The great saphenous vein remains within the saphenous compartment in the thigh  The small saphenous vein is competent and does not communicate with the  popliteal vein  There is a varicosity of the proximal GSV which communicates with the popliteal  vein  There is evidence of an incompetent perforators in the calf which connects the  gastrocnemius vein with varicosities of the GSV    There is no evidence of deep vein thrombosis in the CFV, the proximal PFV, the  femoral vein and the popliteal vein  Study performed with patient in steep Reverse Trendelenburg  EVLT Operative Scheduling Information:    Hospital:  [facility]    Physician:  [address]    Surgery:  Right greater saphenous vein EVLT and stab phlebectomies    Urgency:  Standard    Level:  Level 4: Outpatients to be scheduled for screening procedures and elective surgery that can be delayed for longer than one month without reasonable expectation of detriment to patient  Case Length:  Normal    Post-op Bed:  Outpatient    OR Table:  Standard    Equipment Needs:  None    Medication Instructions:  None    Hydration:  No    Contrast Allergy:  No    Venous Clinical Severity Scores (VCSS)  Item Absent   (0 points) Mild   (1 point) Moderate   (2 points) Severe   (3 points)   Pain [] None [] Occasional [] Daily [x] Daily limiting   Varicose veins [] None [] Few [x] Calf or thigh [] Calf and thigh   Venous edema [x] None [] Foot and ankle [] Above ankle, below knee [] To knee of above   Skin pigmentation [] None [x] Perimalleolar [] Diffuse, lower 1/3 calf [] Wider, above lower 1/3 calf   Inflammation [x] None [] Perimalleolar [] Diffuse, lower 1/3 calf [] Wider, above lower 1/3 calf   Induration [x] None [] Perimalleolar [] Diffuse, lower 1/3 calf [] Wider, above lower 1/3 calf   No  active ulcers [x] None [] 1 [] 2 [] ? 3   Active ulcer size [x] None [] <2 cm [] 2 - 6 cm [] >6 cm   Ulcer duration [] None [] <3 months [] 3 - 12 months [] >1 year   Compression therapy [] None [] Intermittent [] Most days [x] Fully comply   Total 9          CEAP Clinical Classification  [x] Symptomatic   [] Asymptomatic     [] Class 0 No visible or palpable signs of venous disease   [] Class 1 Telangiectasies or reticular veins   [] Class 2 Varicose veins; distinguished from reticular veins by a diameter of 3mm or more   [] Class 3 Edema   [x] Class 4 Changes in skin and subcutaneous tissue secondary to CVD    [] Class 4a Pigmentation or eczema   [] Class 4b Lipodermatosclerosis or atrophie dalton   [] Class 5 Healed venous ulcer   [] Class 6 Active venous ulcer

## 2022-07-11 NOTE — ASSESSMENT & PLAN NOTE
History of left greater saphenous vein EVLT and stab phlebectomies on 02/15/2022  Unfortunately she reports that she had some relief for approximately 1 month however her symptoms have returned  She describes water down the leg sensation as well as tightness and discoloration  She does have varicosities as well as clusters of reticular/spider veins throughout the left lower leg  She now returns to the office to discuss intervention for her right leg  She reports that the pain associated with varicosities are adversely affecting her overall quality life  She does have both deep and superficial venous incompetence  After a thorough discussion with patient regarding realistic expectations regarding her symptoms resolution following intervention she is agreeable to proceed with right greater saphenous vein EVLT and stab phlebectomies  The procedure, risks, benefits, alternatives, anticipated postop course were discussed in detail  Patient was agreeable to proceed  And written consent was obtained

## 2022-07-11 NOTE — LETTER
22  RE: Medicare ID: [id number]    To whom it may concern:    Patient, Isis Akbar (1955), has a routine procedure scheduled on 22 at [address] (Tax: [id number] / NPI: [id number]) [place] with Dr Daniel Nj // Mic Brooks (NPI: [id number])  We are requesting authorization for one (1) unit each of outpatient CPT code(s) [id number] AND 12838  Patient's VCSS = 9 and CEAP Classification = Class 4  Please review the attached clinical documentation and provide your determination  Please Note:  The right greater saphenous vein will be treated with CPT code 10876  The right lower extremity tributaries will be treated with CPT code 92587  Should you have any questions or concerns regarding the details of this case, please do not hesitate to reach out  Respectfully,      Jessica Riggs  Prior Authorization & Referral  Vascular Center  [address]  [address] [facility], [address] [place] [address]   P: [phone]  F: [phone]  maria [email or web address]  [email or web address]                                                        Please Do Not Copy        Prior [address] Outpatient Procedure  Medicare Part A Fax/Mail Cover Sheet  Complete all fields; attach supporting medical documentation and fax to [phone] or mail to the applicable address/number provided at the bottom of the page  Complete ONE (1) Medicare Fax/ Mail Cover Sheet for each prior authorization request for which documentation is being submitted      Beneficiary Last Name  [facility] First Name  Jessie Crowley ID                   [id number] Gender  [] Male [x] Female   1955    Facility/Agency NPIs                              [id number] CMS Certification Number       [id number]   Facility Name and Address  [address] // [place], [place], [address] [facility]   Provider's NPI [id number]   Provider's CMS Certification Number              [id number]   Provider's Name and Address  Farideh Si // [address], [address] [facility], [address] [place] [address]     Requestor Name  Beryle Row Phone Number   [phone]   Requestor Fax Number/Email address  [phone] / Jin [email or web address]  [email or web address] Procedure Code(s)   [id number] AND 3766  [right]   Paired Code(s) for Botulinum Toxin Injections                                                                                                  Not Applicable (N/A)    Diagnosis Codes (providers who submit using esMD must include diagnosis code(s)):       I83 813   Start Date of Authorization     08/16/22 State (location) [place] [address] Units of Service  One (1) unit of each code   Request Completed by: (please print and sign)  Anjelica Joseph Date  07/18/22        This document is intended solely for the use of the individual or entity to which it is addressed and may contain information that is privileged, confidential, and exempt from disclosure under applicable law  If the reader of this notice is not the intended recipient or individual responsible for delivering the message to the intended recipient, you are hereby advised that any dissemination, distribution or copying of this information is strictly prohibited  If you receive this communication in error, please advise us by telephone and destroy these papers

## 2022-07-11 NOTE — TELEPHONE ENCOUNTER
Verified patient's insurance   CONFIRMED - Patient's insurance is Medicare  Is patient requesting a call when authorization has been obtained? Patient did not request a call  Surgery Date: 8/16/22  Primary Surgeon: Maximiliano Santos // Karena Healy (NPI: [id number])  Assisting Surgeon: Not Applicable (N/A)  Facility: [facility] (Tax: [id number] / NPI: [id number])  Inpatient / Outpatient: Outpatient  Level: 4    Clearance Received: No clearance ordered  Consent Received: Yes, scanned into Epic on 7/11/22  Medication Hold / Last Dose: no med hold  VQI Spreadsheet: Not Applicable (N/A)  IR Notified: Not Applicable (N/A)  Rep  Notified: Not Applicable (N/A)  Equipment Needs: Not Applicable (N/A)  Vas Lab Requested: emailed 7/11/22  Patient Contacted: 7/11/22    Diagnosis: I83 813   Procedure/ CPT Code(s): (EVLT) Endovenous Laser Treatment WITH Stab Phlebectomies of the right upper leg // CPT: 45145, 54822     For varicose vein related procedures:   Last LEVDR: 6/23/22, patient's Shelly Loss was completed within 12-months of their procedure date  CEAP Classification: class 4   VCSS: 9    Post Operative Date/ Time: 8/19/22 for dressing removal  , [address] [place] with MICHAEL Swenson (NPI: [id number]) 215[address] duplex     *Please review medication hold(s), PATs, and check H&P with patient  *  PATIENT WAS MAILED SURGERY/SHOWERING/DISCHARGE/COVID INSTRUCTIONS AFTER REVIEWING WITH THEM VIA PHONE CALL

## 2022-07-11 NOTE — TELEPHONE ENCOUNTER
REMINDER: Under Reason For Call, comments MUST be formatted as:   (Surgeon's Initials) / (Procedure)      Special Instructions / FYI:     Procedure: Right greater saphenous vein EVLT and stab phlebectomies    Level: 4 - Route clearance(s) to [facility] Surgery Coordinator Pool    Allergies: Patient has no known allergies  Instructions Given: EVLT Packet with NO Bowel Prep General Instructions     Dialysis: Patient is not on dialysis  Return Visit Required Prior to Procedure: No     Consent: I certify that patient has signed, printed, timed, and dated their surgery consent  I certify that the patient's LEGAL NAME and DATE OF BIRTH are written in the upper left corner on BOTH sides of the consent  I certify that BOTH sides of the completed surgery consent have been scanned into the patient's Epic chart by myself on 7/11/2022  Yes, I have LABELED the consent in Epic as Consent for Vascular Procedure  For Surgical Clearances     Levels   1-3   ROUTE this encounter to [facility] Clearance Pool (AND)   [facility] Surgery Coordinator Pool     Level   4   ROUTE this encounter to [facility] Surgery Coordinator Pool       HYDRATION CLEARANCES   ONLY ROUTE TO  [facility] Clearance Pool     Patient does not require any pre operative clearance  Yes, I have ROUTED this encounter to [facility] Surgery Coordinator and/or [facility] Clearance Pool

## 2022-07-17 ENCOUNTER — APPOINTMENT (EMERGENCY)
Dept: RADIOLOGY | Facility: HOSPITAL | Age: 67
End: 2022-07-17
Payer: MEDICARE

## 2022-07-17 ENCOUNTER — APPOINTMENT (EMERGENCY)
Dept: NON INVASIVE DIAGNOSTICS | Facility: HOSPITAL | Age: 67
End: 2022-07-17
Payer: MEDICARE

## 2022-07-17 ENCOUNTER — HOSPITAL ENCOUNTER (EMERGENCY)
Facility: HOSPITAL | Age: 67
Discharge: HOME | End: 2022-07-17
Attending: EMERGENCY MEDICINE | Admitting: EMERGENCY MEDICINE
Payer: MEDICARE

## 2022-07-17 VITALS
DIASTOLIC BLOOD PRESSURE: 58 MMHG | WEIGHT: 188.93 LBS | BODY MASS INDEX: 31.93 KG/M2 | TEMPERATURE: 97.4 F | RESPIRATION RATE: 20 BRPM | HEART RATE: 65 BPM | OXYGEN SATURATION: 97 % | SYSTOLIC BLOOD PRESSURE: 124 MMHG

## 2022-07-17 DIAGNOSIS — T14.8XXA AVULSION FRACTURE: Primary | ICD-10-CM

## 2022-07-17 PROCEDURE — 93971 EXTREMITY STUDY: CPT | Performed by: SURGERY

## 2022-07-17 PROCEDURE — 73564 X-RAY EXAM KNEE 4 OR MORE: CPT

## 2022-07-17 PROCEDURE — 99284 EMERGENCY DEPT VISIT MOD MDM: CPT | Performed by: PHYSICIAN ASSISTANT

## 2022-07-17 PROCEDURE — 93971 EXTREMITY STUDY: CPT

## 2022-07-17 PROCEDURE — 99284 EMERGENCY DEPT VISIT MOD MDM: CPT

## 2022-07-17 NOTE — ED NOTES
Pt ambulated to the bathroom without any staff assistance  Patient denied any chest pain and or shortness of breath  Patient only complained of leg pain        Benjamin Choudhury  07/17/22 1012

## 2022-07-17 NOTE — ED PROVIDER NOTES
History  Chief Complaint   Patient presents with    Knee Pain     Pt states she stood up from sitting position hearing a "pop" and having immediate pain in right knee  Pt states the pain has increased since starting on Thursday  Pt denies any injury     Patient presents to the emergency department today for evaluation of right knee pain  This is a pleasant 58-year-old female who presents via private vehicle ambulatory providing her own history stating 2 days ago she stood up from a movie theater heard a cracking sensation the right knee and has been having pain and swelling of the right lateral and posterior knee since that point pain  History of varicose veins  History of DVT in the past   She denies chest pain shortness of breath she has been self treating with Salonpas and heat  Prior to Admission Medications   Prescriptions Last Dose Informant Patient Reported? Taking?    Calcium-Phosphorus-Vitamin D (VITAMIN D3/CALCIUM/PHOSPHORUS PO) 7/17/2022 at Unknown time Self Yes Yes   Sig: Take by mouth in the morning     Multiple Vitamin (multivitamin) tablet 7/17/2022 at Unknown time Self Yes Yes   Sig: Take 1 tablet by mouth daily   atorvastatin (LIPITOR) 10 mg tablet 7/17/2022 at Unknown time Self No Yes   Sig: Take 1 tablet (10 mg total) by mouth daily      Facility-Administered Medications: None       Past Medical History:   Diagnosis Date    Arthritis     Colon polyp     DVT (deep venous thrombosis) (HCC)     Fibromyalgia     GERD (gastroesophageal reflux disease)     Hyperlipemia     Hypothyroid     Osteoarthritis     Osteomyelitis of ankle (HCC)     Rheumatoid arthritis (HCC)     RSD (reflex sympathetic dystrophy)        Past Surgical History:   Procedure Laterality Date    ANKLE FUSION Right     BREAST BIOPSY Left 12/18/2018    US guided; benign     BREAST EXCISIONAL BIOPSY Left     benign     CARPAL TUNNEL RELEASE      CHOLECYSTECTOMY      COLONOSCOPY      ERCP      with insertion of tube into bile/pancreatic duct    FACIAL RECONSTRUCTION SURGERY      JOINT REPLACEMENT      tkr left     CA ENDOVENOUS LASER, 1ST VEIN Left 2/15/2022    Procedure: EVLT left greater saphenous vein;  Surgeon: Shar Alcaraz DO;  [facility];  Service: Vascular    CA PHLEB VEINS - EXTREM 20+ Left 2/15/2022    Procedure: MULTIPLE STAB PHLEBECTOMIES LEFT LEG;  Surgeon: Shar Alcaraz DO;  [facility];  Service: Vascular    SHOULDER SURGERY Right     for frozen shoulder/RSD    US GUIDED BREAST BIOPSY LEFT COMPLETE Left 2018    Duct ectasia, fibrosis, focal usual ductal hyperplasia       Family History   Problem Relation Age of Onset    Dementia Mother     Diabetes Mother     Coronary artery disease Father     Hemochromatosis Sister     Cancer Sister     Pancreatic cancer Sister     Pancreatitis Sister     Cancer Brother     Prostate cancer Brother     No Known Problems Maternal Grandmother     No Known Problems Maternal Grandfather     No Known Problems Paternal Grandmother     No Known Problems Paternal Grandfather     Breast cancer Maternal Aunt     Breast cancer Cousin     Diabetes Family     Hypertension Neg Hx      I have reviewed and agree with the history as documented  E-Cigarette/Vaping    E-Cigarette Use Never User      E-Cigarette/Vaping Substances    Nicotine No     THC No     CBD No     Flavoring No     Other No     Unknown No      Social History     Tobacco Use    Smoking status: Former Smoker     Quit date:      Years since quittin 5    Smokeless tobacco: Never Used   Vaping Use    Vaping Use: Never used   Substance Use Topics    Alcohol use: No    Drug use: No       Review of Systems   Constitutional: Negative for chills and fever  HENT: Negative for ear pain and sore throat  Eyes: Negative for pain and visual disturbance  Respiratory: Negative for cough and shortness of breath      Cardiovascular: Negative for chest pain and palpitations  Gastrointestinal: Negative for abdominal pain and vomiting  Genitourinary: Negative for dysuria and hematuria  Musculoskeletal: Negative for arthralgias and back pain  Right knee pain   Skin: Negative for color change and rash  Neurological: Negative for seizures and syncope  All other systems reviewed and are negative  Physical Exam  Physical Exam  Vitals reviewed  Constitutional:       General: She is not in acute distress  Appearance: Normal appearance  She is not ill-appearing, toxic-appearing or diaphoretic  HENT:      Head: Normocephalic and atraumatic  Right Ear: External ear normal       Left Ear: External ear normal    Eyes:      General: No scleral icterus  Right eye: No discharge  Left eye: No discharge  Extraocular Movements: Extraocular movements intact  Conjunctiva/sclera: Conjunctivae normal    Cardiovascular:      Rate and Rhythm: Normal rate  Pulses: Normal pulses  Pulmonary:      Effort: Pulmonary effort is normal  No respiratory distress  Breath sounds: No stridor  Musculoskeletal:         General: No deformity or signs of injury  Cervical back: Normal range of motion  No rigidity  Comments: Patient has varicosities of the bilateral lower extremities  She has some tender varicosities of right knee region medially posteriorly  She has tenderness of the right knee most of laterally however  Normal neurovascular motor exams distally  Skin:     General: Skin is warm  Coloration: Skin is not jaundiced  Findings: No lesion or rash  Neurological:      General: No focal deficit present  Mental Status: She is alert and oriented to person, place, and time  Mental status is at baseline  Gait: Gait normal    Psychiatric:         Mood and Affect: Mood normal          Thought Content:  Thought content normal          Judgment: Judgment normal          Vital Signs  ED Triage Vitals Temperature Pulse Respirations Blood Pressure SpO2   07/17/22 0913 07/17/22 0915 07/17/22 0913 07/17/22 0915 07/17/22 0915   (!) 97 4 °F (36 3 °C) 85 20 135/63 98 %      Temp Source Heart Rate Source Patient Position - Orthostatic VS BP Location FiO2 (%)   07/17/22 0913 07/17/22 0913 -- -- --   Temporal Monitor         Pain Score       --                  Vitals:    07/17/22 0915 07/17/22 0930 07/17/22 1000   BP: 135/63 143/87 127/60   Pulse: 85 84 68         Visual Acuity      ED Medications  Medications - No data to display    Diagnostic Studies  Results Reviewed     None                 XR knee 4+ vw right injury   ED Interpretation by Kristina Zuñiga PA-C (07/17 0945)   Small osseous density noted of the distal femur primarily on image 1 and 2, this does not appear to represent a calcified vessel likely osseous in nature  I do not see effusion  There is degenerative changes throughout  VAS lower limb venous duplex study, unilateral/limited    (Results Pending)              Procedures  Procedures         ED Course  ED Course as of 07/17/22 1120   Sun Jul 17, 2022   0932 Blood Pressure: 135/63   0932 Temperature(!): 97 4 °F (36 3 °C)   0932 Pulse: 85   0932 Respirations: 20   0932 SpO2: 98 %   1013 Vas US tech was summoned by nursing staff awaiting their arrival from a different campus   1035 Vascular ultrasound in process   1111 Per ultrasound technologist no evidence of DVT  MDM    Disposition  Final diagnoses:   Avulsion fracture - R knee     Time reflects when diagnosis was documented in both MDM as applicable and the Disposition within this note     Time User Action Codes Description Comment    7/17/2022 11:11 AM Emily NDIAYE Add [Q87  8XXA] Avulsion fracture     7/17/2022 11:12 AM Emily NDIAYE Modify [A85  8XXA] Avulsion fracture R knee      ED Disposition     ED Disposition   Discharge    Condition   Stable    Date/Time   Sun Jul 17, 2022 11:11 AM    Faizan Dill discharge to home/self care  Follow-up Information     Follow up With Specialties Details Why Contact Info Additional [address] [facility] Specialists [place] [facility] Schedule an appointment as soon as possible for a visit  As needed [address] [facility] [address]  [address] [place] [address] Specialists Michelle Fuller [address] [place], [place], [address]          Patient's Medications   Discharge Prescriptions    No medications on file       No discharge procedures on file      PDMP Review     None          ED Provider  Electronically Signed by           Odessa Fry PA-C  07/17/22 1125

## 2022-07-17 NOTE — ED NOTES
Patient ambulated back to the room and sat on the bed  She denied any new pain  Patient was placed back on the monitor and was provided 2 hot packs for her knee        Sher Choudhury  07/17/22 1022

## 2022-07-18 NOTE — TELEPHONE ENCOUNTER
Authorization requirements reviewed  Please refer to George Cosby / Kostas Nabila number [id number] for case updates

## 2022-07-19 ENCOUNTER — TELEPHONE (OUTPATIENT)
Dept: OBGYN CLINIC | Facility: CLINIC | Age: 67
End: 2022-07-19

## 2022-07-19 NOTE — TELEPHONE ENCOUNTER
Hello,  Please advise if the following patient can be forced onto the schedule:    Patient: Morales Noonan     OVE:68/83/1994    MRN: [id number]    Call back #: [phone]    Insurance: Medicare     Reason for appointment:Avulsion fx right knee // seen in ED 7/17/22     Requested doctor/location: Mary Ellen Khan       Thank you      E-mail sent to [facility]

## 2022-07-25 ENCOUNTER — TELEPHONE (OUTPATIENT)
Dept: INTERNAL MEDICINE CLINIC | Facility: CLINIC | Age: 67
End: 2022-07-25

## 2022-07-25 NOTE — TELEPHONE ENCOUNTER
Madeline called about her repeat blood work that Dr Christofer Elias wanted to do for her  After reviewing her charts it noted that she was starting a new medication and Dr Christofer Elias wanted to recheck her blood work in 6 to 8 weeks after starting  I informed Madeline of this and she stated she understood  She also stated that she is having surgery next month and wanted to make sure that everything was completed before her surgery

## 2022-07-28 ENCOUNTER — OFFICE VISIT (OUTPATIENT)
Dept: OBGYN CLINIC | Facility: CLINIC | Age: 67
End: 2022-07-28
Payer: MEDICARE

## 2022-07-28 VITALS — BODY MASS INDEX: 31.16 KG/M2 | HEIGHT: 65 IN | WEIGHT: 187 LBS

## 2022-07-28 DIAGNOSIS — S86.911A KNEE STRAIN, RIGHT, INITIAL ENCOUNTER: Primary | ICD-10-CM

## 2022-07-28 DIAGNOSIS — M70.62 TROCHANTERIC BURSITIS OF LEFT HIP: ICD-10-CM

## 2022-07-28 DIAGNOSIS — M70.61 TROCHANTERIC BURSITIS OF RIGHT HIP: ICD-10-CM

## 2022-07-28 PROCEDURE — 99213 OFFICE O/P EST LOW 20 MIN: CPT | Performed by: ORTHOPAEDIC SURGERY

## 2022-07-28 PROCEDURE — 20610 DRAIN/INJ JOINT/BURSA W/O US: CPT | Performed by: ORTHOPAEDIC SURGERY

## 2022-07-28 RX ORDER — TRIAMCINOLONE ACETONIDE 40 MG/ML
80 INJECTION, SUSPENSION INTRA-ARTICULAR; INTRAMUSCULAR
Status: COMPLETED | OUTPATIENT
Start: 2022-07-28 | End: 2022-07-28

## 2022-07-28 RX ORDER — BUPIVACAINE HYDROCHLORIDE 2.5 MG/ML
4 INJECTION, SOLUTION INFILTRATION; PERINEURAL
Status: COMPLETED | OUTPATIENT
Start: 2022-07-28 | End: 2022-07-28

## 2022-07-28 RX ADMIN — TRIAMCINOLONE ACETONIDE 80 MG: 40 INJECTION, SUSPENSION INTRA-ARTICULAR; INTRAMUSCULAR at 08:31

## 2022-07-28 RX ADMIN — BUPIVACAINE HYDROCHLORIDE 4 ML: 2.5 INJECTION, SOLUTION INFILTRATION; PERINEURAL at 08:31

## 2022-07-28 NOTE — PROGRESS NOTES
Assessment:  1  Knee strain, right, initial encounter     2  Trochanteric bursitis of right hip     3  Trochanteric bursitis of left hip         Plan:  Right knee strain and bilateral hip trochanteric bursitis  The patient is assured there were no fractures seen on recent right knee x-ray  She is currently doing well and a watch and wait approach will be taken  The patient was provided with bilateral trochanteric bursa steroid injections  The patient tolerated the procedure well  The patient should follow up in 2 months  X-rays reviewed which showed no acute fractures  There is osteophyte formation and chronic fragmentation  She is relatively asymptomatic in regards her right knee  She is having more pain along her bilateral hips  The trochanteric bursa were reinjected with Depo-Medrol and Marcaine  She tolerated procedures well  Return back 2 months re-evaluation  If her condition changes, she will not hesitate to let us know      To do next visit:  Return in about 2 months (around 9/28/2022)  The above stated was discussed in layman's terms and the patient expressed understanding  All questions were answered to the patient's satisfaction  Scribe Attestation    I,:  Maria E Coyne am acting as a scribe while in the presence of the attending physician :       I,:  Helen Gong, DO personally performed the services described in this documentation    as scribed in my presence :             Subjective:   Ara Patterson is a 75 y o  female who presents for follow up of right knee  She had 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 [employer]  She did hear a loud "crack" sound of the right knee with immediate pain  She was seen at the ED  She is overall feeling better  Today she complains of right knee soreness  Prolonged walking and sitting aggravates while change of position alleviates  She also complains of bilateral lateral hip pain  Prolonged walking and lying on sides aggravates while change of position alleviates          Review of systems negative unless otherwise specified in HPI    Past Medical History:   Diagnosis Date    Arthritis     Colon polyp     DVT (deep venous thrombosis) (HCC)     Fibromyalgia     GERD (gastroesophageal reflux disease)     Hyperlipemia     Hypothyroid     Osteoarthritis     Osteomyelitis of ankle (HCC)     Rheumatoid arthritis (HCC)     RSD (reflex sympathetic dystrophy)        Past Surgical History:   Procedure Laterality Date    ANKLE FUSION Right     BREAST BIOPSY Left 12/18/2018    US guided; benign     BREAST EXCISIONAL BIOPSY Left     benign     CARPAL TUNNEL RELEASE      CHOLECYSTECTOMY      COLONOSCOPY      ERCP      with insertion of tube into bile/pancreatic duct    FACIAL RECONSTRUCTION SURGERY      JOINT REPLACEMENT      tkr left     IN ENDOVENOUS LASER, 1ST VEIN Left 2/15/2022    Procedure: EVLT left greater saphenous vein;  Surgeon: Johanna Kaplan DO;  [facility]: [facility];  Service: Vascular    IN PHLEB VEINS - EXTREM 20+ Left 2/15/2022    Procedure: MULTIPLE STAB PHLEBECTOMIES LEFT LEG;  Surgeon: Johanna Kaplan DO;  [facility];  Service: Vascular    SHOULDER SURGERY Right     for frozen shoulder/RSD    US GUIDED BREAST BIOPSY LEFT COMPLETE Left 12/18/2018    Duct ectasia, fibrosis, focal usual ductal hyperplasia       Family History   Problem Relation Age of Onset    Dementia Mother     Diabetes Mother     Coronary artery disease Father     Hemochromatosis Sister     Cancer Sister     Pancreatic cancer Sister     Pancreatitis Sister     Cancer Brother     Prostate cancer Brother     No Known Problems Maternal Grandmother     No Known Problems Maternal Grandfather     No Known Problems Paternal Grandmother     No Known Problems Paternal Grandfather     Breast cancer Maternal Aunt     Breast cancer Cousin     Diabetes Family     Hypertension Neg Hx        Social History     Occupational History    Not on file   Tobacco Use    Smoking status: Former Smoker     Quit date:      Years since quittin 5    Smokeless tobacco: Never Used   Vaping Use    Vaping Use: Never used   Substance and Sexual Activity    Alcohol use: No    Drug use: No    Sexual activity: Not Currently         Current Outpatient Medications:     atorvastatin (LIPITOR) 10 mg tablet, Take 1 tablet (10 mg total) by mouth daily, Disp: 90 tablet, Rfl: 3    Calcium-Phosphorus-Vitamin D (VITAMIN D3/CALCIUM/PHOSPHORUS PO), Take by mouth in the morning  , Disp: , Rfl:     Multiple Vitamin (multivitamin) tablet, Take 1 tablet by mouth daily, Disp: , Rfl:     No Known Allergies         Vitals:       Objective:  Physical exam  · General: Awake, Alert, Oriented  · Eyes: Pupils equal, round and reactive to light  · Heart: regular rate and rhythm  · Lungs: No audible wheezing  · Abdomen: soft                    Ortho Exam  Right knee:  No erythema or ecchymosis  No effusion or swelling  Normal strength  Good ROM with crepitus   Calf compartments soft and supple  Sensation intact  Toes are warm sensate and mobile    Bilateral hips:  TTP over greater trochanter  Good arc of motion  Patient sits comfortably in chair with hip flexed at 90 degrees  Patient stands from seated position without assistance        Diagnostics, reviewed and taken today if performed as documented: The attending physician has personally reviewed the pertinent films in PACS and interpretation is as follows:  Right knee x-ray:  No obvious fractures  Tricompartmental arthritic changes  Procedures, if performed today:    Large joint arthrocentesis: R greater trochanteric bursa  Universal Protocol:  Consent: Verbal consent obtained    Risks and benefits: risks, benefits and alternatives were discussed  Consent given by: patient  Time out: Immediately prior to procedure a "time out" was called to verify the correct patient, procedure, equipment, support staff and site/side marked as required  Timeout called at: 7/28/2022 8:31 AM   Patient understanding: patient states understanding of the procedure being performed  Site marked: the operative site was marked  Patient identity confirmed: verbally with patient    Supporting Documentation  Indications: pain   Procedure Details  Location: hip - R greater trochanteric bursa  Preparation: Patient was prepped and draped in the usual sterile fashion  Needle size: 22 G  Ultrasound guidance: no  Approach: anterolateral  Medications administered: 4 mL bupivacaine 0 25 %; 80 mg triamcinolone acetonide 40 mg/mL    Patient tolerance: patient tolerated the procedure well with no immediate complications  Dressing:  Sterile dressing applied    Large joint arthrocentesis: L greater trochanteric bursa  Universal Protocol:  Consent: Verbal consent obtained  Risks and benefits: risks, benefits and alternatives were discussed  Consent given by: patient  Time out: Immediately prior to procedure a "time out" was called to verify the correct patient, procedure, equipment, support staff and site/side marked as required  Timeout called at: 7/28/2022 8:31 AM   Patient understanding: patient states understanding of the procedure being performed  Site marked: the operative site was marked  Patient identity confirmed: verbally with patient    Supporting Documentation  Indications: pain   Procedure Details  Location: hip - L greater trochanteric bursa  Preparation: Patient was prepped and draped in the usual sterile fashion  Needle size: 22 G  Ultrasound guidance: no  Approach: anterolateral  Medications administered: 4 mL bupivacaine 0 25 %; 80 mg triamcinolone acetonide 40 mg/mL    Patient tolerance: patient tolerated the procedure well with no immediate complications  Dressing:  Sterile dressing applied            Portions of the record may have been created with voice recognition software  Occasional wrong word or "sound a like" substitutions may have occurred due to the inherent limitations of voice recognition software  Read the chart carefully and recognize, using context, where substitutions have occurred

## 2022-08-02 ENCOUNTER — APPOINTMENT (OUTPATIENT)
Dept: LAB | Facility: HOSPITAL | Age: 67
End: 2022-08-02
Payer: MEDICARE

## 2022-08-02 DIAGNOSIS — E78.2 MIXED HYPERLIPIDEMIA: ICD-10-CM

## 2022-08-02 DIAGNOSIS — I83.813 VARICOSE VEINS OF BOTH LOWER EXTREMITIES WITH PAIN: ICD-10-CM

## 2022-08-02 LAB
ALBUMIN SERPL BCP-MCNC: 3.6 G/DL (ref 3.5–5)
ALP SERPL-CCNC: 60 U/L (ref 46–116)
ALT SERPL W P-5'-P-CCNC: 26 U/L (ref 12–78)
ANION GAP SERPL CALCULATED.3IONS-SCNC: 10 MMOL/L (ref 4–13)
AST SERPL W P-5'-P-CCNC: 12 U/L (ref 5–45)
BILIRUB SERPL-MCNC: 0.53 MG/DL (ref 0.2–1)
BUN SERPL-MCNC: 18 MG/DL (ref 5–25)
CALCIUM SERPL-MCNC: 9.1 MG/DL (ref 8.3–10.1)
CHLORIDE SERPL-SCNC: 108 MMOL/L (ref 96–108)
CO2 SERPL-SCNC: 29 MMOL/L (ref 21–32)
CREAT SERPL-MCNC: 0.81 MG/DL (ref 0.6–1.3)
ERYTHROCYTE [DISTWIDTH] IN BLOOD BY AUTOMATED COUNT: 12.7 % (ref 11.6–15.1)
GFR SERPL CREATININE-BSD FRML MDRD: 74 ML/MIN/1.73SQ M
GLUCOSE P FAST SERPL-MCNC: 84 MG/DL (ref 65–99)
HCT VFR BLD AUTO: 41.8 % (ref 34.8–46.1)
HGB BLD-MCNC: 13.7 G/DL (ref 11.5–15.4)
MCH RBC QN AUTO: 34 PG (ref 26.8–34.3)
MCHC RBC AUTO-ENTMCNC: 32.8 G/DL (ref 31.4–37.4)
MCV RBC AUTO: 104 FL (ref 82–98)
PLATELET # BLD AUTO: 213 THOUSANDS/UL (ref 149–390)
PMV BLD AUTO: 10.1 FL (ref 8.9–12.7)
POTASSIUM SERPL-SCNC: 3.9 MMOL/L (ref 3.5–5.3)
PROT SERPL-MCNC: 6.6 G/DL (ref 6.4–8.4)
RBC # BLD AUTO: 4.03 MILLION/UL (ref 3.81–5.12)
SODIUM SERPL-SCNC: 147 MMOL/L (ref 135–147)
WBC # BLD AUTO: 6.27 THOUSAND/UL (ref 4.31–10.16)

## 2022-08-02 PROCEDURE — 80053 COMPREHEN METABOLIC PANEL: CPT

## 2022-08-02 PROCEDURE — 85027 COMPLETE CBC AUTOMATED: CPT

## 2022-08-02 PROCEDURE — 36415 COLL VENOUS BLD VENIPUNCTURE: CPT

## 2022-08-11 NOTE — PRE-PROCEDURE INSTRUCTIONS
Pre-Surgery Instructions:   Medication Instructions    atorvastatin (LIPITOR) 10 mg tablet Take day of surgery   Calcium-Phosphorus-Vitamin D (VITAMIN D3/CALCIUM/PHOSPHORUS PO) Stop 8/11  Do not take morning of surgery    Multiple Vitamin (multivitamin) tablet Stop 8/11  Do not take morning of surgery   Covid screening negative as per patient  Reviewed [facility]'s masking policy  Patient verbalized understanding  Reviewed showering and medication instructions  Take medications with a small sip of water morning of surgery  Patient verbalized understanding  Advised NPO after MN and ASC will call with scheduled surgical time

## 2022-08-15 ENCOUNTER — ANESTHESIA EVENT (OUTPATIENT)
Dept: PERIOP | Facility: HOSPITAL | Age: 67
End: 2022-08-15
Payer: MEDICARE

## 2022-08-16 ENCOUNTER — APPOINTMENT (OUTPATIENT)
Dept: NON INVASIVE DIAGNOSTICS | Facility: HOSPITAL | Age: 67
End: 2022-08-16
Payer: MEDICARE

## 2022-08-16 ENCOUNTER — HOSPITAL ENCOUNTER (OUTPATIENT)
Facility: HOSPITAL | Age: 67
Setting detail: OUTPATIENT SURGERY
Discharge: HOME | End: 2022-08-16
Attending: SURGERY | Admitting: SURGERY
Payer: MEDICARE

## 2022-08-16 ENCOUNTER — ANESTHESIA (OUTPATIENT)
Dept: PERIOP | Facility: HOSPITAL | Age: 67
End: 2022-08-16
Payer: MEDICARE

## 2022-08-16 VITALS
SYSTOLIC BLOOD PRESSURE: 139 MMHG | HEART RATE: 100 BPM | OXYGEN SATURATION: 100 % | WEIGHT: 186.51 LBS | TEMPERATURE: 97.7 F | DIASTOLIC BLOOD PRESSURE: 72 MMHG | HEIGHT: 65 IN | RESPIRATION RATE: 20 BRPM | BODY MASS INDEX: 31.07 KG/M2

## 2022-08-16 DIAGNOSIS — I83.813 VARICOSE VEINS OF BOTH LOWER EXTREMITIES WITH PAIN: Primary | ICD-10-CM

## 2022-08-16 DIAGNOSIS — I83.891 SYMPTOMATIC VARICOSE VEINS OF RIGHT LOWER EXTREMITY: ICD-10-CM

## 2022-08-16 PROBLEM — E66.9 OBESITY (BMI 30-39.9): Status: ACTIVE | Noted: 2022-08-16

## 2022-08-16 PROCEDURE — NC001 PR NO CHARGE: Performed by: SURGERY

## 2022-08-16 PROCEDURE — 37765 STAB PHLEB VEINS XTR 10-20: CPT | Performed by: SURGERY

## 2022-08-16 PROCEDURE — 93971 EXTREMITY STUDY: CPT

## 2022-08-16 PROCEDURE — 36478 ENDOVENOUS LASER 1ST VEIN: CPT | Performed by: SURGERY

## 2022-08-16 PROCEDURE — 99024 POSTOP FOLLOW-UP VISIT: CPT | Performed by: SURGERY

## 2022-08-16 RX ORDER — ONDANSETRON 2 MG/ML
INJECTION INTRAMUSCULAR; INTRAVENOUS AS NEEDED
Status: DISCONTINUED | OUTPATIENT
Start: 2022-08-16 | End: 2022-08-16

## 2022-08-16 RX ORDER — OXYCODONE HYDROCHLORIDE AND ACETAMINOPHEN 5; 325 MG/1; MG/1
1 TABLET ORAL EVERY 6 HOURS PRN
Qty: 20 TABLET | Refills: 0 | Status: SHIPPED | OUTPATIENT
Start: 2022-08-16 | End: 2022-08-26

## 2022-08-16 RX ORDER — ONDANSETRON 2 MG/ML
4 INJECTION INTRAMUSCULAR; INTRAVENOUS ONCE AS NEEDED
Status: DISCONTINUED | OUTPATIENT
Start: 2022-08-16 | End: 2022-08-16 | Stop reason: HOSPADM

## 2022-08-16 RX ORDER — SODIUM CHLORIDE 9 MG/ML
125 INJECTION, SOLUTION INTRAVENOUS CONTINUOUS
Status: DISCONTINUED | OUTPATIENT
Start: 2022-08-16 | End: 2022-08-16 | Stop reason: HOSPADM

## 2022-08-16 RX ORDER — PROMETHAZINE HYDROCHLORIDE 25 MG/ML
6.25 INJECTION, SOLUTION INTRAMUSCULAR; INTRAVENOUS ONCE AS NEEDED
Status: DISCONTINUED | OUTPATIENT
Start: 2022-08-16 | End: 2022-08-16 | Stop reason: HOSPADM

## 2022-08-16 RX ORDER — MAGNESIUM HYDROXIDE 1200 MG/15ML
LIQUID ORAL AS NEEDED
Status: DISCONTINUED | OUTPATIENT
Start: 2022-08-16 | End: 2022-08-16 | Stop reason: HOSPADM

## 2022-08-16 RX ORDER — MEPERIDINE HYDROCHLORIDE 25 MG/ML
12.5 INJECTION INTRAMUSCULAR; INTRAVENOUS; SUBCUTANEOUS ONCE AS NEEDED
Status: DISCONTINUED | OUTPATIENT
Start: 2022-08-16 | End: 2022-08-16 | Stop reason: HOSPADM

## 2022-08-16 RX ORDER — HYDROMORPHONE HCL/PF 1 MG/ML
0.5 SYRINGE (ML) INJECTION
Status: DISCONTINUED | OUTPATIENT
Start: 2022-08-16 | End: 2022-08-16 | Stop reason: HOSPADM

## 2022-08-16 RX ORDER — FENTANYL CITRATE/PF 50 MCG/ML
50 SYRINGE (ML) INJECTION
Status: DISCONTINUED | OUTPATIENT
Start: 2022-08-16 | End: 2022-08-16 | Stop reason: HOSPADM

## 2022-08-16 RX ORDER — DOCUSATE SODIUM 100 MG/1
100 CAPSULE, LIQUID FILLED ORAL 2 TIMES DAILY
Qty: 30 CAPSULE | Refills: 1 | Status: SHIPPED | OUTPATIENT
Start: 2022-08-16

## 2022-08-16 RX ORDER — CEFAZOLIN SODIUM 2 G/50ML
2000 SOLUTION INTRAVENOUS ONCE
Status: COMPLETED | OUTPATIENT
Start: 2022-08-16 | End: 2022-08-16

## 2022-08-16 RX ORDER — PROPOFOL 10 MG/ML
INJECTION, EMULSION INTRAVENOUS AS NEEDED
Status: DISCONTINUED | OUTPATIENT
Start: 2022-08-16 | End: 2022-08-16

## 2022-08-16 RX ORDER — OXYCODONE HYDROCHLORIDE AND ACETAMINOPHEN 5; 325 MG/1; MG/1
1 TABLET ORAL EVERY 4 HOURS PRN
Status: DISCONTINUED | OUTPATIENT
Start: 2022-08-16 | End: 2022-08-16 | Stop reason: HOSPADM

## 2022-08-16 RX ORDER — DEXAMETHASONE SODIUM PHOSPHATE 10 MG/ML
INJECTION, SOLUTION INTRAMUSCULAR; INTRAVENOUS AS NEEDED
Status: DISCONTINUED | OUTPATIENT
Start: 2022-08-16 | End: 2022-08-16

## 2022-08-16 RX ORDER — LIDOCAINE HYDROCHLORIDE 10 MG/ML
INJECTION, SOLUTION EPIDURAL; INFILTRATION; INTRACAUDAL; PERINEURAL AS NEEDED
Status: DISCONTINUED | OUTPATIENT
Start: 2022-08-16 | End: 2022-08-16

## 2022-08-16 RX ORDER — OXYCODONE HYDROCHLORIDE AND ACETAMINOPHEN 5; 325 MG/1; MG/1
2 TABLET ORAL EVERY 4 HOURS PRN
Status: DISCONTINUED | OUTPATIENT
Start: 2022-08-16 | End: 2022-08-16 | Stop reason: HOSPADM

## 2022-08-16 RX ORDER — FENTANYL CITRATE 50 UG/ML
INJECTION, SOLUTION INTRAMUSCULAR; INTRAVENOUS AS NEEDED
Status: DISCONTINUED | OUTPATIENT
Start: 2022-08-16 | End: 2022-08-16

## 2022-08-16 RX ORDER — CHLORHEXIDINE GLUCONATE 0.12 MG/ML
15 RINSE ORAL ONCE
Status: COMPLETED | OUTPATIENT
Start: 2022-08-16 | End: 2022-08-16

## 2022-08-16 RX ORDER — EPHEDRINE SULFATE 50 MG/ML
INJECTION INTRAVENOUS AS NEEDED
Status: DISCONTINUED | OUTPATIENT
Start: 2022-08-16 | End: 2022-08-16

## 2022-08-16 RX ORDER — MIDAZOLAM HYDROCHLORIDE 2 MG/2ML
INJECTION, SOLUTION INTRAMUSCULAR; INTRAVENOUS AS NEEDED
Status: DISCONTINUED | OUTPATIENT
Start: 2022-08-16 | End: 2022-08-16

## 2022-08-16 RX ADMIN — FENTANYL CITRATE 50 MCG: 50 INJECTION INTRAMUSCULAR; INTRAVENOUS at 12:33

## 2022-08-16 RX ADMIN — DEXAMETHASONE SODIUM PHOSPHATE 10 MG: 10 INJECTION, SOLUTION INTRAMUSCULAR; INTRAVENOUS at 12:35

## 2022-08-16 RX ADMIN — FENTANYL CITRATE 50 MCG: 50 INJECTION INTRAMUSCULAR; INTRAVENOUS at 13:41

## 2022-08-16 RX ADMIN — EPHEDRINE SULFATE 10 MG: 50 INJECTION, SOLUTION INTRAVENOUS at 12:39

## 2022-08-16 RX ADMIN — PROPOFOL 150 MG: 10 INJECTION, EMULSION INTRAVENOUS at 12:29

## 2022-08-16 RX ADMIN — CEFAZOLIN SODIUM 2000 MG: 2 SOLUTION INTRAVENOUS at 12:17

## 2022-08-16 RX ADMIN — EPHEDRINE SULFATE 10 MG: 50 INJECTION, SOLUTION INTRAVENOUS at 12:41

## 2022-08-16 RX ADMIN — MIDAZOLAM 2 MG: 1 INJECTION INTRAMUSCULAR; INTRAVENOUS at 12:17

## 2022-08-16 RX ADMIN — SODIUM CHLORIDE 125 ML/HR: 0.9 INJECTION, SOLUTION INTRAVENOUS at 09:55

## 2022-08-16 RX ADMIN — FENTANYL CITRATE 50 MCG: 50 INJECTION INTRAMUSCULAR; INTRAVENOUS at 12:44

## 2022-08-16 RX ADMIN — ONDANSETRON 4 MG: 2 INJECTION INTRAMUSCULAR; INTRAVENOUS at 12:35

## 2022-08-16 RX ADMIN — CHLORHEXIDINE GLUCONATE 0.12% ORAL RINSE 15 ML: 1.2 LIQUID ORAL at 09:53

## 2022-08-16 RX ADMIN — LIDOCAINE HYDROCHLORIDE 100 MG: 10 INJECTION, SOLUTION EPIDURAL; INFILTRATION; INTRACAUDAL at 12:29

## 2022-08-16 RX ADMIN — SODIUM CHLORIDE: 0.9 INJECTION, SOLUTION INTRAVENOUS at 13:18

## 2022-08-16 RX ADMIN — EPHEDRINE SULFATE 10 MG: 50 INJECTION, SOLUTION INTRAVENOUS at 12:37

## 2022-08-16 RX ADMIN — PROPOFOL 50 MG: 10 INJECTION, EMULSION INTRAVENOUS at 13:25

## 2022-08-16 RX ADMIN — EPHEDRINE SULFATE 10 MG: 50 INJECTION, SOLUTION INTRAVENOUS at 12:51

## 2022-08-16 RX ADMIN — FENTANYL CITRATE 50 MCG: 50 INJECTION INTRAMUSCULAR; INTRAVENOUS at 13:27

## 2022-08-16 NOTE — ASSESSMENT & PLAN NOTE
58-year-old female former smoker w/HLD, hypothyroidism, osteoarthritis, rheumatoid arthritis, fibromyalgia, hx of LLE DVT, and symptomatic bilateral lower extremity varicose veins s/p LLE GSV EVLT w/stabs on 2/15/22 by Dr Meghan Perez who presents for RLE intervention      -Plan to proceed with RLE GSV EVLT and stabs  -All questions/concerns addressed  -Post operative dressing to be removed in office at post operative visit  -Plan for d/c post procedure

## 2022-08-16 NOTE — H&P
[facility] 1955, 75 y o  female MRN: [id number]  Unit/Bed#: OR [place] Encounter: [id number]  Primary Care Provider: Eleno Enriquez MD   Date and time admitted to hospital: 8/16/2022  9:21 AM    Varicose veins of both lower extremities with pain  Assessment & Plan  67-year-old female former smoker w/HLD, hypothyroidism, osteoarthritis, rheumatoid arthritis, fibromyalgia, hx of LLE DVT, and symptomatic bilateral lower extremity varicose veins s/p LLE GSV EVLT w/stabs on 2/15/22 by Dr Haylee Martin who presents for RLE intervention      -Plan to proceed with RLE GSV EVLT and stabs  -All questions/concerns addressed  -Post operative dressing to be removed in office at post operative visit  -Plan for d/c post procedure        HPI: Noam Madsen is a 75 y o  female with history of symptomatic varicose veins to the bilateral lower extremities, s/p left lower extremity GSV EVLT in February 2022, presents for elective right lower extremity GSV EVLT with stab phlebectomy  Patient's main concern was tightness and discoloration of the bilateral lower extremities  She had worn compression stockings regularly with minimal relief  She reports that her symptoms had worsened as the day progressed  Overall has felt as if her symptoms secondary to varicose veins affected her lifestyle and ability to complete her daily life activities  Today, patient offers no major complaints  She does report that she has been constipated, however that this is chronic and she follows with her PCP for this  She otherwise offers no complaints      Review of Systems:  General: negative  Cardiovascular: no chest pain or dyspnea on exertion  Respiratory: no cough, shortness of breath, or wheezing  Gastrointestinal: positive for - constipation  Genitourinary ROS: no dysuria, trouble voiding, or hematuria  Musculoskeletal ROS: negative  Neurological ROS: no TIA or stroke symptoms  Hematological and Lymphatic ROS: negative  Dermatological ROS: negative  Psychological ROS: negative  Ophthalmic ROS: negative  ENT ROS: negative    Past Medical History:  Past Medical History:   Diagnosis Date    Arthritis     Colon polyp     DVT (deep venous thrombosis) (HCC)     Fibromyalgia     GERD (gastroesophageal reflux disease)     Hyperlipemia     Hypothyroid     Osteoarthritis     Osteomyelitis of ankle (HCC)     Rheumatoid arthritis (HCC)     RSD (reflex sympathetic dystrophy)        Past Surgical History:  Past Surgical History:   Procedure Laterality Date    ANKLE FUSION Right     BREAST BIOPSY Left 2018    US guided; benign     BREAST EXCISIONAL BIOPSY Left     benign     CARPAL TUNNEL RELEASE      CHOLECYSTECTOMY      COLONOSCOPY      ERCP      with insertion of tube into bile/pancreatic duct    FACIAL RECONSTRUCTION SURGERY      JOINT REPLACEMENT      tkr left     NE ENDOVENOUS LASER, 1ST VEIN Left 2/15/2022    Procedure: EVLT left greater saphenous vein;  Surgeon: Lena Contreras DO;  [facility];  Service: Vascular    NE PHLEB VEINS - EXTREM 20+ Left 2/15/2022    Procedure: MULTIPLE STAB PHLEBECTOMIES LEFT LEG;  Surgeon: Lena Contreras DO;  [facility];  Service: Vascular    SHOULDER SURGERY Right     for frozen shoulder/RSD    US GUIDED BREAST BIOPSY LEFT COMPLETE Left 2018    Duct ectasia, fibrosis, focal usual ductal hyperplasia       Social History:  Social History     Substance and Sexual Activity   Alcohol Use No     Social History     Substance and Sexual Activity   Drug Use Yes    Types: Marijuana    Comment: At a youg age     Social History     Tobacco Use   Smoking Status Former Smoker    Quit date:     Years since quittin 6   Smokeless Tobacco Never Used       Family History:  Family History   Problem Relation Age of Onset    Dementia Mother     Diabetes Mother     Coronary artery disease Father     Hemochromatosis Sister  Cancer Sister     Pancreatic cancer Sister     Pancreatitis Sister     Cancer Brother     Prostate cancer Brother     No Known Problems Maternal Grandmother     No Known Problems Maternal Grandfather     No Known Problems Paternal Grandmother     No Known Problems Paternal Grandfather     Breast cancer Maternal Aunt     Breast cancer Cousin     Diabetes Family     Hypertension Neg Hx        Allergies:  No Known Allergies    Medications:  Current Facility-Administered Medications   Medication Dose Route Frequency    lidocaine (PF) (XYLOCAINE-MPF) 1 % 25 mL, EPINEPHrine PF (ADRENALIN) 0 5 mg in sodium chloride 0 9 % 500 mL OR irrigation   Irrigation Once    sodium chloride 0 9 % infusion  125 mL/hr Intravenous Continuous       Vitals:  BP      Temp      Pulse     Resp      SpO2        I/Os:  No intake/output data recorded    I/O this shift:  In: 300 [I V :300]  Out: -     Lab Results and Cultures:   CBC with diff:   Lab Results   Component Value Date    WBC 6 27 08/02/2022    HGB 13 7 08/02/2022    HCT 41 8 08/02/2022     (H) 08/02/2022     08/02/2022    MCH 34 0 08/02/2022    MCHC 32 8 08/02/2022    RDW 12 7 08/02/2022    MPV 10 1 08/02/2022    NRBC 0 06/16/2022   ,   BMP/CMP:  Lab Results   Component Value Date     06/23/2014    K 3 9 08/02/2022    K 4 3 06/23/2014     08/02/2022     06/23/2014    CO2 29 08/02/2022    CO2 25 1 06/23/2014    ANIONGAP 14 (H) 06/23/2014    BUN 18 08/02/2022    BUN 17 06/23/2014    CREATININE 0 81 08/02/2022    CREATININE 0 72 06/23/2014    GLUCOSE 91 06/23/2014    CALCIUM 9 1 08/02/2022    CALCIUM 8 7 06/23/2014    AST 12 08/02/2022    AST 20 06/23/2014    ALT 26 08/02/2022    ALT 27 06/23/2014    ALKPHOS 60 08/02/2022    ALKPHOS 104 06/23/2014    PROT 7 2 06/23/2014    BILITOT 0 4 06/23/2014    EGFR 74 08/02/2022   ,   Lipid Panel:   Lab Results   Component Value Date    CHOL 190 11/26/2014   ,   Coags:   Lab Results   Component Value Date    PTT 26 06/23/2014    INR 1 01 06/23/2014   ,     Blood Culture: No results found for: BLOODCX,   Urinalysis:   Lab Results   Component Value Date    COLORU Yellow 06/23/2014    CLARITYU Clear 06/23/2014    SPECGRAV 1 025 06/23/2014    PHUR 6 0 06/23/2014    LEUKOCYTESUR Moderate (A) 06/23/2014    NITRITE Negative 06/23/2014    PROTEINUA Trace (A) 06/23/2014    GLUCOSEU Negative 06/23/2014    KETONESU Negative 06/23/2014    BILIRUBINUR Negative 06/23/2014    BLOODU Moderate (A) 06/23/2014   ,   Urine Culture: No results found for: URINECX,   Wound Culure: No results found for: WOUNDCULT    Imaging:  Reviewed    Physical Exam:    General appearance:  Alert and oriented  In no acute distress    Head: Normocephalic, without obvious abnormality, atraumatic  Eyes: negative  Lungs:  Clear to auscultation, normal effort  Heart:  Regular rate and rhythm  Abdomen:  Soft, nondistended  Extremities:  Right lower extremity with multiple areas of truncal varicosities and discoloration consistent with chronic venous insufficiency  Skin:  No wounds or lesions  Neurologic:  Grossly normal                  Ronit Long PA-C  8/16/2022

## 2022-08-16 NOTE — ANESTHESIA PREPROCEDURE EVALUATION
Procedure:  EVLT (Right Leg Upper)  MULTIPLE STAB PHLEBECTOMIES (Right Leg Upper)    Relevant Problems   CARDIO   (+) Hyperlipidemia      ENDO   (+) Hypothyroidism      GI/HEPATIC   (+) Nonalcoholic steatohepatitis      MUSCULOSKELETAL   (+) Lower back pain   (+) Osteoarthritis      NEURO/PSYCH   (+) Pain syndrome, chronic      Other   (+) Obesity (BMI 30-39 9)   (+) Splenic cyst        Physical Exam    Airway    Mallampati score: II  TM Distance: >3 FB  Neck ROM: full     Dental   upper dentures and lower dentures,     Cardiovascular  Rhythm: regular, Rate: normal, Cardiovascular exam normal    Pulmonary  Pulmonary exam normal Breath sounds clear to auscultation,     Other Findings        Anesthesia Plan  ASA Score- 2     Anesthesia Type- general with ASA Monitors  Additional Monitors:   Airway Plan: LMA  Plan Factors-    Chart reviewed  EKG reviewed  Existing labs reviewed  Patient summary reviewed  Patient is not a current smoker  Patient not instructed to abstain from smoking on day of procedure  Patient did not smoke on day of surgery  There is medical exclusion for perioperative obstructive sleep apnea risk education  Induction- intravenous  Postoperative Plan-     Informed Consent- Anesthetic plan and risks discussed with patient

## 2022-08-16 NOTE — OP NOTE
OPERATIVE REPORT  PATIENT NAME: Law Bassett    :  1955  MRN: [id number]  Pt Location: [facility] 09    SURGERY DATE: 2022    Surgeon(s) and Role:     * Dustin Hartmann DO - Primary     * Kenroy Fuentes MD - Assisting    Preop Diagnosis:  Varicose veins of both lower extremities with pain [I83 813]    Post-Op Diagnosis Codes: * Varicose veins of both lower extremities with pain [I83 813]    Procedure(s) (LRB):  EVLT (Right)  MULTIPLE STAB PHLEBECTOMIES (Right)    Specimen(s):  * No specimens in log *    Estimated Blood Loss:   100 mL    Drains:  * No LDAs found *    Anesthesia Type:   General    Operative Indications:  Varicose veins of both lower extremities with pain [I83 813]  Patient is 69-year-old woman with longstanding painful bilateral lower extremity varicose veins/venous insufficiency  She previously underwent successful left greater saphenous vein EVLT and stab phlebectomies  She returns to the office with continued pain associated with her right lower extremity varicosities  Her venous reflux study has suggested superficial venous incompetence  Right greater saphenous vein EVLT and stab phlebectomies recommended  The procedure, risks, benefits, alternatives, anticipated postop course were discussed in detail  Patient was agreeable to proceed  Written consent was obtained  Patient brought to the operating room for the above-mentioned procedure  Operative Findings:  Successful closure of right greater saphenous vein from the knee to the saphenofemoral junction  Total treated segment of greater saphenous vein was approximately 37 cm   7W/184 seconds/1292J  Total stab phlebectomies:  20  Following laser ablation the right common femoral vein was easily compressible and free of thrombus  The treated segment of greater saphenous vein had minimal color flow  Complications:   None    Procedure and Technique:  The patient was properly identified in the preop hold area    The varicosities and operative site were marked  Patient was brought to the operating room where she was positioned supine on the OR table  After adequate induction general anesthesia the right lower extremity circumferentially prepped using chlorhexidine and draped in usual sterile fashion  A preop dose of antibiotics was administered  A formal time-out was performed all were in agreement  The ultrasound was brought onto the surgical field and the greater saphenous vein mapped from the proximal calf to the saphenofemoral junction  Under ultrasound guidance the greater saphenous vein was accessed using micropuncture needle at the knee level  A micro wire was inserted through the needle  The needle was removed over the wire  A micro sheath was inserted  The inner dilator and micro wire were removed  A J guidewire was next advanced centrally into the femoral vein under ultrasound guidance  The micro sheath was exchanged out for the laser sheath  The inner dilator and wire were removed  The laser fiber was inserted through the sheath and positioned approximately 3 cm from the saphenofemoral junction under ultrasound guidance  Next, izabella venous tumescent anesthesia was infiltrated into the saphenous compartment  Following tumescent anesthesia the laser fiber was activated at 7 w and a slow pullback over 104 seconds carried out  Following laser ablation the ultrasound was used to confirm patency of the right common femoral vein  The treated segment of greater saphenous vein had minimal color flow  Attention was next turned towards the phlebectomy portion of procedure  Small skin incisions were carried out with a 11  Scalpel over the previously marked varicosities  The varicosities were avulsed using combination of mosquito clamp and vein hooks  Hemostasis was obtained with digital compression  The incisions were reapproximated using Steri-Strips    The leg was dressed using 4 x 4 gauze, Kerlix, Ace wrap, and Coban  Patient tolerated procedure well  She was awakened, extubated transferred to recovery room in stable condition     I was present for the entire procedure    Patient Disposition:  PACU       SIGNATURE: Johanna Kaplan DO  DATE: August 16, 2022  TIME: 1:55 PM

## 2022-08-16 NOTE — ANESTHESIA POSTPROCEDURE EVALUATION
Post-Op Assessment Note    CV Status:  Stable    Pain management: adequate     Mental Status:  Alert and awake   Hydration Status:  Euvolemic   PONV Controlled:  Controlled   Airway Patency:  Patent      Post Op Vitals Reviewed: Yes      Staff: Anesthesiologist         No complications documented      /60 (08/16/22 1415)    Temp      Pulse 90 (08/16/22 1415)   Resp 16 (08/16/22 1415)    SpO2 99 % (08/16/22 1415)    /60   Pulse 90   Temp 97 6 °F (36 4 °C)   Resp 16   Ht 5' 4 5" (1 638 m)   Wt 84 6 kg (186 lb 8 2 oz)   LMP  (LMP Unknown)   SpO2 99%   BMI 31 52 kg/m²

## 2022-08-16 NOTE — DISCHARGE INSTRUCTIONS
DISCHARGE INSTRUCTIONS  VARICOSE VEIN SURGERY    ACTIVITY:  On the day of your operation, take it easy  You can take short walks around the house  When sitting, the leg should be elevated  The preferred position is to have the leg at or above the level of the heart  Starting on the first day after surgery, light walking is encouraged as tolerated  After your ultrasound test, you can resume your normal activity, but no heavy lifting (do not lift more than 15 pounds) or strenuous exercise for 2 weeks  You should not drive a car until your bandages are removed and you are off all narcotic pain medication  You may ride in a car  DIET: Resume your normal diet  Good nutrition is important for healing of your incision  DRESSINGS/SURGICAL SITE:  When released from the hospital, you should have a compression bandage in place on the operated leg  This bandage should feel snug, but not too tight  If the bandage becomes blood soaked or painfully tight, elevate your leg and call the office ([phone])  You may have surgical glue on your incision sites  There are stitches present under the skin which will absorb on their own  The glue is used to cover the incision, assist in closure, and prevent contamination  This adhesive will darken and peel away on its own within one to two weeks  Do not pick at it  You should shower daily  Wash incisions daily with soap and water, but do not rub or scrub the incisions; rinse thoroughly and pat dry  If the operated leg becomes increasingly painful or swollen, or if there is increasing redness or pain around your incision, contact our office  Some bruising of the skin is common after varicose vein surgery  This can be lessened by elevation of the leg  Many patients will notice some numbness of the shin, ankle, calf, or the top of the foot  This usually improves with time but may be persistent    After surgery you can expect bruising, swelling and hard knots on your leg  As your body heals the bruising will fade and the swelling and knots will subside  Apply sunscreen with SPF 30 to incisions while sun bathing for up to one year after surgery to reduce the chances of your incisions darkening  FOLLOW UP STUDIES:  Your first post-operative appointment will be 2-3 days after your surgery  At this appointment your bandages will be removed, and you will be seen by a Vascular Surgeon, Nurse Practitioner, or Physician Assistant  An appointment for a follow up Doppler ultrasound study will be scheduled on the same day as your follow up appointment  FOLLOW UP APPOINTMENTS:  Making and keeping follow up appointments and ultrasound tests are important to your recovery  If you have difficulty making it to or keeping your follow up appointments, call the office  If you have increased pain, fever >101 5, increased drainage, redness or a bad smell at your surgery site, new coldness/numbness of your arm or leg, please call us immediately and GO directly to the ER  PLEASE CALL THE OFFICE IF YOU HAVE ANY QUESTIONS  [phone]  [phone]  [address] [place] [address] , [address], [place], [address]  [address], [place], 210 [facility]  [address], [facility], 98 [facility]  611 [facility], One [facility],[address], [facility], 5974 [facility]  [address], 1st Floor, Radha Morgankelechi 34  [facility] 19, [address] [facility], [address]  1307 [facility], 8614 [facility], [address]  One [facility], 532 [facility], One [facility],[address], John Burt 6  201 [facility], Ramiro Duque, [address]  [address] [place] [address] [place] [address]

## 2022-08-19 ENCOUNTER — HOSPITAL ENCOUNTER (OUTPATIENT)
Dept: NON INVASIVE DIAGNOSTICS | Facility: CLINIC | Age: 67
Discharge: HOME | End: 2022-08-19
Payer: MEDICARE

## 2022-08-19 ENCOUNTER — OFFICE VISIT (OUTPATIENT)
Dept: VASCULAR SURGERY | Facility: CLINIC | Age: 67
End: 2022-08-19

## 2022-08-19 VITALS
WEIGHT: 186 LBS | BODY MASS INDEX: 30.99 KG/M2 | HEIGHT: 65 IN | HEART RATE: 76 BPM | DIASTOLIC BLOOD PRESSURE: 74 MMHG | SYSTOLIC BLOOD PRESSURE: 128 MMHG | OXYGEN SATURATION: 99 %

## 2022-08-19 DIAGNOSIS — I83.813 VARICOSE VEINS OF BOTH LOWER EXTREMITIES WITH PAIN: ICD-10-CM

## 2022-08-19 DIAGNOSIS — I83.813 VARICOSE VEINS OF BOTH LOWER EXTREMITIES WITH PAIN: Primary | ICD-10-CM

## 2022-08-19 PROCEDURE — 93971 EXTREMITY STUDY: CPT | Performed by: SURGERY

## 2022-08-19 PROCEDURE — 93971 EXTREMITY STUDY: CPT

## 2022-08-19 PROCEDURE — 99024 POSTOP FOLLOW-UP VISIT: CPT

## 2022-08-19 NOTE — PATIENT INSTRUCTIONS
-Your incisions look good  Please continue to monitor for signs of infection and call our office if symptoms occur    -We will get an ultrasound of the leg today to assess for a blood clot  I will review the results when completed  -You may advance your activity as your are able to tolerate    -Utilize compression stockings and frequently elevate the legs  -Tylenol or ibuprofen as needed for pain    -We will follow up in 4-6 weeks for post op visit    -Please call with questions or concerns  DISCHARGE INSTRUCTIONS  VARICOSE VEIN SURGERY    ACTIVITY:  On the day of your operation, take it easy  You can take short walks around the house  When sitting, the leg should be elevated  The preferred position is to have the leg at or above the level of the heart  Starting on the first day after surgery, light walking is encouraged as tolerated  After your ultrasound test, you can resume your normal activity, but no heavy lifting (do not lift more than 15 pounds) or strenuous exercise for 2 weeks  You should not drive a car until your bandages are removed and you are off all narcotic pain medication  You may ride in a car  DIET: Resume your normal diet  Good nutrition is important for healing of your incision  DRESSINGS/SURGICAL SITE:  When released from the hospital, you should have a compression bandage in place on the operated leg  This bandage should feel snug, but not too tight  If the bandage becomes blood soaked or painfully tight, elevate your leg and call the office ([phone])  You may have surgical glue on your incision sites  There are stitches present under the skin which will absorb on their own  The glue is used to cover the incision, assist in closure, and prevent contamination  This adhesive will darken and peel away on its own within one to two weeks  Do not pick at it  You should shower daily    Wash incisions daily with soap and water, but do not rub or scrub the incisions; rinse thoroughly and pat dry  If the operated leg becomes increasingly painful or swollen, or if there is increasing redness or pain around your incision, contact our office  Some bruising of the skin is common after varicose vein surgery  This can be lessened by elevation of the leg  Many patients will notice some numbness of the shin, ankle, calf, or the top of the foot  This usually improves with time but may be persistent  After surgery you can expect bruising, swelling and hard knots on your leg  As your body heals the bruising will fade and the swelling and knots will subside  Apply sunscreen with SPF 30 to incisions while sun bathing for up to one year after surgery to reduce the chances of your incisions darkening  FOLLOW UP STUDIES:  Your first post-operative appointment will be 2-3 days after your surgery  At this appointment your bandages will be removed, and you will be seen by a Vascular Surgeon, Nurse Practitioner, or Physician Assistant  An appointment for a follow up Doppler ultrasound study will be scheduled on the same day as your follow up appointment  FOLLOW UP APPOINTMENTS:  Making and keeping follow up appointments and ultrasound tests are important to your recovery  If you have difficulty making it to or keeping your follow up appointments, call the office  If you have increased pain, fever >101 5, increased drainage, redness or a bad smell at your surgery site, new coldness/numbness of your arm or leg, please call us immediately and GO directly to the ER  PLEASE CALL THE OFFICE IF YOU HAVE ANY QUESTIONS  [phone]  [phone]  [address] [place] [address] , [address], [place], [facility]  [address], [address] [place], 210 [facility]  [address]   2707 [facility], [facility], [address] [place] [address]  [address] [place] [address], [facility],[address], [facility], 5974 [facility] [address]  Lamberto England [address], 1st Floor, Alberto Morgan 34  [address] [place] [address], 2nd Floor, 6001 E [facility], [address] [place],[address] 401 W [facility], 8614 [facility], [place], 960 [facility]  One [facility], [address] [place] [address], [facility], [address], John Burt 6  201 [facility], [facility] [place], [address] [place] [address]

## 2022-08-19 NOTE — PROGRESS NOTES
Assessment/Plan:    Varicose veins of both lower extremities with pain  78year old female former smoker w/ HTN, HLD, hypothyroidism, PÉREZ, OA, hx of DVT of the LLE s/p venogram, presenting with painful truncal varicose veins of the bilateral lower extremities, L>R  She is now s/p RLE GSV EVLT with stab phlebectomies x20 on 8/16/22 by Dr Farrah Vásquez  She presents today for post op visit  -Doing well post-operatively  Denies fever or chills  -Reports mild pain in RLE today which has been tolerable  States she last utilized prescription pain medication on Tuesday  Tenderness to palpation in medial thigh with mild swelling in right ankle/foot    -Stab sites are clean, intact with steri-strips in place  Small area of oozing at proximal medial/posterior thigh with moderate ecchymosis around stab sites  Pressure applied to areas and reinforced with steri-strips   -No evidence of local infection     -Palpable pedal pulse in RLE  Motor/sensory intact  - Recommend continued conservative measures including, daily compression stockings as tolerated and frequent lower extremity elevation    - Tubigrip size F provided today  - Continue OTC tylenol or ibuprofen as needed for pain  - Advance activity as tolerated  - Wash incision sites with soap and water  Pat dry  Allow steri-strips to fall off on its own, do not aggressively scrub or pull off    - Post-procedure LEV duplex scheduled today  Will review once completed  - Instructed patient to call the office in the interim with any questions, concerns, or new symptoms   - Return to office with surgeon in 4-6 weeks post-procedure  Diagnoses and all orders for this visit:    Varicose veins of both lower extremities with pain          Subjective:      Patient ID: Bernabe Medina is a 75 y o  female  HPI  Patient is here today s/p a right EVLT done 8/16/2022 by Dr Farrah Vásquez  Dressing were removed from the right leg   Patient c/o swelling in the right ankle  She had moderate bruising around stab sites and proximal medial/posterior thigh region  Pt c/o of tenderness to palpation in the area of the bruising  She reports her pain is tolerable and last required prescription narcotics on Tuesday  Patient is taking Atorvastatin  She is a former smoker  The following portions of the patient's history were reviewed and updated as appropriate: allergies, current medications, past family history, past medical history, past social history, past surgical history and problem list     Review of Systems   Constitutional: Negative  HENT: Negative  Eyes: Negative  Respiratory: Negative  Cardiovascular: Positive for leg swelling  Gastrointestinal: Positive for abdominal pain and constipation  Endocrine: Negative  Genitourinary: Negative  Musculoskeletal: Positive for arthralgias  Skin: Negative  Allergic/Immunologic: Positive for environmental allergies  Neurological: Negative  Hematological: Bruises/bleeds easily  Psychiatric/Behavioral: Negative  I have personally reviewed and made appropriate changes to the ROS that was input by the medical assistant       Objective:      Vitals:    08/19/22 1342   BP: 128/74   BP Location: Left arm   Patient Position: Sitting   Cuff Size: Standard   Pulse: 76   SpO2: 99%   Weight: 84 4 kg (186 lb)   Height: 5' 4 5" (1 638 m)       Patient Active Problem List   Diagnosis    Adenomatous colon polyp    Baker's cyst of knee    Colon, diverticulosis    Constipation    Hemochromatosis    Hot flashes due to menopause    Hyperlipidemia    Hypothyroidism    Lower back pain    Nonalcoholic steatohepatitis    Osteoarthritis    Pain syndrome, chronic    Peripheral neuropathy    Splenic cyst    Tendonitis of elbow, left    Vitamin D deficiency    Varicose veins of both lower extremities with pain    Left hip pain    Leg swelling    Trochanteric bursitis of left hip    Family history of malignant neoplasm of pancreas    Chronic embolism and thrombosis of other specified deep vein of right lower extremity (HCC)    Obesity (BMI 30-39  9)       Past Surgical History:   Procedure Laterality Date    ANKLE FUSION Right     BREAST BIOPSY Left 12/18/2018    US guided; benign     BREAST EXCISIONAL BIOPSY Left     benign     CARPAL TUNNEL RELEASE      CHOLECYSTECTOMY      COLONOSCOPY      ERCP      with insertion of tube into bile/pancreatic duct    FACIAL RECONSTRUCTION SURGERY      JOINT REPLACEMENT      tkr left     HI ENDOVENOUS LASER, 1ST VEIN Left 2/15/2022    Procedure: EVLT left greater saphenous vein;  Surgeon: Kriss Browning DO;  [facility];  Service: Vascular    HI ENDOVENOUS LASER, 1ST VEIN Right 8/16/2022    Procedure: EVLT;  Surgeon: Kriss Browning DO;  [facility];  Service: Vascular    HI PHLEB VEINS - EXTREM 20+ Left 2/15/2022    Procedure: MULTIPLE STAB PHLEBECTOMIES LEFT LEG;  Surgeon: Kriss Browning DO;  [facility];  Service: Vascular    HI PHLEB VEINS - EXTREM 20+ Right 8/16/2022    Procedure: MULTIPLE STAB PHLEBECTOMIES;  Surgeon: Kriss Browning DO;  [facility];  Service: Vascular    SHOULDER SURGERY Right     for frozen shoulder/RSD    US GUIDED BREAST BIOPSY LEFT COMPLETE Left 12/18/2018    Duct ectasia, fibrosis, focal usual ductal hyperplasia       Family History   Problem Relation Age of Onset    Dementia Mother     Diabetes Mother     Coronary artery disease Father     Hemochromatosis Sister     Cancer Sister     Pancreatic cancer Sister     Pancreatitis Sister     Cancer Brother     Prostate cancer Brother     No Known Problems Maternal Grandmother     No Known Problems Maternal Grandfather     No Known Problems Paternal Grandmother     No Known Problems Paternal Grandfather     Breast cancer Maternal Aunt     Breast cancer Cousin     Diabetes Family     Hypertension Neg Hx        Social History     Socioeconomic History    Marital status:      Spouse name: Not on file    Number of children: Not on file    Years of education: Not on file    Highest education level: Not on file   Occupational History    Not on file   Tobacco Use    Smoking status: Former Smoker     Quit date:      Years since quittin 6    Smokeless tobacco: Never Used   Vaping Use    Vaping Use: Never used   Substance and Sexual Activity    Alcohol use: No    Drug use: Yes     Types: Marijuana     Comment: At a youg age   [place] Sexual activity: Not Currently   Other Topics Concern    Not on file   Social History Narrative    Not on file     Social Determinants of Health     Financial Resource Strain: Not on file   Food Insecurity: Not on file   Transportation Needs: Not on file   Physical Activity: Not on file   Stress: Not on file   Social Connections: Not on file   Intimate Partner Violence: Not on file   Housing Stability: Not on file       No Known Allergies      Current Outpatient Medications:     atorvastatin (LIPITOR) 10 mg tablet, Take 1 tablet (10 mg total) by mouth daily, Disp: 90 tablet, Rfl: 3    Calcium-Phosphorus-Vitamin D (VITAMIN D3/CALCIUM/PHOSPHORUS PO), Take by mouth in the morning  , Disp: , Rfl:     docusate sodium (COLACE) 100 mg capsule, Take 1 capsule (100 mg total) by mouth 2 (two) times a day Hold for loose bowel movements, Disp: 30 capsule, Rfl: 1    Multiple Vitamin (multivitamin) tablet, Take 1 tablet by mouth daily, Disp: , Rfl:     oxyCODONE-acetaminophen (Percocet) 5-325 mg per tablet, Take 1 tablet by mouth every 6 (six) hours as needed for moderate pain for up to 10 days Max Daily Amount: 4 tablets (Patient not taking: No sig reported), Disp: 20 tablet, Rfl: 0      /74 (BP Location: Left arm, Patient Position: Sitting, Cuff Size: Standard)   Pulse 76   Ht 5' 4 5" (1 638 m)   Wt 84 4 kg (186 lb)   LMP  (LMP Unknown)   SpO2 99%   BMI 31 43 kg/m² Physical Exam  Vitals and nursing note reviewed  Constitutional:       Appearance: Normal appearance  She is obese  HENT:      Head: Normocephalic and atraumatic  Neck:      Vascular: No carotid bruit  Cardiovascular:      Rate and Rhythm: Normal rate and regular rhythm  Pulses:           Carotid pulses are 2+ on the right side and 2+ on the left side  Radial pulses are 2+ on the right side and 2+ on the left side  Dorsalis pedis pulses are 2+ on the right side  Posterior tibial pulses are 2+ on the right side  Heart sounds: Normal heart sounds  Comments: No carotid bruit   Pulmonary:      Effort: Pulmonary effort is normal  No respiratory distress  Breath sounds: Normal breath sounds  Abdominal:      General: Bowel sounds are normal  There is no distension  Palpations: Abdomen is soft  Comments: No abdominal bruit or pulsatile masses  Musculoskeletal:         General: Swelling present  Normal range of motion  Cervical back: Normal range of motion and neck supple  Skin:     General: Skin is warm  Capillary Refill: Capillary refill takes less than 2 seconds  Coloration: Skin is not pale  Findings: Bruising present  No erythema  Comments: Moderate ecchymosis around stab sites  No evidence of local infection  (See clinical picture below)  Neurological:      General: No focal deficit present  Mental Status: She is alert and oriented to person, place, and time     Psychiatric:         Mood and Affect: Mood normal          Behavior: Behavior normal            MIK Guzman  [facility]  [phone]

## 2022-08-19 NOTE — ASSESSMENT & PLAN NOTE
78year old female former smoker w/ HTN, HLD, hypothyroidism, PÉREZ, OA, hx of DVT of the LLE s/p venogram, presenting with painful truncal varicose veins of the bilateral lower extremities, L>R  She is now s/p RLE GSV EVLT with stab phlebectomies x20 on 8/16/22 by Dr Taylor Dennis Port  She presents today for post op visit  -Doing well post-operatively  Denies fever or chills  -Reports mild pain in RLE today which has been tolerable  States she last utilized prescription pain medication on Tuesday    -Stab sites are clean, intact with steri-strips in place  Small area of oozing at proximal medial/posterior thigh with moderate ecchymosis around stab sites  Pressure applied to areas and reinforced with steri-strips   -No evidence of local infection     -Palpable pedal pulse in RLE  Motor/sensory intact  - Recommend continued conservative measures including, daily compression stockings as tolerated and frequent lower extremity elevation    - Tubigrip size F provided today  - Continue OTC tylenol or ibuprofen as needed for pain  - Advance activity as tolerated  - Wash incision sites with soap and water  Pat dry  Allow steri-strips to fall off on its own, do not aggressively scrub or pull off    - Post-procedure LEV duplex scheduled today  Will review once completed  - Instructed patient to call the office in the interim with any questions, concerns, or new symptoms   - Return to office with surgeon in 4-6 weeks post-procedure

## 2022-09-09 NOTE — RESULT ENCOUNTER NOTE
Occupational Therapy  Phone: Xuan    Fax: [phone]                       Outpatient Occupational Therapy                 DAILY TREATMENT NOTE    Date: 9/9/2022  Patients Name:  Oleh Sever  YOB: 2018 (10 y.o.)  Gender:  male  MRN:  [id number]  [facility] #: [id number]  Referring Physician: Hung Love MD   Diagnosis: Diagnosis: Delayed Milestone (R62.0), Sensory Integration (F88)    Precautions:      INSURANCE  OT Insurance Information: [address]      Total # of Visits Approved: 30   Total # of Visits to Date: 15     PAIN  [x]No     []Yes      Location:  N/A  Pain Rating (0-10 pain scale):   Pain Description:  N/A    SUBJECTIVE  Patient present to clinic with transition from SLP session this date. Reports good session overall, however noted to have decreased direction following at the end of the session. GOALS/ TREATMENT SESSION:    Current Progress   Long Term Goal:  Long Term Goal 1: Child will demonstrate improved self-regulation, as measured by his ability to participate in therapist-directed tasks during a session with minimal negative behaviors. See Short Term Goal Notes Below for Present Levels []Met  [x]Partially met  []Not met     Long Term Goal 2: Child will demonstrate improved fine motor skills as measured by his ability to complete age-appropriate tasks with Radha. []Met  [x]Partially met  []Not met   Short Term Goals:  Time Frame for Short term goals: 90 days    Short Term Goal 1: Child will imitate vertical and horizontal lines with minimal [place] assist x3 trials each in 2 sessions  Able to independently imitate 3 vertical lines following 2 verbal cues using a 4-finger grasp on a standard grasp, able to imitate horizontal lines approximately 1 inch long independently.   []Met  [x]Partially met  []Not met   Short Term Goal 2: Child will complete 2 therapist directed tasks from start to finish with min VC's for Please inform patient of normal results Engagement in 2 consecutive sessions. Required 4 prompts at beginning of session in order to appropriately transition into the room. Required an average of 2-3 redirectional prompts to begin first task this date. []Met  [x]Partially met  []Not met   Short Term Goal 3: Child will engage in BUE/hand strengthening activities x6 consecutive minutes with minimal prompting in 2 sessions. Completed a 8-minute in-hand manipulation activity utilizing wooden tweezers to complete matching activity to create a picture with marbles []Met  [x]Partially met  []Not met   Short Term Goal 4: Child will cut straight and curved lines with regard to start/end points within 1/2\" of the line with min A. Cut 3 straight lines this date with 2 cues for safety to slow down using loop scissors, min A to align appropriately, then cut across within 1/4-1/2\" of lines given. []Met  [x]Partially met  []Not met   Short Term Goal 5: Initiate education/sensory diet HEP. Continue. Provided mom with fine motor activity for direction following as well as sensory ideas for home. [x]Met  []Partially met  []Not met   OBJECTIVE  Required increased cues for direction following this date and transitions between activities, fair engagement for ~75% of session. EDUCATION  Education provided to patient/family/caregiver: Provided mom with fine motor activity for direction following as well as sensory ideas for home.      Method of Education:     []Discussion     [x]Demonstration    [x]Written     []Other  Evaluation of Patients Response to Education:        []Patient and or Caregiver verbalized understanding  []Patient and or Caregiver Demonstrated without assistance   []Patient and or Caregiver Demonstrated with assistance  []Needs additional instruction to demonstrate understanding of education    ASSESSMENT  Patient tolerated todays treatment session:    []Good   [x]Fair   []Poor  Limitations/difficulties with treatment session due to:   Goal Assessment: [x]No Change    []Improved  Comments:    PLAN  [x]Continue with current plan of care  []Medical [facility]  []Hold per patient request  []Change Treatment plan:  []Insurance hold  []Other     TIME   Time Treatment session was INITIATED 10:00 AM   Time Treatment session was STOPPED 10:30 AM   Timed Code Treatment Minutes 30 minutes       Electronically signed by:    MONTEZ Brsieno            Date:9/9/2022

## 2022-09-29 ENCOUNTER — OFFICE VISIT (OUTPATIENT)
Dept: OBGYN CLINIC | Facility: CLINIC | Age: 67
End: 2022-09-29
Payer: MEDICARE

## 2022-09-29 VITALS
HEART RATE: 73 BPM | HEIGHT: 65 IN | WEIGHT: 186 LBS | BODY MASS INDEX: 30.99 KG/M2 | SYSTOLIC BLOOD PRESSURE: 101 MMHG | DIASTOLIC BLOOD PRESSURE: 64 MMHG

## 2022-09-29 DIAGNOSIS — M70.61 TROCHANTERIC BURSITIS OF RIGHT HIP: Primary | ICD-10-CM

## 2022-09-29 DIAGNOSIS — M70.62 TROCHANTERIC BURSITIS OF LEFT HIP: ICD-10-CM

## 2022-09-29 PROCEDURE — 20610 DRAIN/INJ JOINT/BURSA W/O US: CPT | Performed by: ORTHOPAEDIC SURGERY

## 2022-09-29 PROCEDURE — 99213 OFFICE O/P EST LOW 20 MIN: CPT | Performed by: ORTHOPAEDIC SURGERY

## 2022-09-29 RX ORDER — BUPIVACAINE HYDROCHLORIDE 2.5 MG/ML
4 INJECTION, SOLUTION INFILTRATION; PERINEURAL
Status: COMPLETED | OUTPATIENT
Start: 2022-09-29 | End: 2022-09-29

## 2022-09-29 RX ORDER — METHYLPREDNISOLONE ACETATE 40 MG/ML
2 INJECTION, SUSPENSION INTRA-ARTICULAR; INTRALESIONAL; INTRAMUSCULAR; SOFT TISSUE
Status: COMPLETED | OUTPATIENT
Start: 2022-09-29 | End: 2022-09-29

## 2022-09-29 RX ADMIN — BUPIVACAINE HYDROCHLORIDE 4 ML: 2.5 INJECTION, SOLUTION INFILTRATION; PERINEURAL at 08:15

## 2022-09-29 RX ADMIN — METHYLPREDNISOLONE ACETATE 2 ML: 40 INJECTION, SUSPENSION INTRA-ARTICULAR; INTRALESIONAL; INTRAMUSCULAR; SOFT TISSUE at 08:15

## 2022-09-29 NOTE — PROGRESS NOTES
Assessment:   Diagnosis ICD-10-CM Associated Orders   1  Trochanteric bursitis of right hip  M70 61 Large joint arthrocentesis: bilateral greater trochanteric bursa   2  Trochanteric bursitis of left hip  M70 62 Large joint arthrocentesis: bilateral greater trochanteric bursa       Plan:  She was offered, accepted, performed an injection(s) of cortisone to her Bilateral hip for symptomatic relief of pain and inflammation  Patient tolerated the treatment(s) well  Ice and post injection protocol advised  Weightbearing activities as tolerated  She will be seen for follow-up in 2 months for re-evaluation and consideration for repeat injections as necessary  Patient expresses understanding and is in agreement with this treatment plan  The patient was given the opportunity to ask questions or present concerns  To do next visit:  Return in about 2 months (around 11/29/2022) for Recheck  The above stated was discussed in layman's terms and the patient expressed understanding  All questions were answered to the patient's satisfaction  The patient has trochanteric bursitis of her bilateral hips  Physical examination shows point tenderness along this region  The pain did radiate down the iliotibial band  Under aseptic technique, both hips were injected with Depo-Medrol and Marcaine  She tolerated procedure quite well  Return back in 3 months evaluation  If her condition changes, she will not hesitate to let us know      Scribe Attestation    I,:  Maye Stewart am acting as a scribe while in the presence of the attending physician :       I,:  Gem Kellogg, DO personally performed the services described in this documentation    as scribed in my presence :             Subjective:   Burma Severs is a 75 y o  female who presents today for a follow-up evaluation of her bilateral hip due to chronic pain, known known trochanteric bursitis   Patient is doing well but reports pain with prolonged sedentary positions and weight-bearing activities especially ambulating stairs  She denies any recent bruising, numbness, tingling, or feelings of instability  She also denies any fevers, chills or shortness of breath  Review of systems negative unless otherwise specified in HPI  Review of Systems   Constitutional: Negative for chills and fever  HENT: Negative for ear pain and sore throat  Eyes: Negative for pain and visual disturbance  Respiratory: Negative for cough and shortness of breath  Cardiovascular: Negative for chest pain and palpitations  Gastrointestinal: Negative for abdominal pain and vomiting  Genitourinary: Negative for dysuria and hematuria  Musculoskeletal: Negative for arthralgias and back pain  Skin: Negative for color change and rash  Neurological: Negative for seizures and syncope  All other systems reviewed and are negative        Past Medical History:   Diagnosis Date    Arthritis     Colon polyp     DVT (deep venous thrombosis) (HCC)     Fibromyalgia     GERD (gastroesophageal reflux disease)     Hyperlipemia     Hypothyroid     Osteoarthritis     Osteomyelitis of ankle (HCC)     Rheumatoid arthritis (HCC)     RSD (reflex sympathetic dystrophy)        Past Surgical History:   Procedure Laterality Date    ANKLE FUSION Right     BREAST BIOPSY Left 12/18/2018    US guided; benign     BREAST EXCISIONAL BIOPSY Left     benign     CARPAL TUNNEL RELEASE      CHOLECYSTECTOMY      COLONOSCOPY      ERCP      with insertion of tube into bile/pancreatic duct    FACIAL RECONSTRUCTION SURGERY      JOINT REPLACEMENT      tkr left     MS ENDOVENOUS LASER, 1ST VEIN Left 2/15/2022    Procedure: EVLT left greater saphenous vein;  Surgeon: DO Fabienne;  [facility];  Service: Vascular    MS ENDOVENOUS LASER, 1ST VEIN Right 8/16/2022    Procedure: EVLT;  Surgeon: DO Fabienne;  [facility];  Service: Vascular    MS [address] [place] [address] 20+ Left 2/15/2022    Procedure: MULTIPLE STAB PHLEBECTOMIES LEFT LEG;  Surgeon: Danay Liriano DO;  [facility];  Service: Vascular    MO PHLEB VEINS - EXTREM 20+ Right 2022    Procedure: MULTIPLE STAB PHLEBECTOMIES;  Surgeon: Danay Liriano DO;  [facility];  Service: Vascular    SHOULDER SURGERY Right     for frozen shoulder/RSD    US GUIDED BREAST BIOPSY LEFT COMPLETE Left 2018    Duct ectasia, fibrosis, focal usual ductal hyperplasia       Family History   Problem Relation Age of Onset    Dementia Mother     Diabetes Mother     Coronary artery disease Father     Hemochromatosis Sister     Cancer Sister     Pancreatic cancer Sister     Pancreatitis Sister     Cancer Brother     Prostate cancer Brother     No Known Problems Maternal Grandmother     No Known Problems Maternal Grandfather     No Known Problems Paternal Grandmother     No Known Problems Paternal Grandfather     Breast cancer Maternal Aunt     Breast cancer Cousin     Diabetes Family     Hypertension Neg Hx        Social History     Occupational History    Not on file   Tobacco Use    Smoking status: Former Smoker     Quit date:      Years since quittin 7    Smokeless tobacco: Never Used   Vaping Use    Vaping Use: Never used   Substance and Sexual Activity    Alcohol use: No    Drug use: Yes     Types: Marijuana     Comment: At a youg age   Veronica France Sexual activity: Not Currently         Current Outpatient Medications:     atorvastatin (LIPITOR) 10 mg tablet, Take 1 tablet (10 mg total) by mouth daily, Disp: 90 tablet, Rfl: 3    Calcium-Phosphorus-Vitamin D (VITAMIN D3/CALCIUM/PHOSPHORUS PO), Take by mouth in the morning  , Disp: , Rfl:     docusate sodium (COLACE) 100 mg capsule, Take 1 capsule (100 mg total) by mouth 2 (two) times a day Hold for loose bowel movements, Disp: 30 capsule, Rfl: 1    Multiple Vitamin (multivitamin) tablet, Take 1 tablet by mouth daily, Disp: , Rfl: No Known Allergies       Vitals:    09/29/22 0759   BP: 101/64   Pulse: 73       Objective:                    Ortho Exam  bilateral Hip -  Patient presents with no anatomical deformity  Ambulates with antalgic gait pattern  Uses No assistive devices  TTP over greater trochanter / trochanteric bursa  ROM: reduced  Painful passive circumduction   MMT: 4/5 throughout  Knee flexor and extensor mechanism intact  2+ TP and DP pulses with brisk capillary refill to the toes  Sural, saphenous, tibial, superficial and deep peroneal motor and sensory distribution intact  Sensation to light touch     Diagnostics, reviewed and taken today if performed as documented:    None performed    Procedures, if performed today:    Large joint arthrocentesis: bilateral greater trochanteric bursa  Universal Protocol:  Consent: Verbal consent obtained  Risks and benefits: risks, benefits and alternatives were discussed  Consent given by: patient  Time out: Immediately prior to procedure a "time out" was called to verify the correct patient, procedure, equipment, support staff and site/side marked as required  Timeout called at: 9/29/2022 8:10 AM   Patient understanding: patient states understanding of the procedure being performed  Site marked: the operative site was marked  Patient identity confirmed: verbally with patient    Supporting Documentation  Indications: pain and diagnostic evaluation   Procedure Details  Location: hip - bilateral greater trochanteric bursa  Needle size: 22 G  Ultrasound guidance: no  Approach: anterolateral    Medications (Right): 4 mL bupivacaine 0 25 %; 2 mL methylPREDNISolone acetate 40 mg/mLMedications (Left): 4 mL bupivacaine 0 25 %; 2 mL methylPREDNISolone acetate 40 mg/mL   Patient tolerance: patient tolerated the procedure well with no immediate complications  Dressing:  Sterile dressing applied            Portions of the record may have been created with voice recognition software    Occasional wrong word or "sound a like" substitutions may have occurred due to the inherent limitations of voice recognition software  Read the chart carefully and recognize, using context, where substitutions have occurred

## 2022-10-10 ENCOUNTER — TELEPHONE (OUTPATIENT)
Dept: VASCULAR SURGERY | Facility: HOSPITAL | Age: 67
End: 2022-10-10

## 2022-10-10 ENCOUNTER — OFFICE VISIT (OUTPATIENT)
Dept: VASCULAR SURGERY | Facility: HOSPITAL | Age: 67
End: 2022-10-10
Payer: MEDICARE

## 2022-10-10 VITALS
BODY MASS INDEX: 31.31 KG/M2 | DIASTOLIC BLOOD PRESSURE: 76 MMHG | HEIGHT: 64 IN | SYSTOLIC BLOOD PRESSURE: 124 MMHG | TEMPERATURE: 98.8 F | WEIGHT: 183.4 LBS | HEART RATE: 76 BPM | OXYGEN SATURATION: 98 %

## 2022-10-10 DIAGNOSIS — I83.813 VARICOSE VEINS OF BOTH LOWER EXTREMITIES WITH PAIN: Primary | ICD-10-CM

## 2022-10-10 PROCEDURE — 99214 OFFICE O/P EST MOD 30 MIN: CPT | Performed by: SURGERY

## 2022-10-10 RX ORDER — CHLORHEXIDINE GLUCONATE 0.12 MG/ML
15 RINSE ORAL ONCE
OUTPATIENT
Start: 2022-10-10 | End: 2022-10-10

## 2022-10-10 RX ORDER — CEFAZOLIN SODIUM 2 G/50ML
2000 SOLUTION INTRAVENOUS ONCE
OUTPATIENT
Start: 2022-10-10 | End: 2022-10-10

## 2022-10-10 NOTE — PROGRESS NOTES
Assessment/Plan:    Varicose veins of both lower extremities with pain  Christiano Elise returns to the office for routine scheduled visit status post right greater saphenous vein EVLT and stab phlebectomies back in August   She previously underwent left greater saphenous vein EVLT and stab phlebectomies back in February  Now she reports that the right leg feels very good however she has had worsening left lower leg pain associated with her varicosities  She reports that she has developed new varicosities of her left lower leg  While there are a few bulky varicosities there are numerous clusters of spider/reticular intradermal veins  She understands that these clusters of veins are not amenable to phlebectomy  Despite this she reports that her symptoms are adversely affecting her overall quality of life and wishes to proceed with left lower leg microphlebectomy procedure  The procedure, risks, benefits, alternatives, and anticipated postop course as well as reasonable expectations/goals regarding success in alleviating her symptoms were discusses  She understands that procedure may not alleviate her symptoms  Despite this she wishes to proceed  Diagnoses and all orders for this visit:    Varicose veins of both lower extremities with pain  -     Case request operating room: MULTIPLE STAB PHLEBECTOMIES; Standing  -     Basic metabolic panel; Future  -     CBC and Platelet; Future  -     Case request operating room: MULTIPLE STAB PHLEBECTOMIES    Other orders  -     Diet NPO; Sips with meds; Standing  -     Void on call to OR; Standing  -     Insert peripheral IV; Standing  -     Nursing Communication CHG bath, have staff wash entire body (neck down) per pre op bathing protocol  Routine, evening prior to, and day of surgery ; Standing  -     Nursing Communication Swab both nares with Povidone-Iodine solution, EXCLUDE if patient has shellfish/Iodine allergy   Routine, day of surgery, on call to OR ; Standing  - chlorhexidine (PERIDEX) 0 12 % oral rinse 15 mL  -     Place sequential compression device; Standing  -     ceFAZolin (ANCEF) IVPB (premix in dextrose) 2,000 mg 50 mL          Subjective:      Patient ID: Noam Madsen is a 75 y o  female  Patient presesnts today for a 4-6 week follow up p/o EVLT done 8/16/22 by Dr Earle Moore  Pt c/o of pain to left lower ext  Feeling like a "vice gripping " when standing up after sitting  PT also c/o of rest pain that wakes her up at times a night  Pt c/o of left knee swelling  PT wears compression daily and elevates when able  Pt is taking Atorvastatin  Pt is a former smoker  Khari Diallo returns to the office following right greater saphenous vein EVLT and stab phlebectomies  She reports right leg feels very good however she has developed worsening/recurrent left lower leg varicosities  She did undergo prior left greater saphenous vein EVLT and stab phlebectomies back in February  She reports symptoms were adversely affecting overall quality of life  She wears compression stockings daily  Despite compression stockings her symptoms have worsened  She describes his symptoms as a vice  along the left lower leg in conjunction with a "water dripping" sensation under the skin  The following portions of the patient's history were reviewed and updated as appropriate: allergies, current medications, past family history, past medical history, past social history, past surgical history and problem list     Review of Systems   Constitutional: Negative  HENT: Negative  Eyes: Negative  Respiratory: Negative  Cardiovascular: Positive for leg swelling  Gastrointestinal: Negative  Endocrine: Negative  Genitourinary: Negative  Musculoskeletal: Negative  Skin: Negative  Allergic/Immunologic: Negative  Neurological: Negative  Hematological: Bruises/bleeds easily  Psychiatric/Behavioral: Negative          I have personally reviewed the ROS entered by MA and agree as documented  Objective:      /76 (BP Location: Right arm, Patient Position: Sitting, Cuff Size: Standard)   Pulse 76   Temp 98 8 °F (37 1 °C) (Tympanic)   Ht 5' 4" (1 626 m)   Wt 83 2 kg (183 lb 6 4 oz)   LMP  (LMP Unknown)   SpO2 98%   BMI 31 48 kg/m²          Physical Exam  Constitutional:       General: She is not in acute distress  Appearance: She is well-developed  HENT:      Head: Normocephalic and atraumatic  Eyes:      General: No scleral icterus  Conjunctiva/sclera: Conjunctivae normal    Neck:      Trachea: No tracheal deviation  Cardiovascular:      Rate and Rhythm: Normal rate and regular rhythm  Heart sounds: Normal heart sounds  Pulmonary:      Effort: Pulmonary effort is normal       Breath sounds: Normal breath sounds  Abdominal:      General: There is no distension  Palpations: Abdomen is soft  There is no mass (no appreciable aortic pulsation/aneurysm)  Tenderness: There is no abdominal tenderness  There is no guarding or rebound  Musculoskeletal:         General: Normal range of motion  Cervical back: Normal range of motion and neck supple  Skin:     General: Skin is warm and dry  Comments: Several bulky varicosities along the left lower leg with multiple clusters of intradermal spider/reticular veins   Neurological:      Mental Status: She is alert and oriented to person, place, and time  Psychiatric:         Behavior: Behavior normal        EVLT Operative Scheduling Information:    Hospital:  [facility]    Physician:  Kelly Prasad    Surgery:  Left lower extremity (lower leg) microphlebectomies    Urgency:  Standard    Level:  Level 4: Outpatients to be scheduled for screening procedures and elective surgery that can be delayed for longer than one month without reasonable expectation of detriment to patient      Case Length:  2 hours    Post-op Bed:  Outpatient    OR Table:  Standard    Equipment Needs:  None    Medication Instructions:  None    Hydration:  No    Contrast Allergy:  No    Venous Clinical Severity Scores (VCSS)  Item Absent   (0 points) Mild   (1 point) Moderate   (2 points) Severe   (3 points)   Pain [] None [] Occasional [] Daily [x] Daily limiting   Varicose veins [] None [] Few [x] Calf or thigh [] Calf and thigh   Venous edema [] None [] Foot and ankle [x] Above ankle, below knee [] To knee of above   Skin pigmentation [] None [x] Perimalleolar [] Diffuse, lower 1/3 calf [] Wider, above lower 1/3 calf   Inflammation [x] None [] Perimalleolar [] Diffuse, lower 1/3 calf [] Wider, above lower 1/3 calf   Induration [x] None [] Perimalleolar [] Diffuse, lower 1/3 calf [] Wider, above lower 1/3 calf   No  active ulcers [x] None [] 1 [] 2 [] ? 3   Active ulcer size [x] None [] <2 cm [] 2 - 6 cm [] >6 cm   Ulcer duration [x] None [] <3 months [] 3 - 12 months [] >1 year   Compression therapy [] None [] Intermittent [] Most days [x] Fully comply   Total 11          CEAP Clinical Classification  [x] Symptomatic   [] Asymptomatic     [] Class 0 No visible or palpable signs of venous disease   [] Class 1 Telangiectasies or reticular veins   [x] Class 2 Varicose veins; distinguished from reticular veins by a diameter of 3mm or more   [] Class 3 Edema   [] Class 4 Changes in skin and subcutaneous tissue secondary to CVD    [] Class 4a Pigmentation or eczema   [] Class 4b Lipodermatosclerosis or atrophie dalton   [] Class 5 Healed venous ulcer   [] Class 6 Active venous ulcer

## 2022-10-10 NOTE — LETTER
10/21/22  RE: Medicare ID: [id number]    To whom it may concern:    Patient, Ronaldo Dill (1955), has a routine procedure scheduled on 22 at [address]  (Tax: [id number] / NPI: [id number]) [place] with Dr Darci Rizvi // Abel Mcgwoan (NPI: [id number])  We are requesting authorization for one (1) unit each of outpatient CPT code(s) 09672 Patient's VCSS = 11 and CEAP Classification = Class 2  Please review the attached clinical documentation and provide your determination  Please Note:      The left lower extremity tributaries will be treated with CPT code 83576  Should you have any questions or concerns regarding the details of this case, please do not hesitate to reach out  Respectfully,      Koko Burnett  Prior Authorization & Referral  Vascular Center  [place] [address] [facility]  [address]  [address] [facility] [address] [place] [address]   P: [phone]  F: [phone]  maria [email or web address]  [email or web address]                                Please Do Not Copy        Prior [address] Outpatient Procedure  Medicare Part A Fax/Mail Cover Sheet  Complete all fields; attach supporting medical documentation and fax to [phone] or mail to the applicable address/number provided at the bottom of the page  Complete ONE (1) Medicare Fax/ Mail Cover Sheet for each prior authorization request for which documentation is being submitted      Beneficiary Last Name  [facility] First Name  Raul Gonzales ID     [id number] Gender  [] Male [x] Female   1955    Facility/Agency NPIs       [id number] CMS Certification Number       [id number]   Facility Name and Address  [address]  // [place], [facility], [address]    Provider's NPI      [id number]   Provider's CMS Certification Number        [id number]   Provider's Name and Address  Abel Mcgowan // [address], [address], [place], [address] [place] [address]     Requestor Name  [address] [place] [address] Requestor Phone Number   [phone]   Requestor Fax Number/Email address  [phone] / [email or web address] Procedure Code(s)   [id number]  [left]   Paired Code(s) for Botulinum Toxin Injections                                                                                                  Not Applicable (N/A)    Diagnosis Codes (providers who submit using esMD must include diagnosis code(s)):  I83 813   Start Date of Authorization     11/18/22 [facility] (location) [place] [address] Units of Service  One (1) unit of each code   Request Completed by: (please print and sign)  Chuyita Sinclair Date  10/21/22        This document is intended solely for the use of the individual or entity to which it is addressed and may contain information that is privileged, confidential, and exempt from disclosure under applicable law  If the reader of this notice is not the intended recipient or individual responsible for delivering the message to the intended recipient, you are hereby advised that any dissemination, distribution or copying of this information is strictly prohibited  If you receive this communication in error, please advise us by telephone and destroy these papers

## 2022-10-10 NOTE — ASSESSMENT & PLAN NOTE
Gosia Miller returns to the office for routine scheduled visit status post right greater saphenous vein EVLT and stab phlebectomies back in August   She previously underwent left greater saphenous vein EVLT and stab phlebectomies back in February  Now she reports that the right leg feels very good however she has had worsening left lower leg pain associated with her varicosities  She reports that she has developed new varicosities of her left lower leg  While there are a few bulky varicosities there are numerous clusters of spider/reticular intradermal veins  She understands that these clusters of veins are not amenable to phlebectomy  Despite this she reports that her symptoms are adversely affecting her overall quality of life and wishes to proceed with left lower leg microphlebectomy procedure  The procedure, risks, benefits, alternatives, and anticipated postop course as well as reasonable expectations/goals regarding success in alleviating her symptoms were discusses  She understands that procedure may not alleviate her symptoms  Despite this she wishes to proceed

## 2022-10-10 NOTE — TELEPHONE ENCOUNTER
REMINDER: Under Reason For Call, comments MUST be formatted as:   (Surgeon's Initials) / (Procedure)      Special Instructions / FYI:     Procedure: Left lower extremity (lower leg) microphlebectomies    Level: 4 - Route clearance(s) to [facility] Surgery Coordinator Pool    Allergies: Patient has no known allergies  Instructions Given: NO Bowel Prep General Instructions     Dialysis: Patient is not on dialysis  Return Visit Required Prior to Procedure: No     Consent: I certify that patient has signed, printed, timed, and dated their surgery consent  I certify that the patient's LEGAL NAME and DATE OF BIRTH are written in the upper left corner on BOTH sides of the consent  I certify that BOTH sides of the completed surgery consent have been scanned into the patient's Epic chart by myself on 10/10/2022  Yes, I have LABELED the consent in Epic as Consent for Vascular Procedure  For Surgical Clearances     Levels   1-3   ROUTE this encounter to [facility] Clearance Pool (AND)   [facility] Surgery Coordinator Pool     Level   4   ROUTE this encounter to [facility] Surgery Coordinator Pool       HYDRATION CLEARANCES   ONLY ROUTE TO  [facility] Clearance Pool     Patient does not require any pre operative clearance  Yes, I have ROUTED this encounter to [facility] Surgery Coordinator and/or [facility] Clearance Pool

## 2022-10-13 ENCOUNTER — OFFICE VISIT (OUTPATIENT)
Dept: GASTROENTEROLOGY | Facility: CLINIC | Age: 67
End: 2022-10-13
Payer: MEDICARE

## 2022-10-13 ENCOUNTER — PREP FOR PROCEDURE (OUTPATIENT)
Dept: VASCULAR SURGERY | Facility: CLINIC | Age: 67
End: 2022-10-13

## 2022-10-13 VITALS
WEIGHT: 182 LBS | BODY MASS INDEX: 30.32 KG/M2 | OXYGEN SATURATION: 97 % | SYSTOLIC BLOOD PRESSURE: 129 MMHG | RESPIRATION RATE: 18 BRPM | DIASTOLIC BLOOD PRESSURE: 54 MMHG | HEART RATE: 73 BPM | TEMPERATURE: 99.8 F | HEIGHT: 65 IN

## 2022-10-13 DIAGNOSIS — D12.6 ADENOMATOUS POLYP OF COLON, UNSPECIFIED PART OF COLON: ICD-10-CM

## 2022-10-13 DIAGNOSIS — K59.00 CONSTIPATION, UNSPECIFIED CONSTIPATION TYPE: Primary | ICD-10-CM

## 2022-10-13 DIAGNOSIS — D73.89 LESION OF SPLEEN: ICD-10-CM

## 2022-10-13 DIAGNOSIS — K62.5 RECTAL BLEEDING: ICD-10-CM

## 2022-10-13 PROCEDURE — 99214 OFFICE O/P EST MOD 30 MIN: CPT | Performed by: NURSE PRACTITIONER

## 2022-10-13 RX ORDER — LINACLOTIDE 72 UG/1
72 CAPSULE, GELATIN COATED ORAL DAILY
Qty: 30 CAPSULE | Refills: 5 | Status: SHIPPED | OUTPATIENT
Start: 2022-10-13 | End: 2022-10-19 | Stop reason: SDUPTHER

## 2022-10-13 NOTE — PATIENT INSTRUCTIONS
Scheduled date of colonoscopy (as of today):  12/21/2022  Physician performing colonoscopy:  Dr Marly Hylton MD   Location of colonoscopy: [address] [place] [address]  Bowel prep reviewed with patient: 2 Day Miralax/ Dulcolax  Instructions reviewed with patient by:  Lucie Sin   Clearances:  N/A

## 2022-10-13 NOTE — TELEPHONE ENCOUNTER
Verified patient's insurance   CONFIRMED - Patient's insurance is Medicare  Is patient requesting a call when authorization has been obtained? Patient did not request a call  Surgery Date: 11/18/22  Primary Surgeon: Carlos Cardona // Mala Fuentes (NPI: [id number])  Assisting Surgeon: Not Applicable (N/A)  Facility: [facility] (Tax: [id number] / NPI: [id number])  Inpatient / Outpatient: Outpatient  Level: 4    Clearance Received: No clearance ordered  Consent Received: Yes, scanned into Epic on 10/10/22  Medication Hold / Last Dose: Not Applicable (N/A)  VQI Spreadsheet: Not Applicable (N/A)  IR Notified: Not Applicable (N/A)  Rep  Notified: Not Applicable (N/A)  Equipment Needs: Not Applicable (N/A)  Vas Lab Requested: Not Applicable (N/A)  Patient Contacted: 10/13/22    Diagnosis: I83 813  Procedure/ CPT Code(s): Stab Phlebectomies of the left lower extremity /// CPT: 37275    For varicose vein related procedures:   Last LEVDR: Yes, patient's Belia Penning was completed within 12-months of their procedure date    CEAP Classification: Message to Dr Edna Soliz to check box for score  VCSS: 11    Post Operative Date/ Time: 11/22/22 , 9:30am [place] with TONY Garcia (NPI: [id number])     Pt will have blood work and ekg done at [place] [address]

## 2022-10-13 NOTE — TELEPHONE ENCOUNTER
Dr Nkechi Mckinney,    Can you please addend your office visit note to include the pt's CEAP score? There is no box checked and we need that to submit for authorization

## 2022-10-13 NOTE — PROGRESS NOTES
[facility] - Outpatient Follow-up Note  Bernabe Medina 75 y o  female MRN: [id number]  Encounter: [id number]          ASSESSMENT AND PLAN:      1  Constipation, unspecified constipation type  2  Rectal bleeding    Patient reports that she typically has a bowel movement maybe twice a week and the stool is usually hard and difficult to pass  She does sometimes have rectal bleeding  She does also reports some abdominal bloating and discomfort until she has a bowel movement  She states that she takes Colace and has tried many over-the-counter regimens with little relief  She states that she had been on Linzess in the past that was helpful  Patient is willing to restart Linzess at this time  Will start on low-dose  - linaCLOtide (Linzess) 72 MCG CAPS; Take 72 mcg by mouth daily  Dispense: 30 capsule; Refill: 5      3  Adenomatous polyp of colon, unspecified part of colon    Patient had undergone a colonoscopy in August of 2021 that revealed multiple small and large pancolonic diverticula, small internal hemorrhoids and an inadequate prep  She was recommended to repeat in 1 year  Process, risks and benefits discussed with patient, she is agreeable  Given her continued constipation, would recommend a 2 day prep  - Colonoscopy; Future    4  Lesion of spleen    Patient had undergone a CT scan of the abdomen due to a family history of pancreatic cancer  She was found to have multiple small existing and new lesions on her spleen however they were thought to likely be benign  She was recommended for follow-up MRI of the abdomen in 6-9 months however this was not yet completed    Patient is agreeable at this time      - MRI abdomen w wo contrast; Future    Will see patient back after procedure   ______________________________________________________________________    SUBJECTIVE:  Daisy Newman is a 68-year-old female that presents today for a constipation and to set up screening colonoscopy  Patient was last seen in March of 2021 and underwent an colonoscopy in August of 2021 that revealed multiple small and large pancolonic diverticula, small internal hemorrhoids and an inadequate prep  She was recommended to repeat in 1 year  Patient reports that she typically has a bowel movement twice a week and will often abdominal pressure and discomfort until she has a bowel movement  She states that she has taken Linzess in the past but is currently only taking over-the-counter medication which has not been helpful  She states that her stool is often hard and she will have some rectal bleeding when she strains  She reports that she has tried MiraLax which has not been helpful  She denies any upper GI symptoms  Patient had reported that her sister pancreatic cancer  She underwent a CT scan that revealed small lesions in the spleen that were favorable for benignity  Follow-up MRI of the abdomen was recommended to assess continued stability  REVIEW OF SYSTEMS:  Review of Systems   HENT: Negative for trouble swallowing  Gastrointestinal: Positive for abdominal distention, abdominal pain, anal bleeding and constipation  Negative for blood in stool, diarrhea, nausea and vomiting  Musculoskeletal: Positive for arthralgias           Historical Information   Past Medical History:   Diagnosis Date   • Arthritis    • Colon polyp    • DVT (deep venous thrombosis) (HCC)    • Fibromyalgia    • GERD (gastroesophageal reflux disease)    • Hyperlipemia    • Hypothyroid    • Osteoarthritis    • Osteomyelitis of ankle (HCC)    • Rheumatoid arthritis (HCC)    • RSD (reflex sympathetic dystrophy)      Past Surgical History:   Procedure Laterality Date   • ANKLE FUSION Right    • BREAST BIOPSY Left 12/18/2018    US guided; benign    • BREAST EXCISIONAL BIOPSY Left     benign    • CARPAL TUNNEL RELEASE     • CHOLECYSTECTOMY     • COLONOSCOPY     • ERCP      with insertion of tube into bile/pancreatic duct   • FACIAL RECONSTRUCTION SURGERY     • JOINT REPLACEMENT      tkr left    • RI ENDOVENOUS LASER, 1ST VEIN Left 2/15/2022    Procedure: EVLT left greater saphenous vein;  Surgeon: Lexx Bernal DO;  [facility];  Service: Vascular   • RI ENDOVENOUS LASER, 1ST VEIN Right 2022    Procedure: EVLT;  Surgeon: Lexx Bernal DO;  [facility];  Service: Vascular   • RI PHLEB VEINS - EXTREM 20+ Left 2/15/2022    Procedure: MULTIPLE STAB PHLEBECTOMIES LEFT LEG;  Surgeon: Lexx Bernal DO;  [facility];  Service: Vascular   • RI PHLEB VEINS - EXTREM 20+ Right 2022    Procedure: MULTIPLE STAB PHLEBECTOMIES;  Surgeon: Lexx Bernal DO;  [facility];  Service: Vascular   • SHOULDER SURGERY Right     for frozen shoulder/RSD   • US GUIDED BREAST BIOPSY LEFT COMPLETE Left 2018    Duct ectasia, fibrosis, focal usual ductal hyperplasia     Social History   Social History     Substance and Sexual Activity   Alcohol Use No     Social History     Substance and Sexual Activity   Drug Use Yes   • Types: Marijuana    Comment: At a youg age     Social History     Tobacco Use   Smoking Status Former Smoker   • Quit date:    • Years since quittin 7   Smokeless Tobacco Never Used     Family History   Problem Relation Age of Onset   • Dementia Mother    • Diabetes Mother    • Coronary artery disease Father    • Hemochromatosis Sister    • Cancer Sister    • Pancreatic cancer Sister    • Pancreatitis Sister    • Cancer Brother    • Prostate cancer Brother    • No Known Problems Maternal Grandmother    • No Known Problems Maternal Grandfather    • No Known Problems Paternal Grandmother    • No Known Problems Paternal Grandfather    • Breast cancer Maternal Aunt    • Breast cancer Cousin    • Diabetes Family    • Hypertension Neg Hx        Meds/Allergies       Current Outpatient Medications:   •  atorvastatin (LIPITOR) 10 mg tablet  •  Calcium-Phosphorus-Vitamin D (VITAMIN D3/CALCIUM/PHOSPHORUS PO)  •  docusate sodium (COLACE) 100 mg capsule  •  linaCLOtide (Linzess) 72 MCG CAPS  •  Multiple Vitamin (multivitamin) tablet    No Known Allergies        Objective     Blood pressure 129/54, pulse 73, temperature 99 8 °F (37 7 °C), temperature source Tympanic, resp  rate 18, height 5' 4 5" (1 638 m), weight 82 6 kg (182 lb), SpO2 97 %  Body mass index is 30 76 kg/m²  PHYSICAL EXAM:      General Appearance:   Alert, cooperative, no distress   HEENT:   Normocephalic, atraumatic, anicteric  Neck:  Supple, symmetrical, trachea midline   Lungs:   Clear to auscultation bilaterally; no rales, rhonchi or wheezing; respirations unlabored    Heart[de-identified]   Regular rate and rhythm; no murmur, rub, or gallop  Abdomen:   Soft, non-tender, non-distended; normal bowel sounds; no masses, no organomegaly    Genitalia:   Deferred    Rectal:   Deferred    Extremities:  No cyanosis, clubbing or edema    Skin:  No jaundice, rashes, or lesions             Lab Results:   No visits with results within 1 Day(s) from this visit  Latest known visit with results is:   Appointment on 08/02/2022   Component Date Value   • Sodium 08/02/2022 147    • Potassium 08/02/2022 3 9    • Chloride 08/02/2022 108    • CO2 08/02/2022 29    • ANION GAP 08/02/2022 10    • BUN 08/02/2022 18    • Creatinine 08/02/2022 0 81    • Glucose, Fasting 08/02/2022 84    • Calcium 08/02/2022 9 1    • AST 08/02/2022 12    • ALT 08/02/2022 26    • Alkaline Phosphatase 08/02/2022 60    • Total Protein 08/02/2022 6 6    • Albumin 08/02/2022 3 6    • Total Bilirubin 08/02/2022 0 53    • eGFR 08/02/2022 74    • WBC 08/02/2022 6 27    • RBC 08/02/2022 4 03    • Hemoglobin 08/02/2022 13 7    • Hematocrit 08/02/2022 41 8    • MCV 08/02/2022 104 (A)   • MCH 08/02/2022 34 0    • MCHC 08/02/2022 32 8    • RDW 08/02/2022 12 7    • Platelets 49/62/0987 213    • MPV 08/02/2022 10 1          Radiology Results:   No results found

## 2022-10-14 ENCOUNTER — OFFICE VISIT (OUTPATIENT)
Dept: LAB | Facility: HOSPITAL | Age: 67
End: 2022-10-14
Attending: SURGERY
Payer: MEDICARE

## 2022-10-14 ENCOUNTER — TELEPHONE (OUTPATIENT)
Dept: OTHER | Facility: OTHER | Age: 67
End: 2022-10-14

## 2022-10-14 ENCOUNTER — APPOINTMENT (OUTPATIENT)
Dept: LAB | Facility: HOSPITAL | Age: 67
End: 2022-10-14
Attending: SURGERY
Payer: MEDICARE

## 2022-10-14 DIAGNOSIS — I83.813 VARICOSE VEINS OF BOTH LOWER EXTREMITIES WITH PAIN: ICD-10-CM

## 2022-10-14 DIAGNOSIS — K59.09 OTHER CONSTIPATION: Primary | ICD-10-CM

## 2022-10-14 LAB
ANION GAP SERPL CALCULATED.3IONS-SCNC: 10 MMOL/L (ref 4–13)
ATRIAL RATE: 57 BPM
ATRIAL RATE: 60 BPM
BUN SERPL-MCNC: 18 MG/DL (ref 5–25)
CALCIUM SERPL-MCNC: 9.4 MG/DL (ref 8.3–10.1)
CHLORIDE SERPL-SCNC: 108 MMOL/L (ref 96–108)
CO2 SERPL-SCNC: 27 MMOL/L (ref 21–32)
CREAT SERPL-MCNC: 0.76 MG/DL (ref 0.6–1.3)
ERYTHROCYTE [DISTWIDTH] IN BLOOD BY AUTOMATED COUNT: 12 % (ref 11.6–15.1)
GFR SERPL CREATININE-BSD FRML MDRD: 80 ML/MIN/1.73SQ M
GLUCOSE P FAST SERPL-MCNC: 93 MG/DL (ref 65–99)
HCT VFR BLD AUTO: 44 % (ref 34.8–46.1)
HGB BLD-MCNC: 14.2 G/DL (ref 11.5–15.4)
MCH RBC QN AUTO: 33.3 PG (ref 26.8–34.3)
MCHC RBC AUTO-ENTMCNC: 32.3 G/DL (ref 31.4–37.4)
MCV RBC AUTO: 103 FL (ref 82–98)
P AXIS: 37 DEGREES
P AXIS: 50 DEGREES
PLATELET # BLD AUTO: 169 THOUSANDS/UL (ref 149–390)
PMV BLD AUTO: 9.6 FL (ref 8.9–12.7)
POTASSIUM SERPL-SCNC: 4.3 MMOL/L (ref 3.5–5.3)
PR INTERVAL: 160 MS
PR INTERVAL: 162 MS
QRS AXIS: 58 DEGREES
QRS AXIS: 60 DEGREES
QRSD INTERVAL: 84 MS
QRSD INTERVAL: 86 MS
QT INTERVAL: 404 MS
QT INTERVAL: 404 MS
QTC INTERVAL: 393 MS
QTC INTERVAL: 404 MS
RBC # BLD AUTO: 4.27 MILLION/UL (ref 3.81–5.12)
SODIUM SERPL-SCNC: 145 MMOL/L (ref 135–147)
T WAVE AXIS: 70 DEGREES
T WAVE AXIS: 72 DEGREES
VENTRICULAR RATE: 57 BPM
VENTRICULAR RATE: 60 BPM
WBC # BLD AUTO: 6.01 THOUSAND/UL (ref 4.31–10.16)

## 2022-10-14 PROCEDURE — 93005 ELECTROCARDIOGRAM TRACING: CPT

## 2022-10-14 PROCEDURE — 85027 COMPLETE CBC AUTOMATED: CPT

## 2022-10-14 PROCEDURE — 36415 COLL VENOUS BLD VENIPUNCTURE: CPT

## 2022-10-14 PROCEDURE — 93010 ELECTROCARDIOGRAM REPORT: CPT | Performed by: INTERNAL MEDICINE

## 2022-10-14 PROCEDURE — 80048 BASIC METABOLIC PNL TOTAL CA: CPT

## 2022-10-14 RX ORDER — LUBIPROSTONE 8 UG/1
8 CAPSULE ORAL 2 TIMES DAILY WITH MEALS
Qty: 60 CAPSULE | Refills: 5 | Status: SHIPPED | OUTPATIENT
Start: 2022-10-14 | End: 2022-10-21

## 2022-10-14 NOTE — TELEPHONE ENCOUNTER
There is not  She could try a coupon card if we have any or [employer] as it has a generic    Nasir Mcgraw

## 2022-10-14 NOTE — TELEPHONE ENCOUNTER
Patient is requesting a call back from the office regarding her prescription for Linzess  She states pharmacy wants to charge over $400 and she cannot afford  She would like a generic brand instead

## 2022-10-17 NOTE — TELEPHONE ENCOUNTER
Spoke with pt about elio's recommendations and she is interested in see how much the coupons would help her before going for another expensive medication    She will stop by the [place] office for a coupon

## 2022-10-19 ENCOUNTER — HOSPITAL ENCOUNTER (OUTPATIENT)
Dept: MRI IMAGING | Facility: HOSPITAL | Age: 67
Discharge: HOME | End: 2022-10-19

## 2022-10-19 ENCOUNTER — TELEPHONE (OUTPATIENT)
Dept: GASTROENTEROLOGY | Facility: CLINIC | Age: 67
End: 2022-10-19

## 2022-10-19 DIAGNOSIS — D73.89 LESION OF SPLEEN: ICD-10-CM

## 2022-10-19 DIAGNOSIS — K59.00 CONSTIPATION, UNSPECIFIED CONSTIPATION TYPE: ICD-10-CM

## 2022-10-19 RX ORDER — LINACLOTIDE 72 UG/1
72 CAPSULE, GELATIN COATED ORAL DAILY
Qty: 90 CAPSULE | Refills: 3 | Status: SHIPPED | OUTPATIENT
Start: 2022-10-19 | End: 2022-10-21

## 2022-10-19 NOTE — TELEPHONE ENCOUNTER
Patient came into office stating she was unable to complete MRI today, she felt claustrophobic  She would like to know what can be done to move forward   Patient is also requesting to call into pharmacy, 3 month supply of Linzess   Due to it being cheaper

## 2022-10-20 DIAGNOSIS — D73.89 LESION OF SPLEEN: Primary | ICD-10-CM

## 2022-10-20 RX ORDER — LORAZEPAM 0.5 MG/1
0.5 TABLET ORAL ONCE
Qty: 1 TABLET | Refills: 0 | Status: SHIPPED | OUTPATIENT
Start: 2022-10-20 | End: 2023-06-19

## 2022-10-20 NOTE — TELEPHONE ENCOUNTER
Patient is aware  She is willing to try the Ativan  She is questioning the reason why she needs MRI and if something was found  Please advise so I can make patient aware

## 2022-10-21 DIAGNOSIS — K59.00 CONSTIPATION, UNSPECIFIED CONSTIPATION TYPE: Primary | ICD-10-CM

## 2022-10-21 NOTE — TELEPHONE ENCOUNTER
Authorization requirements reviewed  Please refer to Yoon Stokes / Ibeth Stamp number [id number] for case updates

## 2022-10-21 NOTE — TELEPHONE ENCOUNTER
Lucie-Patient unable to afford medication  Can we look into any help from rep or assistance program?      Serena-Patient feels ativan will not work for her and prefers an open MRI  Can we move forward with ordering and scheduling that for her  Please advise  She wants to know why this was not done sooner due to findings as well as colonoscopy  Please advise

## 2022-10-21 NOTE — TELEPHONE ENCOUNTER
Call from patient advising the $30 coupon for the medication will not work due to her being on Medicare and even with [facility] they are charging $485 a month  With her insurance it is $157 a month  She is unable to afford the prescription  Patient is also asking if she can have an open MRI done as she is unsure if the Ativan will even help  She also wants to know why she needs an MRI  Please call

## 2022-10-24 ENCOUNTER — DOCUMENTATION (OUTPATIENT)
Dept: GASTROENTEROLOGY | Facility: CLINIC | Age: 67
End: 2022-10-24

## 2022-10-24 NOTE — PROGRESS NOTES
Samples of Trulance given to patient in office  2 boxes, 6 tablets  Lot [id number]  Expiration 10/2022  Take one tablet by mouth daily  Contact office once samples are complete and make aware of progress

## 2022-10-25 ENCOUNTER — HOSPITAL ENCOUNTER (OUTPATIENT)
Dept: MAMMOGRAPHY | Facility: HOSPITAL | Age: 67
Discharge: HOME | End: 2022-10-25
Payer: MEDICARE

## 2022-10-25 ENCOUNTER — HOSPITAL ENCOUNTER (OUTPATIENT)
Dept: BONE DENSITY | Facility: HOSPITAL | Age: 67
Discharge: HOME | End: 2022-10-25
Attending: OBSTETRICS & GYNECOLOGY
Payer: MEDICARE

## 2022-10-25 VITALS — BODY MASS INDEX: 30.32 KG/M2 | HEIGHT: 65 IN | WEIGHT: 182 LBS

## 2022-10-25 DIAGNOSIS — Z12.31 ENCOUNTER FOR SCREENING MAMMOGRAM FOR MALIGNANT NEOPLASM OF BREAST: ICD-10-CM

## 2022-10-25 DIAGNOSIS — Z78.0 POSTMENOPAUSAL: ICD-10-CM

## 2022-10-25 PROCEDURE — 77067 SCR MAMMO BI INCL CAD: CPT

## 2022-10-25 PROCEDURE — 77063 BREAST TOMOSYNTHESIS BI: CPT

## 2022-10-25 PROCEDURE — 77080 DXA BONE DENSITY AXIAL: CPT

## 2022-10-26 ENCOUNTER — TELEPHONE (OUTPATIENT)
Dept: GASTROENTEROLOGY | Facility: CLINIC | Age: 67
End: 2022-10-26

## 2022-10-26 DIAGNOSIS — K59.00 CONSTIPATION, UNSPECIFIED CONSTIPATION TYPE: ICD-10-CM

## 2022-10-26 NOTE — TELEPHONE ENCOUNTER
Patient contacted office stating Trulance is working for her, she would like script sent to pharmacy

## 2022-10-27 DIAGNOSIS — K59.00 CONSTIPATION, UNSPECIFIED CONSTIPATION TYPE: ICD-10-CM

## 2022-10-27 NOTE — TELEPHONE ENCOUNTER
Approvedon October 26  PA Case: [id number], Status: Approved, Coverage Starts on: 1/1/2022 12:00:00 AM, Coverage Ends on: 12/31/2023 12:00:00 AM  Questions? Contact [phone]  Pt aware   Please send script to pharmacy

## 2022-10-28 ENCOUNTER — TELEPHONE (OUTPATIENT)
Dept: GASTROENTEROLOGY | Facility: CLINIC | Age: 67
End: 2022-10-28

## 2022-10-28 NOTE — TELEPHONE ENCOUNTER
Patients GI provider:  Kiki Kumar    Number to return call: [phone]    Reason for call: Pt calling to advise medication that was sent into pharmacy yesterday was too expensive and she is not getting it      Scheduled procedure/appointment date if applicable: procedure 54/43

## 2022-10-31 ENCOUNTER — TELEPHONE (OUTPATIENT)
Dept: GASTROENTEROLOGY | Facility: CLINIC | Age: 67
End: 2022-10-31

## 2022-10-31 ENCOUNTER — TELEPHONE (OUTPATIENT)
Dept: INTERNAL MEDICINE CLINIC | Facility: CLINIC | Age: 67
End: 2022-10-31

## 2022-10-31 DIAGNOSIS — D73.89 LESION OF SPLEEN: Primary | ICD-10-CM

## 2022-10-31 NOTE — TELEPHONE ENCOUNTER
Patient called, she wanted to update you on her mammogram  If you can take a look, she is scheduled for f/u appointments on this  She also wanted your opinion on the dexa scan and what she should take for the low bone density  This was also ordered by gyn

## 2022-11-01 NOTE — TELEPHONE ENCOUNTER
Bone density showed osteopenia with mild weakening of the bones but not yet osteoporosis  Calcium and vitamin-D recommended  Usually about 800 to a 1000 mg of calcium on a daily basis  Sometimes higher if does not consume much calcium in diet  Recommend vitamin-D 2000 U on a daily basis  Weightbearing exercise such as walking recommended if tolerates  Be careful to avoid falls  Would recheck a bone density in 2 years

## 2022-11-14 NOTE — PRE-PROCEDURE INSTRUCTIONS
Pre-Surgery Instructions:   Medication Instructions   • atorvastatin (LIPITOR) 10 mg tablet Take day of surgery  with sip of water    • CALCIUM CITRATE PO Stop taking 7 days prior to surgery  Pt reports last dose on 11/14/22    • Cholecalciferol (VITAMIN D-3 PO) Stop taking 7 days prior to surgery  Pt reports last dose on 11/14/22    • Multiple Vitamin (multivitamin) tablet Stop taking 7 days prior to surgery  Pt reports last dose on 11/14/22      Reviewed all medications and instructions for DOS  Reviewed all showering instructions and COVID visitation policy  Pt aware that [facility] is location for DOS, instructed that pt pre op nurse will call on 11/17/22  to give specific instructions for DOS  Pt instructed to bring photo ID and insurance card for DOS, remove all jewelry and  NO valuables for DOS  Pt instructed to use only Tylenol between now 11/14/22  and DOS, NO NSAID products  Pt informed transport is needed for DOS due to receiving anesthesia  Instructed patient to minimize alcohol use prior to surgery  No alcohol 24 hours prior to surgery to reduce risk of dehydration  Pt verbalized understanding of all instructions given and reviewed for DOS  Pt is NOT vaccinated for COVID   Pt has cleanser and instructions from office for bathing -reviewed with pt for DOS  My Surgical Experience    The following information was developed to assist you to prepare for your operation  What do I need to do before coming to the hospital?  • Arrange for a responsible person to drive you to and from the hospital   • Arrange care for your children at home  Children are not allowed in the recovery areas of the hospital  • Plan to wear clothing that is easy to put on and take off  If you are having shoulder surgery, wear a shirt that buttons or zippers in the front  Bathing  o Shower the evening before and the morning of your surgery with an antibacterial soap   Please refer to the Pre Op Showering Instructions for Surgery Patients Sheet   o Remove nail polish and all body piercing jewelry  o Do not shave any body part for at least 24 hours before surgery-this includes face, arms, legs and upper body  Food  o Nothing to eat or drink after midnight the night before your surgery  This includes candy and chewing gum  o Exception: If your surgery is after 12:00pm (noon), you may have clear liquids such as 7-Up®, ginger ale, apple or cranberry juice, Jell-O®, water, or clear broth until 8:00 am  o Do not drink milk or juice with pulp on the morning before surgery  o Do not drink alcohol 24 hours before surgery  Medicine  o Follow instructions you received from your surgeon about which medicines you may take on the day of surgery  o If instructed to take medicine on the morning of surgery, take pills with just a small sip of water  Call your prescribing doctor for specific infroamtion on what to do if you take insulin    What should I bring to the hospital?    Bring:  • Crutches or a walker, if you have them, for foot or knee surgery  • A list of the daily medicines, vitamins, minerals, herbals and nutritional supplements you take  Include the dosages of medicines and the time you take them each day  • Glasses, dentures or hearing aids  • Minimal clothing; you will be wearing hospital sleepwear  • Photo ID; required to verify your identity  • If you have a Living Will or Power of , bring a copy of the documents  • If you have an ostomy, bring an extra pouch and any supplies you use    Do not bring  • Medicines or inhalers  • Money, valuables or jewelry    What other information should I know about the day of surgery? • Notify your surgeons if you develop a cold, sore throat, cough, fever, rash or any other illness    • Report to the Ambulatory Surgical/Same Day Surgery Unit  • You will be instructed to stop at Registration only if you have not been pre-registered  • Inform your  fi they do not stay that they will be asked by the staff to leave a phone number where they can be reached  • Be available to be reached before surgery  In the event the operating room schedule changes, you may be asked to come in earlier or later than expected    *It is important to tell your doctor and others involved in your health care if you are taking or have been taking any non-prescription drugs, vitamins, minerals, herbals or other nutritional supplements   Any of these may interact with some food or medicines and cause a reaction

## 2022-11-17 ENCOUNTER — ANESTHESIA EVENT (OUTPATIENT)
Dept: PERIOP | Facility: HOSPITAL | Age: 67
End: 2022-11-17

## 2022-11-18 ENCOUNTER — HOSPITAL ENCOUNTER (OUTPATIENT)
Facility: HOSPITAL | Age: 67
Setting detail: OUTPATIENT SURGERY
Discharge: HOME | End: 2022-11-18
Attending: SURGERY | Admitting: SURGERY

## 2022-11-18 ENCOUNTER — ANESTHESIA (OUTPATIENT)
Dept: PERIOP | Facility: HOSPITAL | Age: 67
End: 2022-11-18

## 2022-11-18 VITALS
SYSTOLIC BLOOD PRESSURE: 126 MMHG | RESPIRATION RATE: 16 BRPM | HEIGHT: 65 IN | TEMPERATURE: 97.9 F | BODY MASS INDEX: 31.18 KG/M2 | OXYGEN SATURATION: 99 % | HEART RATE: 80 BPM | DIASTOLIC BLOOD PRESSURE: 55 MMHG | WEIGHT: 187.17 LBS

## 2022-11-18 DIAGNOSIS — I83.813 VARICOSE VEINS OF BOTH LOWER EXTREMITIES WITH PAIN: Primary | ICD-10-CM

## 2022-11-18 RX ORDER — FENTANYL CITRATE 50 UG/ML
INJECTION, SOLUTION INTRAMUSCULAR; INTRAVENOUS AS NEEDED
Status: DISCONTINUED | OUTPATIENT
Start: 2022-11-18 | End: 2022-11-18

## 2022-11-18 RX ORDER — CHLORHEXIDINE GLUCONATE 0.12 MG/ML
15 RINSE ORAL ONCE
Status: COMPLETED | OUTPATIENT
Start: 2022-11-18 | End: 2022-11-18

## 2022-11-18 RX ORDER — FENTANYL CITRATE/PF 50 MCG/ML
25 SYRINGE (ML) INJECTION
Status: DISCONTINUED | OUTPATIENT
Start: 2022-11-18 | End: 2022-11-18 | Stop reason: HOSPADM

## 2022-11-18 RX ORDER — ONDANSETRON 2 MG/ML
4 INJECTION INTRAMUSCULAR; INTRAVENOUS ONCE AS NEEDED
Status: DISCONTINUED | OUTPATIENT
Start: 2022-11-18 | End: 2022-11-18 | Stop reason: HOSPADM

## 2022-11-18 RX ORDER — SODIUM CHLORIDE 9 MG/ML
125 INJECTION, SOLUTION INTRAVENOUS CONTINUOUS
Status: DISCONTINUED | OUTPATIENT
Start: 2022-11-18 | End: 2022-11-18 | Stop reason: HOSPADM

## 2022-11-18 RX ORDER — DEXAMETHASONE SODIUM PHOSPHATE 10 MG/ML
INJECTION, SOLUTION INTRAMUSCULAR; INTRAVENOUS AS NEEDED
Status: DISCONTINUED | OUTPATIENT
Start: 2022-11-18 | End: 2022-11-18

## 2022-11-18 RX ORDER — OXYCODONE HYDROCHLORIDE AND ACETAMINOPHEN 5; 325 MG/1; MG/1
1 TABLET ORAL EVERY 8 HOURS PRN
Qty: 20 TABLET | Refills: 0 | Status: SHIPPED | OUTPATIENT
Start: 2022-11-18 | End: 2022-11-28

## 2022-11-18 RX ORDER — SODIUM CHLORIDE 9 MG/ML
125 INJECTION, SOLUTION INTRAVENOUS CONTINUOUS
Status: DISCONTINUED | OUTPATIENT
Start: 2022-11-18 | End: 2022-11-18 | Stop reason: SDUPTHER

## 2022-11-18 RX ORDER — ONDANSETRON 2 MG/ML
INJECTION INTRAMUSCULAR; INTRAVENOUS AS NEEDED
Status: DISCONTINUED | OUTPATIENT
Start: 2022-11-18 | End: 2022-11-18

## 2022-11-18 RX ORDER — LIDOCAINE HYDROCHLORIDE 20 MG/ML
INJECTION, SOLUTION EPIDURAL; INFILTRATION; INTRACAUDAL; PERINEURAL AS NEEDED
Status: DISCONTINUED | OUTPATIENT
Start: 2022-11-18 | End: 2022-11-18

## 2022-11-18 RX ORDER — MIDAZOLAM HYDROCHLORIDE 2 MG/2ML
INJECTION, SOLUTION INTRAMUSCULAR; INTRAVENOUS AS NEEDED
Status: DISCONTINUED | OUTPATIENT
Start: 2022-11-18 | End: 2022-11-18

## 2022-11-18 RX ORDER — OXYCODONE HYDROCHLORIDE AND ACETAMINOPHEN 5; 325 MG/1; MG/1
1 TABLET ORAL EVERY 4 HOURS PRN
Status: DISCONTINUED | OUTPATIENT
Start: 2022-11-18 | End: 2022-11-18 | Stop reason: HOSPADM

## 2022-11-18 RX ORDER — FENTANYL CITRATE/PF 50 MCG/ML
50 SYRINGE (ML) INJECTION
Status: CANCELLED | OUTPATIENT
Start: 2022-11-18

## 2022-11-18 RX ORDER — MAGNESIUM HYDROXIDE 1200 MG/15ML
LIQUID ORAL AS NEEDED
Status: DISCONTINUED | OUTPATIENT
Start: 2022-11-18 | End: 2022-11-18 | Stop reason: HOSPADM

## 2022-11-18 RX ORDER — HYDROMORPHONE HCL/PF 1 MG/ML
0.5 SYRINGE (ML) INJECTION
Status: CANCELLED | OUTPATIENT
Start: 2022-11-18

## 2022-11-18 RX ORDER — ONDANSETRON 2 MG/ML
4 INJECTION INTRAMUSCULAR; INTRAVENOUS ONCE AS NEEDED
Status: CANCELLED | OUTPATIENT
Start: 2022-11-18

## 2022-11-18 RX ORDER — EPHEDRINE SULFATE 50 MG/ML
INJECTION INTRAVENOUS AS NEEDED
Status: DISCONTINUED | OUTPATIENT
Start: 2022-11-18 | End: 2022-11-18

## 2022-11-18 RX ORDER — CEFAZOLIN SODIUM 2 G/50ML
2000 SOLUTION INTRAVENOUS ONCE
Status: COMPLETED | OUTPATIENT
Start: 2022-11-18 | End: 2022-11-18

## 2022-11-18 RX ORDER — PROPOFOL 10 MG/ML
INJECTION, EMULSION INTRAVENOUS AS NEEDED
Status: DISCONTINUED | OUTPATIENT
Start: 2022-11-18 | End: 2022-11-18

## 2022-11-18 RX ADMIN — SODIUM CHLORIDE 125 ML/HR: 0.9 INJECTION, SOLUTION INTRAVENOUS at 09:34

## 2022-11-18 RX ADMIN — CHLORHEXIDINE GLUCONATE 0.12% ORAL RINSE 15 ML: 1.2 LIQUID ORAL at 09:33

## 2022-11-18 RX ADMIN — OXYCODONE HYDROCHLORIDE AND ACETAMINOPHEN 1 TABLET: 5; 325 TABLET ORAL at 13:42

## 2022-11-18 RX ADMIN — SODIUM CHLORIDE: 0.9 INJECTION, SOLUTION INTRAVENOUS at 12:12

## 2022-11-18 RX ADMIN — DEXAMETHASONE SODIUM PHOSPHATE 5 MG: 10 INJECTION, SOLUTION INTRAMUSCULAR; INTRAVENOUS at 11:31

## 2022-11-18 RX ADMIN — FENTANYL CITRATE 50 MCG: 50 INJECTION INTRAMUSCULAR; INTRAVENOUS at 11:27

## 2022-11-18 RX ADMIN — EPHEDRINE SULFATE 5 MG: 50 INJECTION, SOLUTION INTRAVENOUS at 11:40

## 2022-11-18 RX ADMIN — CEFAZOLIN SODIUM 2000 MG: 2 SOLUTION INTRAVENOUS at 11:16

## 2022-11-18 RX ADMIN — EPHEDRINE SULFATE 10 MG: 50 INJECTION, SOLUTION INTRAVENOUS at 11:51

## 2022-11-18 RX ADMIN — LIDOCAINE HYDROCHLORIDE 100 MG: 20 INJECTION, SOLUTION EPIDURAL; INFILTRATION; INTRACAUDAL; PERINEURAL at 11:27

## 2022-11-18 RX ADMIN — MIDAZOLAM 2 MG: 1 INJECTION INTRAMUSCULAR; INTRAVENOUS at 11:24

## 2022-11-18 RX ADMIN — ONDANSETRON 4 MG: 2 INJECTION INTRAMUSCULAR; INTRAVENOUS at 11:31

## 2022-11-18 RX ADMIN — PROPOFOL 200 MG: 10 INJECTION, EMULSION INTRAVENOUS at 11:27

## 2022-11-18 NOTE — H&P
H & P    Plan: left lower extremity micorphlebecotmies  Operative site and varicosities marked  No new changes  /64   Pulse 93   Temp 99 1 °F (37 3 °C) (Temporal)   Resp 20   Ht 5' 4 5" (1 638 m)   Wt 84 9 kg (187 lb 2 7 oz)   LMP  (LMP Unknown)   SpO2 99%   BMI 31 63 kg/m²                                                       Assessment/Plan:     Varicose veins of both lower extremities with pain  Christiano Elise returns to the office for routine scheduled visit status post right greater saphenous vein EVLT and stab phlebectomies back in August   She previously underwent left greater saphenous vein EVLT and stab phlebectomies back in February  Now she reports that the right leg feels very good however she has had worsening left lower leg pain associated with her varicosities  She reports that she has developed new varicosities of her left lower leg  While there are a few bulky varicosities there are numerous clusters of spider/reticular intradermal veins  She understands that these clusters of veins are not amenable to phlebectomy  Despite this she reports that her symptoms are adversely affecting her overall quality of life and wishes to proceed with left lower leg microphlebectomy procedure  The procedure, risks, benefits, alternatives, and anticipated postop course as well as reasonable expectations/goals regarding success in alleviating her symptoms were discusses  She understands that procedure may not alleviate her symptoms  Despite this she wishes to proceed          Diagnoses and all orders for this visit:     Varicose veins of both lower extremities with pain  -     Case request operating room: MULTIPLE STAB PHLEBECTOMIES; Standing  -     Basic metabolic panel; Future  -     CBC and Platelet; Future  -     Case request operating room: MULTIPLE STAB PHLEBECTOMIES     Other orders  -     Diet NPO; Sips with meds; Standing  -     Void on call to OR; Standing  -     Insert peripheral IV;  Standing  - Nursing Communication CHG bath, have staff wash entire body (neck down) per pre op bathing protocol  Routine, evening prior to, and day of surgery ; Standing  -     Nursing Communication Swab both nares with Povidone-Iodine solution, EXCLUDE if patient has shellfish/Iodine allergy  Routine, day of surgery, on call to OR ; Standing  -     chlorhexidine (PERIDEX) 0 12 % oral rinse 15 mL  -     Place sequential compression device; Standing  -     ceFAZolin (ANCEF) IVPB (premix in dextrose) 2,000 mg 50 mL            Subjective:       Patient ID: Burma Severs is a 75 y o  female         Patient presesnts today for a 4-6 week follow up p/o EVLT done 8/16/22 by Dr Kelsey Santana  Pt c/o of pain to left lower ext  Feeling like a "vice gripping " when standing up after sitting  PT also c/o of rest pain that wakes her up at times a night  Pt c/o of left knee swelling  PT wears compression daily and elevates when able  Pt is taking Atorvastatin  Pt is a former smoker    Helio Bentley returns to the office following right greater saphenous vein EVLT and stab phlebectomies  She reports right leg feels very good however she has developed worsening/recurrent left lower leg varicosities  She did undergo prior left greater saphenous vein EVLT and stab phlebectomies back in February  She reports symptoms were adversely affecting overall quality of life  She wears compression stockings daily  Despite compression stockings her symptoms have worsened  She describes his symptoms as a vice  along the left lower leg in conjunction with a "water dripping" sensation under the skin         The following portions of the patient's history were reviewed and updated as appropriate: allergies, current medications, past family history, past medical history, past social history, past surgical history and problem list      Review of Systems   Constitutional: Negative  HENT: Negative  Eyes: Negative  Respiratory: Negative  Cardiovascular: Positive for leg swelling  Gastrointestinal: Negative  Endocrine: Negative  Genitourinary: Negative  Musculoskeletal: Negative  Skin: Negative  Allergic/Immunologic: Negative  Neurological: Negative  Hematological: Bruises/bleeds easily  Psychiatric/Behavioral: Negative  I have personally reviewed the ROS entered by MA and agree as documented      Objective:        /76 (BP Location: Right arm, Patient Position: Sitting, Cuff Size: Standard)   Pulse 76   Temp 98 8 °F (37 1 °C) (Tympanic)   Ht 5' 4" (1 626 m)   Wt 83 2 kg (183 lb 6 4 oz)   LMP  (LMP Unknown)   SpO2 98%   BMI 31 48 kg/m²             Physical Exam  Constitutional:       General: She is not in acute distress  Appearance: She is well-developed  HENT:      Head: Normocephalic and atraumatic  Eyes:      General: No scleral icterus  Conjunctiva/sclera: Conjunctivae normal    Neck:      Trachea: No tracheal deviation  Cardiovascular:      Rate and Rhythm: Normal rate and regular rhythm  Heart sounds: Normal heart sounds  Pulmonary:      Effort: Pulmonary effort is normal       Breath sounds: Normal breath sounds  Abdominal:      General: There is no distension  Palpations: Abdomen is soft  There is no mass (no appreciable aortic pulsation/aneurysm)  Tenderness: There is no abdominal tenderness  There is no guarding or rebound  Musculoskeletal:         General: Normal range of motion  Cervical back: Normal range of motion and neck supple  Skin:     General: Skin is warm and dry  Comments: Several bulky varicosities along the left lower leg with multiple clusters of intradermal spider/reticular veins   Neurological:      Mental Status: She is alert and oriented to person, place, and time     Psychiatric:         Behavior: Behavior normal

## 2022-11-18 NOTE — INTERIM OP NOTE
<20 STAB PHLEBECTOMIES  Postoperative Note  PATIENT NAME: Mile Mcnair  : 1955  MRN: [id number]  AL HYBRID 09    Surgery Date: 2022    Preop Diagnosis:  Varicose veins of both lower extremities with pain [I83 813]    Post-Op Diagnosis Codes: * Varicose veins of both lower extremities with pain [I83 813]    Procedure(s) (LRB):  <20 STAB PHLEBECTOMIES (Left)    Surgeon(s) and Role:     * Jenna Bradley DO - Primary     * Matthew Dowell PA-C - Assisting    Specimens:  * No specimens in log *    Estimated Blood Loss:   Minimal    Anesthesia Type:   General     Findings:    Thin friable varicosities  Total stabs: <44  Complications:   None      SIGNATURE: Jenna Bradley DO   DATE: 2022   TIME: 12:16 PM

## 2022-11-18 NOTE — OP NOTE
OPERATIVE REPORT  PATIENT NAME: Pasha Gore    :  1955  MRN: [id number]  Pt Location: [place] [facility] 09    SURGERY DATE: 2022    Surgeon(s) and Role:     * Jenna Bradley, DO - Primary     * Kendall Begum PA-C - Assisting    Preop Diagnosis:  Varicose veins of both lower extremities with pain [I83 813]    Post-Op Diagnosis Codes: * Varicose veins of both lower extremities with pain [I83 813]    Procedure(s) (LRB):  <20 STAB PHLEBECTOMIES (Left)    Specimen(s):  * No specimens in log *    Estimated Blood Loss:   Minimal    Drains:  * No LDAs found *    Anesthesia Type:   General    Operative Indications:  Varicose veins of both lower extremities with pain [I83 813]  Patient is a 42-year-old woman who previously underwent right lower extremity varicose intervention with adequate results who returns to the office with complaints of painful left lower extremity varicosities  She previously underwent left greater saphenous vein EVLT  Despite compression stockings her symptoms persisted  Left lower extremity microphlebectomy is recommended  The procedure, benefits, risks, anticipated postop course discussed in detail  Patient was agreeable proceed  Written consent was obtained  Of note patient does have large clusters of scattered reticular/spider veins which she understands would not be amenable to phlebectomy  She was offered sclerotherapy as an office procedure understanding that this would be considered a cosmetic procedure not covered under her insurance policy  She has deferred thus far with respect to proceeding with treatment of her spider/reticular veins  Operative Findings: Thin friable varicosities  Total stab site:  Less than 20    Complications:   None    Procedure and Technique:  The patient was properly identified in the preop holding area  Patient's name, laterality, nature procedure verified  The varicosities and operative site were marked    Patient was brought to the operating room where she was positioned supine on the OR table  After adequate induction general anesthesia left lower extremity circumferentially prepped using chlorhexidine and draped in usual sterile fashion  A formal time-out was performed and all in agreement  Preop dose of antibiotics was administered  A 11  Scalpel used to carry a small skin incisions over the previously marked varicosities  The varicosities were avulsed using combination of mosquito clamps and vein hooks  Hemostasis was obtained with digital compression  The total less than 20 stabs was performed  The incision were reapproximated using Steri-Strips  The leg was dressed using 4 x 4 gauze, Kerlix, Ace wrap, and Coban from the foot up to the distal thigh  Patient tolerated procedure well  All needles, instruments, sponge counts reported correct  Patient was awakened, extubated transferred to recovery room in stable condition     I was present for the entire procedure, A qualified resident physician was not available and A physician assistant was required during the procedure for retraction tissue handling,dissection and suturing    Patient Disposition:  PACU         SIGNATURE: Lexx Bernal DO  DATE: November 18, 2022  TIME: 3:15 PM

## 2022-11-18 NOTE — DISCHARGE INSTRUCTIONS
DISCHARGE INSTRUCTIONS  VARICOSE VEIN SURGERY    ACTIVITY:  On the day of your operation, “take it easy”  You can take short walks around the house  When sitting, the leg should be elevated  The preferred position is to have the leg at or above the level of the heart  Starting on the first day after surgery, light walking is encouraged as tolerated  After your ultrasound test, you can resume your normal activity, but no heavy lifting (do not lift more than 15 pounds) or strenuous exercise for 2 weeks  You should not drive a car until your bandages are removed and you are off all narcotic pain medication  You may ride in a car  DIET: Resume your normal diet  Good nutrition is important for healing of your incision  DRESSINGS/SURGICAL SITE:  When released from the hospital, you should have a compression bandage in place on the operated leg  This bandage should feel snug, but not too tight  If the bandage becomes blood soaked or painfully tight, elevate your leg and call the office ([phone])  You may have surgical glue on your incision sites  There are stitches present under the skin which will absorb on their own  The glue is used to cover the incision, assist in closure, and prevent contamination  This adhesive will darken and peel away on its own within one to two weeks  Do not pick at it  You should shower daily  Wash incisions daily with soap and water, but do not rub or scrub the incisions; rinse thoroughly and pat dry  If the operated leg becomes increasingly painful or swollen, or if there is increasing redness or pain around your incision, contact our office  Some bruising of the skin is common after varicose vein surgery  This can be lessened by elevation of the leg  Many patients will notice some numbness of the shin, ankle, calf, or the top of the foot  This usually improves with time but may be persistent    After surgery you can expect bruising, swelling and hard knots on your leg  As your body heals the bruising will fade and the swelling and knots will subside  Apply sunscreen with SPF 30 to incisions while sun bathing for up to one year after surgery to reduce the chances of your incisions darkening  FOLLOW UP STUDIES:  Your first post-operative appointment will be 2-3 days after your surgery  At this appointment your bandages will be removed, and you will be seen by a Vascular Surgeon, Nurse Practitioner, or Physician Assistant  An appointment for a follow up Doppler ultrasound study will be scheduled on the same day as your follow up appointment  FOLLOW UP APPOINTMENTS:  Making and keeping follow up appointments and ultrasound tests are important to your recovery  If you have difficulty making it to or keeping your follow up appointments, call the office  If you have increased pain, fever >101 5, increased drainage, redness or a bad smell at your surgery site, new coldness/numbness of your arm or leg, please call us immediately and GO directly to the ER  PLEASE CALL THE OFFICE IF YOU HAVE ANY QUESTIONS  [phone]  [phone]  [address] [place] [address] , [address], [place], [address]  [address], [address], [place], [address]  [address], [facility], [address] [facility]  [address] [facility], [address] [facility], [facility], [address] [facility]  [address], [address]st [address], [place] [address]  [facility], [address] [facility], [address]  [address] [facility], [address] [facility], [address]  One [facility], [address] [facility], [address] [facility],[address], [address]  201 [facility], Mandie Duque, [address]  [address] [place] [address] [facility]Saint [facility] [address]

## 2022-11-18 NOTE — ANESTHESIA PREPROCEDURE EVALUATION
Procedure:  MULTIPLE STAB PHLEB (Left: Leg Lower)    Relevant Problems   CARDIO   (+) Hyperlipidemia      ENDO   (+) Hypothyroidism      GI/HEPATIC   (+) Nonalcoholic steatohepatitis      MUSCULOSKELETAL   (+) Lower back pain   (+) Osteoarthritis      NEURO/PSYCH   (+) Pain syndrome, chronic      Other   (+) Splenic cyst        Physical Exam    Airway    Mallampati score: II  TM Distance: >3 FB  Neck ROM: full     Dental   upper dentures and lower dentures,     Cardiovascular  Rhythm: regular, Rate: normal, Cardiovascular exam normal    Pulmonary  Pulmonary exam normal Breath sounds clear to auscultation,     Other Findings        Anesthesia Plan  ASA Score- 2     Anesthesia Type- general with ASA Monitors  Additional Monitors:   Airway Plan: LMA  Plan Factors-    Chart reviewed  EKG reviewed  Existing labs reviewed  Patient summary reviewed  Patient is not a current smoker  Patient not instructed to abstain from smoking on day of procedure  Patient did not smoke on day of surgery  There is medical exclusion for perioperative obstructive sleep apnea risk education  Induction- intravenous  Postoperative Plan-     Informed Consent- Anesthetic plan and risks discussed with patient

## 2022-11-20 ENCOUNTER — RA CDI HCC (OUTPATIENT)
Dept: OTHER | Facility: HOSPITAL | Age: 67
End: 2022-11-20

## 2022-11-20 NOTE — PROGRESS NOTES
[address]  coding opportunities       Chart reviewed, no opportunity found: CHART REVIEWED, NO OPPORTUNITY FOUND        Patients Insurance     Medicare Insurance: Medicare

## 2022-11-21 NOTE — PROGRESS NOTES
Assessment/Plan:    Varicose veins of both lower extremities with pain  75year old female, former smoker, w/ HTN, HLD, hypothyroidism, OA, hx of DVT, painful bilateral truncal varicose veins L>R s/p L GSV EVLT 02/15/2022 ([facility]), R GSV EVLT and stab phlebectomy x 20 08/16/2022 [facility]) now s/p LLE below-knee stab phlebectomy <20 11/18/2022 [facility]) presents for postop follow-up  - Patient w/o complaints/ Doing well post-operatively  - Post op dressing removed in office today without incident  - stab incision sites CDI with Steri-Strips  No drainage or bleeding  Mild ecchymosis  No edema  - Palpable L DP    Plan:  - Incisional care reviewed  Keep clean and dry  Let steri strips fall off naturally  No soaking or submerging   - No post op LEV necessary  - Resume compression for symptom and edema control   - increase activity as tolerated  - Instructed patient to call the office in the interim with any questions, concerns, or changes to incisions sites including pain, erythema, drainage or signs of infection   - Return to office with Dr Pola Del Cid in 4-6 weeks        Diagnoses and all orders for this visit:    Varicose veins of both lower extremities with pain          Subjective:      Patient ID: Pamela Gonzalez is a 75 y o  female  Patient present as a Post op LLE Stab Phlebs done by Pito Rm on 11/18/22  Patient denies any pain, swelling or open wounds  Patient reports some tenderness  Patient is currently taking Atorvastatin  HPI    The following portions of the patient's history were reviewed and updated as appropriate: allergies, current medications, past family history, past medical history, past social history, past surgical history and problem list     Review of Systems   Constitutional: Negative  HENT: Negative  Eyes: Negative  Respiratory: Negative  Cardiovascular: Negative  Gastrointestinal: Negative  Endocrine: Negative  Genitourinary: Negative  Musculoskeletal: Negative  Skin: Negative  Allergic/Immunologic: Negative  Neurological: Negative  Hematological: Negative  Psychiatric/Behavioral: Negative  I have reviewed and made appropriate changes to the review of systems input by the medical assistant  Objective:      /70 (BP Location: Left arm, Patient Position: Sitting, Cuff Size: Adult)   Pulse 86   Ht 5' 4 5" (1 638 m)   Wt 86 3 kg (190 lb 3 2 oz)   LMP  (LMP Unknown)   BMI 32 14 kg/m²          Physical Exam  Vitals and nursing note reviewed  Constitutional:       Appearance: Normal appearance  Cardiovascular:      Rate and Rhythm: Normal rate  Pulmonary:      Effort: Pulmonary effort is normal  No respiratory distress  Musculoskeletal:         General: Normal range of motion  Left lower leg: No edema  Skin:     General: Skin is warm and dry  Capillary Refill: Capillary refill takes less than 2 seconds  Comments: LLE stab incision CDI with Steri-Strips  No drainage or bleeding  Mild ecchymosis  No edema    LLE reticular and spider veins   Neurological:      Mental Status: She is alert and oriented to person, place, and time     Psychiatric:         Behavior: Behavior normal                  Vitals:    11/22/22 0914   BP: 110/70   BP Location: Left arm   Patient Position: Sitting   Cuff Size: Adult   Pulse: 86   Weight: 86 3 kg (190 lb 3 2 oz)   Height: 5' 4 5" (1 638 m)       Patient Active Problem List   Diagnosis   • Adenomatous colon polyp   • Baker's cyst of knee   • Colon, diverticulosis   • Constipation   • Hemochromatosis   • Hot flashes due to menopause   • Hyperlipidemia   • Hypothyroidism   • Lower back pain   • Nonalcoholic steatohepatitis   • Osteoarthritis   • Pain syndrome, chronic   • Peripheral neuropathy   • Splenic cyst   • Tendonitis of elbow, left   • Vitamin D deficiency   • Varicose veins of both lower extremities with pain   • Left hip pain   • Leg swelling   • Trochanteric bursitis of left hip   • Family history of malignant neoplasm of pancreas   • Chronic embolism and thrombosis of other specified deep vein of right lower extremity (HCC)   • Obesity (BMI 30-39  9)       Past Surgical History:   Procedure Laterality Date   • ANKLE FUSION Right    • BREAST BIOPSY Left 12/18/2018    US guided; benign    • BREAST EXCISIONAL BIOPSY Left     benign    • CARPAL TUNNEL RELEASE     • CHOLECYSTECTOMY     • COLONOSCOPY     • ERCP      with insertion of tube into bile/pancreatic duct   • FACIAL RECONSTRUCTION SURGERY     • JOINT REPLACEMENT      tkr left    • WA ENDOVENOUS LASER, 1ST VEIN Left 2/15/2022    Procedure: EVLT left greater saphenous vein;  Surgeon: Hi Venegas DO;  [facility];  Service: Vascular   • WA ENDOVENOUS LASER, 1ST VEIN Right 8/16/2022    Procedure: EVLT;  Surgeon: Hi Venegas DO;  [facility];  Service: Vascular   • WA PHLEB VEINS - EXTREM 20+ Left 2/15/2022    Procedure: MULTIPLE STAB PHLEBECTOMIES LEFT LEG;  Surgeon: Hi Venegas DO;  [facility];  Service: Vascular   • WA PHLEB VEINS - EXTREM 20+ Right 8/16/2022    Procedure: MULTIPLE STAB PHLEBECTOMIES;  Surgeon: Hi Venegas DO;  [facility];  Service: Vascular   • WA PHLEB VEINS - EXTREM 20+ Left 11/18/2022    Procedure: <20 STAB PHLEBECTOMIES;  Surgeon: Hi Venegas DO;  [facility];  Service: Vascular   • SHOULDER SURGERY Right     for frozen shoulder/RSD   • US GUIDED BREAST BIOPSY LEFT COMPLETE Left 12/18/2018    Duct ectasia, fibrosis, focal usual ductal hyperplasia       Family History   Problem Relation Age of Onset   • Dementia Mother    • Diabetes Mother    • Anesthesia problems Father    • Coronary artery disease Father    • Hemochromatosis Sister    • Cancer Sister    • Pancreatic cancer Sister    • Pancreatitis Sister    • Cancer Brother    • Prostate cancer Brother    • No Known Problems Maternal Grandmother    • No Known Problems Maternal Grandfather    • No Known Problems Paternal Grandmother    • No Known Problems Paternal Grandfather    • Breast cancer Maternal Aunt    • Breast cancer Cousin    • Diabetes Family    • Hypertension Neg Hx        Social History     Socioeconomic History   • Marital status:      Spouse name: Not on file   • Number of children: Not on file   • Years of education: Not on file   • Highest education level: Not on file   Occupational History   • Not on file   Tobacco Use   • Smoking status: Former     Types: Cigarettes     Quit date: 0     Years since quittin 9   • Smokeless tobacco: Never   • Tobacco comments:     Quit 1998 - former smoker   Vaping Use   • Vaping Use: Never used   Substance and Sexual Activity   • Alcohol use: Not Currently     Comment: Denies any current use - has had former use - no problems with alcohol   • Drug use: Not Currently     Types: Marijuana     Comment: Former use - at age 12 - no current use at this time   • Sexual activity: Not Currently     Comment: Not active at this time   Other Topics Concern   • Not on file   Social History Narrative   • Not on file     Social Determinants of Health     Financial Resource Strain: Not on file   Food Insecurity: Not on file   Transportation Needs: Not on file   Physical Activity: Not on file   Stress: Not on file   Social Connections: Not on file   Intimate Partner Violence: Not on file   Housing Stability: Not on file       Allergies   Allergen Reactions   • Bee Venom Itching and Edema     Bee stings         Current Outpatient Medications:   •  atorvastatin (LIPITOR) 10 mg tablet, Take 1 tablet (10 mg total) by mouth daily, Disp: 90 tablet, Rfl: 3  •  CALCIUM CITRATE PO, Take 900 mg by mouth in the morning, Disp: , Rfl:   •  Calcium-Phosphorus-Vitamin D (VITAMIN D3/CALCIUM/PHOSPHORUS PO), Take by mouth in the morning  , Disp: , Rfl:   •  Cholecalciferol (VITAMIN D-3 PO), Take 2,000 Int'l Units by mouth in the morning, Disp: , Rfl:   •  Multiple Vitamin (multivitamin) tablet, Take 1 tablet by mouth daily, Disp: , Rfl:   •  oxyCODONE-acetaminophen (Percocet) 5-325 mg per tablet, Take 1 tablet by mouth every 8 (eight) hours as needed for moderate pain for up to 10 days Max Daily Amount: 3 tablets, Disp: 20 tablet, Rfl: 0  •  Plecanatide 3 MG TABS, Take 3 mg by mouth in the morning, Disp: 30 tablet, Rfl: 11  •  docusate sodium (COLACE) 100 mg capsule, Take 1 capsule (100 mg total) by mouth 2 (two) times a day Hold for loose bowel movements, Disp: 30 capsule, Rfl: 1  •  LORazepam (Ativan) 0 5 mg tablet, Take 1 tablet (0 5 mg total) by mouth 1 (one) time for 1 dose Take 1/2 hour prior to MRI, Disp: 1 tablet, Rfl: 0

## 2022-11-22 ENCOUNTER — OFFICE VISIT (OUTPATIENT)
Dept: VASCULAR SURGERY | Facility: CLINIC | Age: 67
End: 2022-11-22

## 2022-11-22 ENCOUNTER — TELEPHONE (OUTPATIENT)
Dept: VASCULAR SURGERY | Facility: CLINIC | Age: 67
End: 2022-11-22

## 2022-11-22 VITALS
HEIGHT: 65 IN | WEIGHT: 190.2 LBS | SYSTOLIC BLOOD PRESSURE: 110 MMHG | HEART RATE: 86 BPM | BODY MASS INDEX: 31.69 KG/M2 | DIASTOLIC BLOOD PRESSURE: 70 MMHG

## 2022-11-22 DIAGNOSIS — I83.813 VARICOSE VEINS OF BOTH LOWER EXTREMITIES WITH PAIN: Primary | ICD-10-CM

## 2022-11-22 NOTE — ASSESSMENT & PLAN NOTE
75year old female, former smoker, w/ HTN, HLD, hypothyroidism, OA, hx of DVT, painful bilateral truncal varicose veins L>R s/p L GSV EVLT 02/15/2022 [facility]), R GSV EVLT and stab phlebectomy x 20 08/16/2022 [facility]) now s/p LLE below-knee stab phlebectomy <20 11/18/2022 [facility]) presents for postop follow-up  - Patient w/o complaints/ Doing well post-operatively  - Post op dressing removed in office today without incident  - stab incision sites CDI with Steri-Strips  No drainage or bleeding  Mild ecchymosis  No edema  - Palpable L DP    Plan:  - Incisional care reviewed  Keep clean and dry  Let steri strips fall off naturally   No soaking or submerging   - No post op LEV necessary  - Resume compression for symptom and edema control   - increase activity as tolerated  - Instructed patient to call the office in the interim with any questions, concerns, or changes to incisions sites including pain, erythema, drainage or signs of infection   - Return to office with Dr Juan Daniel Banda in 4-6 weeks

## 2022-11-22 NOTE — PATIENT INSTRUCTIONS
-clean incisions daily with soap and water  No lotions, ointments or creams to incisions x 4 weeks  No soaking or submerging incisions x 4 weeks  No swimming   -resume use of compression stockings when tolerated  Use compression and leg elevation as needed for control of swelling and symptomatic relief   -return to office 4-6 weeks with Dr Omega Garcia  -please contact the office with new symptoms or changes in incisions

## 2022-11-28 ENCOUNTER — ANESTHESIA EVENT (OUTPATIENT)
Dept: RADIOLOGY | Facility: HOSPITAL | Age: 67
End: 2022-11-28

## 2022-11-28 NOTE — PRE-PROCEDURE INSTRUCTIONS
Pre-Surgery Instructions:   Medication Instructions   • atorvastatin (LIPITOR) 10 mg tablet Take day of surgery  • CALCIUM CITRATE PO Stop taking 5 days prior to surgery  • Calcium-Phosphorus-Vitamin D (VITAMIN D3/CALCIUM/PHOSPHORUS PO) Stop taking 5 days prior to surgery  • Cholecalciferol (VITAMIN D-3 PO) Stop taking 5 days prior to surgery  • Multiple Vitamin (multivitamin) tablet Stop taking 5 days prior to surgery  • Plecanatide 3 MG TABS Hold day of surgery

## 2022-12-01 ENCOUNTER — OFFICE VISIT (OUTPATIENT)
Dept: OBGYN CLINIC | Facility: CLINIC | Age: 67
End: 2022-12-01

## 2022-12-01 VITALS — HEIGHT: 64 IN | WEIGHT: 184 LBS | BODY MASS INDEX: 31.41 KG/M2

## 2022-12-01 DIAGNOSIS — M70.62 TROCHANTERIC BURSITIS OF LEFT HIP: ICD-10-CM

## 2022-12-01 DIAGNOSIS — M70.61 TROCHANTERIC BURSITIS OF RIGHT HIP: Primary | ICD-10-CM

## 2022-12-01 RX ORDER — TRIAMCINOLONE ACETONIDE 40 MG/ML
80 INJECTION, SUSPENSION INTRA-ARTICULAR; INTRAMUSCULAR
Status: COMPLETED | OUTPATIENT
Start: 2022-12-01 | End: 2022-12-01

## 2022-12-01 RX ORDER — BUPIVACAINE HYDROCHLORIDE 2.5 MG/ML
4 INJECTION, SOLUTION INFILTRATION; PERINEURAL
Status: COMPLETED | OUTPATIENT
Start: 2022-12-01 | End: 2022-12-01

## 2022-12-01 RX ADMIN — BUPIVACAINE HYDROCHLORIDE 4 ML: 2.5 INJECTION, SOLUTION INFILTRATION; PERINEURAL at 08:44

## 2022-12-01 RX ADMIN — TRIAMCINOLONE ACETONIDE 80 MG: 40 INJECTION, SUSPENSION INTRA-ARTICULAR; INTRAMUSCULAR at 08:44

## 2022-12-01 NOTE — PROGRESS NOTES
Assessment/Plan:    No problem-specific Assessment & Plan notes found for this encounter  Diagnoses and all orders for this visit:    Trochanteric bursitis of right hip    Trochanteric bursitis of left hip          Both hips were injected with Kenalog and Marcaine  She tolerated procedures quite well  Return back in 2 months evaluation  If her condition changes, she will not hesitate to let us know    Subjective:      Patient ID: Barb Frederick is a 75 y o  female  HPI    The patient has a history of trochanteric bursitis for bilateral hips  She is here for her injection which he gets approximately every 2 months  She denies any numbness or tingling  She denies any fever or chills  She denies any pain along her back    The following portions of the patient's history were reviewed and updated as appropriate: allergies, current medications, past family history, past medical history, past social history, past surgical history and problem list     Review of Systems   Constitutional: Negative for chills, fever and unexpected weight change  HENT: Negative for hearing loss, nosebleeds and sore throat  Eyes: Negative for pain, redness and visual disturbance  Respiratory: Negative for cough, shortness of breath and wheezing  Cardiovascular: Negative for chest pain, palpitations and leg swelling  Gastrointestinal: Negative for abdominal pain, nausea and vomiting  Endocrine: Negative for polydipsia and polyuria  Genitourinary: Negative for dysuria and hematuria  Musculoskeletal: Positive for arthralgias, gait problem and myalgias  Negative for back pain, joint swelling, neck pain and neck stiffness  As noted in HPI   Skin: Negative for rash and wound  Neurological: Negative for dizziness, numbness and headaches  Psychiatric/Behavioral: Negative for decreased concentration and suicidal ideas  The patient is not nervous/anxious            Objective:      Ht 5' 4" (1 626 m)   Wt 83 5 kg (184 lb)   LMP  (LMP Unknown)   BMI 31 58 kg/m²          Physical Exam        Bilateral lower extremities are neurovascular intact  Toes are pink and mobile  Compartments are soft  There is full range of motion along both her hips  There is point tenderness along the trochanteric bursa  The pain did radiate down the iliotibial band  No ligament dysfunction  No warmth erythema  No pain along the low back  Neurologically intact distally    Large joint arthrocentesis: bilateral greater trochanteric bursa  Universal Protocol:  Consent: Verbal consent obtained  Written consent not obtained  Risks and benefits: risks, benefits and alternatives were discussed  Consent given by: patient  Time out: Immediately prior to procedure a "time out" was called to verify the correct patient, procedure, equipment, support staff and site/side marked as required  Patient understanding: patient states understanding of the procedure being performed  Test results: test results available and properly labeled  Site marked: the operative site was marked  Radiology Images displayed and confirmed   If images not available, report reviewed: imaging studies available  Patient identity confirmed: verbally with patient    Supporting Documentation  Indications: pain   Procedure Details  Location: hip - bilateral greater trochanteric bursa  Preparation: Patient was prepped and draped in the usual sterile fashion  Needle size: 22 G  Ultrasound guidance: no  Approach: lateral    Medications (Right): 4 mL bupivacaine 0 25 %; 80 mg triamcinolone acetonide 40 mg/mLMedications (Left): 4 mL bupivacaine 0 25 %; 80 mg triamcinolone acetonide 40 mg/mL   Patient tolerance: patient tolerated the procedure well with no immediate complications  Dressing:  Sterile dressing applied

## 2022-12-08 ENCOUNTER — HOSPITAL ENCOUNTER (OUTPATIENT)
Dept: MAMMOGRAPHY | Facility: HOSPITAL | Age: 67
Discharge: HOME | End: 2022-12-08

## 2022-12-08 ENCOUNTER — HOSPITAL ENCOUNTER (OUTPATIENT)
Dept: ULTRASOUND IMAGING | Facility: HOSPITAL | Age: 67
Discharge: HOME | End: 2022-12-08

## 2022-12-08 VITALS — BODY MASS INDEX: 31.43 KG/M2 | HEIGHT: 64 IN | WEIGHT: 184.08 LBS

## 2022-12-08 DIAGNOSIS — R92.8 ABNORMAL MAMMOGRAM: ICD-10-CM

## 2022-12-09 NOTE — PROGRESS NOTES
Call placed to patient regarding recommendation for;    _____ RIGHT ___X___LEFT      __X___Ultrasound guided  ______Stereotactic breast biopsy  Pt states that procedure was explained to her, additional questions answered at this time    __X___Verbalized understanding        Blood thinners: No: ___x__ Yes: _____ What:     Biopsy teaching sheet given:  _____yes __X__no (All teaching points discussed during call)    Pt given name/# for any further questions/needs    Pt agreeable to a post procedure call and states we can give her biopsy results to her over the phone

## 2022-12-11 NOTE — ANESTHESIA PREPROCEDURE EVALUATION
Procedure:  MRI ABDOMEN W WO CONTRAST    Relevant Problems   ANESTHESIA (within normal limits)      CARDIO   (+) Chronic embolism and thrombosis of other specified deep vein of right lower extremity (HCC)   (+) Hyperlipidemia      ENDO   (+) Hypothyroidism      GI/HEPATIC   (+) Nonalcoholic steatohepatitis      /RENAL (within normal limits)      HEMATOLOGY (within normal limits)      MUSCULOSKELETAL   (+) Lower back pain   (+) Osteoarthritis      NEURO/PSYCH   (+) Pain syndrome, chronic      PULMONARY (within normal limits)      Other   (+) Obesity (BMI 30-39 9)   (+) Splenic cyst      EKG 10/14/2022:  Normal sinus rhythm  Normal ECG  When compared with ECG of 14-OCT-2022 08:58,  No significant change was found    Lab Results   Component Value Date    WBC 6 01 10/14/2022    HGB 14 2 10/14/2022    HCT 44 0 10/14/2022     (H) 10/14/2022     10/14/2022     Lab Results   Component Value Date    SODIUM 145 10/14/2022    K 4 3 10/14/2022     10/14/2022    CO2 27 10/14/2022    BUN 18 10/14/2022    CREATININE 0 76 10/14/2022    GLUC 95 02/04/2022    CALCIUM 9 4 10/14/2022     Lab Results   Component Value Date    INR 1 01 06/23/2014    INR 1 32 (H) 03/08/2014    INR 0 96 02/08/2014    PROTIME 12 8 06/23/2014    PROTIME 15 8 (H) 03/08/2014    PROTIME 12 3 02/08/2014     No results found for: HGBA1C       Physical Exam    Airway    Mallampati score: II         Dental       Cardiovascular  Cardiovascular exam normal    Pulmonary  Pulmonary exam normal     Other Findings        Anesthesia Plan  ASA Score- 3     Anesthesia Type- general with ASA Monitors  Additional Monitors:   Airway Plan: LMA  Plan Factors-    Chart reviewed  EKG reviewed  Existing labs reviewed  Patient summary reviewed  Induction- intravenous  Postoperative Plan-     Informed Consent- Anesthetic plan and risks discussed with patient  I personally reviewed this patient with the CRNA   Discussed and agreed on the Anesthesia Plan with the TERRY Hudson

## 2022-12-12 ENCOUNTER — HOSPITAL ENCOUNTER (OUTPATIENT)
Dept: RADIOLOGY | Facility: HOSPITAL | Age: 67
Discharge: HOME | End: 2022-12-12

## 2022-12-12 ENCOUNTER — ANESTHESIA (OUTPATIENT)
Dept: RADIOLOGY | Facility: HOSPITAL | Age: 67
End: 2022-12-12

## 2022-12-12 VITALS
TEMPERATURE: 99 F | SYSTOLIC BLOOD PRESSURE: 126 MMHG | HEIGHT: 65 IN | WEIGHT: 185 LBS | DIASTOLIC BLOOD PRESSURE: 60 MMHG | HEART RATE: 74 BPM | OXYGEN SATURATION: 97 % | RESPIRATION RATE: 18 BRPM | BODY MASS INDEX: 30.82 KG/M2

## 2022-12-12 DIAGNOSIS — D73.89 LESION OF SPLEEN: ICD-10-CM

## 2022-12-12 RX ORDER — PROPOFOL 10 MG/ML
INJECTION, EMULSION INTRAVENOUS AS NEEDED
Status: DISCONTINUED | OUTPATIENT
Start: 2022-12-12 | End: 2022-12-12

## 2022-12-12 RX ORDER — GLYCOPYRROLATE 0.2 MG/ML
INJECTION INTRAMUSCULAR; INTRAVENOUS AS NEEDED
Status: DISCONTINUED | OUTPATIENT
Start: 2022-12-12 | End: 2022-12-12

## 2022-12-12 RX ORDER — SODIUM CHLORIDE, SODIUM LACTATE, POTASSIUM CHLORIDE, CALCIUM CHLORIDE 600; 310; 30; 20 MG/100ML; MG/100ML; MG/100ML; MG/100ML
125 INJECTION, SOLUTION INTRAVENOUS CONTINUOUS
Status: DISCONTINUED | OUTPATIENT
Start: 2022-12-12 | End: 2022-12-13 | Stop reason: HOSPADM

## 2022-12-12 RX ORDER — DEXAMETHASONE SODIUM PHOSPHATE 10 MG/ML
INJECTION, SOLUTION INTRAMUSCULAR; INTRAVENOUS AS NEEDED
Status: DISCONTINUED | OUTPATIENT
Start: 2022-12-12 | End: 2022-12-12

## 2022-12-12 RX ORDER — ONDANSETRON 2 MG/ML
INJECTION INTRAMUSCULAR; INTRAVENOUS AS NEEDED
Status: DISCONTINUED | OUTPATIENT
Start: 2022-12-12 | End: 2022-12-12

## 2022-12-12 RX ADMIN — SODIUM CHLORIDE, POTASSIUM CHLORIDE, SODIUM LACTATE AND CALCIUM CHLORIDE 125 ML/HR: 600; 310; 30; 20 INJECTION, SOLUTION INTRAVENOUS at 12:10

## 2022-12-12 RX ADMIN — PROPOFOL 200 MG: 10 INJECTION, EMULSION INTRAVENOUS at 14:51

## 2022-12-12 RX ADMIN — GLYCOPYRROLATE 0.2 MG: 0.2 INJECTION, SOLUTION INTRAMUSCULAR; INTRAVENOUS at 14:51

## 2022-12-12 RX ADMIN — PROPOFOL 50 MG: 10 INJECTION, EMULSION INTRAVENOUS at 14:52

## 2022-12-12 RX ADMIN — ONDANSETRON 2 MG: 2 INJECTION INTRAMUSCULAR; INTRAVENOUS at 14:51

## 2022-12-12 RX ADMIN — GADOBUTROL 8 ML: 604.72 INJECTION INTRAVENOUS at 17:16

## 2022-12-12 RX ADMIN — DEXAMETHASONE SODIUM PHOSPHATE 10 MG: 10 INJECTION, SOLUTION INTRAMUSCULAR; INTRAVENOUS at 14:51

## 2022-12-12 NOTE — NURSING NOTE
Abdominal MRI completed, patient tolerated procedure  Post vital signs taken and recorded  Report given to  [facility]  Patient placed in transport to be taken to [facility]  Patient offers no complaints or verbalizing any issues upon leaving MRI

## 2022-12-12 NOTE — ANESTHESIA POSTPROCEDURE EVALUATION
Post-Op Assessment Note    CV Status:  Stable    Pain management: adequate     Mental Status:  Alert and awake   Hydration Status:  Euvolemic   PONV Controlled:  Controlled   Airway Patency:  Patent      Post Op Vitals Reviewed: Yes      Staff: CRNA         No notable events documented      BP   136/66   Temp   98 5   Pulse  86   Resp   18   SpO2   97

## 2022-12-15 ENCOUNTER — TELEPHONE (OUTPATIENT)
Dept: GASTROENTEROLOGY | Facility: CLINIC | Age: 67
End: 2022-12-15

## 2022-12-16 NOTE — TELEPHONE ENCOUNTER
Pt called in again to speak to someone about her MRI results  Pt is very anxious and would to hear back from someone  Advised pt that the office closed early yesterday due to the weather and that the results are still in process  [phone] is her call back number

## 2022-12-16 NOTE — RESULT ENCOUNTER NOTE
Spoke with patient over the phone regarding benign MRI results  Discussed that the areas on her spleen are likely hemangiomas and are stable as well as the dilatation noticed that her pancreatic duct  Patient verbalizes understanding

## 2022-12-20 RX ORDER — SODIUM CHLORIDE, SODIUM LACTATE, POTASSIUM CHLORIDE, CALCIUM CHLORIDE 600; 310; 30; 20 MG/100ML; MG/100ML; MG/100ML; MG/100ML
125 INJECTION, SOLUTION INTRAVENOUS CONTINUOUS
Status: CANCELLED | OUTPATIENT
Start: 2022-12-20

## 2022-12-20 RX ORDER — SODIUM CHLORIDE, SODIUM LACTATE, POTASSIUM CHLORIDE, CALCIUM CHLORIDE 600; 310; 30; 20 MG/100ML; MG/100ML; MG/100ML; MG/100ML
20 INJECTION, SOLUTION INTRAVENOUS CONTINUOUS
Status: CANCELLED | OUTPATIENT
Start: 2022-12-20

## 2022-12-21 ENCOUNTER — ANESTHESIA (OUTPATIENT)
Dept: PERIOP | Facility: HOSPITAL | Age: 67
End: 2022-12-21

## 2022-12-21 ENCOUNTER — HOSPITAL ENCOUNTER (OUTPATIENT)
Dept: PERIOP | Facility: HOSPITAL | Age: 67
Setting detail: OUTPATIENT SURGERY
Discharge: HOME | End: 2022-12-21

## 2022-12-21 ENCOUNTER — ANESTHESIA EVENT (OUTPATIENT)
Dept: PERIOP | Facility: HOSPITAL | Age: 67
End: 2022-12-21

## 2022-12-21 VITALS
WEIGHT: 185 LBS | SYSTOLIC BLOOD PRESSURE: 112 MMHG | RESPIRATION RATE: 18 BRPM | HEART RATE: 64 BPM | OXYGEN SATURATION: 95 % | TEMPERATURE: 97.1 F | DIASTOLIC BLOOD PRESSURE: 54 MMHG | BODY MASS INDEX: 30.82 KG/M2 | HEIGHT: 65 IN

## 2022-12-21 DIAGNOSIS — D12.6 ADENOMATOUS POLYP OF COLON, UNSPECIFIED PART OF COLON: ICD-10-CM

## 2022-12-21 PROBLEM — M06.9 RHEUMATOID ARTHRITIS (HCC): Status: ACTIVE | Noted: 2022-12-21

## 2022-12-21 RX ORDER — SODIUM CHLORIDE, SODIUM LACTATE, POTASSIUM CHLORIDE, CALCIUM CHLORIDE 600; 310; 30; 20 MG/100ML; MG/100ML; MG/100ML; MG/100ML
125 INJECTION, SOLUTION INTRAVENOUS CONTINUOUS
Status: DISCONTINUED | OUTPATIENT
Start: 2022-12-21 | End: 2022-12-25 | Stop reason: HOSPADM

## 2022-12-21 RX ORDER — PROPOFOL 10 MG/ML
INJECTION, EMULSION INTRAVENOUS CONTINUOUS PRN
Status: DISCONTINUED | OUTPATIENT
Start: 2022-12-21 | End: 2022-12-21

## 2022-12-21 RX ORDER — LIDOCAINE HYDROCHLORIDE 10 MG/ML
INJECTION, SOLUTION EPIDURAL; INFILTRATION; INTRACAUDAL; PERINEURAL AS NEEDED
Status: DISCONTINUED | OUTPATIENT
Start: 2022-12-21 | End: 2022-12-21

## 2022-12-21 RX ORDER — PROPOFOL 10 MG/ML
INJECTION, EMULSION INTRAVENOUS AS NEEDED
Status: DISCONTINUED | OUTPATIENT
Start: 2022-12-21 | End: 2022-12-21

## 2022-12-21 RX ADMIN — PROPOFOL 80 MCG/KG/MIN: 10 INJECTION, EMULSION INTRAVENOUS at 08:10

## 2022-12-21 RX ADMIN — PROPOFOL 100 MG: 10 INJECTION, EMULSION INTRAVENOUS at 08:08

## 2022-12-21 RX ADMIN — LIDOCAINE HYDROCHLORIDE 50 MG: 10 INJECTION, SOLUTION EPIDURAL; INFILTRATION; INTRACAUDAL; PERINEURAL at 08:08

## 2022-12-21 RX ADMIN — PROPOFOL 30 MG: 10 INJECTION, EMULSION INTRAVENOUS at 08:10

## 2022-12-21 RX ADMIN — SODIUM CHLORIDE, POTASSIUM CHLORIDE, SODIUM LACTATE AND CALCIUM CHLORIDE 125 ML/HR: 600; 310; 30; 20 INJECTION, SOLUTION INTRAVENOUS at 07:22

## 2022-12-21 NOTE — ANESTHESIA POSTPROCEDURE EVALUATION
Post-Op Assessment Note    CV Status:  Stable  Pain Score: 0    Pain management: adequate     Mental Status:  Alert and awake   Hydration Status:  Euvolemic   PONV Controlled:  Controlled   Airway Patency:  Patent      Post Op Vitals Reviewed: Yes      Staff: Anesthesiologist, CRNA         No notable events documented      BP (!) 99/43 (12/21/22 0827)    Temp 98 9 °F (37 2 °C) (12/21/22 0827)    Pulse 69 (12/21/22 0827)   Resp 22 (12/21/22 0827)    SpO2 99 % (12/21/22 0827)

## 2022-12-21 NOTE — H&P
History and Physical -  [facility]  Manuel Chakraborty 75 y o  female MRN: [id number]                  HPI: Manuel Chakraborty is a 75y o  year old female who presents for colon adenoma      REVIEW OF SYSTEMS: Per the HPI, and otherwise unremarkable      Historical Information   Past Medical History:   Diagnosis Date   • Arthritis    • Colon polyp    • DVT (deep venous thrombosis) ([facility] )     11/14/22 Per pt back in the 80's - no further issues at this time   • Fibromyalgia    • GERD (gastroesophageal reflux disease)     11/14/22 Per pt resolved -"doesnt have"   • Hyperlipemia    • Hypothyroid    • Neuropathy     per pt in feet   • Osteoarthritis    • Osteomyelitis of ankle (HCC)     Right   • Rheumatoid arthritis ([facility] [address] )    • RSD (reflex sympathetic dystrophy)    • Vaginal delivery     x1     Past Surgical History:   Procedure Laterality Date   • ANKLE FUSION Right    • BREAST BIOPSY Left 12/18/2018    US guided; benign    • BREAST EXCISIONAL BIOPSY Left     benign    • CARPAL TUNNEL RELEASE     • CHOLECYSTECTOMY     • COLONOSCOPY     • ERCP      with insertion of tube into bile/pancreatic duct   • FACIAL RECONSTRUCTION SURGERY     • JOINT REPLACEMENT      tkr left    • HI ENDOVENOUS LASER, 1ST VEIN Left 2/15/2022    Procedure: EVLT left greater saphenous vein;  Surgeon: Kyle Bello DO;  [facility]: [facility];  Service: Vascular   • HI ENDOVENOUS LASER, 1ST VEIN Right 8/16/2022    Procedure: EVLT;  Surgeon: Kyle Bello DO;  [facility];  Service: Vascular   • HI PHLEB VEINS - EXTREM 20+ Left 2/15/2022    Procedure: MULTIPLE STAB PHLEBECTOMIES LEFT LEG;  Surgeon: Kyle Bello DO;  [facility];  Service: Vascular   • HI PHLEB VEINS - EXTREM 20+ Right 8/16/2022    Procedure: MULTIPLE STAB PHLEBECTOMIES;  Surgeon: Kyle Bello DO;  [facility];  Service: Vascular   • HI PHLEB VEINS - EXTREM 20+ Left 11/18/2022    Procedure: <20 STAB PHLEBECTOMIES; Surgeon: Marci Spencer DO;  [facility];  Service: Vascular   • SHOULDER SURGERY Right     for frozen shoulder/RSD   • US GUIDED BREAST BIOPSY LEFT COMPLETE Left 2018    Duct ectasia, fibrosis, focal usual ductal hyperplasia     Social History   Social History     Substance and Sexual Activity   Alcohol Use Not Currently    Comment: Denies any current use - has had former use - no problems with alcohol     Social History     Substance and Sexual Activity   Drug Use Not Currently   • Types: Marijuana    Comment: Former use - at age 12 - no current use at this time     Social History     Tobacco Use   Smoking Status Former   • Types: Cigarettes   • Quit date:    • Years since quittin 9   Smokeless Tobacco Never   Tobacco Comments    Quit 1998 - former smoker     Family History   Problem Relation Age of Onset   • Dementia Mother    • Diabetes Mother    • Anesthesia problems Father    • Coronary artery disease Father    • Hemochromatosis Sister    • Cancer Sister    • Pancreatic cancer Sister    • Pancreatitis Sister    • Cancer Brother    • Prostate cancer Brother    • No Known Problems Maternal Grandmother    • No Known Problems Maternal Grandfather    • No Known Problems Paternal Grandmother    • No Known Problems Paternal Grandfather    • Breast cancer Maternal Aunt    • Breast cancer Cousin    • Diabetes Family    • Hypertension Neg Hx        Meds/Allergies       Current Outpatient Medications:   •  atorvastatin (LIPITOR) 10 mg tablet  •  CALCIUM CITRATE PO  •  Calcium-Phosphorus-Vitamin D (VITAMIN D3/CALCIUM/PHOSPHORUS PO)  •  Cholecalciferol (VITAMIN D-3 PO)  •  LORazepam (Ativan) 0 5 mg tablet  •  Multiple Vitamin (multivitamin) tablet  •  docusate sodium (COLACE) 100 mg capsule  •  Plecanatide 3 MG TABS    Current Facility-Administered Medications:   •  lactated ringers infusion, 125 mL/hr, Intravenous, Continuous, Continue from Pre-op at 22 0747    Allergies   Allergen Reactions   • Bee Venom Itching and Edema     Bee stings       Objective     /60   Pulse 72   Temp 98 8 °F (37 1 °C) (Tympanic)   Resp 21   Ht 5' 4 5" (1 638 m)   Wt 83 9 kg (185 lb)   LMP  (LMP Unknown)   SpO2 98%   BMI 31 26 kg/m²       PHYSICAL EXAM    Gen: NAD  Head: NCAT  CV: RRR  CHEST: Clear  ABD: soft, NT/ND  EXT: no edema      ASSESSMENT/PLAN:  This is a 75y o  year old female here for colonoscopy, and she is stable and optimized for her procedure

## 2022-12-21 NOTE — ANESTHESIA PREPROCEDURE EVALUATION
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)   (+) Hyperlipidemia      ENDO   (+) Hypothyroidism      GI/HEPATIC  Confirmed NPO appropriate  s/p bowel prep   (+) Nonalcoholic steatohepatitis      /RENAL (within normal limits)      HEMATOLOGY  Chronic DVT      MUSCULOSKELETAL   (+) Lower back pain   (+) Osteoarthritis   (+) Rheumatoid arthritis (HCC)      NEURO/PSYCH   (+) Pain syndrome, chronic      PULMONARY   (-) Smoking   (-) URI (upper respiratory infection)      Nervous and Auditory   (+) Peripheral neuropathy      Other   (+) Obesity (BMI 30-39 9)   (+) Splenic cyst        Physical Exam    Airway    Mallampati score: II  TM Distance: >3 FB  Neck ROM: full     Dental   upper dentures and lower dentures,     Cardiovascular  Rhythm: regular, Rate: normal,     Pulmonary  Breath sounds clear to auscultation,     Other Findings        Anesthesia Plan  ASA Score- 3     Anesthesia Type- IV sedation with anesthesia with ASA Monitors  Additional Monitors:   Airway Plan:     Comment: I discussed the risks and benefits of IV sedation anesthesia including the possibility of the need to convert to general anesthesia and the potential risk of awareness  The patient was given the opportunity to ask questions, which were answered          Plan Factors-Exercise tolerance (METS): >4 METS  Chart reviewed  Patient is not a current smoker  Induction- intravenous  Postoperative Plan-     Informed Consent- Anesthetic plan and risks discussed with patient  I personally reviewed this patient with the CRNA  Discussed and agreed on the Anesthesia Plan with the CRNA  Chelo Leavitt

## 2023-01-04 ENCOUNTER — HOSPITAL ENCOUNTER (OUTPATIENT)
Dept: ULTRASOUND IMAGING | Facility: HOSPITAL | Age: 68
Discharge: HOME | End: 2023-01-04

## 2023-01-04 ENCOUNTER — HOSPITAL ENCOUNTER (OUTPATIENT)
Dept: MAMMOGRAPHY | Facility: HOSPITAL | Age: 68
Discharge: HOME | End: 2023-01-04

## 2023-01-04 VITALS — BODY MASS INDEX: 30.82 KG/M2 | HEIGHT: 65 IN | WEIGHT: 185 LBS

## 2023-01-04 VITALS — DIASTOLIC BLOOD PRESSURE: 68 MMHG | SYSTOLIC BLOOD PRESSURE: 122 MMHG

## 2023-01-04 DIAGNOSIS — Z98.890 STATUS POST LEFT BREAST BIOPSY: ICD-10-CM

## 2023-01-04 DIAGNOSIS — R92.8 ABNORMAL MAMMOGRAM OF LEFT BREAST: ICD-10-CM

## 2023-01-04 RX ORDER — LIDOCAINE HYDROCHLORIDE 10 MG/ML
5 INJECTION, SOLUTION EPIDURAL; INFILTRATION; INTRACAUDAL; PERINEURAL
Status: COMPLETED | OUTPATIENT
Start: 2023-01-04 | End: 2023-01-04

## 2023-01-04 RX ADMIN — LIDOCAINE HYDROCHLORIDE 5 ML: 10 INJECTION, SOLUTION EPIDURAL; INFILTRATION; INTRACAUDAL; PERINEURAL at 09:20

## 2023-01-05 NOTE — PROGRESS NOTES
Post procedure call completed    Bleeding: _____yes __X___no    Pain: _____yes ___X___no    Redness/Swelling: ______yes ___X___no    Band aid removed: _____yes ___X__no (discussed removing when she showers)    Steri-Strips intact: ___X___yes _____no (discussed with patient to remove steri strips on 1/9/2023  if they have not come off on their own)    Pt with no questions at this time, adv will call when results available, adv to call with any questions or concerns, has name/# for contact

## 2023-01-06 ENCOUNTER — TELEPHONE (OUTPATIENT)
Dept: MAMMOGRAPHY | Facility: CLINIC | Age: 68
End: 2023-01-06

## 2023-01-23 ENCOUNTER — OFFICE VISIT (OUTPATIENT)
Dept: GASTROENTEROLOGY | Facility: CLINIC | Age: 68
End: 2023-01-23

## 2023-01-23 ENCOUNTER — OFFICE VISIT (OUTPATIENT)
Dept: VASCULAR SURGERY | Facility: HOSPITAL | Age: 68
End: 2023-01-23

## 2023-01-23 VITALS
DIASTOLIC BLOOD PRESSURE: 70 MMHG | HEIGHT: 64 IN | WEIGHT: 184 LBS | SYSTOLIC BLOOD PRESSURE: 130 MMHG | TEMPERATURE: 97.6 F | HEART RATE: 68 BPM | BODY MASS INDEX: 31.41 KG/M2

## 2023-01-23 VITALS
BODY MASS INDEX: 31.07 KG/M2 | HEIGHT: 64 IN | TEMPERATURE: 97.6 F | WEIGHT: 182 LBS | OXYGEN SATURATION: 98 % | HEART RATE: 68 BPM

## 2023-01-23 DIAGNOSIS — R14.2 BELCHING: ICD-10-CM

## 2023-01-23 DIAGNOSIS — R10.32 LEFT LOWER QUADRANT ABDOMINAL PAIN: Primary | ICD-10-CM

## 2023-01-23 DIAGNOSIS — I83.813 VARICOSE VEINS OF BOTH LOWER EXTREMITIES WITH PAIN: Primary | ICD-10-CM

## 2023-01-23 DIAGNOSIS — R14.3 FLATUS: ICD-10-CM

## 2023-01-23 DIAGNOSIS — D73.89 LESION OF SPLEEN: ICD-10-CM

## 2023-01-23 DIAGNOSIS — R14.0 BLOATING: ICD-10-CM

## 2023-01-23 DIAGNOSIS — K59.00 CONSTIPATION, UNSPECIFIED CONSTIPATION TYPE: ICD-10-CM

## 2023-01-23 NOTE — PROGRESS NOTES
Assessment/Plan:    Varicose veins of both lower extremities with pain  "Madeline" returns to the office for a routine postoperative visit status post left lower extremity microphlebectomy's  Patient is quite pleased with her overall results  At the "water sensation" flowing down the leg has resolved  The phlebectomy sites have healed nicely  She should continue to wear her compression stocking  She may follow-up on an as-needed basis  Diagnoses and all orders for this visit:    Varicose veins of both lower extremities with pain          Subjective:      Patient ID: Goran Hay is a 75 y o  female  Patient presents today for 6 week follow up s/p LLE below the knee Stab phlebectomy done 11/18/22 by Dr Kyra Milton  Pt denies pain, numbness tingle  Pt is taking Atorvastatin and is a former smoker  Madeline returns to office for routine post operative visit s/p LLE microphlebectomies  She is doing well  She is please with her results  The following portions of the patient's history were reviewed and updated as appropriate: allergies, current medications, past family history, past medical history, past social history, past surgical history and problem list     Review of Systems   Constitutional: Negative  HENT: Negative  Eyes: Negative  Respiratory: Negative  Cardiovascular: Negative  Gastrointestinal: Negative  Endocrine: Positive for cold intolerance  Genitourinary: Negative  Musculoskeletal: Positive for joint swelling  Skin: Negative  Allergic/Immunologic: Negative  Neurological: Negative  Hematological: Bruises/bleeds easily  Psychiatric/Behavioral: Negative  I have personally reviewed the ROS entered by MA and agree as documented      Objective:      Pulse 68   Temp 97 6 °F (36 4 °C) (Tympanic)   Ht 5' 4" (1 626 m)   Wt 82 6 kg (182 lb)   LMP  (LMP Unknown)   SpO2 98%   BMI 31 24 kg/m²          Physical Exam  Constitutional:       General: She is not in acute distress  Appearance: She is not ill-appearing, toxic-appearing or diaphoretic  HENT:      Head: Normocephalic and atraumatic  Cardiovascular:      Rate and Rhythm: Normal rate  Pulmonary:      Effort: Pulmonary effort is normal    Skin:     Comments: Cluster of left lower leg reticular/spider veins  Her phlebectomy sites are well healed  Neurological:      Mental Status: She is alert  Psychiatric:         Mood and Affect: Mood normal          Behavior: Behavior normal          Thought Content:  Thought content normal          Judgment: Judgment normal

## 2023-01-23 NOTE — PROGRESS NOTES
[facility] - Outpatient Follow-up Note  Nighat Adams 75 y o  female MRN: [id number]  Encounter: [id number]          ASSESSMENT AND PLAN:      1  Left lower quadrant abdominal pain  2  Bloating  3  Flatus  4  Belching  5  Constipation, unspecified constipation type    Patient recently underwent colonoscopy that revealed pancolonic diverticulosis no polyps  She was recommended for repeat in 5 years due to a personal history of colon polyps  She does report that since her colonoscopy she has had increased flatus, belching and occasional bloating  She does also report significant left lower quadrant pain when she eats [employer] however denies any abdominal pain outside of this  She is taking MiraLAX with good control of constipation and stool softeners as needed  Patient is concerned about celiac  Will order IgA and TTG IgA testing however did discuss the use of probiotics/daily yogurt and simethicone or Beano as needed  Patient verbalizes understanding     -Consider daily probiotics or daily yogurt  -Avoid [employer] rolls  -Simethicone or Beano as needed  -Repeat colonoscopy in 2027  - IgA; Future  - Tissue transglutaminase, IgA; Future    6  Lesion of spleen    Patient recently underwent MRI for evaluation of splenic lesions seen on CT scan in September 2021  This revealed several splenic lesions with features suggesting hemangioma and stable since 2014 as well as mild focal dilatation of the pancreatic duct near the ampulla measuring up to 5 mm however this was also similar in appearance dating back to 2014  Discussed benign results with patient  We will see patient back in 3 months or sooner if needed  ______________________________________________________________________    SUBJECTIVE:  Aleksey Cheung is a 67-year-old female that presents today for a follow-up after a colonoscopy that revealed pancolonic diverticulosis and no polyps    She was recommended for repeat in 5 years due to a personal history of colon polyps  Patient reports that since her colonoscopy she has had increased flatus, belching and occasional bloating  She also reports when she eats [employer] she has left lower quadrant discomfort  She denies abdominal pain outside of this  She denies any diarrhea or bloody stools  She reports that she is taking MiraLAX daily with good control of her constipation  She states that if she does not go for a day or 2 or has difficulty passing a bowel movement then she will take stool softeners as well with good relief  Patient had also undergone an MRI due to splenic lesions seen on CT scan done in September 2021  Her MRI revealed several splenic lesions with features suggesting hemangioma and stable since 2014  Additionally mild focal dilatation of the pancreatic duct near the ampulla measuring up to 5 mm was seen and similar in appearance dating back to 2014, no masses noted  REVIEW OF SYSTEMS:  Review of Systems   Constitutional: Negative for fever  Gastrointestinal: Positive for abdominal distention, abdominal pain (Left lower quadrant when eating [employer] rolls) and constipation  Negative for anal bleeding, blood in stool, diarrhea, nausea and vomiting  Genitourinary: Negative for dysuria, frequency and hematuria  Musculoskeletal: Negative for arthralgias and myalgias  Neurological: Positive for headaches           Historical Information   Past Medical History:   Diagnosis Date   • Arthritis    • Colon polyp    • DVT (deep venous thrombosis) ([facility] [address] )     11/14/22 Per pt back in the 80's - no further issues at this time   • Fibromyalgia    • GERD (gastroesophageal reflux disease)     11/14/22 Per pt resolved -"doesnt have"   • Hyperlipemia    • Hypothyroid    • Neuropathy     per pt in feet   • Osteoarthritis    • Osteomyelitis of ankle (HCC)     Right   • Rheumatoid arthritis ([address] )    • RSD (reflex sympathetic dystrophy) • Vaginal delivery     x1     Past Surgical History:   Procedure Laterality Date   • ANKLE FUSION Right    • BREAST BIOPSY Left 12/18/2018    US guided; benign    • BREAST EXCISIONAL BIOPSY Left     benign    • CARPAL TUNNEL RELEASE     • CHOLECYSTECTOMY     • COLONOSCOPY     • ERCP      with insertion of tube into bile/pancreatic duct   • FACIAL RECONSTRUCTION SURGERY     • JOINT REPLACEMENT      tkr left    • OK ENDOVEN ABLTJ INCMPTNT VEIN XTR LASER 1ST VEIN Left 02/15/2022    Procedure: EVLT left greater saphenous vein;  Surgeon: Rajan Casanova DO;  [facility];  Service: Vascular   • OK ENDOVEN ABLTJ INCMPTNT VEIN XTR LASER 1ST VEIN Right 08/16/2022    Procedure: EVLT;  Surgeon: Rajan Casanova DO;  [facility];  Service: Vascular   • OK STAB PHLEBT VARICOSE VEINS 1 XTR > 20 INCS Left 02/15/2022    Procedure: MULTIPLE STAB PHLEBECTOMIES LEFT LEG;  Surgeon: Rajan Casanova DO;  [facility];  Service: Vascular   • OK STAB PHLEBT VARICOSE VEINS 1 XTR > 20 INCS Right 08/16/2022    Procedure: MULTIPLE STAB PHLEBECTOMIES;  Surgeon: Rajan Casanova DO;  [facility];  Service: Vascular   • OK STAB PHLEBT VARICOSE VEINS 1 XTR > 20 INCS Left 11/18/2022    Procedure: <20 STAB PHLEBECTOMIES;  Surgeon: Rajan Casanova DO;  [facility];  Service: Vascular   • SHOULDER SURGERY Right     for frozen shoulder/RSD   • US GUIDED BREAST BIOPSY LEFT COMPLETE Left 12/18/2018    Duct ectasia, fibrosis, focal usual ductal hyperplasia   • US GUIDED BREAST BIOPSY LEFT COMPLETE Left 01/04/2023   • US GUIDED BREAST BIOPSY LEFT COMPLETE Left 1/4/2023     Social History   Social History     Substance and Sexual Activity   Alcohol Use Not Currently    Comment: Denies any current use - has had former use - no problems with alcohol     Social History     Substance and Sexual Activity   Drug Use Not Currently   • Types: Marijuana    Comment: Former use - at age 12 - no current use at this time     Social History     Tobacco Use   Smoking Status Former   • Types: Cigarettes   • Quit date:    • Years since quittin 0   Smokeless Tobacco Never   Tobacco Comments    Quit 1998 - former smoker     Family History   Problem Relation Age of Onset   • Dementia Mother    • Diabetes Mother    • Anesthesia problems Father    • Coronary artery disease Father    • Hemochromatosis Sister    • Cancer Sister    • Pancreatic cancer Sister    • Pancreatitis Sister    • Cancer Brother    • Prostate cancer Brother    • No Known Problems Maternal Grandmother    • No Known Problems Maternal Grandfather    • No Known Problems Paternal Grandmother    • No Known Problems Paternal Grandfather    • Breast cancer Maternal Aunt    • Breast cancer Cousin    • Diabetes Family    • Hypertension Neg Hx        Meds/Allergies       Current Outpatient Medications:   •  atorvastatin (LIPITOR) 10 mg tablet  •  CALCIUM CITRATE PO  •  Calcium-Phosphorus-Vitamin D (VITAMIN D3/CALCIUM/PHOSPHORUS PO)  •  Cholecalciferol (VITAMIN D-3 PO)  •  docusate sodium (COLACE) 100 mg capsule  •  LORazepam (Ativan) 0 5 mg tablet  •  Multiple Vitamin (multivitamin) tablet  •  Plecanatide 3 MG TABS    Allergies   Allergen Reactions   • Bee Venom Itching and Edema     Bee stings           Objective     Blood pressure 130/70, pulse 68, temperature 97 6 °F (36 4 °C), height 5' 4" (1 626 m), weight 83 5 kg (184 lb)  Body mass index is 31 58 kg/m²  PHYSICAL EXAM:      General Appearance:   Alert, cooperative, no distress   HEENT:   Normocephalic, atraumatic, anicteric  Neck:  Supple, symmetrical, trachea midline   Lungs:   Clear to auscultation bilaterally; no rales, rhonchi or wheezing; respirations unlabored    Heart[de-identified]   Regular rate and rhythm; no murmur, rub, or gallop     Abdomen:   Soft, non-tender, non-distended; normal bowel sounds; no masses, no organomegaly    Genitalia:   Deferred    Rectal:   Deferred    Extremities:  No cyanosis, clubbing or edema    Skin:  No jaundice, rashes, or lesions             Lab Results:   No visits with results within 1 Day(s) from this visit  Latest known visit with results is:   Hospital Outpatient Visit on 01/04/2023   Component Date Value   • Case Report 01/04/2023                      Value:Surgical Pathology Report                         Case: [id number]                                   Authorizing Provider:  Mara Young MD       Collected:           01/04/2023 3912              [facility] Received:            01/04/2023 5880                                     Ultrasound                                                                   Pathologist:           Kanika Donnelly MD                                                    Specimen:    Breast, Left, LEFT 2:30 4 CMFN                                                            • Final Diagnosis 01/04/2023                      Value: This result contains rich text formatting which cannot be displayed here  • Additional Information 01/04/2023                      Value: This result contains rich text formatting which cannot be displayed here  • Gross Description 01/04/2023                      Value: This result contains rich text formatting which cannot be displayed here  • Clinical Information 01/04/2023                      Value:Growing irregular mass previous benign biopsy but change warranted re- bx  R/O IDC         Radiology Results:   US guided breast biopsy left complete, Mammo post biopsy left    Addendum Date: 1/6/2023 Addendum:   ADDENDUM: PATHOLOGY RESULTS: Histology results from biopsy of the left 2:00 mass, 4 cm from the nipple, show benign breast tissue, fat necrosis, hemosiderin laden macrophages, and fibrosis  Fat necrosis and presence of hemosiderin are probably related to prior biopsy of this area in 2018   The mass has been biopsied twice and results returned benign each time despite irregular shape  Benign histology results are concordant  Recommend return to routine screening  Our nurse navigator will contact the patient to relay these results and recommendations  RECOMMENDATION:      - Return to Routine Screening for both breasts  END ADDENDUM    Result Date: 1/4/2023  Narrative: DIAGNOSIS: Abnormal mammogram of left breast INDICATION: Daisy Power is a 75 y o  female presenting for biopsy of irregular left 2:30 mass  Previous biopsy showed no atypia or evidence for malignancy but the mass has grown and become more irregular warranting rebiopsy  Prior to the procedure, previous imaging was reviewed  Prior path results were reviewed in [facility]  PROCEDURE: Risks, alternatives, and benefits of the procedure were discussed with the patient  All her questions were answered  Written documentation of informed consent was obtained  Procedural timeout was taken to verify patient identity and procedural site  The patient was laid on the ultrasound procedural table  Target left 2:30 lambdoid (inverted Y-shaped) mass was identified and marked at the skin surface with indelible marker  The left outer breast was prepped and draped using standard aseptic technique  Local anesthesia with 1% lidocaine was provided at the skin surface and deeper breast tissues  Under sonographic guidance, 12-gauge marquee biopsy needle was guided into the target lesion through an introducer needle via lateral approach  A total of three 12-gauge core biopsy samples were obtained  First sample was through the superficial lobe of the lesion while the last 2 samples were through the deeper lobes of the lesion  After the last sample, heart-shaped marker clip was placed to identify the site of biopsy  Pressure was held at the biopsy site, after which postprocedural ultrasound was performed to evaluate for development of hematoma    With hemostasis confirmed, the patient was cleaned and bandaged and proceeded to diagnostic mammogram   After left diagnostic mammogram confirmed clip placement the patient was given postprocedural instructions  She left the radiology department in good condition and reported no immediate complications  FINDINGS: No intraprocedural or postprocedural evidence for hematoma  Marker clip deployed appropriately  There is mammographic-sonographic concordance  Impression: Procedurally successful left breast mass biopsy without immediate complications  Pathology results are pending  RECOMMENDATION:      - Waiting for pathology for both breasts   Workstation ID: [id number]

## 2023-01-23 NOTE — ASSESSMENT & PLAN NOTE
"Madeline" returns to the office for a routine postoperative visit status post left lower extremity microphlebectomy's  Patient is quite pleased with her overall results  At the "water sensation" flowing down the leg has resolved  The phlebectomy sites have healed nicely  She should continue to wear her compression stocking  She may follow-up on an as-needed basis 
Alert and oriented to person, place and time

## 2023-02-02 ENCOUNTER — OFFICE VISIT (OUTPATIENT)
Dept: OBGYN CLINIC | Facility: CLINIC | Age: 68
End: 2023-02-02

## 2023-02-02 ENCOUNTER — APPOINTMENT (OUTPATIENT)
Dept: LAB | Facility: HOSPITAL | Age: 68
End: 2023-02-02

## 2023-02-02 VITALS
BODY MASS INDEX: 31.58 KG/M2 | SYSTOLIC BLOOD PRESSURE: 96 MMHG | DIASTOLIC BLOOD PRESSURE: 55 MMHG | HEIGHT: 64 IN | HEART RATE: 72 BPM

## 2023-02-02 DIAGNOSIS — M70.61 TROCHANTERIC BURSITIS OF RIGHT HIP: ICD-10-CM

## 2023-02-02 DIAGNOSIS — M70.62 TROCHANTERIC BURSITIS OF LEFT HIP: Primary | ICD-10-CM

## 2023-02-02 DIAGNOSIS — R10.32 LEFT LOWER QUADRANT ABDOMINAL PAIN: ICD-10-CM

## 2023-02-02 DIAGNOSIS — R14.3 FLATUS: ICD-10-CM

## 2023-02-02 DIAGNOSIS — R14.0 BLOATING: ICD-10-CM

## 2023-02-02 LAB — IGA SERPL-MCNC: 195 MG/DL (ref 70–400)

## 2023-02-02 RX ORDER — TRIAMCINOLONE ACETONIDE 40 MG/ML
80 INJECTION, SUSPENSION INTRA-ARTICULAR; INTRAMUSCULAR
Status: COMPLETED | OUTPATIENT
Start: 2023-02-02 | End: 2023-02-02

## 2023-02-02 RX ORDER — BUPIVACAINE HYDROCHLORIDE 2.5 MG/ML
4 INJECTION, SOLUTION INFILTRATION; PERINEURAL
Status: COMPLETED | OUTPATIENT
Start: 2023-02-02 | End: 2023-02-02

## 2023-02-02 RX ADMIN — BUPIVACAINE HYDROCHLORIDE 4 ML: 2.5 INJECTION, SOLUTION INFILTRATION; PERINEURAL at 08:21

## 2023-02-02 RX ADMIN — TRIAMCINOLONE ACETONIDE 80 MG: 40 INJECTION, SUSPENSION INTRA-ARTICULAR; INTRAMUSCULAR at 08:21

## 2023-02-02 NOTE — PROGRESS NOTES
Assessment:  1  Trochanteric bursitis of left hip        2  Trochanteric bursitis of right hip            Plan:  Bilateral trochanteric bursitis  · The patient was provided with bilateral trochanteric bursa steroid injections  The patient tolerated the procedure well  · The patient should follow up in 3 months  Aseptic technique, both hips were injected with Kenalog and Marcaine  She tolerated the procedures quite well  Return back in 3 months evaluation  If her condition changes, she would not hesitate to let us know      To do next visit:  Return in about 3 months (around 5/2/2023)  The above stated was discussed in layman's terms and the patient expressed understanding  All questions were answered to the patient's satisfaction  Scribe Attestation    I,:  Jammie Jones am acting as a scribe while in the presence of the attending physician :       I,:  Dameon Borjas, DO personally performed the services described in this documentation    as scribed in my presence :             Subjective:   Yovani Gregg is a 70 y o  female who presents for follow up of bilateral hips  She is s/p bilateral trochanteric bursa steroid injections with benefit, 12/1/2022  Today she complains of bilateral lateral hip pain  Prolonged walking aggravates while rest alleviates          Review of systems negative unless otherwise specified in HPI    Past Medical History:   Diagnosis Date   • Arthritis    • Colon polyp    • DVT (deep venous thrombosis) ([facility] [address] )     11/14/22 Per pt back in the 80's - no further issues at this time   • Fibromyalgia    • GERD (gastroesophageal reflux disease)     11/14/22 Per pt resolved -"doesnt have"   • Hyperlipemia    • Hypothyroid    • Neuropathy     per pt in feet   • Osteoarthritis    • Osteomyelitis of ankle (HCC)     Right   • Rheumatoid arthritis ([address] )    • RSD (reflex sympathetic dystrophy)    • Vaginal delivery     x1       Past Surgical History:   Procedure Laterality Date • ANKLE FUSION Right    • BREAST BIOPSY Left 12/18/2018    US guided; benign    • BREAST EXCISIONAL BIOPSY Left     benign    • CARPAL TUNNEL RELEASE     • CHOLECYSTECTOMY     • COLONOSCOPY     • ERCP      with insertion of tube into bile/pancreatic duct   • FACIAL RECONSTRUCTION SURGERY     • JOINT REPLACEMENT      tkr left    • ID ENDOVEN ABLTJ INCMPTNT VEIN XTR LASER 1ST VEIN Left 02/15/2022    Procedure: EVLT left greater saphenous vein;  Surgeon: Naila Townsend DO;  [facility];  Service: Vascular   • ID ENDOVEN ABLTJ INCMPTNT VEIN XTR LASER 1ST VEIN Right 08/16/2022    Procedure: EVLT;  Surgeon: Naila Townsend DO;  [facility];  Service: Vascular   • ID STAB PHLEBT VARICOSE VEINS 1 XTR > 20 INCS Left 02/15/2022    Procedure: MULTIPLE STAB PHLEBECTOMIES LEFT LEG;  Surgeon: Naila Townsend DO;  [facility];  Service: Vascular   • ID STAB PHLEBT VARICOSE VEINS 1 XTR > 20 INCS Right 08/16/2022    Procedure: MULTIPLE STAB PHLEBECTOMIES;  Surgeon: Naila Townsend DO;  [facility];  Service: Vascular   • ID STAB PHLEBT VARICOSE VEINS 1 XTR > 20 INCS Left 11/18/2022    Procedure: <20 STAB PHLEBECTOMIES;  Surgeon: Naila Townsend DO;  [facility];  Service: Vascular   • SHOULDER SURGERY Right     for frozen shoulder/RSD   • US GUIDED BREAST BIOPSY LEFT COMPLETE Left 12/18/2018    Duct ectasia, fibrosis, focal usual ductal hyperplasia   • US GUIDED BREAST BIOPSY LEFT COMPLETE Left 01/04/2023   • US GUIDED BREAST BIOPSY LEFT COMPLETE Left 1/4/2023       Family History   Problem Relation Age of Onset   • Dementia Mother    • Diabetes Mother    • Anesthesia problems Father    • Coronary artery disease Father    • Hemochromatosis Sister    • Cancer Sister    • Pancreatic cancer Sister    • Pancreatitis Sister    • Cancer Brother    • Prostate cancer Brother    • No Known Problems Maternal Grandmother    • No Known Problems Maternal Grandfather    • No Known Problems Paternal Grandmother    • No Known Problems Paternal Grandfather    • Breast cancer Maternal Aunt    • Breast cancer Cousin    • Diabetes Family    • Hypertension Neg Hx        Social History     Occupational History   • Not on file   Tobacco Use   • Smoking status: Former     Types: Cigarettes     Quit date:      Years since quittin 1   • Smokeless tobacco: Never   • Tobacco comments:     Quit 1998 - former smoker   Vaping Use   • Vaping Use: Never used   Substance and Sexual Activity   • Alcohol use: Not Currently     Comment: Denies any current use - has had former use - no problems with alcohol   • Drug use: Not Currently     Types: Marijuana     Comment: Former use - at age 12 - no current use at this time   • Sexual activity: Not Currently     Comment: Not active at this time         Current Outpatient Medications:   •  atorvastatin (LIPITOR) 10 mg tablet, Take 1 tablet (10 mg total) by mouth daily, Disp: 90 tablet, Rfl: 3  •  CALCIUM CITRATE PO, Take 900 mg by mouth in the morning, Disp: , Rfl:   •  Cholecalciferol (VITAMIN D-3 PO), Take 2,000 Int'l Units by mouth in the morning, Disp: , Rfl:   •  Multiple Vitamin (multivitamin) tablet, Take 1 tablet by mouth daily, Disp: , Rfl:   •  Calcium-Phosphorus-Vitamin D (VITAMIN D3/CALCIUM/PHOSPHORUS PO), Take by mouth in the morning (Patient not taking: Reported on 2023), Disp: , Rfl:   •  docusate sodium (COLACE) 100 mg capsule, Take 1 capsule (100 mg total) by mouth 2 (two) times a day Hold for loose bowel movements (Patient not taking: Reported on 2023), Disp: 30 capsule, Rfl: 1  •  LORazepam (Ativan) 0 5 mg tablet, Take 1 tablet (0 5 mg total) by mouth 1 (one) time for 1 dose Take 1/2 hour prior to MRI, Disp: 1 tablet, Rfl: 0  •  Plecanatide 3 MG TABS, Take 3 mg by mouth in the morning (Patient not taking: Reported on 2023), Disp: 30 tablet, Rfl: 11    Allergies   Allergen Reactions   • Bee Venom Itching and Edema     Bee stings Vitals:    02/02/23 0804   BP: 96/55   Pulse: 72       Objective:  Physical exam  · General: Awake, Alert, Oriented  · Eyes: Pupils equal, round and reactive to light  · Heart: regular rate and rhythm  · Lungs: No audible wheezing  · Abdomen: soft                    Ortho Exam  Bilateral hips:  TTP over greater trochanter  Good arc of motion  Patient sits comfortably in chair with hip flexed at 90 degrees  Patient stands from seated position without assistance  Calf compartments soft and supple  Sensation intact  Toes are warm sensate and mobile      Diagnostics, reviewed and taken today if performed as documented:    None performed    Procedures, if performed today:    Large joint arthrocentesis: R greater trochanteric bursa  Universal Protocol:  Consent: Verbal consent obtained  Risks and benefits: risks, benefits and alternatives were discussed  Consent given by: patient  Time out: Immediately prior to procedure a "time out" was called to verify the correct patient, procedure, equipment, support staff and site/side marked as required  Timeout called at: 2/2/2023 8:15 AM   Patient understanding: patient states understanding of the procedure being performed  Site marked: the operative site was marked  Patient identity confirmed: verbally with patient    Supporting Documentation  Indications: pain   Procedure Details  Location: hip - R greater trochanteric bursa  Preparation: Patient was prepped and draped in the usual sterile fashion  Needle size: 22 G  Ultrasound guidance: no  Approach: anterolateral  Medications administered: 4 mL bupivacaine 0 25 %; 80 mg triamcinolone acetonide 40 mg/mL    Patient tolerance: patient tolerated the procedure well with no immediate complications  Dressing:  Sterile dressing applied    Large joint arthrocentesis: L greater trochanteric bursa  Universal Protocol:  Consent: Verbal consent obtained    Risks and benefits: risks, benefits and alternatives were discussed  Consent given by: patient  Time out: Immediately prior to procedure a "time out" was called to verify the correct patient, procedure, equipment, support staff and site/side marked as required  Timeout called at: 2/2/2023 8:15 AM   Patient understanding: patient states understanding of the procedure being performed  Site marked: the operative site was marked  Patient identity confirmed: verbally with patient    Supporting Documentation  Indications: pain   Procedure Details  Location: hip - L greater trochanteric bursa  Preparation: Patient was prepped and draped in the usual sterile fashion  Needle size: 22 G  Ultrasound guidance: no  Approach: anterolateral  Medications administered: 4 mL bupivacaine 0 25 %; 80 mg triamcinolone acetonide 40 mg/mL    Patient tolerance: patient tolerated the procedure well with no immediate complications  Dressing:  Sterile dressing applied            Portions of the record may have been created with voice recognition software  Occasional wrong word or "sound a like" substitutions may have occurred due to the inherent limitations of voice recognition software  Read the chart carefully and recognize, using context, where substitutions have occurred

## 2023-02-03 LAB — TTG IGA SER-ACNC: <2 U/ML (ref 0–3)

## 2023-03-10 DIAGNOSIS — E78.2 MIXED HYPERLIPIDEMIA: ICD-10-CM

## 2023-03-10 RX ORDER — ATORVASTATIN CALCIUM 10 MG/1
TABLET, FILM COATED ORAL
Qty: 90 TABLET | Refills: 3 | Status: SHIPPED | OUTPATIENT
Start: 2023-03-10

## 2023-06-19 ENCOUNTER — OFFICE VISIT (OUTPATIENT)
Dept: INTERNAL MEDICINE CLINIC | Facility: CLINIC | Age: 68
End: 2023-06-19
Payer: MEDICARE

## 2023-06-19 VITALS
HEIGHT: 64 IN | SYSTOLIC BLOOD PRESSURE: 122 MMHG | WEIGHT: 184.3 LBS | BODY MASS INDEX: 31.47 KG/M2 | HEART RATE: 67 BPM | OXYGEN SATURATION: 98 % | TEMPERATURE: 98 F | DIASTOLIC BLOOD PRESSURE: 76 MMHG

## 2023-06-19 DIAGNOSIS — K59.00 CONSTIPATION, UNSPECIFIED CONSTIPATION TYPE: ICD-10-CM

## 2023-06-19 DIAGNOSIS — M06.9 RHEUMATOID ARTHRITIS INVOLVING MULTIPLE SITES, UNSPECIFIED WHETHER RHEUMATOID FACTOR PRESENT (HCC): ICD-10-CM

## 2023-06-19 DIAGNOSIS — G89.4 PAIN SYNDROME, CHRONIC: ICD-10-CM

## 2023-06-19 DIAGNOSIS — E83.110 HEREDITARY HEMOCHROMATOSIS (HCC): ICD-10-CM

## 2023-06-19 DIAGNOSIS — D75.89 MACROCYTOSIS: ICD-10-CM

## 2023-06-19 DIAGNOSIS — Z59.9 FINANCIAL DIFFICULTIES: ICD-10-CM

## 2023-06-19 DIAGNOSIS — E78.2 MIXED HYPERLIPIDEMIA: Primary | ICD-10-CM

## 2023-06-19 DIAGNOSIS — M15.9 PRIMARY OSTEOARTHRITIS INVOLVING MULTIPLE JOINTS: ICD-10-CM

## 2023-06-19 DIAGNOSIS — I82.591 CHRONIC EMBOLISM AND THROMBOSIS OF OTHER SPECIFIED DEEP VEIN OF RIGHT LOWER EXTREMITY (HCC): ICD-10-CM

## 2023-06-19 DIAGNOSIS — D53.1 OTHER MEGALOBLASTIC ANEMIAS, NOT ELSEWHERE CLASSIFIED: ICD-10-CM

## 2023-06-19 DIAGNOSIS — E55.9 VITAMIN D DEFICIENCY: ICD-10-CM

## 2023-06-19 PROCEDURE — G0439 PPPS, SUBSEQ VISIT: HCPCS | Performed by: FAMILY MEDICINE

## 2023-06-19 PROCEDURE — 99214 OFFICE O/P EST MOD 30 MIN: CPT | Performed by: FAMILY MEDICINE

## 2023-06-19 RX ORDER — MELOXICAM 15 MG/1
15 TABLET ORAL DAILY PRN
Qty: 90 TABLET | Refills: 1 | Status: SHIPPED | OUTPATIENT
Start: 2023-06-19 | End: 2023-12-16

## 2023-06-19 SDOH — ECONOMIC STABILITY - INCOME SECURITY: PROBLEM RELATED TO HOUSING AND ECONOMIC CIRCUMSTANCES, UNSPECIFIED: Z59.9

## 2023-06-19 NOTE — PROGRESS NOTES
Assessment and Plan:     Problem List Items Addressed This Visit        Cardiovascular and Mediastinum    Chronic embolism and thrombosis of other specified deep vein of right lower extremity (HCC)       Musculoskeletal and Integument    Osteoarthritis    Relevant Medications    meloxicam (MOBIC) 15 mg tablet    Rheumatoid arthritis (HCC)    Relevant Medications    meloxicam (MOBIC) 15 mg tablet    Other Relevant Orders    CBC and differential    Vitamin B12       Other    Constipation    Hemochromatosis    Relevant Orders    Iron Panel (Includes Ferritin, Iron Sat%, Iron, and TIBC)    Vitamin B12    Hyperlipidemia - Primary    Relevant Orders    Comprehensive metabolic panel    Lipid panel    TSH, 3rd generation    Pain syndrome, chronic    Relevant Medications    meloxicam (MOBIC) 15 mg tablet    Vitamin D deficiency    Relevant Orders    Vitamin D 25 hydroxy    Macrocytosis    Relevant Orders    CBC and differential    Vitamin B12   Other Visit Diagnoses     Financial difficulties        Relevant Orders    Ambulatory referral to social work care management program    Other megaloblastic anemias, not elsewhere classified        Relevant Orders    Vitamin B12        Orders and recommendations as noted above  Slip given for routine laboratory testing  Reviewed her concerns regarding financial issues  Will refer to case management  Continue to follow-up with GI  Recent GI note reviewed  Continue with the atorvastatin  Watch diet  Increase fiber in diet  Watch for any worsening of the constipation  Continue to watch for dietary triggers  Joint symptoms reviewed  We will start her on the meloxicam   Potential side effects discussed  Watch for any worsening of the varicose veins  Has followed up with vascular in the past   Continue with the vitamin D supplementation  Does have a history of hereditary hemochromatosis  We will check iron level with next laboratory testing    Blood cells are on the larger side   We will check vitamin B12 level for further investigation  Continue with mammograms yearly  Continue with bone densities every other year  Up-to-date on colonoscopy  Recommend flu shot in the fall  We will have her follow-up in about 6 months or sooner if needed  BMI Counseling: Body mass index is 31 64 kg/m²  The BMI is above normal  Nutrition recommendations include encouraging healthy choices of fruits and vegetables, consuming healthier snacks, limiting drinks that contain sugar, moderation in carbohydrate intake and reducing intake of cholesterol  Exercise recommendations include exercising 3-5 times per week  Rationale for BMI follow-up plan is due to patient being overweight or obese  Depression Screening and Follow-up Plan: Patient was screened for depression during today's encounter  They screened negative with a PHQ-2 score of 0  Preventive health issues were discussed with patient, and age appropriate screening tests were ordered as noted in patient's After Visit Summary  Personalized health advice and appropriate referrals for health education or preventive services given if needed, as noted in patient's After Visit Summary  History of Present Illness:     Patient presents for a Medicare Wellness Visit    She presents for routine follow-up as well as Medicare wellness visit  Has been having a lot of financial issues  Has difficulty making ends meet because of the costs of daily living  Does not qualify for much assistance, however  Has had some persistent issues with the varicose veins in the left leg but they have improved  Continues to use compression over the area  Some worsening of joint symptoms especially into the hands, hips, and legs  Is concerned about taking much because of her history of constipation  Cannot afford much over-the-counter  Tries to remain as active as possible but pain limits her at times    Still with some issues occasionally with constipation depending on what she eats  Continues to follow-up with GI  Denies any chest pain or palpitations  Denies any significant shortness of breath  Left knee has had some worsening and may follow-up with orthopedics regarding this especially since she has a history of a left total knee replacement  Patient Care Team:  Karen Jain MD as PCP - General  MD Theresa Lagos MD Costella Jing, MD Ramona Okeefe DO (Orthopedic Surgery)     Review of Systems:     Review of Systems   Constitutional: Positive for activity change and fatigue  Negative for appetite change, chills and fever  HENT: Negative for congestion and rhinorrhea  Eyes: Negative for visual disturbance  Respiratory: Negative for cough, chest tightness and shortness of breath  Cardiovascular: Negative for chest pain and palpitations  Gastrointestinal: Positive for constipation  Negative for abdominal pain, blood in stool, diarrhea, nausea and vomiting  Endocrine: Negative for polydipsia, polyphagia and polyuria  Genitourinary: Negative for dysuria, frequency and urgency  Musculoskeletal: Positive for arthralgias, gait problem, joint swelling and myalgias  Skin: Negative for color change  Neurological: Negative for dizziness and headaches  Hematological: Does not bruise/bleed easily  Psychiatric/Behavioral: Positive for dysphoric mood and sleep disturbance  Negative for confusion  The patient is not nervous/anxious           Problem List:     Patient Active Problem List   Diagnosis   • Adenomatous colon polyp   • Baker's cyst of knee   • Colon, diverticulosis   • Constipation   • Hemochromatosis   • Hot flashes due to menopause   • Hyperlipidemia   • Hypothyroidism   • Lower back pain   • Nonalcoholic steatohepatitis   • Osteoarthritis   • Pain syndrome, chronic   • Peripheral neuropathy   • Splenic cyst   • Tendonitis of elbow, left   • Vitamin D deficiency   • Varicose veins of both "lower extremities with pain   • Left hip pain   • Leg swelling   • Trochanteric bursitis of left hip   • Family history of malignant neoplasm of pancreas   • Chronic embolism and thrombosis of other specified deep vein of right lower extremity (HCC)   • Obesity (BMI 30-39  9)   • Rheumatoid arthritis ([facility] [address] )   • Macrocytosis      Past Medical and Surgical History:     Past Medical History:   Diagnosis Date   • Arthritis    • Colon polyp    • DVT (deep venous thrombosis) ([facility] )     11/14/22 Per pt back in the 80's - no further issues at this time   • Fibromyalgia    • GERD (gastroesophageal reflux disease)     11/14/22 Per pt resolved -\"doesnt have\"   • Hyperlipemia    • Hypothyroid    • Neuropathy     per pt in feet   • Osteoarthritis    • Osteomyelitis of ankle (HCC)     Right   • Rheumatoid arthritis ([facility] )    • RSD (reflex sympathetic dystrophy)    • Vaginal delivery     x1     Past Surgical History:   Procedure Laterality Date   • ANKLE FUSION Right    • BREAST BIOPSY Left 12/18/2018    US guided; benign    • BREAST EXCISIONAL BIOPSY Left     benign    • CARPAL TUNNEL RELEASE     • CHOLECYSTECTOMY     • COLONOSCOPY     • ERCP      with insertion of tube into bile/pancreatic duct   • FACIAL RECONSTRUCTION SURGERY     • JOINT REPLACEMENT      tkr left    • [place] ENDOVEN ABLTJ INCMPTNT VEIN XTR LASER 1ST VEIN Left 02/15/2022    Procedure: EVLT left greater saphenous vein;  Surgeon: Gypsy Mackay DO;  [facility];  Service: Vascular   • [place] ENDOVEN ABLTJ INCMPTNT VEIN XTR LASER 1ST VEIN Right 08/16/2022    Procedure: EVLT;  Surgeon: Gypsy Mackay DO;  [facility];  Service: Vascular   • [place] STAB PHLEBT VARICOSE VEINS 1 XTR > 20 INCS Left 02/15/2022    Procedure: MULTIPLE STAB PHLEBECTOMIES LEFT LEG;  Surgeon: Gypsy Mackay DO;  [facility];  Service: Vascular   • [place] STAB PHLEBT VARICOSE VEINS 1 XTR > 20 INCS Right 08/16/2022    Procedure: MULTIPLE STAB PHLEBECTOMIES;  Surgeon: " Jenna Bradley DO;  [facility];  Service: Vascular   • [place] STAB PHLEBT VARICOSE VEINS 1 XTR > 20 INCS Left 2022    Procedure: <20 STAB PHLEBECTOMIES;  Surgeon: Taylor Biswas DO;  [facility];  Service: Vascular   • SHOULDER SURGERY Right     for frozen shoulder/RSD   • US GUIDED BREAST BIOPSY LEFT COMPLETE Left 2018    Duct ectasia, fibrosis, focal usual ductal hyperplasia   • US GUIDED BREAST BIOPSY LEFT COMPLETE Left 2023   • US GUIDED BREAST BIOPSY LEFT COMPLETE Left 2023      Family History:     Family History   Problem Relation Age of Onset   • Dementia Mother    • Diabetes Mother    • Anesthesia problems Father    • Coronary artery disease Father    • Hemochromatosis Sister    • Cancer Sister    • Pancreatic cancer Sister    • Pancreatitis Sister    • Cancer Brother    • Prostate cancer Brother    • No Known Problems Maternal Grandmother    • No Known Problems Maternal Grandfather    • No Known Problems Paternal Grandmother    • No Known Problems Paternal Grandfather    • Breast cancer Maternal Aunt    • Breast cancer Cousin    • Diabetes Family    • Hypertension Neg Hx       Social History:     Social History     Socioeconomic History   • Marital status:      Spouse name: None   • Number of children: None   • Years of education: None   • Highest education level: None   Occupational History   • None   Tobacco Use   • Smoking status: Former     Types: Cigarettes     Quit date:      Years since quittin 4   • Smokeless tobacco: Never   • Tobacco comments:     Quit 1998 - former smoker   Vaping Use   • Vaping Use: Never used   Substance and Sexual Activity   • Alcohol use: Not Currently     Comment: Denies any current use - has had former use - no problems with alcohol   • Drug use: Not Currently     Types: Marijuana     Comment: Former use - at age 12 - no current use at this time   • Sexual activity: Not Currently     Comment: Not active at this time   Other Topics Concern   • None   Social History Narrative   • None     Social Determinants of Health     Financial Resource Strain: High Risk (6/12/2023)    Overall Financial Resource Strain (CARDIA)    • Difficulty of Paying Living Expenses: Very hard   Food Insecurity: Not on file   Transportation Needs: No Transportation Needs (6/12/2023)    PRAPARE - Transportation    • Lack of Transportation (Medical): No    • Lack of Transportation (Non-Medical): No   Physical Activity: Not on file   Stress: Not on file   Social Connections: Not on file   Intimate Partner Violence: Not on file   Housing Stability: Not on file      Medications and Allergies:     Current Outpatient Medications   Medication Sig Dispense Refill   • atorvastatin (LIPITOR) 10 mg tablet take 1 tablet by mouth once daily 90 tablet 3   • CALCIUM CITRATE PO Take 900 mg by mouth in the morning     • Cholecalciferol (VITAMIN D-3 PO) Take 2,000 Int'l Units by mouth in the morning     • meloxicam (MOBIC) 15 mg tablet Take 1 tablet (15 mg total) by mouth daily as needed for moderate pain 90 tablet 1   • Multiple Vitamin (multivitamin) tablet Take 1 tablet by mouth daily       No current facility-administered medications for this visit  Allergies   Allergen Reactions   • Bee Venom Itching and Edema     Bee stings      Immunizations: There is no immunization history for the selected administration types on file for this patient  Health Maintenance:         Topic Date Due   • Breast Cancer Screening: Mammogram  10/25/2023   • DXA SCAN  10/25/2024   • Cervical Cancer Screening  06/13/2025   • Colorectal Cancer Screening  12/20/2027   • Hepatitis C Screening  Completed         Topic Date Due   • COVID-19 Vaccine (1) Never done   • Pneumococcal Vaccine: 65+ Years (1 - PCV) Never done   • Influenza Vaccine (Season Ended) 09/01/2023      Medicare Screening Tests and Risk Assessments:     Yovani Florez is here for her Subsequent Wellness visit   Last Medicare Wellness visit information reviewed, patient interviewed and updates made to the record today  Health Risk Assessment:   Patient rates overall health as good  Patient feels that their physical health rating is slightly worse  Patient is satisfied with their life  Eyesight was rated as same  Hearing was rated as same  Patient feels that their emotional and mental health rating is same  Patients states they are never, rarely angry  Patient states they are sometimes unusually tired/fatigued  Pain experienced in the last 7 days has been a lot  Patient's pain rating has been 5/10  Patient states that she has experienced no weight loss or gain in last 6 months  Depression Screening:   PHQ-2 Score: 0      Fall Risk Screening: In the past year, patient has experienced: history of falling in past year    Injured during fall?: No    Feels unsteady when standing or walking?: No    Worried about falling?: Yes      Urinary Incontinence Screening:   Patient has not leaked urine accidently in the last six months  Home Safety:  Patient does not have trouble with stairs inside or outside of their home  Patient has working smoke alarms and has working carbon monoxide detector  Home safety hazards include: none  Nutrition:   Current diet is Low Saturated Fat, Low Carb, No Added Salt and Limited junk food  Medications:   Patient is not currently taking any over-the-counter supplements  Patient is able to manage medications  Activities of Daily Living (ADLs)/Instrumental Activities of Daily Living (IADLs):   Walk and transfer into and out of bed and chair?: Yes  Dress and groom yourself?: Yes    Bathe or shower yourself?: Yes    Feed yourself?  Yes  Do your laundry/housekeeping?: Yes  Manage your money, pay your bills and track your expenses?: Yes  Make your own meals?: Yes    Do your own shopping?: Yes    Previous Hospitalizations:   Any hospitalizations or ED visits within the last 12 months?: No Advance Care Planning:   Living will: No    Durable POA for healthcare: No    Advanced directive: No    Five wishes given: Yes      Cognitive Screening:   Provider or family/friend/caregiver concerned regarding cognition?: No    PREVENTIVE SCREENINGS      Cardiovascular Screening:    General: Screening Not Indicated and History Lipid Disorder      Diabetes Screening:     General: Screening Current      Colorectal Cancer Screening:     General: Screening Current      Breast Cancer Screening:     General: Screening Current      Cervical Cancer Screening:    General: Screening Not Indicated      Osteoporosis Screening:    General: Screening Current      Abdominal Aortic Aneurysm (AAA) Screening:        General: Screening Not Indicated      Lung Cancer Screening:     General: Screening Not Indicated      Hepatitis C Screening:    General: Screening Current    Screening, Brief Intervention, and Referral to Treatment (SBIRT)    Screening  Typical number of drinks in a day: 0  Typical number of drinks in a week: 0  Interpretation: Low risk drinking behavior  AUDIT-C Screenin) How often did you have a drink containing alcohol in the past year? never  2) How many drinks did you have on a typical day when you were drinking in the past year? 0  3) How often did you have 6 or more drinks on one occasion in the past year? never    AUDIT-C Score: 0  Interpretation: Score 0-2 (female): Negative screen for alcohol misuse    Single Item Drug Screening:  How often have you used an illegal drug (including marijuana) or a prescription medication for non-medical reasons in the past year? never    Single Item Drug Screen Score: 0  Interpretation: Negative screen for possible drug use disorder    Brief Intervention  Alcohol & drug use screenings were reviewed  No concerns regarding substance use disorder identified  No results found       Physical Exam:     /76 (BP Location: Left arm, Patient Position: Sitting, Cuff "Size: Large)   Pulse 67   Temp 98 °F (36 7 °C)   Ht 5' 4\" (1 626 m)   Wt 83 6 kg (184 lb 4 8 oz)   LMP  (LMP Unknown)   SpO2 98%   BMI 31 64 kg/m²     Physical Exam  Vitals and nursing note reviewed  Constitutional:       General: She is not in acute distress  Appearance: She is well-developed, well-groomed and overweight  HENT:      Head: Normocephalic and atraumatic  Eyes:      Conjunctiva/sclera: Conjunctivae normal    Cardiovascular:      Rate and Rhythm: Normal rate and regular rhythm  Heart sounds: No murmur heard  Pulmonary:      Effort: Pulmonary effort is normal  No respiratory distress  Breath sounds: Normal breath sounds  Abdominal:      Palpations: Abdomen is soft  Tenderness: There is no abdominal tenderness  Musculoskeletal:         General: No swelling  Cervical back: Neck supple  Comments: Degenerative changes bilateral hands and wrists; postsurgical changes left knee; varicosities bilateral lower extremities left greater than right   Skin:     General: Skin is warm and dry  Capillary Refill: Capillary refill takes less than 2 seconds  Comments: Varicosities bilateral lower extremities left much greater than right   Neurological:      Mental Status: She is alert  Psychiatric:         Mood and Affect: Mood and affect normal          Speech: Speech normal          Behavior: Behavior is cooperative           Cognition and Memory: Cognition and memory normal           Balbir Riggs MD  "

## 2023-06-19 NOTE — PATIENT INSTRUCTIONS
Medicare Preventive Visit Patient Instructions  Thank you for completing your Welcome to Medicare Visit or Medicare Annual Wellness Visit today  Your next wellness visit will be due in one year (6/19/2024)  The screening/preventive services that you may require over the next 5-10 years are detailed below  Some tests may not apply to you based off risk factors and/or age  Screening tests ordered at today's visit but not completed yet may show as past due  Also, please note that scanned in results may not display below  Preventive Screenings:  Service Recommendations Previous Testing/Comments   Colorectal Cancer Screening  * Colonoscopy    * Fecal Occult Blood Test (FOBT)/Fecal Immunochemical Test (FIT)  * Fecal DNA/Cologuard Test  * Flexible Sigmoidoscopy Age: 39-70 years old   Colonoscopy: every 10 years (may be performed more frequently if at higher risk)  OR  FOBT/FIT: every 1 year  OR  Cologuard: every 3 years  OR  Sigmoidoscopy: every 5 years  Screening may be recommended earlier than age 39 if at higher risk for colorectal cancer  Also, an individualized decision between you and your healthcare provider will decide whether screening between the ages of 74-80 would be appropriate  Colonoscopy: 12/21/2022  FOBT/FIT: Not on file  Cologuard: Not on file  Sigmoidoscopy: Not on file    Screening Current     Breast Cancer Screening Age: 36 years old  Frequency: every 1-2 years  Not required if history of left and right mastectomy Mammogram: 10/25/2022    Screening Current   Cervical Cancer Screening Between the ages of 21-29, pap smear recommended once every 3 years  Between the ages of 33-67, can perform pap smear with HPV co-testing every 5 years     Recommendations may differ for women with a history of total hysterectomy, cervical cancer, or abnormal pap smears in past  Pap Smear: 06/13/2022    Screening Not Indicated   Hepatitis C Screening Once for adults born between 1945 and 1965  More frequently in patients at high risk for Hepatitis C Hep C Antibody: 05/13/2020    Screening Current   Diabetes Screening 1-2 times per year if you're at risk for diabetes or have pre-diabetes Fasting glucose: 93 mg/dL (10/14/2022)  A1C: No results in last 5 years (No results in last 5 years)  Screening Current   Cholesterol Screening Once every 5 years if you don't have a lipid disorder  May order more often based on risk factors  Lipid panel: 06/16/2022    Screening Not Indicated  History Lipid Disorder     Other Preventive Screenings Covered by Medicare:  1  Abdominal Aortic Aneurysm (AAA) Screening: covered once if your at risk  You're considered to be at risk if you have a family history of AAA  2  Lung Cancer Screening: covers low dose CT scan once per year if you meet all of the following conditions: (1) Age 50-69; (2) No signs or symptoms of lung cancer; (3) Current smoker or have quit smoking within the last 15 years; (4) You have a tobacco smoking history of at least 20 pack years (packs per day multiplied by number of years you smoked); (5) You get a written order from a healthcare provider  3  Glaucoma Screening: covered annually if you're considered high risk: (1) You have diabetes OR (2) Family history of glaucoma OR (3)  aged 48 and older OR (3)  American aged 72 and older  3  Osteoporosis Screening: covered every 2 years if you meet one of the following conditions: (1) You're estrogen deficient and at risk for osteoporosis based off medical history and other findings; (2) Have a vertebral abnormality; (3) On glucocorticoid therapy for more than 3 months; (4) Have primary hyperparathyroidism; (5) On osteoporosis medications and need to assess response to drug therapy  · Last bone density test (DXA Scan): 10/25/2022   5  HIV Screening: covered annually if you're between the age of 15-65  Also covered annually if you are younger than 13 and older than 72 with risk factors for HIV infection  For pregnant patients, it is covered up to 3 times per pregnancy  Immunizations:  Immunization Recommendations   Influenza Vaccine Annual influenza vaccination during flu season is recommended for all persons aged >= 6 months who do not have contraindications   Pneumococcal Vaccine   * Pneumococcal conjugate vaccine = PCV13 (Prevnar 13), PCV15 (Vaxneuvance), PCV20 (Prevnar 20)  * Pneumococcal polysaccharide vaccine = PPSV23 (Pneumovax) Adults 25-60 years old: 1-3 doses may be recommended based on certain risk factors  Adults 72 years old: 1-2 doses may be recommended based off what pneumonia vaccine you previously received   Hepatitis B Vaccine 3 dose series if at intermediate or high risk (ex: diabetes, end stage renal disease, liver disease)   Tetanus (Td) Vaccine - COST NOT COVERED BY MEDICARE PART B Following completion of primary series, a booster dose should be given every 10 years to maintain immunity against tetanus  Td may also be given as tetanus wound prophylaxis  Tdap Vaccine - COST NOT COVERED BY MEDICARE PART B Recommended at least once for all adults  For pregnant patients, recommended with each pregnancy  Shingles Vaccine (Shingrix) - COST NOT COVERED BY MEDICARE PART B  2 shot series recommended in those aged 48 and above     Health Maintenance Due:      Topic Date Due   • Breast Cancer Screening: Mammogram  10/25/2023   • DXA SCAN  10/25/2024   • Cervical Cancer Screening  06/13/2025   • Colorectal Cancer Screening  12/20/2027   • Hepatitis C Screening  Completed     Immunizations Due:      Topic Date Due   • COVID-19 Vaccine (1) Never done   • Pneumococcal Vaccine: 65+ Years (1 - PCV) Never done   • Influenza Vaccine (Season Ended) 09/01/2023     Advance Directives   What are advance directives? Advance directives are legal documents that state your wishes and plans for medical care  These plans are made ahead of time in case you lose your ability to make decisions for yourself   Advance directives can apply to any medical decision, such as the treatments you want, and if you want to donate organs  What are the types of advance directives? There are many types of advance directives, and each state has rules about how to use them  You may choose a combination of any of the following:  · Living will: This is a written record of the treatment you want  You can also choose which treatments you do not want, which to limit, and which to stop at a certain time  This includes surgery, medicine, IV fluid, and tube feedings  · Durable power of  for healthcare [facility]): This is a written record that states who you want to make healthcare choices for you when you are unable to make them for yourself  This person, called a proxy, is usually a family member or a friend  You may choose more than 1 proxy  · Do not resuscitate (DNR) order:  A DNR order is used in case your heart stops beating or you stop breathing  It is a request not to have certain forms of treatment, such as CPR  A DNR order may be included in other types of advance directives  · Medical directive: This covers the care that you want if you are in a coma, near death, or unable to make decisions for yourself  You can list the treatments you want for each condition  Treatment may include pain medicine, surgery, blood transfusions, dialysis, IV or tube feedings, and a ventilator (breathing machine)  · Values history: This document has questions about your views, beliefs, and how you feel and think about life  This information can help others choose the care that you would choose  Why are advance directives important? An advance directive helps you control your care  Although spoken wishes may be used, it is better to have your wishes written down  Spoken wishes can be misunderstood, or not followed  Treatments may be given even if you do not want them   An advance directive may make it easier for your family to make difficult choices about your care  Fall Prevention    Fall prevention  includes ways to make your home and other areas safer  It also includes ways you can move more carefully to prevent a fall  Health conditions that cause changes in your blood pressure, vision, or muscle strength and coordination may increase your risk for falls  Medicines may also increase your risk for falls if they make you dizzy, weak, or sleepy  Fall prevention tips:   · Stand or sit up slowly  · Use assistive devices as directed  · Wear shoes that fit well and have soles that   · Wear a personal alarm  · Stay active  · Manage your medical conditions  Home Safety Tips:  · Add items to prevent falls in the bathroom  · Keep paths clear  · Install bright lights in your home  · Keep items you use often on shelves within reach  · Paint or place reflective tape on the edges of your stairs  Weight Management   Why it is important to manage your weight:  Being overweight increases your risk of health conditions such as heart disease, high blood pressure, type 2 diabetes, and certain types of cancer  It can also increase your risk for osteoarthritis, sleep apnea, and other respiratory problems  Aim for a slow, steady weight loss  Even a small amount of weight loss can lower your risk of health problems  How to lose weight safely:  A safe and healthy way to lose weight is to eat fewer calories and get regular exercise  You can lose up about 1 pound a week by decreasing the number of calories you eat by 500 calories each day  Healthy meal plan for weight management:  A healthy meal plan includes a variety of foods, contains fewer calories, and helps you stay healthy  A healthy meal plan includes the following:  · Eat whole-grain foods more often  A healthy meal plan should contain fiber  Fiber is the part of grains, fruits, and vegetables that is not broken down by your body   Whole-grain foods are healthy and provide extra fiber in your diet  Some examples of whole-grain foods are whole-wheat breads and pastas, oatmeal, brown rice, and bulgur  · Eat a variety of vegetables every day  Include dark, leafy greens such as spinach, kale, neha greens, and mustard greens  Eat yellow and orange vegetables such as carrots, sweet potatoes, and winter squash  · Eat a variety of fruits every day  Choose fresh or canned fruit (canned in its own juice or light syrup) instead of juice  Fruit juice has very little or no fiber  · Eat low-fat dairy foods  Drink fat-free (skim) milk or 1% milk  Eat fat-free yogurt and low-fat cottage cheese  Try low-fat cheeses such as mozzarella and other reduced-fat cheeses  · Choose meat and other protein foods that are low in fat  Choose beans or other legumes such as split peas or lentils  Choose fish, skinless poultry (chicken or turkey), or lean cuts of red meat (beef or pork)  Before you cook meat or poultry, cut off any visible fat  · Use less fat and oil  Try baking foods instead of frying them  Add less fat, such as margarine, sour cream, regular salad dressing and mayonnaise to foods  Eat fewer high-fat foods  Some examples of high-fat foods include french fries, doughnuts, ice cream, and cakes  · Eat fewer sweets  Limit foods and drinks that are high in sugar  This includes candy, cookies, regular soda, and sweetened drinks  Exercise:  Exercise at least 30 minutes per day on most days of the week  Some examples of exercise include walking, biking, dancing, and swimming  You can also fit in more physical activity by taking the stairs instead of the elevator or parking farther away from stores  Ask your healthcare provider about the best exercise plan for you  © Copyright [address] 2018 Information is for End User's use only and may not be sold, redistributed or otherwise used for commercial purposes   All illustrations and images included in CareNotes® are the copyrighted property of A D A M , Inc  or [address] [place] [address]

## 2023-06-20 ENCOUNTER — PATIENT OUTREACH (OUTPATIENT)
Dept: INTERNAL MEDICINE CLINIC | Facility: CLINIC | Age: 68
End: 2023-06-20

## 2023-06-20 ENCOUNTER — APPOINTMENT (OUTPATIENT)
Dept: LAB | Facility: HOSPITAL | Age: 68
End: 2023-06-20
Payer: MEDICARE

## 2023-06-20 DIAGNOSIS — D75.89 MACROCYTOSIS: ICD-10-CM

## 2023-06-20 DIAGNOSIS — M06.9 RHEUMATOID ARTHRITIS INVOLVING MULTIPLE SITES, UNSPECIFIED WHETHER RHEUMATOID FACTOR PRESENT (HCC): ICD-10-CM

## 2023-06-20 DIAGNOSIS — E55.9 VITAMIN D DEFICIENCY: ICD-10-CM

## 2023-06-20 DIAGNOSIS — E78.2 MIXED HYPERLIPIDEMIA: ICD-10-CM

## 2023-06-20 DIAGNOSIS — D53.1 OTHER MEGALOBLASTIC ANEMIAS, NOT ELSEWHERE CLASSIFIED: ICD-10-CM

## 2023-06-20 DIAGNOSIS — E83.110 HEREDITARY HEMOCHROMATOSIS (HCC): ICD-10-CM

## 2023-06-20 LAB
25(OH)D3 SERPL-MCNC: 52.7 NG/ML (ref 30–100)
ALBUMIN SERPL BCP-MCNC: 3.8 G/DL (ref 3.5–5)
ALP SERPL-CCNC: 57 U/L (ref 34–104)
ALT SERPL W P-5'-P-CCNC: 13 U/L (ref 7–52)
ANION GAP SERPL CALCULATED.3IONS-SCNC: 5 MMOL/L (ref 4–13)
AST SERPL W P-5'-P-CCNC: 16 U/L (ref 13–39)
BASOPHILS # BLD AUTO: 0.04 THOUSANDS/ÂΜL (ref 0–0.1)
BASOPHILS NFR BLD AUTO: 1 % (ref 0–1)
BILIRUB SERPL-MCNC: 0.61 MG/DL (ref 0.2–1)
BUN SERPL-MCNC: 19 MG/DL (ref 5–25)
CALCIUM SERPL-MCNC: 9.3 MG/DL (ref 8.4–10.2)
CHLORIDE SERPL-SCNC: 108 MMOL/L (ref 96–108)
CHOLEST SERPL-MCNC: 133 MG/DL
CO2 SERPL-SCNC: 29 MMOL/L (ref 21–32)
CREAT SERPL-MCNC: 0.87 MG/DL (ref 0.6–1.3)
EOSINOPHIL # BLD AUTO: 0.11 THOUSAND/ÂΜL (ref 0–0.61)
EOSINOPHIL NFR BLD AUTO: 2 % (ref 0–6)
ERYTHROCYTE [DISTWIDTH] IN BLOOD BY AUTOMATED COUNT: 12 % (ref 11.6–15.1)
FERRITIN SERPL-MCNC: 138 NG/ML (ref 11–307)
GFR SERPL CREATININE-BSD FRML MDRD: 68 ML/MIN/1.73SQ M
GLUCOSE P FAST SERPL-MCNC: 89 MG/DL (ref 65–99)
HCT VFR BLD AUTO: 39.7 % (ref 34.8–46.1)
HDLC SERPL-MCNC: 53 MG/DL
HGB BLD-MCNC: 13.5 G/DL (ref 11.5–15.4)
IMM GRANULOCYTES # BLD AUTO: 0.01 THOUSAND/UL (ref 0–0.2)
IMM GRANULOCYTES NFR BLD AUTO: 0 % (ref 0–2)
IRON SATN MFR SERPL: 39 % (ref 15–50)
IRON SERPL-MCNC: 103 UG/DL (ref 50–170)
LDLC SERPL CALC-MCNC: 63 MG/DL (ref 0–100)
LYMPHOCYTES # BLD AUTO: 1.67 THOUSANDS/ÂΜL (ref 0.6–4.47)
LYMPHOCYTES NFR BLD AUTO: 36 % (ref 14–44)
MCH RBC QN AUTO: 35.5 PG (ref 26.8–34.3)
MCHC RBC AUTO-ENTMCNC: 34 G/DL (ref 31.4–37.4)
MCV RBC AUTO: 105 FL (ref 82–98)
MONOCYTES # BLD AUTO: 0.5 THOUSAND/ÂΜL (ref 0.17–1.22)
MONOCYTES NFR BLD AUTO: 11 % (ref 4–12)
NEUTROPHILS # BLD AUTO: 2.32 THOUSANDS/ÂΜL (ref 1.85–7.62)
NEUTS SEG NFR BLD AUTO: 50 % (ref 43–75)
NONHDLC SERPL-MCNC: 80 MG/DL
NRBC BLD AUTO-RTO: 0 /100 WBCS
PLATELET # BLD AUTO: 151 THOUSANDS/UL (ref 149–390)
PMV BLD AUTO: 9.1 FL (ref 8.9–12.7)
POTASSIUM SERPL-SCNC: 4.1 MMOL/L (ref 3.5–5.3)
PROT SERPL-MCNC: 6.1 G/DL (ref 6.4–8.4)
RBC # BLD AUTO: 3.8 MILLION/UL (ref 3.81–5.12)
SODIUM SERPL-SCNC: 142 MMOL/L (ref 135–147)
TIBC SERPL-MCNC: 266 UG/DL (ref 250–450)
TRIGL SERPL-MCNC: 85 MG/DL
TSH SERPL DL<=0.05 MIU/L-ACNC: 4.52 UIU/ML (ref 0.45–4.5)
VIT B12 SERPL-MCNC: 373 PG/ML (ref 180–914)
WBC # BLD AUTO: 4.65 THOUSAND/UL (ref 4.31–10.16)

## 2023-06-20 PROCEDURE — 80061 LIPID PANEL: CPT

## 2023-06-20 PROCEDURE — 83550 IRON BINDING TEST: CPT

## 2023-06-20 PROCEDURE — 84443 ASSAY THYROID STIM HORMONE: CPT

## 2023-06-20 PROCEDURE — 83540 ASSAY OF IRON: CPT

## 2023-06-20 PROCEDURE — 85025 COMPLETE CBC W/AUTO DIFF WBC: CPT

## 2023-06-20 PROCEDURE — 36415 COLL VENOUS BLD VENIPUNCTURE: CPT

## 2023-06-20 PROCEDURE — 80053 COMPREHEN METABOLIC PANEL: CPT

## 2023-06-20 PROCEDURE — 82607 VITAMIN B-12: CPT

## 2023-06-20 PROCEDURE — 82306 VITAMIN D 25 HYDROXY: CPT

## 2023-06-20 PROCEDURE — 82728 ASSAY OF FERRITIN: CPT

## 2023-06-20 NOTE — PROGRESS NOTES
JOANNE OVERTON reviewed chart  Pt was referred by PCP d/t BRIDGET  Pt reported that it is very difficult to afford her basic needs  JOANNE OVERTON placed call to pt to introduce self and offer assistance  Pt stated she was out and will call back tomorrow

## 2023-06-22 ENCOUNTER — OFFICE VISIT (OUTPATIENT)
Dept: OBGYN CLINIC | Facility: CLINIC | Age: 68
End: 2023-06-22
Payer: MEDICARE

## 2023-06-22 VITALS
DIASTOLIC BLOOD PRESSURE: 71 MMHG | HEART RATE: 86 BPM | BODY MASS INDEX: 31.41 KG/M2 | SYSTOLIC BLOOD PRESSURE: 123 MMHG | WEIGHT: 184 LBS | HEIGHT: 64 IN

## 2023-06-22 DIAGNOSIS — M70.62 TROCHANTERIC BURSITIS OF LEFT HIP: ICD-10-CM

## 2023-06-22 DIAGNOSIS — M70.61 TROCHANTERIC BURSITIS OF RIGHT HIP: Primary | ICD-10-CM

## 2023-06-22 PROCEDURE — 99213 OFFICE O/P EST LOW 20 MIN: CPT | Performed by: ORTHOPAEDIC SURGERY

## 2023-06-22 PROCEDURE — 20610 DRAIN/INJ JOINT/BURSA W/O US: CPT | Performed by: ORTHOPAEDIC SURGERY

## 2023-06-22 RX ORDER — TRIAMCINOLONE ACETONIDE 40 MG/ML
80 INJECTION, SUSPENSION INTRA-ARTICULAR; INTRAMUSCULAR
Status: COMPLETED | OUTPATIENT
Start: 2023-06-22 | End: 2023-06-22

## 2023-06-22 RX ORDER — BUPIVACAINE HYDROCHLORIDE 2.5 MG/ML
4 INJECTION, SOLUTION INFILTRATION; PERINEURAL
Status: COMPLETED | OUTPATIENT
Start: 2023-06-22 | End: 2023-06-22

## 2023-06-22 RX ADMIN — TRIAMCINOLONE ACETONIDE 80 MG: 40 INJECTION, SUSPENSION INTRA-ARTICULAR; INTRAMUSCULAR at 09:15

## 2023-06-22 RX ADMIN — BUPIVACAINE HYDROCHLORIDE 4 ML: 2.5 INJECTION, SOLUTION INFILTRATION; PERINEURAL at 09:15

## 2023-06-22 NOTE — PROGRESS NOTES
Assessment/Plan:   Diagnoses and all orders for this visit:    Trochanteric bursitis of right hip  -     Large joint arthrocentesis: bilateral greater trochanteric bursa    Trochanteric bursitis of left hip  -     Large joint arthrocentesis: bilateral greater trochanteric bursa    Discussed with patient that today's physical exam is consistent with flareup of trochanteric bursitis of the bilateral hips  Patient was offered, and accepted, Kenalog and Marcaine injection(s) to the bilateral hips for relief of pain and inflammation  Patient tolerated treatment(s) well  She will be seen in 3 months for re-evaluation and consideration for repeat injections as necessary  In regards to her left knee, she is encouraged to make a separate appointment for further evaluation  Patient expresses understanding and is in agreement with this treatment plan  Open to air bursitis of her bilateral hips  Under aseptic technique, both hips were injected with Kenalog and Marcaine  She tolerated procedures quite well  Return back 3 months for evaluation  If her condition changes, she would not hesitate to let us know    Subjective:   Patient ID: Jennifer Champagne  1955     HPI  Patient is a 70 y o  female who presents for follow-up evaluation of trochanteric bursitis of the bilateral hips  She was last seen regresses issue on 4/13/2023, at which time she received bilateral corticosteroid injections  She states that she got significant relief following these injections reducing her pain to 1-2/10  On today's presentation she reports that her pain is increased over the past 2 weeks  She rates her pain today at 5-6/10, and exacerbated with direct pressure such as lying on either affected side  She denies any new onset bruising, swelling, numbness, or tingling      The following portions of the patient's history were reviewed and updated as appropriate:  Past medical history, past surgical history, Family history, social history, "current medications and allergies    Past Medical History:   Diagnosis Date   • Arthritis    • Colon polyp    • DVT (deep venous thrombosis) ([facility] [address] )     11/14/22 Per pt back in the 80's - no further issues at this time   • Fibromyalgia    • GERD (gastroesophageal reflux disease)     11/14/22 Per pt resolved -\"doesnt have\"   • Hyperlipemia    • Hypothyroid    • Neuropathy     per pt in feet   • Osteoarthritis    • Osteomyelitis of ankle (HCC)     Right   • Rheumatoid arthritis ([facility] [address] )    • RSD (reflex sympathetic dystrophy)    • Vaginal delivery     x1       Past Surgical History:   Procedure Laterality Date   • ANKLE FUSION Right    • BREAST BIOPSY Left 12/18/2018    US guided; benign    • BREAST EXCISIONAL BIOPSY Left     benign    • CARPAL TUNNEL RELEASE     • CHOLECYSTECTOMY     • COLONOSCOPY     • ERCP      with insertion of tube into bile/pancreatic duct   • FACIAL RECONSTRUCTION SURGERY     • JOINT REPLACEMENT      tkr left    • [place] ENDOVEN ABLTJ INCMPTNT VEIN XTR LASER 1ST VEIN Left 02/15/2022    Procedure: EVLT left greater saphenous vein;  Surgeon: DO Fabienne;  [facility]: [facility];  Service: Vascular   • [place] ENDOVEN ABLTJ INCMPTNT VEIN XTR LASER 1ST VEIN Right 08/16/2022    Procedure: EVLT;  Surgeon: DO Fabienne;  [facility];  Service: Vascular   • [place] STAB PHLEBT VARICOSE VEINS 1 XTR > 20 INCS Left 02/15/2022    Procedure: MULTIPLE STAB PHLEBECTOMIES LEFT LEG;  Surgeon: DO Fabienne;  [facility];  Service: Vascular   • [place] STAB PHLEBT VARICOSE VEINS 1 XTR > 20 INCS Right 08/16/2022    Procedure: MULTIPLE STAB PHLEBECTOMIES;  Surgeon: DO Fabienne;  [facility];  Service: Vascular   • [place] STAB PHLEBT VARICOSE VEINS 1 XTR > 20 INCS Left 11/18/2022    Procedure: <20 STAB PHLEBECTOMIES;  Surgeon: DO Fabienne;  [facility];  Service: Vascular   • SHOULDER SURGERY Right     for frozen shoulder/RSD   • US GUIDED BREAST BIOPSY LEFT " COMPLETE Left 2018    Duct ectasia, fibrosis, focal usual ductal hyperplasia   • US GUIDED BREAST BIOPSY LEFT COMPLETE Left 2023   • US GUIDED BREAST BIOPSY LEFT COMPLETE Left 2023       Family History   Problem Relation Age of Onset   • Dementia Mother    • Diabetes Mother    • Anesthesia problems Father    • Coronary artery disease Father    • Hemochromatosis Sister    • Cancer Sister    • Pancreatic cancer Sister    • Pancreatitis Sister    • Cancer Brother    • Prostate cancer Brother    • No Known Problems Maternal Grandmother    • No Known Problems Maternal Grandfather    • No Known Problems Paternal Grandmother    • No Known Problems Paternal Grandfather    • Breast cancer Maternal Aunt    • Breast cancer Cousin    • Diabetes Family    • Hypertension Neg Hx        Social History     Socioeconomic History   • Marital status:      Spouse name: None   • Number of children: None   • Years of education: None   • Highest education level: None   Occupational History   • None   Tobacco Use   • Smoking status: Former     Types: Cigarettes     Quit date:      Years since quittin 4   • Smokeless tobacco: Never   • Tobacco comments:     Quit 1998 - former smoker   Vaping Use   • Vaping Use: Never used   Substance and Sexual Activity   • Alcohol use: Not Currently     Comment: Denies any current use - has had former use - no problems with alcohol   • Drug use: Not Currently     Types: Marijuana     Comment: Former use - at age 12 - no current use at this time   • Sexual activity: Not Currently     Comment: Not active at this time   Other Topics Concern   • None   Social History Narrative   • None     Social Determinants of Health     Financial Resource Strain: High Risk (2023)    Overall Financial Resource Strain (CARDIA)    • Difficulty of Paying Living Expenses: Very hard   Food Insecurity: Not on file   Transportation Needs: No Transportation Needs (2023)    PRAPARE - "Transportation    • Lack of Transportation (Medical): No    • Lack of Transportation (Non-Medical): No   Physical Activity: Not on file   Stress: Not on file   Social Connections: Not on file   Intimate Partner Violence: Not on file   Housing Stability: Not on file         Current Outpatient Medications:   •  atorvastatin (LIPITOR) 10 mg tablet, take 1 tablet by mouth once daily, Disp: 90 tablet, Rfl: 3  •  CALCIUM CITRATE PO, Take 900 mg by mouth in the morning, Disp: , Rfl:   •  Cholecalciferol (VITAMIN D-3 PO), Take 2,000 Int'l Units by mouth in the morning, Disp: , Rfl:   •  meloxicam (MOBIC) 15 mg tablet, Take 1 tablet (15 mg total) by mouth daily as needed for moderate pain, Disp: 90 tablet, Rfl: 1  •  Multiple Vitamin (multivitamin) tablet, Take 1 tablet by mouth daily, Disp: , Rfl:     Allergies   Allergen Reactions   • Bee Venom Itching and Edema     Bee stings       Review of Systems   Constitutional: Negative for chills, fever and unexpected weight change  HENT: Negative for hearing loss, nosebleeds and sore throat  Eyes: Negative for pain, redness and visual disturbance  Respiratory: Negative for cough, shortness of breath and wheezing  Cardiovascular: Negative for chest pain, palpitations and leg swelling  Gastrointestinal: Negative for abdominal pain, nausea and vomiting  Endocrine: Negative for polydipsia and polyuria  Genitourinary: Negative for dysuria and hematuria  Musculoskeletal:        As noted in HPI   Skin: Negative for rash and wound  Neurological: Negative for dizziness, numbness and headaches  Psychiatric/Behavioral: Negative for decreased concentration and suicidal ideas  The patient is not nervous/anxious           Objective:  /71 (BP Location: Left arm, Patient Position: Sitting, Cuff Size: Large)   Pulse 86   Ht 5' 4\" (1 626 m)   Wt 83 5 kg (184 lb)   LMP  (LMP Unknown)   BMI 31 58 kg/m²     Ortho Exam  Bilateral hips -   Patient presents with no obvious " "anatomical deformity  Ambulates with steady gait pattern  Uses no assistive device  Nontender over lumbar midline  Nontender over paraspinal musculature  TTP over greater trochanter/trochanteric bursa  Nontender over PSIS, nontender over piriformis/glute med  Nontender over anterior hip joint/groin  ROM: 0° - 15° seated IR, 0° - 30° seated ER, 100° seated hip flexion  Pain-free passive circumduction  Knee flexor and extensor mechanisms intact  + CAROLIN for lateral pain exacerbation, + FADIR lateral pain exacerbation  - log roll  2+ TP and DP pulses with brisk capillary refill to the toes  Sural, saphenous, tibial, superficial and deep peroneal motor and sensory distributions intact  Sensation to light touch intact distally    Physical Exam  Vitals and nursing note reviewed  Constitutional:       General: She is not in acute distress  Appearance: She is well-developed  HENT:      Head: Normocephalic and atraumatic  Eyes:      Conjunctiva/sclera: Conjunctivae normal    Cardiovascular:      Rate and Rhythm: Normal rate  Pulmonary:      Effort: Pulmonary effort is normal    Musculoskeletal:      Cervical back: Neck supple  Skin:     General: Skin is warm and dry  Capillary Refill: Capillary refill takes less than 2 seconds  Neurological:      Mental Status: She is alert and oriented to person, place, and time  Psychiatric:         Mood and Affect: Mood normal          Behavior: Behavior normal           Diagnostic Test Review:  No new imaging reviewed this visit    Large joint arthrocentesis: bilateral greater trochanteric bursa  Universal Protocol:  Consent: Verbal consent obtained  Risks and benefits: risks, benefits and alternatives were discussed  Consent given by: patient  Time out: Immediately prior to procedure a \"time out\" was called to verify the correct patient, procedure, equipment, support staff and site/side marked as required    Timeout called at: 6/22/2023 9:23 AM   Patient " understanding: patient states understanding of the procedure being performed  Site marked: the operative site was marked  Patient identity confirmed: verbally with patient    Supporting Documentation  Indications: pain and joint swelling   Procedure Details  Location: hip - bilateral greater trochanteric bursa  Preparation: Patient was prepped and draped in the usual sterile fashion  Needle gauge: 21G    Ultrasound guidance: no  Approach: lateral    Medications (Right): 4 mL bupivacaine 0 25 %; 80 mg triamcinolone acetonide 40 mg/mLMedications (Left): 4 mL bupivacaine 0 25 %; 80 mg triamcinolone acetonide 40 mg/mL   Patient tolerance: patient tolerated the procedure well with no immediate complications  Dressing:  Sterile dressing applied          Scribe Attestation    I,:  April Duran am acting as a scribe while in the presence of the attending physician :       I,:  Americo Mccarthy DO personally performed the services described in this documentation    as scribed in my presence :

## 2023-06-23 ENCOUNTER — PATIENT OUTREACH (OUTPATIENT)
Dept: INTERNAL MEDICINE CLINIC | Facility: CLINIC | Age: 68
End: 2023-06-23

## 2023-06-23 NOTE — PROGRESS NOTES
JOANNE OVERTON made second outreach attempt to pt to f/u on referral  Pt answered and stated she was in line for a  and asked to call back  Will attempt one more outreach next week if pt does not call back

## 2023-06-26 ENCOUNTER — PATIENT OUTREACH (OUTPATIENT)
Dept: INTERNAL MEDICINE CLINIC | Facility: CLINIC | Age: 68
End: 2023-06-26

## 2023-06-26 NOTE — PROGRESS NOTES
JOANNE OVERTON received vm from pt and returned call  JOANNE OVERTON explained role and reason for referral  Pt stated she is having some trouble with food d/t SNAP benefits being decreased at the end of the pandemic assistance  JOANNE OVERTON looked at senior food box eligibility guidelines and pt stated she does not qualify  Pt stated that she does alright, but wishes she would get more SNAP benefits  JOANNE OVERTON shared that pt could call aging and ask about in home meals  Pt stated she does have their contact information  Pt denied having any other needs at this time  Referral closed

## 2023-06-29 ENCOUNTER — APPOINTMENT (OUTPATIENT)
Dept: RADIOLOGY | Facility: MEDICAL CENTER | Age: 68
End: 2023-06-29
Payer: MEDICARE

## 2023-06-29 ENCOUNTER — OFFICE VISIT (OUTPATIENT)
Dept: OBGYN CLINIC | Facility: CLINIC | Age: 68
End: 2023-06-29
Payer: MEDICARE

## 2023-06-29 ENCOUNTER — APPOINTMENT (OUTPATIENT)
Dept: LAB | Facility: MEDICAL CENTER | Age: 68
End: 2023-06-29
Payer: MEDICARE

## 2023-06-29 VITALS
HEART RATE: 70 BPM | HEIGHT: 64 IN | SYSTOLIC BLOOD PRESSURE: 131 MMHG | BODY MASS INDEX: 31.58 KG/M2 | DIASTOLIC BLOOD PRESSURE: 82 MMHG

## 2023-06-29 DIAGNOSIS — T84.033A LOOSENING OF PROSTHESIS OF LEFT TOTAL KNEE REPLACEMENT, INITIAL ENCOUNTER (HCC): ICD-10-CM

## 2023-06-29 DIAGNOSIS — M25.562 LEFT KNEE PAIN, UNSPECIFIED CHRONICITY: ICD-10-CM

## 2023-06-29 DIAGNOSIS — T84.033A LOOSENING OF PROSTHESIS OF LEFT TOTAL KNEE REPLACEMENT, INITIAL ENCOUNTER (HCC): Primary | ICD-10-CM

## 2023-06-29 LAB
BASOPHILS # BLD AUTO: 0.03 THOUSANDS/ÂΜL (ref 0–0.1)
BASOPHILS NFR BLD AUTO: 0 % (ref 0–1)
EOSINOPHIL # BLD AUTO: 0.04 THOUSAND/ÂΜL (ref 0–0.61)
EOSINOPHIL NFR BLD AUTO: 1 % (ref 0–6)
ERYTHROCYTE [DISTWIDTH] IN BLOOD BY AUTOMATED COUNT: 12.5 % (ref 11.6–15.1)
ERYTHROCYTE [SEDIMENTATION RATE] IN BLOOD: 4 MM/HOUR (ref 0–29)
HCT VFR BLD AUTO: 41.5 % (ref 34.8–46.1)
HGB BLD-MCNC: 13.6 G/DL (ref 11.5–15.4)
IMM GRANULOCYTES # BLD AUTO: 0.06 THOUSAND/UL (ref 0–0.2)
IMM GRANULOCYTES NFR BLD AUTO: 1 % (ref 0–2)
LYMPHOCYTES # BLD AUTO: 1.47 THOUSANDS/ÂΜL (ref 0.6–4.47)
LYMPHOCYTES NFR BLD AUTO: 18 % (ref 14–44)
MCH RBC QN AUTO: 34.2 PG (ref 26.8–34.3)
MCHC RBC AUTO-ENTMCNC: 32.8 G/DL (ref 31.4–37.4)
MCV RBC AUTO: 104 FL (ref 82–98)
MONOCYTES # BLD AUTO: 1.14 THOUSAND/ÂΜL (ref 0.17–1.22)
MONOCYTES NFR BLD AUTO: 14 % (ref 4–12)
NEUTROPHILS # BLD AUTO: 5.56 THOUSANDS/ÂΜL (ref 1.85–7.62)
NEUTS SEG NFR BLD AUTO: 66 % (ref 43–75)
NRBC BLD AUTO-RTO: 0 /100 WBCS
PLATELET # BLD AUTO: 213 THOUSANDS/UL (ref 149–390)
PMV BLD AUTO: 10 FL (ref 8.9–12.7)
RBC # BLD AUTO: 3.98 MILLION/UL (ref 3.81–5.12)
WBC # BLD AUTO: 8.3 THOUSAND/UL (ref 4.31–10.16)

## 2023-06-29 PROCEDURE — 36415 COLL VENOUS BLD VENIPUNCTURE: CPT

## 2023-06-29 PROCEDURE — 85025 COMPLETE CBC W/AUTO DIFF WBC: CPT

## 2023-06-29 PROCEDURE — 85652 RBC SED RATE AUTOMATED: CPT

## 2023-06-29 PROCEDURE — 73562 X-RAY EXAM OF KNEE 3: CPT

## 2023-06-29 PROCEDURE — 99213 OFFICE O/P EST LOW 20 MIN: CPT | Performed by: ORTHOPAEDIC SURGERY

## 2023-06-29 NOTE — PROGRESS NOTES
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, initial encounter ([facility] [address] )  -     NM bone scan 3 phase; Future  -     CBC and differential; Future  -     Sedimentation rate, automated; Future    Left knee pain, unspecified chronicity  -     XR knee 3 vw left non injury; Future         Reviewed physical exam and imaging with patient on today's exam  Her symptoms and radiographic findings demonstrate suspected septic loosening of the prosthesis in her left knee  A bone scan, CBC, and sedimentation rate were ordered for further evaluation of loosening and infection  Continue weight bearing activity as tolerated  She will follow-up once the lab work is completed  The patient has diffuse pain along her left total knee that was replaced 10 years ago  She has lost over 70 pounds  There is diffuse inflammation along this region  X-rays show lucency along the anterior flange  Would recommend obtaining a bone scan as well as blood work  Return back once complete    Subjective:   Patient ID: Jenelle Shore  1955     HPI  Patient is a 70 y o  female who presents for initial evaluation of her left knee  The patient has been experiencing pain for several months, with no known mechanism of injury  The patient reports losing approximately 75lbs recently  She has a history of a TKA of her left knee, completed approximately 10-12 years ago  She states that she has had the knee operated on twice  She also has ahistory of varicose veins on her left lower leg  On today's visit, she reports pain on the medial and anterior aspect of her knee  She states that she experiences pain with deep knee flexion and when ambulating stairs  She reports instances of instability when walking  She denies numbness or tingling       The following portions of the patient's history were reviewed and updated as appropriate:  Past medical history, past surgical history, "Family history, social history, current medications and allergies    Past Medical History:   Diagnosis Date   • Arthritis    • Colon polyp    • DVT (deep venous thrombosis) ([facility] [address] )     11/14/22 Per pt back in the 80's - no further issues at this time   • Fibromyalgia    • GERD (gastroesophageal reflux disease)     11/14/22 Per pt resolved -\"doesnt have\"   • Hyperlipemia    • Hypothyroid    • Neuropathy     per pt in feet   • Osteoarthritis    • Osteomyelitis of ankle (HCC)     Right   • Rheumatoid arthritis ([facility] [address] )    • RSD (reflex sympathetic dystrophy)    • Vaginal delivery     x1       Past Surgical History:   Procedure Laterality Date   • ANKLE FUSION Right    • BREAST BIOPSY Left 12/18/2018    US guided; benign    • BREAST EXCISIONAL BIOPSY Left     benign    • CARPAL TUNNEL RELEASE     • CHOLECYSTECTOMY     • COLONOSCOPY     • ERCP      with insertion of tube into bile/pancreatic duct   • FACIAL RECONSTRUCTION SURGERY     • JOINT REPLACEMENT      tkr left    • UT ENDOVEN ABLTJ INCMPTNT VEIN XTR LASER 1ST VEIN Left 02/15/2022    Procedure: EVLT left greater saphenous vein;  Surgeon: Hieu De Paz DO;  [facility];  Service: Vascular   • UT ENDOVEN ABLTJ INCMPTNT VEIN XTR LASER 1ST VEIN Right 08/16/2022    Procedure: EVLT;  Surgeon: Hieu De Paz DO;  [facility];  Service: Vascular   • UT STAB PHLEBT VARICOSE VEINS 1 XTR > 20 INCS Left 02/15/2022    Procedure: MULTIPLE STAB PHLEBECTOMIES LEFT LEG;  Surgeon: Hieu De Paz DO;  [facility];  Service: Vascular   • UT STAB PHLEBT VARICOSE VEINS 1 XTR > 20 INCS Right 08/16/2022    Procedure: MULTIPLE STAB PHLEBECTOMIES;  Surgeon: Hieu De Paz DO;  [facility];  Service: Vascular   • UT STAB PHLEBT VARICOSE VEINS 1 XTR > 20 INCS Left 11/18/2022    Procedure: <20 STAB PHLEBECTOMIES;  Surgeon: Hieu De Paz DO;  [facility];  Service: Vascular   • SHOULDER SURGERY Right     for frozen shoulder/RSD   • " US GUIDED BREAST BIOPSY LEFT COMPLETE Left 2018    Duct ectasia, fibrosis, focal usual ductal hyperplasia   • US GUIDED BREAST BIOPSY LEFT COMPLETE Left 2023   • US GUIDED BREAST BIOPSY LEFT COMPLETE Left 2023       Family History   Problem Relation Age of Onset   • Dementia Mother    • Diabetes Mother    • Anesthesia problems Father    • Coronary artery disease Father    • Hemochromatosis Sister    • Cancer Sister    • Pancreatic cancer Sister    • Pancreatitis Sister    • Cancer Brother    • Prostate cancer Brother    • No Known Problems Maternal Grandmother    • No Known Problems Maternal Grandfather    • No Known Problems Paternal Grandmother    • No Known Problems Paternal Grandfather    • Breast cancer Maternal Aunt    • Breast cancer Cousin    • Diabetes Family    • Hypertension Neg Hx        Social History     Socioeconomic History   • Marital status:      Spouse name: None   • Number of children: None   • Years of education: None   • Highest education level: None   Occupational History   • None   Tobacco Use   • Smoking status: Former     Types: Cigarettes     Quit date:      Years since quittin 5   • Smokeless tobacco: Never   • Tobacco comments:     Quit 1998 - former smoker   Vaping Use   • Vaping Use: Never used   Substance and Sexual Activity   • Alcohol use: Not Currently     Comment: Denies any current use - has had former use - no problems with alcohol   • Drug use: Not Currently     Types: Marijuana     Comment: Former use - at age 12 - no current use at this time   • Sexual activity: Not Currently     Comment: Not active at this time   Other Topics Concern   • None   Social History Narrative   • None     Social Determinants of Health     Financial Resource Strain: High Risk (2023)    Overall Financial Resource Strain (CARDIA)    • Difficulty of Paying Living Expenses: Very hard   Food Insecurity: Not on file   Transportation Needs: No Transportation Needs "(6/12/2023)    PRAPARE - Transportation    • Lack of Transportation (Medical): No    • Lack of Transportation (Non-Medical): No   Physical Activity: Not on file   Stress: Not on file   Social Connections: Not on file   Intimate Partner Violence: Not on file   Housing Stability: Not on file         Current Outpatient Medications:   •  atorvastatin (LIPITOR) 10 mg tablet, take 1 tablet by mouth once daily, Disp: 90 tablet, Rfl: 3  •  CALCIUM CITRATE PO, Take 900 mg by mouth in the morning, Disp: , Rfl:   •  Cholecalciferol (VITAMIN D-3 PO), Take 2,000 Int'l Units by mouth in the morning, Disp: , Rfl:   •  meloxicam (MOBIC) 15 mg tablet, Take 1 tablet (15 mg total) by mouth daily as needed for moderate pain, Disp: 90 tablet, Rfl: 1  •  Multiple Vitamin (multivitamin) tablet, Take 1 tablet by mouth daily, Disp: , Rfl:     Allergies   Allergen Reactions   • Bee Venom Itching and Edema     Bee stings       Review of Systems   Constitutional: Negative for chills, fever and unexpected weight change  HENT: Negative for hearing loss, nosebleeds and sore throat  Eyes: Negative for pain, redness and visual disturbance  Respiratory: Negative for cough, shortness of breath and wheezing  Cardiovascular: Negative for chest pain, palpitations and leg swelling  Gastrointestinal: Negative for abdominal pain, nausea and vomiting  Endocrine: Negative for polydipsia and polyuria  Genitourinary: Negative for dysuria and hematuria  Skin: Negative for rash and wound  Neurological: Negative for dizziness, numbness and headaches  Psychiatric/Behavioral: Negative for decreased concentration and suicidal ideas  The patient is not nervous/anxious  All other systems reviewed and are negative         Objective:  /82 (BP Location: Left arm, Patient Position: Sitting, Cuff Size: Standard)   Pulse 70   Ht 5' 4\" (1 626 m)   LMP  (LMP Unknown)   BMI 31 58 kg/m²     Ortho Exam  left knee(s) -   Patient ambulates with " antalgic gait pattern  Uses No assistive device  No anatomical deformity  Skin is warm and dry to touch with no signs of erythema, ecchymosis, or infection   Mild generalized soft tissue swelling or effusion noted  ROM (5° - 115°)   MMT: 4/5 throughout  TTP over medial joint line, TTP over lateral joint line  Flexor and extensor mechanisms are intact   Knee is stable to varus and valgus stress  - Lachman's  - Anterior Drawer, - Posterior Drawer  - Medial Bharathi's, - Lateral Bharathi's  - Pivot Shift  Patella tracks centrally without palpable crepitus  Calf compartments are soft and supple  - Devora's sign  2+ DP and PT pulses with brisk capillary refill to the toes  Sural, saphenous, tibial, superficial, and deep peroneal motor and sensory distributions intact  Sensation light touch intact distally      Physical Exam  HENT:      Head: Normocephalic and atraumatic  Nose: Nose normal    Eyes:      Conjunctiva/sclera: Conjunctivae normal    Cardiovascular:      Rate and Rhythm: Normal rate  Pulmonary:      Effort: Pulmonary effort is normal    Musculoskeletal:      Cervical back: Neck supple  Skin:     General: Skin is warm and dry  Capillary Refill: Capillary refill takes less than 2 seconds  Neurological:      Mental Status: She is alert and oriented to person, place, and time  Psychiatric:         Mood and Affect: Mood normal          Behavior: Behavior normal           Diagnostic Test Review: The attending physician has personally reviewed the pertinent films in PACS and the interpretation is as follows:    X-Ray of left knee taken on 6/29/23 were reviewed and showed questionable lucency along the anterior flange  Procedures   None performed       Scribe Attestation    I,:  Noemi Mike am acting as a scribe while in the presence of the attending physician :       I,:  Kaela Chang DO personally performed the services described in this documentation    as scribed in my presence :

## 2023-07-05 ENCOUNTER — HOSPITAL ENCOUNTER (OUTPATIENT)
Dept: NUCLEAR MEDICINE | Facility: HOSPITAL | Age: 68
Discharge: HOME | End: 2023-07-05
Attending: ORTHOPAEDIC SURGERY
Payer: MEDICARE

## 2023-07-05 DIAGNOSIS — T84.033A LOOSENING OF PROSTHESIS OF LEFT TOTAL KNEE REPLACEMENT, INITIAL ENCOUNTER (HCC): ICD-10-CM

## 2023-07-05 PROCEDURE — G1004 CDSM NDSC: HCPCS

## 2023-07-05 PROCEDURE — 78315 BONE IMAGING 3 PHASE: CPT

## 2023-07-05 PROCEDURE — A9503 TC99M MEDRONATE: HCPCS

## 2023-07-10 ENCOUNTER — TELEPHONE (OUTPATIENT)
Dept: INTERNAL MEDICINE CLINIC | Facility: CLINIC | Age: 68
End: 2023-07-10

## 2023-07-10 DIAGNOSIS — E53.8 VITAMIN B12 DEFICIENCY: Primary | ICD-10-CM

## 2023-07-10 RX ORDER — LANOLIN ALCOHOL/MO/W.PET/CERES
1000 CREAM (GRAM) TOPICAL DAILY
Qty: 90 TABLET | Refills: 1 | Status: SHIPPED | OUTPATIENT
Start: 2023-07-10

## 2023-07-10 NOTE — TELEPHONE ENCOUNTER
Madeline was wondering if b12 can be called in as a script     Spoke to provider and she sent in to niharika in [facility]

## 2023-07-13 ENCOUNTER — OFFICE VISIT (OUTPATIENT)
Dept: OBGYN CLINIC | Facility: CLINIC | Age: 68
End: 2023-07-13
Payer: MEDICARE

## 2023-07-13 VITALS
BODY MASS INDEX: 31.41 KG/M2 | HEART RATE: 71 BPM | HEIGHT: 64 IN | WEIGHT: 184 LBS | SYSTOLIC BLOOD PRESSURE: 138 MMHG | DIASTOLIC BLOOD PRESSURE: 71 MMHG

## 2023-07-13 DIAGNOSIS — M25.562 LEFT KNEE PAIN, UNSPECIFIED CHRONICITY: Primary | ICD-10-CM

## 2023-07-13 DIAGNOSIS — Z96.652 STATUS POST TOTAL KNEE REPLACEMENT, LEFT: ICD-10-CM

## 2023-07-13 DIAGNOSIS — M23.52 CHRONIC KNEE INSTABILITY, LEFT: ICD-10-CM

## 2023-07-13 PROCEDURE — 99213 OFFICE O/P EST LOW 20 MIN: CPT | Performed by: ORTHOPAEDIC SURGERY

## 2023-07-13 NOTE — PROGRESS NOTES
Assessment:  1. Left knee pain, unspecified chronicity  Brace      2. Chronic knee instability, left  Brace      3. Status post total knee replacement, left  Brace          Plan:  Left knee pain with history of total knee arthroplasty 10-12 years ago  · Bone scan and blood work display no evidence of hardware loosening or infection  · Patient provided with left knee hinged brace  · Follow up in 3 months      To do next visit:  Return in about 3 months (around 10/13/2023). The above stated was discussed in layman's terms and the patient expressed understanding. All questions were answered to the patient's satisfaction. Patient still having diffuse tenderness along her left lower extremity. There is swelling present throughout. This could be related to her venous disease. She is getting get in contact with her vascular surgeon. The blood work was negative for any infection. There is no evidence any loosening as well. She was placed in a brace. She wants using exercise bike instead of therapy. Follow-up in 3 months evaluation. If her condition changes, she would not hesitate to let us know      Scribe Attestation    I,:  Ashley Wren am acting as a scribe while in the presence of the attending physician.:       I,:  Nahed Adrian, DO personally performed the services described in this documentation    as scribed in my presence.:             Subjective:   Clare Hendricks is a 70 y.o. female who presents for follow up of left knee with history of total knee arthroplasty 10-12 years ago. Today she complains of generalized knee pain. Most activity such as walking aggravates while rest alleviates.         Review of systems negative unless otherwise specified in HPI    Past Medical History:   Diagnosis Date   • Arthritis    • Colon polyp    • DVT (deep venous thrombosis) ([address])     11/14/22 Per pt back in the 80's - no further issues at this time   • Fibromyalgia    • GERD (gastroesophageal reflux disease)     11/14/22 Per pt resolved -"doesnt have"   • Hyperlipemia    • Hypothyroid    • Neuropathy     per pt in feet   • Osteoarthritis    • Osteomyelitis of ankle (HCC)     Right   • Rheumatoid arthritis (HCC)    • RSD (reflex sympathetic dystrophy)    • Vaginal delivery     x1       Past Surgical History:   Procedure Laterality Date   • ANKLE FUSION Right    • BREAST BIOPSY Left 12/18/2018    US guided; benign    • BREAST EXCISIONAL BIOPSY Left     benign    • CARPAL TUNNEL RELEASE     • CHOLECYSTECTOMY     • COLONOSCOPY     • ERCP      with insertion of tube into bile/pancreatic duct   • FACIAL RECONSTRUCTION SURGERY     • JOINT REPLACEMENT      tkr left    • [place] ENDOVEN ABLTJ INCMPTNT VEIN XTR LASER 1ST VEIN Left 02/15/2022    Procedure: EVLT left greater saphenous vein;  Surgeon: Alex Bueno DO;  [facility];  Service: Vascular   • [place] ENDOVEN ABLTJ INCMPTNT VEIN XTR LASER 1ST VEIN Right 08/16/2022    Procedure: EVLT;  Surgeon: Alex Bueno DO;  [facility];  Service: Vascular   • WA STAB PHLEBT VARICOSE VEINS 1 XTR > 20 INCS Left 02/15/2022    Procedure: MULTIPLE STAB PHLEBECTOMIES LEFT LEG;  Surgeon: Alex Bueno DO;  [facility];  Service: Vascular   • WA STAB PHLEBT VARICOSE VEINS 1 XTR > 20 INCS Right 08/16/2022    Procedure: MULTIPLE STAB PHLEBECTOMIES;  Surgeon: Alex Bueno DO;  [facility];  Service: Vascular   • WA STAB PHLEBT VARICOSE VEINS 1 XTR > 20 INCS Left 11/18/2022    Procedure: <20 STAB PHLEBECTOMIES;  Surgeon: Alex Bueno DO;  [facility];  Service: Vascular   • SHOULDER SURGERY Right     for frozen shoulder/RSD   • US GUIDED BREAST BIOPSY LEFT COMPLETE Left 12/18/2018    Duct ectasia, fibrosis, focal usual ductal hyperplasia   • US GUIDED BREAST BIOPSY LEFT COMPLETE Left 01/04/2023   • US GUIDED BREAST BIOPSY LEFT COMPLETE Left 1/4/2023       Family History   Problem Relation Age of Onset   • Dementia Mother • Diabetes Mother    • Anesthesia problems Father    • Coronary artery disease Father    • Hemochromatosis Sister    • Cancer Sister    • Pancreatic cancer Sister    • Pancreatitis Sister    • Cancer Brother    • Prostate cancer Brother    • No Known Problems Maternal Grandmother    • No Known Problems Maternal Grandfather    • No Known Problems Paternal Grandmother    • No Known Problems Paternal Grandfather    • Breast cancer Maternal Aunt    • Breast cancer Cousin    • Diabetes Family    • Hypertension Neg Hx        Social History     Occupational History   • Not on file   Tobacco Use   • Smoking status: Former     Types: Cigarettes     Quit date:      Years since quittin.5   • Smokeless tobacco: Never   • Tobacco comments:     Quit 1998 - former smoker   Vaping Use   • Vaping Use: Never used   Substance and Sexual Activity   • Alcohol use: Not Currently     Comment: Denies any current use - has had former use - no problems with alcohol   • Drug use: Not Currently     Types: Marijuana     Comment: Former use - at age 12 - no current use at this time   • Sexual activity: Not Currently     Comment: Not active at this time         Current Outpatient Medications:   •  atorvastatin (LIPITOR) 10 mg tablet, take 1 tablet by mouth once daily, Disp: 90 tablet, Rfl: 3  •  CALCIUM CITRATE PO, Take 900 mg by mouth in the morning, Disp: , Rfl:   •  Cholecalciferol (VITAMIN D-3 PO), Take 2,000 Int'l Units by mouth in the morning, Disp: , Rfl:   •  meloxicam (MOBIC) 15 mg tablet, Take 1 tablet (15 mg total) by mouth daily as needed for moderate pain, Disp: 90 tablet, Rfl: 1  •  Multiple Vitamin (multivitamin) tablet, Take 1 tablet by mouth daily, Disp: , Rfl:   •  vitamin B-12 (VITAMIN B-12) 1,000 mcg tablet, Take 1 tablet (1,000 mcg total) by mouth daily, Disp: 90 tablet, Rfl: 1    Allergies   Allergen Reactions   • Bee Venom Itching and Edema     Bee stings            Vitals:    23 0839   BP: 138/71 Pulse: 71       Objective:  Physical exam  · General: Awake, Alert, Oriented  · Eyes: Pupils equal, round and reactive to light  · Heart: regular rate and rhythm  · Lungs: No audible wheezing  · Abdomen: soft                    Ortho Exam  Left knee:  TTP over anterolateral knee over joint line  Well healed anterior incision  No erythema and no ecchymosis  Stable to varus and valgus stress  Extensor mechanism intact  Extension 0  Flexion 120  Calf compartments soft and supple  Sensation intact  Toes are warm sensate and mobile      Diagnostics, reviewed and taken today if performed as documented: The attending physician has personally reviewed the pertinent films in PACS and interpretation is as follows:  Bone scan:  No evidence of left knee hardware loosening. Procedures, if performed today:    Procedures    None performed      Portions of the record may have been created with voice recognition software. Occasional wrong word or "sound a like" substitutions may have occurred due to the inherent limitations of voice recognition software. Read the chart carefully and recognize, using context, where substitutions have occurred.

## 2023-07-26 ENCOUNTER — OFFICE VISIT (OUTPATIENT)
Dept: GASTROENTEROLOGY | Facility: CLINIC | Age: 68
End: 2023-07-26
Payer: MEDICARE

## 2023-07-26 VITALS
RESPIRATION RATE: 16 BRPM | OXYGEN SATURATION: 97 % | DIASTOLIC BLOOD PRESSURE: 69 MMHG | HEART RATE: 73 BPM | WEIGHT: 184 LBS | TEMPERATURE: 98.9 F | BODY MASS INDEX: 30.66 KG/M2 | SYSTOLIC BLOOD PRESSURE: 123 MMHG | HEIGHT: 65 IN

## 2023-07-26 DIAGNOSIS — D73.89 LESION OF SPLEEN: ICD-10-CM

## 2023-07-26 DIAGNOSIS — D12.6 ADENOMATOUS POLYP OF COLON, UNSPECIFIED PART OF COLON: ICD-10-CM

## 2023-07-26 DIAGNOSIS — R14.0 BLOATING: Primary | ICD-10-CM

## 2023-07-26 DIAGNOSIS — K86.89 PANCREATIC DUCT DILATED: ICD-10-CM

## 2023-07-26 DIAGNOSIS — K59.00 CONSTIPATION, UNSPECIFIED CONSTIPATION TYPE: ICD-10-CM

## 2023-07-26 PROCEDURE — 99214 OFFICE O/P EST MOD 30 MIN: CPT | Performed by: PHYSICIAN ASSISTANT

## 2023-07-26 RX ORDER — POLYETHYLENE GLYCOL 3350 17 G/17G
17 POWDER, FOR SOLUTION ORAL DAILY
Qty: 765 G | Refills: 5 | Status: SHIPPED | OUTPATIENT
Start: 2023-07-26

## 2023-07-26 NOTE — PATIENT INSTRUCTIONS
For now, continue on daily miralax. Look into the low FODMAP diet as certain foods may be contributing to your bloating. I will check you for a bacterial infection in the stomach called h pylori. We will also try to get a breath test for something called bacterial intestinal overgrowth that can cause bloating and excess gas production. If you have any new nausea, vomiting, poor appetite, weight loss, blood in stool, please let us know right away.

## 2023-07-26 NOTE — PROGRESS NOTES
[facility] Outpatient Follow[facility]up [facility]  Ramona Pretty 70 y.o. female MRN: [id number]  Encounter: [id number]    ASSESSMENT AND PLAN:      1. Bloating  2. Constipation, unspecified constipation type    Pt is up to date on colonoscopy. She has had advanced imaging of abdomen which as unremarkable, no evidence of malignancy. She has been dealing with some constipation for which she takes daily miralax with good effect. We did review given her persistent bloating will investigate additionally with h pylori testing as well as SIBO breath testing. We reviewed low FODMAP diet, as dietary intake may be contributing to her symptoms. Okay for Gas-x, Beano as needed. Trial IB Jean-Paul.     - H. pylori antigen, stool; Future  - Small intestinal bacterial overgrowth  - polyethylene glycol (GLYCOLAX) 17 GM/SCOOP powder; Take 17 g by mouth daily  Dispense: 765 g; Refill: 5    3. Adenomatous polyp of colon, unspecified part of colon    She is UTD on colonoscopy for surveillance purposes given hx of polyps. Will be due for repeat colonoscopy in 5 years for surveillance purposes, in 12/2027.    4. Lesion of spleen    We reviewed findings of stable splenic lesion c/w hemangioma. This does not require additional testing/surveillance imaging. 5. Pancreatic duct dilated    We also reviewed incidental finding of dilated PD near the ampulla measuring up to 5 mm, both findings of which were stable dating back to 2014. No evidence of suspicious lesions or pancreatic or ampullary mass. We will continue to follow at this time, consider surveillance imaging in the future versus an EUS for additional evaluation if any new symptoms arise. We will follow up in approx 3-6 months to reassess symptoms. ______________________________________________________________________    SUBJECTIVE: Patient is a 70 y.o. female who presents today for follow-up regarding abd bloating.  Pmhx sig for RA, reflex sympathetic dystrophy, hypothyroidism, hyperlipidemia, GERD, fibromyalgia, personal history of colon polyps. Pt was last evaluated during Telehealth visit in 04/2023 for abdominal bloating, increased belching and constipation following colonoscopy. She started taking miralax and stool softeners to help with constipation. She was complaining of LLQ pain following certain oral intake. 07/26/23:     Pt notes continued improvement in bowel habits and bloating since last OV. She is still having infrequent rare abd discomfort at times. Still having some excess flatus and belching at times, seems to occur irregardless of oral intake. Denies straining, constipation, BRBPR or melena. She denies any diarrhea or nocturnal BMs. Denies any unintentional weight loss over the past 6 months or so. She denies any significant heartburn, indigestion, nausea, emesis, dysphagia or odynophagia. NSAIDs: no regular use   Etoh: none   Tobacco: none     12/2022: MRI abdomen: Several splenic lesions with features suggesting hemangiomas; Mild focal dilatation of the pancreatic duct near the ampulla measuring up to 5 mm; No pancreatic or ampullary mass  02/2023: IgA 195, Ttg IgA < 2      Endoscopic history:   Colon: 12/2022: Pancolonic diverticulosis    Review of Systems   Constitutional: Negative for fever. Gastrointestinal: Negative for constipation, diarrhea, nausea and vomiting. Genitourinary: Positive for frequency. Negative for dysuria and hematuria. Musculoskeletal: Positive for arthralgias and myalgias. Neurological: Positive for headaches.    Otherwise Per HPI    Historical Information   Past Medical History:   Diagnosis Date   • Arthritis    • Colon polyp    • DVT (deep venous thrombosis) ([address])     11/14/22 Per pt back in the 80's - no further issues at this time   • Fibromyalgia    • GERD (gastroesophageal reflux disease)     11/14/22 Per pt resolved -"doesnt have"   • Hyperlipemia    • Hypothyroid    • Neuropathy     per pt in feet   • Osteoarthritis    • Osteomyelitis of ankle (HCC)     Right   • Rheumatoid arthritis (HCC)    • RSD (reflex sympathetic dystrophy)    • Vaginal delivery     x1     Past Surgical History:   Procedure Laterality Date   • ANKLE FUSION Right    • BREAST BIOPSY Left 12/18/2018    US guided; benign    • BREAST EXCISIONAL BIOPSY Left     benign    • CARPAL TUNNEL RELEASE     • CHOLECYSTECTOMY     • COLONOSCOPY     • ERCP      with insertion of tube into bile/pancreatic duct   • FACIAL RECONSTRUCTION SURGERY     • JOINT REPLACEMENT      tkr left    • VT ENDOVEN ABLTJ INCMPTNT VEIN XTR LASER 1ST VEIN Left 02/15/2022    Procedure: EVLT left greater saphenous vein;  Surgeon: Darryl Trevizo DO;  Location: [facility];  Service: Vascular   • VT ENDOVEN ABLTJ INCMPTNT VEIN XTR LASER 1ST VEIN Right 08/16/2022    Procedure: EVLT;  Surgeon: Darryl Trevizo DO;  Location[facility];  Service: Vascular   • VT STAB PHLEBT VARICOSE VEINS 1 XTR > 20 INCS Left 02/15/2022    Procedure: MULTIPLE STAB PHLEBECTOMIES LEFT LEG;  Surgeon: Darryl Trevizo DO;  Location[facility];  Service: Vascular   • VT STAB PHLEBT VARICOSE VEINS 1 XTR > 20 INCS Right 08/16/2022    Procedure: MULTIPLE STAB PHLEBECTOMIES;  Surgeon: Darryl Trevizo DO;  [facility];  Service: Vascular   • VT STAB PHLEBT VARICOSE VEINS 1 XTR > 20 INCS Left 11/18/2022    Procedure: <20 STAB PHLEBECTOMIES;  Surgeon: Darryl Trevizo DO;  Location: [facility];  Service: Vascular   • SHOULDER SURGERY Right     for frozen shoulder/RSD   • US GUIDED BREAST BIOPSY LEFT COMPLETE Left 12/18/2018    Duct ectasia, fibrosis, focal usual ductal hyperplasia   • US GUIDED BREAST BIOPSY LEFT COMPLETE Left 01/04/2023   • US GUIDED BREAST BIOPSY LEFT COMPLETE Left 1/4/2023     Social History   Social History     Substance and Sexual Activity   Alcohol Use Not Currently    Comment: Denies any current use - has had former use - no problems with alcohol Social History     Substance and Sexual Activity   Drug Use Not Currently   • Types: Marijuana    Comment: Former use - at age 12 - no current use at this time     Social History     Tobacco Use   Smoking Status Former   • Types: Cigarettes   • Quit date:    • Years since quittin.5   Smokeless Tobacco Never   Tobacco Comments    Quit 1998 - former smoker     Family History   Problem Relation Age of Onset   • Dementia Mother    • Diabetes Mother    • Anesthesia problems Father    • Coronary artery disease Father    • Hemochromatosis Sister    • Cancer Sister    • Pancreatic cancer Sister    • Pancreatitis Sister    • Cancer Brother    • Prostate cancer Brother    • No Known Problems Maternal Grandmother    • No Known Problems Maternal Grandfather    • No Known Problems Paternal Grandmother    • No Known Problems Paternal Grandfather    • Breast cancer Maternal Aunt    • Breast cancer Cousin    • Diabetes Family    • Hypertension Neg Hx        Meds/Allergies       Current Outpatient Medications:   •  atorvastatin (LIPITOR) 10 mg tablet  •  CALCIUM CITRATE PO  •  Cholecalciferol (VITAMIN D-3 PO)  •  meloxicam (MOBIC) 15 mg tablet  •  Multiple Vitamin (multivitamin) tablet  •  vitamin B-12 (VITAMIN B-12) 1,000 mcg tablet    Allergies   Allergen Reactions   • Bee Venom Itching and Edema     Bee stings           Objective     Blood pressure 123/69, pulse 73, temperature 98.9 °F (37.2 °C), temperature source Tympanic, resp. rate 16, height 5' 4.5" (1.638 m), weight 83.5 kg (184 lb), SpO2 97 %. Body mass index is 31.1 kg/m². Physical Exam  Vitals and nursing note reviewed. Constitutional:       Appearance: Normal appearance. HENT:      Head: Normocephalic and atraumatic. Eyes:      General: No scleral icterus. Conjunctiva/sclera: Conjunctivae normal.   Cardiovascular:      Rate and Rhythm: Normal rate. Pulmonary:      Effort: Pulmonary effort is normal. No respiratory distress.    Abdominal: General: Bowel sounds are normal. There is no distension. Palpations: Abdomen is soft. Tenderness: There is no abdominal tenderness. There is no guarding. Skin:     General: Skin is warm and dry. Coloration: Skin is not jaundiced. Neurological:      General: No focal deficit present. Mental Status: She is alert and oriented to person, place, and time. Psychiatric:         Mood and Affect: Mood normal.         Behavior: Behavior normal.       Lab Results:   No visits with results within 1 Day(s) from this visit. Latest known visit with results is:   Appointment on 06/29/2023   Component Date Value   • WBC 06/29/2023 8.30    • RBC 06/29/2023 3.98    • Hemoglobin 06/29/2023 13.6    • Hematocrit 06/29/2023 41.5    • MCV 06/29/2023 104 (H)    • MCH 06/29/2023 34.2    • MCHC 06/29/2023 32.8    • RDW 06/29/2023 12.5    • MPV 06/29/2023 10.0    • Platelets 65/36/0168 213    • nRBC 06/29/2023 0    • Neutrophils Relative 06/29/2023 66    • Immat GRANS % 06/29/2023 1    • Lymphocytes Relative 06/29/2023 18    • Monocytes Relative 06/29/2023 14 (H)    • Eosinophils Relative 06/29/2023 1    • Basophils Relative 06/29/2023 0    • Neutrophils Absolute 06/29/2023 5.56    • Immature Grans Absolute 06/29/2023 0.06    • Lymphocytes Absolute 06/29/2023 1.47    • Monocytes Absolute 06/29/2023 1.14    • Eosinophils Absolute 06/29/2023 0.04    • Basophils Absolute 06/29/2023 0.03    • Sed Rate 06/29/2023 4          Radiology Results:   XR knee 3 vw left non injury    Result Date: 7/6/2023  Narrative: LEFT KNEE INDICATION:   M25.562: Pain in left knee. COMPARISON: Left knee x-ray dated July 17, 2016. VIEWS:  XR KNEE 3 VW LEFT NON INJURY FINDINGS: There is no acute fracture or dislocation. There is no joint effusion. Stable appearance of total knee arthroplasty. No evidence of hardware complication. No lytic or blastic osseous lesion. Soft tissues are unremarkable.      Impression: Stable appearance of total knee arthroplasty. Workstation performed: [id number]     NM bone scan 3 phase    Result Date: 7/5/2023  Narrative: THREE PHASE BONE SCAN OF BILATERAL KNEES INDICATION: Septic loosening of TKA left knee T84.033A: Mechanical loosening of internal left knee prosthetic joint, initial encounter PREVIOUS FILM CORRELATION:    Left knee x-ray 6/29/2023 RADIOPHARMACEUTICAL 24.8 mCi Tc-99m MDP IV TECHNIQUE:  Perfusion images of bilateral knees were acquired in the anterior and posterior projection. Blood pool and 2-3 hour delayed images were acquired of bilateral knees in multiple projections. FINDINGS: PERFUSION:   Normal. BLOOD POOL: Mild radiotracer uptake at the left distal femoral region just superior to the photopenic prosthesis. There is also mild uptake at the left knee medial soft tissues. DELAYED: Photopenic left knee replacement. No significant periprosthetic uptake. Impression: 1. Mild blood pool activity noted at the left knee distal femoral region and medial soft tissues. This suggests soft tissue infection or inflammation. No significant periprosthetic uptake at the left knee on delayed imaging to suggest hardware complication. Workstation performed: [id number]     Miguel Mcgovern PA-C    **Please note:  Dictation voice to text software may have been used in the creation of this record. Occasional wrong word or “sound alike” substitutions may have occurred due to the inherent limitations of voice recognition software. Read the chart carefully and recognize, using context, where substitutions have occurred. **

## 2023-07-27 ENCOUNTER — APPOINTMENT (OUTPATIENT)
Dept: LAB | Facility: HOSPITAL | Age: 68
End: 2023-07-27
Payer: MEDICARE

## 2023-07-27 DIAGNOSIS — R14.0 BLOATING: ICD-10-CM

## 2023-07-27 PROCEDURE — 87338 HPYLORI STOOL AG IA: CPT

## 2023-07-28 ENCOUNTER — OFFICE VISIT (OUTPATIENT)
Dept: VASCULAR SURGERY | Facility: CLINIC | Age: 68
End: 2023-07-28
Payer: MEDICARE

## 2023-07-28 VITALS
SYSTOLIC BLOOD PRESSURE: 128 MMHG | HEART RATE: 72 BPM | WEIGHT: 184 LBS | BODY MASS INDEX: 30.66 KG/M2 | HEIGHT: 65 IN | DIASTOLIC BLOOD PRESSURE: 72 MMHG

## 2023-07-28 DIAGNOSIS — I83.813 VARICOSE VEINS OF BOTH LOWER EXTREMITIES WITH PAIN: Primary | ICD-10-CM

## 2023-07-28 LAB — H PYLORI AG STL QL IA: NEGATIVE

## 2023-07-28 PROCEDURE — 99213 OFFICE O/P EST LOW 20 MIN: CPT

## 2023-07-28 NOTE — ASSESSMENT & PLAN NOTE
Patient returns the office today for reevaluation of left lower extremity pain, swelling, bulging veins. She reports her symptoms started approximately 1 month ago and have become debilitating. She underwent left GSV EVLT in November 2022 with good result. She was recently seen by orthopedics this month for evaluation of left knee pain. Work-up was negative and she was instructed to follow-up with vascular for evaluation of her veins. She has a history of total knee replacement 10 to 12 years ago. On exam, she has notable progression of her veins in the left leg. She has tenderness to palpation to the left lateral calf with multiple clusters of reticular and varicose veins. Patient reports frustration with her symptoms and would like something to be done. We will obtain a unilateral venous reflux study to reassess the left leg and have her follow-up with a surgeon to review and discuss possible surgical interventions, if indicated. I discussed options of cosmetic sclerotherapy for treatment of spider and reticular veins. Patient reports she is not able to afford this type of procedure.

## 2023-07-28 NOTE — PATIENT INSTRUCTIONS
-Recommend conservative measures with use of daily compression hose (20-30 mmHg), lower extremity elevation, regular exercise, and diligent skin care. - Obtain LEVR duplex  - Return to office with surgeon with [facility] for re-evaluation and discussion of surgical options. - Instructed to call the office in the interim with any questions, concerns, or new symptoms. Venous Insufficiency   AMBULATORY CARE:   Venous insufficiency  is a condition that prevents blood from flowing out of your legs and back to your heart. Veins contain valves that help blood flow in one direction. Venous insufficiency means the valves do not close correctly or fully. Blood flows back and pools in your leg. This can cause problems such as varicose veins. Venous insufficiency may also be called chronic venous insufficiency or venous stasis. Common signs and symptoms:   Visible veins on your legs that may be small and red or large, thick, and blue         Swelling in your ankles or calves         Changes in skin color, such as dark or purple skin    An ulcer (open sore) on your leg    Leg pain that is worse when you are menstruating (women) or when you stand, and better when you elevate your legs    Burning or itching    Cramps that happen at night    Thick, hard skin on your legs and ankles    Feeling of heaviness in your legs    Seek care immediately if:   You have a wound that does not heal or is infected. You have an injury that has broken your skin and caused your varicose veins to bleed. Your leg is swollen and hard. You have pain in your leg that does not go away or gets worse. Your legs or feet are turning blue or black. Your leg feels warm, tender, and painful. It may look swollen and red. Call your doctor if:   You have a fever. You have varicose veins and they are painful. You have new or worsening leg pain, swelling, or redness. You have new or worsening ulcers or other sores on your leg.     You have questions or concerns about your condition or care. Treatment  may include any of the following:  Medicine  may be given to improve blood flow. The medicines may thin your blood or reduce swelling to help blood flow. You may also need medicine to treat a bacterial infection. Ablation  is a procedure used to close varicose veins. A catheter is guided until it is near the vein. A device will then be guided to the area. The device may produce energy through radiofrequency or a laser. The energy creates heat that will close the blood vessel. Sclerotherapy  is a procedure used to fade visible veins. Your healthcare provider will inject a liquid into a spider vein or varicose vein. The liquid causes irritation in the vein. The vein swells and sticks together. Your body will then absorb the vein. Surgery  may be needed if other treatments do not work. Surgery may be used to repair a leg vein valve or to clip or tie off a vein so blood cannot flow through it. You may need to have a veins removed during surgery called stripping. Surgery may be used to bypass (go around) the damaged vein. Blood will flow through a vein transplanted from another part of your body. Manage your symptoms:   Wear pressure stockings as directed. Pressure stockings help keep blood from pooling in your leg veins. Your healthcare provider can prescribe stockings that are right for you. Do not buy over-the-counter pressure stockings unless your healthcare provider says it is okay. They may not fit correctly or may have elastic that cuts off your circulation. Ask your healthcare provider when to start wearing pressure stockings and how long to wear them each day. Do not sit or stand for long periods of time. If you have to sit for a long time, flex and extend your legs, feet, and ankles. Do this about 10 times every 30 minutes to help keep blood flowing.  If you have to stand for a long time, take breaks and sit with your legs elevated. Elevate your legs. Elevate your legs above the level of your heart to reduce swelling. Your healthcare provider may recommend that you keep your legs elevated for 30 minutes at a time. You may need to do this 3 to 4 times per day, or more if your healthcare provider recommends. Do not smoke. Nicotine and other chemicals in cigarettes and cigars can cause blood vessel damage. Ask your healthcare provider for information if you currently smoke and need help to quit. E-cigarettes or smokeless tobacco still contain nicotine. Talk to your healthcare provider before you use these products. Reach or maintain a healthy weight. Extra weight can make venous insufficiency worse. Ask your healthcare provider what a healthy weight is for you. He or she can help you create a weight loss plan if you need to lose weight. Exercise as directed. Walking can help increase blood flow in your calves. Ask your healthcare provider how much exercise you need each day and which exercises are best for you. Care for your skin. Keep your skin clean. Do not use any soaps or lotions that may dry your skin. For example, do not use products that contain fragrance or alcohol. If you have a skin ulcer, your healthcare provider may recommend a wet-to-dry bandage. To do this, apply a wet bandage to your wound and allow it to dry. This will help remove drainage from your wound each time you change the bandage. Your healthcare provider will tell you how often to change your bandage and which kind of bandage to use. Check your wound for signs of infection, such as swelling or pus. Go to physical therapy (PT) as directed. A physical therapist can help you increase movement and range of motion in your legs. Follow up with your doctor as directed:  Write down your questions so you remember to ask them during your visits.   © Copyright [employer] 2022 Information is for End User's use only and may not be sold, redistributed or otherwise used for commercial purposes. The above information is an  only. It is not intended as medical advice for individual conditions or treatments. Talk to your doctor, nurse or pharmacist before following any medical regimen to see if it is safe and effective for you.

## 2023-07-28 NOTE — PROGRESS NOTES
Assessment/Plan:  78year old female former smoker w/ HTN, HLD, hypothyroidism, PÉREZ, OA, hx of DVT of the LLE s/p venogram, presenting with painful truncal varicose veins of the bilateral lower extremities, L>R. She is s/p LLE GSV EVLT 2/15/22, RLE GSV EVLT with stab phlebectomies x20 on 8/16/22, and LLE stab phlebectomies 11/2022 [facility]). Varicose veins of both lower extremities with pain  Patient returns the office today for reevaluation of left lower extremity pain, swelling, bulging veins. She reports her symptoms started approximately 1 month ago and has become debilitating. She underwent left GSV EVLT 2/15/22 and stab phlebectomies in November 2022 by Dr. Bolivar Alfaro with good result. She was recently seen by orthopedics this month for evaluation of left knee pain as potential source of pain. Work-up was negative and she was instructed to follow-up with vascular for evaluation of her veins. She has a history of total knee replacement 10 to 12 years ago. She has had no recent testing on her veins. On exam, she has notable progression of her veins in the left leg. She has tenderness to palpation to the left lateral calf with multiple clusters of reticular, spider, and varicose veins. No hx of bleeding veins. Clinical photos below and in media. Patient reports frustration with her symptoms and would like something to be done. He has been compliant with conservative management. She wears daily OTC compression and is unable to afford prescription compression. She elevates her legs and lives a very active lifestyle. We will obtain a unilateral venous reflux study to reassess the left leg and have her follow-up with a surgeon to review and discuss possible surgical interventions, if indicated. I discussed options of cosmetic sclerotherapy for treatment of spider and reticular veins today. Patient reports she is not able to afford this type of procedure.             Diagnoses and all orders for this visit:    Varicose veins of both lower extremities with pain  -     VAS reflux lower limb venous duplex study with reflux assesment, unilateral; Future          Subjective:      Patient ID: Odalis Camp is a 70 y.o. female. HPI  "Madeline" presents today for reevaluation of left leg pain and swelling. She reports her symptoms have been progressing over the past month. She has underwent bilateral GSV EVLT and stab phlebectomies by Dr. Duyen Agudelo. Initially reported good success from these interventions but has recently started to develop pain and swelling in the left leg which has become disabling. She has been compliant with conservative management. She wears daily over-the-counter compression stockings and elevates her legs. She lives a very active lifestyle and is diligent with her skin care routine. She reports she was seen by orthopedics for her left knee pain as she thought this was contributing to her symptoms. Her work-up was negative and she was told to follow-up with vascular for evaluation of her varicose veins. Denies any recent trauma to the leg. She does have a history of a left leg DVT.        Odalis Camp is seen for evaluation of: [x]Varicose veins/legs  [x]Spider veins/legs  []Spider veins/face  []Venous stasis ulcer  []Superficial thrombophlebitis  []Other -      She complains of []none  [x]bulging veins  [x]dilated veins  []discolored veins         There is []no edema              []right leg edema  [x]left leg edema       []bilateral lower extremity edema     There is   []no leg pain          []right leg pain  [x]left leg pain         []bilateral leg pain  []bilateral leg pain; L>R   []bilateral leg pain; R>L     Pain is described as [x]aching              []itching  []sharp                []burning  [x]throbbing         []stinging  []heavy                []dull  []other -      Symptoms have been ongoing for:  1 month   There is  []no pertinent medical history  [x]history of DVT  []PE  []superficial venous thrombosis     Prior treatment includes []none  [x]EVLT  [x]OTC stockings  []prescription compression stockings  []vein ligation  []vein stripping  [x]stab phlebectomy  []sclerotherapy injections  []Other -      Current treatment includes []none  [x]compression socks  []avoiding tight clothing  [x]leg elevation  []rest  [x]exercise  []weight management  [x]skin care  []periodic evaluation   []Other-     Treatment has been []effective  [x]ineffective         The following portions of the patient's history were reviewed and updated as appropriate: allergies, current medications, past family history, past medical history, past social history, past surgical history and problem list.    Review of Systems   Constitutional: Negative. HENT: Negative. Eyes: Negative. Respiratory: Negative. Cardiovascular: Positive for leg swelling. Gastrointestinal: Negative. Endocrine: Negative. Genitourinary: Negative. Musculoskeletal: Negative. Skin: Negative. Allergic/Immunologic: Negative. Neurological: Negative. Hematological: Negative. Psychiatric/Behavioral: Negative. I have personally reviewed and made appropriate changes to the ROS that was input by the medical assistant.        Objective:      Vitals:    07/28/23 0944   BP: 128/72   BP Location: Left arm   Patient Position: Sitting   Cuff Size: Standard   Pulse: 72   Weight: 83.5 kg (184 lb)   Height: 5' 4.5" (1.638 m)       Patient Active Problem List   Diagnosis   • Adenomatous colon polyp   • Baker's cyst of knee   • Colon, diverticulosis   • Constipation   • Hemochromatosis   • Hot flashes due to menopause   • Hyperlipidemia   • Hypothyroidism   • Lower back pain   • Nonalcoholic steatohepatitis   • Osteoarthritis   • Pain syndrome, chronic   • Peripheral neuropathy   • Splenic cyst   • Tendonitis of elbow, left   • Vitamin D deficiency   • Varicose veins of both lower extremities with pain   • Left hip pain   • Leg swelling   • Trochanteric bursitis of left hip   • Family history of malignant neoplasm of pancreas   • Chronic embolism and thrombosis of other specified deep vein of right lower extremity (HCC)   • Obesity (BMI 30-39. 9)   • Rheumatoid arthritis ([address])   • Macrocytosis   • Left knee pain   • Status post total knee replacement, left   • Chronic knee instability, left       Past Surgical History:   Procedure Laterality Date   • ANKLE FUSION Right    • BREAST BIOPSY Left 12/18/2018    US guided; benign    • BREAST EXCISIONAL BIOPSY Left     benign    • CARPAL TUNNEL RELEASE     • CHOLECYSTECTOMY     • COLONOSCOPY     • ERCP      with insertion of tube into bile/pancreatic duct   • FACIAL RECONSTRUCTION SURGERY     • JOINT REPLACEMENT      tkr left    • MD ENDOVEN ABLTJ INCMPTNT VEIN XTR LASER 1ST VEIN Left 02/15/2022    Procedure: EVLT left greater saphenous vein;  Surgeon: Brandt Severance, DO;  [facility];  Service: Vascular   • MD ENDOVEN ABLTJ INCMPTNT VEIN XTR LASER 1ST VEIN Right 08/16/2022    Procedure: EVLT;  Surgeon: Brandt Severance, DO;  [facility];  Service: Vascular   • MD STAB PHLEBT VARICOSE VEINS 1 XTR > 20 INCS Left 02/15/2022    Procedure: MULTIPLE STAB PHLEBECTOMIES LEFT LEG;  Surgeon: Brandt Severance, DO;  [facility];  Service: Vascular   • MD STAB PHLEBT VARICOSE VEINS 1 XTR > 20 INCS Right 08/16/2022    Procedure: MULTIPLE STAB PHLEBECTOMIES;  Surgeon: Brandt Severance, DO;  [facility];  Service: Vascular   • MD STAB PHLEBT VARICOSE VEINS 1 XTR > 20 INCS Left 11/18/2022    Procedure: <20 STAB PHLEBECTOMIES;  Surgeon: Brandt Severance, DO;  [facility];  Service: Vascular   • SHOULDER SURGERY Right     for frozen shoulder/RSD   • US GUIDED BREAST BIOPSY LEFT COMPLETE Left 12/18/2018    Duct ectasia, fibrosis, focal usual ductal hyperplasia   • US GUIDED BREAST BIOPSY LEFT COMPLETE Left 01/04/2023   • US GUIDED BREAST BIOPSY LEFT COMPLETE Left 2023       Family History   Problem Relation Age of Onset   • Dementia Mother    • Diabetes Mother    • Anesthesia problems Father    • Coronary artery disease Father    • Hemochromatosis Sister    • Cancer Sister    • Pancreatic cancer Sister    • Pancreatitis Sister    • Cancer Brother    • Prostate cancer Brother    • No Known Problems Maternal Grandmother    • No Known Problems Maternal Grandfather    • No Known Problems Paternal Grandmother    • No Known Problems Paternal Grandfather    • Breast cancer Maternal Aunt    • Breast cancer Cousin    • Diabetes Family    • Hypertension Neg Hx        Social History     Socioeconomic History   • Marital status:      Spouse name: Not on file   • Number of children: Not on file   • Years of education: Not on file   • Highest education level: Not on file   Occupational History   • Not on file   Tobacco Use   • Smoking status: Former     Types: Cigarettes     Quit date:      Years since quittin.5   • Smokeless tobacco: Never   • Tobacco comments:     Quit 1998 - former smoker   Vaping Use   • Vaping Use: Never used   Substance and Sexual Activity   • Alcohol use: Not Currently     Comment: Denies any current use - has had former use - no problems with alcohol   • Drug use: Not Currently     Types: Marijuana     Comment: Former use - at age 12 - no current use at this time   • Sexual activity: Not Currently     Comment: Not active at this time   Other Topics Concern   • Not on file   Social History Narrative   • Not on file     Social Determinants of Health     Financial Resource Strain: High Risk (2023)    Overall Financial Resource Strain (CARDIA)    • Difficulty of Paying Living Expenses: Very hard   Food Insecurity: Not on file   Transportation Needs: No Transportation Needs (2023)    PRAPARE - Transportation    • Lack of Transportation (Medical): No    • Lack of Transportation (Non-Medical):  No Physical Activity: Not on file   Stress: Not on file   Social Connections: Not on file   Intimate Partner Violence: Not on file   Housing Stability: Not on file       Allergies   Allergen Reactions   • Bee Venom Itching and Edema     Bee stings         Current Outpatient Medications:   •  atorvastatin (LIPITOR) 10 mg tablet, take 1 tablet by mouth once daily, Disp: 90 tablet, Rfl: 3  •  CALCIUM CITRATE PO, Take 900 mg by mouth in the morning, Disp: , Rfl:   •  Cholecalciferol (VITAMIN D-3 PO), Take 2,000 Int'l Units by mouth in the morning, Disp: , Rfl:   •  meloxicam (MOBIC) 15 mg tablet, Take 1 tablet (15 mg total) by mouth daily as needed for moderate pain, Disp: 90 tablet, Rfl: 1  •  Multiple Vitamin (multivitamin) tablet, Take 1 tablet by mouth daily, Disp: , Rfl:   •  polyethylene glycol (GLYCOLAX) 17 GM/SCOOP powder, Take 17 g by mouth daily, Disp: 765 g, Rfl: 5  •  vitamin B-12 (VITAMIN B-12) 1,000 mcg tablet, Take 1 tablet (1,000 mcg total) by mouth daily, Disp: 90 tablet, Rfl: 1      /72 (BP Location: Left arm, Patient Position: Sitting, Cuff Size: Standard)   Pulse 72   Ht 5' 4.5" (1.638 m)   Wt 83.5 kg (184 lb)   LMP  (LMP Unknown)   BMI 31.10 kg/m²          Physical Exam  Vitals and nursing note reviewed. Constitutional:       Appearance: Normal appearance. HENT:      Head: Normocephalic and atraumatic. Cardiovascular:      Rate and Rhythm: Normal rate and regular rhythm. Pulses: Normal pulses. Radial pulses are 2+ on the right side and 2+ on the left side. Dorsalis pedis pulses are 2+ on the left side. Posterior tibial pulses are 2+ on the left side. Heart sounds: Normal heart sounds. Pulmonary:      Effort: Pulmonary effort is normal. No respiratory distress. Abdominal:      Comments: No abdominal bruit or pulsatile masses. Musculoskeletal:         General: Tenderness present. No swelling. Normal range of motion.       Cervical back: Normal range of motion and neck supple. Right lower leg: No edema. Left lower leg: No edema. Comments: Tenderness over cluster of reticular veins in left lateral calf   Skin:     General: Skin is warm. Capillary Refill: Capillary refill takes less than 2 seconds. Comments: Multiple scattered clusters of reticular and spider veins. Scattered varicose veins. Neurological:      General: No focal deficit present. Mental Status: She is alert and oriented to person, place, and time. Psychiatric:         Mood and Affect: Mood normal.         Behavior: Behavior normal.                           Theresa Yi PA-C  [facility]  [phone]    I have spent a total time of 20 minutes on 07/28/23 in caring for this patient including Prognosis, Risks and benefits of tx options, Instructions for management, Patient and family education, Importance of tx compliance, Risk factor reductions, Impressions, Counseling / Coordination of care, Documenting in the medical record, Reviewing / ordering tests, medicine, procedures   and Obtaining or reviewing history  .

## 2023-08-25 ENCOUNTER — HOSPITAL ENCOUNTER (OUTPATIENT)
Dept: NON INVASIVE DIAGNOSTICS | Facility: HOSPITAL | Age: 68
Discharge: HOME | End: 2023-08-25
Payer: MEDICARE

## 2023-08-25 DIAGNOSIS — I83.813 VARICOSE VEINS OF BOTH LOWER EXTREMITIES WITH PAIN: ICD-10-CM

## 2023-08-25 PROCEDURE — 93971 EXTREMITY STUDY: CPT

## 2023-08-28 PROCEDURE — 93970 EXTREMITY STUDY: CPT | Performed by: SURGERY

## 2023-09-05 ENCOUNTER — OFFICE VISIT (OUTPATIENT)
Dept: VASCULAR SURGERY | Facility: CLINIC | Age: 68
End: 2023-09-05
Payer: MEDICARE

## 2023-09-05 VITALS
SYSTOLIC BLOOD PRESSURE: 122 MMHG | BODY MASS INDEX: 30.66 KG/M2 | WEIGHT: 184 LBS | HEART RATE: 66 BPM | HEIGHT: 65 IN | DIASTOLIC BLOOD PRESSURE: 68 MMHG | OXYGEN SATURATION: 96 % | TEMPERATURE: 97.3 F

## 2023-09-05 DIAGNOSIS — I83.813 VARICOSE VEINS OF BOTH LOWER EXTREMITIES WITH PAIN: ICD-10-CM

## 2023-09-05 DIAGNOSIS — I82.591 CHRONIC EMBOLISM AND THROMBOSIS OF OTHER SPECIFIED DEEP VEIN OF RIGHT LOWER EXTREMITY (HCC): ICD-10-CM

## 2023-09-05 DIAGNOSIS — I87.2 VENOUS INSUFFICIENCY OF BOTH LOWER EXTREMITIES: Primary | ICD-10-CM

## 2023-09-05 PROCEDURE — 99213 OFFICE O/P EST LOW 20 MIN: CPT | Performed by: SURGERY

## 2023-09-05 NOTE — PROGRESS NOTES
Assessment/Plan:    Pt is a 71 yo F w/ diverticulosis, nonalcoholic steatohepatitis, hypothryoidism, hx of DVT, peripheral neuropathy, OA, RA, HLD, obesity, s/p L TKA, venous insufficiency s/p B GSV EVLT and stabs ([facility])    Varicose veins of both lower extremities with pain  Chronic embolism and thrombosis of other specified deep vein of right lower extremity (HCC)  Venous insufficiency of both lower extremities  -remote hx of DVT w/ intervention in '80s. Patient thinks LLE but chart had RLE??  -s/p L GSV EVLT 2/22  -s/p R GSV EVLT and 20 stabs 8/22  -s/p L stabs 11/22 (all [facility])  -now w/ painful varicosities of the L medial knee area  -reveiwed recent reflux study which shows successful ablation; there is an accessory vein with reflux which communicates with calf varicosities  -discussed the need for compression to help with symptoms; currently only wearing tubigrip and doesn't own compression socks; instructed on how and where to buy these; patient is concerned with cost; she is doing a good job with leg elevaiton and exercise  -discussed options at this point including further stab phlebectomies, although this would be difficult as her most painful area is a matted area of retics; possibly could use sclero for this area? Would be high risk for ulceration given the density of veins and size  -will trial medical management w/ compression for 3 mos and f/u afterwards; patient would like to f/u in [place] office (unclear why she was scheduled for this office today) with Dr. Amber Manning      Subjective:      Patient ID: Yao Hernandez is a 70 y.o. female. Patient presents today for review of LLE venous duplex done 8/25/23. Pt reports buldging veins pain and swelling aching. Itching to left lower extremity. Pt does wear compression all the time with relief. Pt is taking Atorvastatin. Pt is a former smoker. HPI:    Patient presents to discuss venous disease.     She was previously treated by Dr. Radha Noland in [place] and underwent L GSV EVLT 2/15/22, R GSV EVLT and stabs x20 22[facility]    She is only wearing a tubigrip for compression. She elevates her legs 90% of the day. She walks 10,000 steps a day. She complains of bulging painful varicosities. The worst of this is at the medial proximal calf. She also has pain in the knee. She has hx of knee replacement and R ankle fusion. She has remote hx of DVT in the early '80s and some sort of procedure that was very painful and she refers to as "venogram"      The following portions of the patient's history were reviewed and updated as appropriate: allergies, current medications, past family history, past medical history, past social history, past surgical history and problem list.    Review of Systems   Constitutional: Negative. HENT: Negative. Eyes: Negative. Respiratory: Negative. Cardiovascular: Positive for leg swelling. Gastrointestinal: Negative. Endocrine: Negative. Genitourinary: Negative. Musculoskeletal: Negative. Skin: Negative. Allergic/Immunologic: Negative. Neurological: Negative. Hematological: Negative. Psychiatric/Behavioral: Negative. Objective:      /68 (BP Location: Left arm, Patient Position: Sitting, Cuff Size: Standard)   Pulse 66   Temp (!) 97.3 °F (36.3 °C) (Temporal)   Ht 5' 4.5" (1.638 m)   Wt 83.5 kg (184 lb)   LMP  (LMP Unknown)   SpO2 96%   BMI 31.10 kg/m²          Physical Exam  Cardiovascular:      Pulses:           Radial pulses are 2+ on the right side and 2+ on the left side. Dorsalis pedis pulses are 2+ on the right side and 2+ on the left side. Posterior tibial pulses are 2+ on the right side and 2+ on the left side. Musculoskeletal:      Right lower le+ Edema present. Left lower le+ Edema present. Skin:     Comments:  There is a bulging vein on the medial L thigh/knee/prox calf; the proximal calf area is more matted retics than varicosity; mild stasis changes           I have reviewed and made appropriate changes to the review of systems input by the medical assistant. Vitals:    09/05/23 1441   BP: 122/68   BP Location: Left arm   Patient Position: Sitting   Cuff Size: Standard   Pulse: 66   Temp: (!) 97.3 °F (36.3 °C)   TempSrc: Temporal   SpO2: 96%   Weight: 83.5 kg (184 lb)   Height: 5' 4.5" (1.638 m)       Patient Active Problem List   Diagnosis   • Adenomatous colon polyp   • Baker's cyst of knee   • Colon, diverticulosis   • Constipation   • Hemochromatosis   • Hot flashes due to menopause   • Hyperlipidemia   • Hypothyroidism   • Lower back pain   • Nonalcoholic steatohepatitis   • Osteoarthritis   • Pain syndrome, chronic   • Peripheral neuropathy   • Splenic cyst   • Tendonitis of elbow, left   • Vitamin D deficiency   • Varicose veins of both lower extremities with pain   • Left hip pain   • Leg swelling   • Trochanteric bursitis of left hip   • Family history of malignant neoplasm of pancreas   • Chronic embolism and thrombosis of other specified deep vein of right lower extremity (HCC)   • Obesity (BMI 30-39. 9)   • Rheumatoid arthritis ([address])   • Macrocytosis   • Left knee pain   • Status post total knee replacement, left   • Chronic knee instability, left       Past Surgical History:   Procedure Laterality Date   • ANKLE FUSION Right    • BREAST BIOPSY Left 12/18/2018    US guided; benign    • BREAST EXCISIONAL BIOPSY Left     benign    • CARPAL TUNNEL RELEASE     • CHOLECYSTECTOMY     • COLONOSCOPY     • ERCP      with insertion of tube into bile/pancreatic duct   • FACIAL RECONSTRUCTION SURGERY     • JOINT REPLACEMENT      tkr left    • NE ENDOVEN ABLTJ INCMPTNT VEIN XTR LASER 1ST VEIN Left 02/15/2022    Procedure: EVLT left greater saphenous vein;  Surgeon: Sol Sheets DO;  [facility];  Service: Vascular   • NE ENDOVEN ABLTJ INCMPTNT VEIN XTR LASER 1ST VEIN Right 08/16/2022 Procedure: EVLT;  Surgeon: Edyta Loving DO;  [facility];  Service: Vascular   • FL STAB PHLEBT VARICOSE VEINS 1 XTR > 20 INCS Left 02/15/2022    Procedure: MULTIPLE STAB PHLEBECTOMIES LEFT LEG;  Surgeon: Edyta Loving DO;  [facility];  Service: Vascular   • FL STAB PHLEBT VARICOSE VEINS 1 XTR > 20 INCS Right 2022    Procedure: MULTIPLE STAB PHLEBECTOMIES;  Surgeon: Edyta Loving DO;  [facility];  Service: Vascular   • FL STAB PHLEBT VARICOSE VEINS 1 XTR > 20 INCS Left 2022    Procedure: <20 STAB PHLEBECTOMIES;  Surgeon: Edyta Loving DO;  [facility];  Service: Vascular   • SHOULDER SURGERY Right     for frozen shoulder/RSD   • US GUIDED BREAST BIOPSY LEFT COMPLETE Left 2018    Duct ectasia, fibrosis, focal usual ductal hyperplasia   • US GUIDED BREAST BIOPSY LEFT COMPLETE Left 2023   • US GUIDED BREAST BIOPSY LEFT COMPLETE Left 2023       Family History   Problem Relation Age of Onset   • Dementia Mother    • Diabetes Mother    • Anesthesia problems Father    • Coronary artery disease Father    • Hemochromatosis Sister    • Cancer Sister    • Pancreatic cancer Sister    • Pancreatitis Sister    • Cancer Brother    • Prostate cancer Brother    • No Known Problems Maternal Grandmother    • No Known Problems Maternal Grandfather    • No Known Problems Paternal Grandmother    • No Known Problems Paternal Grandfather    • Breast cancer Maternal Aunt    • Breast cancer Cousin    • Diabetes Family    • Hypertension Neg Hx        Social History     Socioeconomic History   • Marital status:      Spouse name: Not on file   • Number of children: Not on file   • Years of education: Not on file   • Highest education level: Not on file   Occupational History   • Not on file   Tobacco Use   • Smoking status: Former     Types: Cigarettes     Quit date:      Years since quittin.6   • Smokeless tobacco: Never   • Tobacco comments:     Quit 1/8/1998 - former smoker   Vaping Use   • Vaping Use: Never used   Substance and Sexual Activity   • Alcohol use: Not Currently     Comment: Denies any current use - has had former use - no problems with alcohol   • Drug use: Not Currently     Types: Marijuana     Comment: Former use - at age 12 - no current use at this time   • Sexual activity: Not Currently     Comment: Not active at this time   Other Topics Concern   • Not on file   Social History Narrative   • Not on file     Social Determinants of Health     Financial Resource Strain: High Risk (6/12/2023)    Overall Financial Resource Strain (CARDIA)    • Difficulty of Paying Living Expenses: Very hard   Food Insecurity: Not on file   Transportation Needs: No Transportation Needs (6/12/2023)    PRAPARE - Transportation    • Lack of Transportation (Medical): No    • Lack of Transportation (Non-Medical):  No   Physical Activity: Not on file   Stress: Not on file   Social Connections: Not on file   Intimate Partner Violence: Not on file   Housing Stability: Not on file       Allergies   Allergen Reactions   • Bee Venom Itching and Edema     Bee stings         Current Outpatient Medications:   •  atorvastatin (LIPITOR) 10 mg tablet, take 1 tablet by mouth once daily, Disp: 90 tablet, Rfl: 3  •  CALCIUM CITRATE PO, Take 900 mg by mouth in the morning, Disp: , Rfl:   •  Cholecalciferol (VITAMIN D-3 PO), Take 2,000 Int'l Units by mouth in the morning, Disp: , Rfl:   •  meloxicam (MOBIC) 15 mg tablet, Take 1 tablet (15 mg total) by mouth daily as needed for moderate pain, Disp: 90 tablet, Rfl: 1  •  Multiple Vitamin (multivitamin) tablet, Take 1 tablet by mouth daily, Disp: , Rfl:   •  polyethylene glycol (GLYCOLAX) 17 GM/SCOOP powder, Take 17 g by mouth daily, Disp: 765 g, Rfl: 5  •  vitamin B-12 (VITAMIN B-12) 1,000 mcg tablet, Take 1 tablet (1,000 mcg total) by mouth daily, Disp: 90 tablet, Rfl: 1

## 2023-09-05 NOTE — PATIENT INSTRUCTIONS
1) Venous insufficiency  -the most important thing we can do for your painful bulging veins is start wearing compression  I recommend a weight of 20-30mmHg (medium), thigh high. They should say "gradient compression". You MUST measure your leg to make sure you get compression that fits.   Try this website: "[email or web address]" or Cristino Gong    -continue elevating your legs, getting in your steps, and working on healthy weight

## 2023-10-12 ENCOUNTER — OFFICE VISIT (OUTPATIENT)
Dept: OBGYN CLINIC | Facility: CLINIC | Age: 68
End: 2023-10-12
Payer: MEDICARE

## 2023-10-12 VITALS
SYSTOLIC BLOOD PRESSURE: 127 MMHG | HEART RATE: 78 BPM | WEIGHT: 184 LBS | BODY MASS INDEX: 30.66 KG/M2 | HEIGHT: 65 IN | DIASTOLIC BLOOD PRESSURE: 78 MMHG

## 2023-10-12 DIAGNOSIS — M70.61 TROCHANTERIC BURSITIS OF RIGHT HIP: Primary | ICD-10-CM

## 2023-10-12 DIAGNOSIS — M70.62 TROCHANTERIC BURSITIS OF LEFT HIP: ICD-10-CM

## 2023-10-12 PROCEDURE — 20610 DRAIN/INJ JOINT/BURSA W/O US: CPT | Performed by: ORTHOPAEDIC SURGERY

## 2023-10-12 PROCEDURE — 99213 OFFICE O/P EST LOW 20 MIN: CPT | Performed by: ORTHOPAEDIC SURGERY

## 2023-10-12 RX ORDER — TRIAMCINOLONE ACETONIDE 40 MG/ML
80 INJECTION, SUSPENSION INTRA-ARTICULAR; INTRAMUSCULAR
Status: COMPLETED | OUTPATIENT
Start: 2023-10-12 | End: 2023-10-12

## 2023-10-12 RX ORDER — BUPIVACAINE HYDROCHLORIDE 2.5 MG/ML
4 INJECTION, SOLUTION INFILTRATION; PERINEURAL
Status: COMPLETED | OUTPATIENT
Start: 2023-10-12 | End: 2023-10-12

## 2023-10-12 RX ADMIN — BUPIVACAINE HYDROCHLORIDE 4 ML: 2.5 INJECTION, SOLUTION INFILTRATION; PERINEURAL at 08:15

## 2023-10-12 RX ADMIN — TRIAMCINOLONE ACETONIDE 80 MG: 40 INJECTION, SUSPENSION INTRA-ARTICULAR; INTRAMUSCULAR at 08:15

## 2023-10-12 NOTE — PROGRESS NOTES
Assessment/Plan:    No problem-specific Assessment & Plan notes found for this encounter. Diagnoses and all orders for this visit:    Trochanteric bursitis of right hip    Trochanteric bursitis of left hip          Both hips were injected with Kenalog and Marcaine. She tolerated the procedures quite well. Return back in 10 weeks for reevaluation    Subjective:      Patient ID: Cassandra Day is a 70 y.o. female. HPI    The patient has a history of trochanteric bursitis of her bilateral hips. She desires to have another set of injections which she gets every 10 weeks. She denies any numbness or tingling. She denies any fever or chills. She denies any groin pain    The following portions of the patient's history were reviewed and updated as appropriate: allergies, current medications, past family history, past medical history, past social history, past surgical history, and problem list.    Review of Systems   Constitutional:  Negative for chills, fever and unexpected weight change. HENT:  Negative for hearing loss, nosebleeds and sore throat. Eyes:  Negative for pain, redness and visual disturbance. Respiratory:  Negative for cough, shortness of breath and wheezing. Cardiovascular:  Negative for chest pain, palpitations and leg swelling. Gastrointestinal:  Negative for abdominal pain, nausea and vomiting. Endocrine: Negative for polydipsia and polyuria. Genitourinary:  Negative for dysuria and hematuria. Musculoskeletal:  Positive for arthralgias and myalgias. Negative for back pain, gait problem, joint swelling, neck pain and neck stiffness. As noted in HPI   Skin:  Negative for rash and wound. Neurological:  Negative for dizziness, numbness and headaches. Psychiatric/Behavioral:  Negative for decreased concentration and suicidal ideas. The patient is not nervous/anxious.           Objective:      /78 (BP Location: Left arm, Patient Position: Sitting, Cuff Size: Standard)   Pulse 78   Ht 5' 4.5" (1.638 m)   Wt 83.5 kg (184 lb) Comment: reported  LMP  (LMP Unknown)   BMI 31.10 kg/m²          Physical Exam        Bilateral lower extremities are neuro vas intact. Toes are pink and mobile. Compartments are soft. There is point tenderness along her bilateral trochanteric bursa. There is a positive Patty's test.  No groin pain. Remaining sensation, motor, Deep tendon reflexes intact. There is a negative clonus and negative Babinski test    Large joint arthrocentesis: bilateral greater trochanteric bursa  Universal Protocol:  Consent: Verbal consent obtained. Written consent not obtained. Risks and benefits: risks, benefits and alternatives were discussed  Consent given by: patient  Time out: Immediately prior to procedure a "time out" was called to verify the correct patient, procedure, equipment, support staff and site/side marked as required. Patient understanding: patient states understanding of the procedure being performed  Test results: test results available and properly labeled  Site marked: the operative site was marked  Radiology Images displayed and confirmed.  If images not available, report reviewed: imaging studies available  Patient identity confirmed: verbally with patient  Procedure Details  Location: hip - bilateral greater trochanteric bursa  Approach: lateral    Medications (Right): 4 mL bupivacaine 0.25 %; 80 mg triamcinolone acetonide 40 mg/mLMedications (Left): 4 mL bupivacaine 0.25 %; 80 mg triamcinolone acetonide 40 mg/mL   Patient tolerance: patient tolerated the procedure well with no immediate complications  Dressing:  Sterile dressing applied

## 2023-10-27 ENCOUNTER — HOSPITAL ENCOUNTER (OUTPATIENT)
Dept: MAMMOGRAPHY | Facility: HOSPITAL | Age: 68
Discharge: HOME | End: 2023-10-27
Payer: MEDICARE

## 2023-10-27 DIAGNOSIS — Z12.31 ENCOUNTER FOR SCREENING MAMMOGRAM FOR MALIGNANT NEOPLASM OF BREAST: ICD-10-CM

## 2023-10-27 PROCEDURE — 77063 BREAST TOMOSYNTHESIS BI: CPT

## 2023-10-27 PROCEDURE — 77067 SCR MAMMO BI INCL CAD: CPT

## 2023-11-01 ENCOUNTER — TELEPHONE (OUTPATIENT)
Dept: GASTROENTEROLOGY | Facility: CLINIC | Age: 68
End: 2023-11-01

## 2023-11-01 NOTE — TELEPHONE ENCOUNTER
Madeline was not able to do the one test, waiting on stool result. no longer has pain, prefers not to RES unless stool test results warrent it.

## 2023-11-28 DIAGNOSIS — G89.4 PAIN SYNDROME, CHRONIC: ICD-10-CM

## 2023-11-28 DIAGNOSIS — M15.9 PRIMARY OSTEOARTHRITIS INVOLVING MULTIPLE JOINTS: ICD-10-CM

## 2023-11-28 DIAGNOSIS — E78.2 MIXED HYPERLIPIDEMIA: ICD-10-CM

## 2023-11-28 RX ORDER — ATORVASTATIN CALCIUM 10 MG/1
10 TABLET, FILM COATED ORAL DAILY
Qty: 30 TABLET | Refills: 0 | Status: SHIPPED | OUTPATIENT
Start: 2023-11-28

## 2023-11-28 RX ORDER — MELOXICAM 15 MG/1
15 TABLET ORAL DAILY PRN
Qty: 30 TABLET | Refills: 0 | Status: SHIPPED | OUTPATIENT
Start: 2023-11-28 | End: 2024-05-26

## 2023-12-11 ENCOUNTER — OFFICE VISIT (OUTPATIENT)
Dept: VASCULAR SURGERY | Facility: HOSPITAL | Age: 68
End: 2023-12-11
Payer: MEDICARE

## 2023-12-11 ENCOUNTER — TELEPHONE (OUTPATIENT)
Dept: VASCULAR SURGERY | Facility: HOSPITAL | Age: 68
End: 2023-12-11

## 2023-12-11 VITALS
DIASTOLIC BLOOD PRESSURE: 68 MMHG | OXYGEN SATURATION: 96 % | HEART RATE: 73 BPM | SYSTOLIC BLOOD PRESSURE: 132 MMHG | HEIGHT: 65 IN | TEMPERATURE: 97.1 F | WEIGHT: 199.6 LBS | BODY MASS INDEX: 33.26 KG/M2

## 2023-12-11 DIAGNOSIS — I83.813 VARICOSE VEINS OF BOTH LOWER EXTREMITIES WITH PAIN: Primary | ICD-10-CM

## 2023-12-11 DIAGNOSIS — I87.2 VENOUS INSUFFICIENCY OF LEFT LEG: Primary | ICD-10-CM

## 2023-12-11 PROCEDURE — 99214 OFFICE O/P EST MOD 30 MIN: CPT | Performed by: SURGERY

## 2023-12-11 RX ORDER — CEFAZOLIN SODIUM 1 G/50ML
1000 SOLUTION INTRAVENOUS ONCE
OUTPATIENT
Start: 2023-12-11 | End: 2023-12-11

## 2023-12-11 RX ORDER — CHLORHEXIDINE GLUCONATE ORAL RINSE 1.2 MG/ML
15 SOLUTION DENTAL ONCE
OUTPATIENT
Start: 2023-12-11 | End: 2023-12-11

## 2023-12-11 NOTE — LETTER
23  RE: Medicare ID: [id number]     To whom it may concern:    Patient, Surekha Navarrete (1955), has a routine procedure scheduled on 24 at [facility] (Tax: [id number] / NPI: [id number]) [place] with Dr. Bolivar Page // Tomás Mask (NPI: [id number]). We are requesting authorization for one (1) unit each of outpatient CPT code(s) 07788. Patient's VCSS = 7 and CEAP Classification = Class 3. Please review the attached clinical documentation and provide your determination. Please Note:  The left greater saphenous vein will be treated with CPT code 66476        Should you have any questions or concerns regarding the details of this case, please do not hesitate to reach out. Respectfully,      Luigi Champion  Prior Authorization & Referral  Vascular Center  [address]   P: [phone]  F: [phone]  [email or web address]  [email or web address]                                   Please Do Not Copy        Prior [facility] Outpatient Procedure  Medicare Part A Fax/Mail Cover Sheet  Complete all fields; attach supporting medical documentation and fax to [phone] or mail to the applicable address/number provided at the bottom of the page. Complete ONE (1) Medicare Fax/ Mail Cover Sheet for each prior authorization request for which documentation is being submitted.     Beneficiary Last Name  [facility] First Name  Violette Hirsch ID  [id number]  Gender  [] Male [x] Female   1955    Facility/Agency NPIs  [id number] CMS Certification Number  [id number]   Facility Name and Address  [facility] // [facility], [address] [place] [address]   Provider's NPI  [id number] Provider's CMS Certification Number  [id number]   Provider's Name and Address  Tomás Mask // [address] [place] [address]     Requestor Name  [facility] Requestor Phone Number   [phone]   Requestor Fax Number/Email address  [phone] / [email or web address] Procedure Code(s)   78690 [ left]   Paired Code(s) for Botulinum Toxin Injections                                                                                                  Not Applicable (N/A)    Diagnosis Codes (providers who submit using esMD must include diagnosis code(s)): I87.2      Start Date of Authorization  01/05/23 State (location) [facility] Units of Service  One (1) unit of each code   Request Completed by: (please print and sign)  Luigi Champion Date  12/12/23        This document is intended solely for the use of the individual or entity to which it is addressed and may contain information that is privileged, confidential, and exempt from disclosure under applicable law. If the reader of this notice is not the intended recipient or individual responsible for delivering the message to the intended recipient, you are hereby advised that any dissemination, distribution or copying of this information is strictly prohibited. If you receive this communication in error, please advise us by telephone and destroy these papers.

## 2023-12-11 NOTE — TELEPHONE ENCOUNTER
Verified patient's insurance   CONFIRMED - Patient's insurance is Medicare  Is patient requesting a call when authorization has been obtained? Patient did not request a call. Surgery Date: 1/5/24   Primary Surgeon: Kevan Bar // Clint Johnston (NPI: [id number])  Assisting Surgeon: Not Applicable (N/A)  Facility: [facility] (Tax: [id number] / NPI: [id number])  Inpatient / Outpatient: Outpatient  Level: 4    Clearance Received: No clearance ordered. Consent Received: Yes, scanned into Epic on 12/11/23. Medication Hold / Last Dose:  no med hold  IR Notified: Not Applicable (N/A)  Rep. Notified:  Christella Harada and Jovanni Willis of [address] [place] [address] via email 12/11/23  Equipment Needs: Not Applicable (N/A)  Vas Lab Requested:  via email 12/11/23  Patient Contacted: 12/11/23     Diagnosis:   Venous insufficiency of left leg I87.2  Procedure/ CPT Code(s): VenaSeal WITH Stab Phlebectomies of the left upper leg // CPT: 64426, no phlebs on consent     For varicose vein related procedures:   Last LEVDR: Not Applicable (N/A)  CEAP Classification:  class  3  VCSS:  7    Post Operative Date/ Time: 1/8/2024 , 11am dressing removal and 1130am duplex [place] with Clint Johnston (NPI: [id number])     *Please review medication hold(s), PATs, and check H&P with patient. *  PATIENT WAS MAILED SURGERY/SHOWERING/DISCHARGE/COVID INSTRUCTIONS AFTER REVIEWING WITH THEM VIA PHONE CALL.  post op appt mailed to patient 12/11/23

## 2023-12-11 NOTE — TELEPHONE ENCOUNTER
REMINDER: Under Reason For Call, comments MUST be formatted as:   (Surgeon's Initials) / (Procedure)      Special Instructions / FYI: [facility]    Procedure: VenaSeal of the left upper leg    Level: 4 - Route clearance(s) to [facility] Surgery Coordinator Pool    Allergies: Bee venom    Instructions Given: NO Bowel Prep General Instructions     Dialysis: Patient is not on dialysis. Return Visit Required Prior to Procedure: No.    Consent: I certify that patient has signed, printed, timed, and dated their surgery consent. I certify that the patient's LEGAL NAME and DATE OF BIRTH are written in the upper left corner on BOTH sides of the consent. I certify that BOTH sides of the completed surgery consent have been scanned into the patient's Epic chart by myself on 12/11/2023. Yes, I have LABELED the consent in Epic as Consent for Vascular Procedure. For Surgical Clearances     Levels   1-3   ROUTE this encounter to [facility] Clearance Pool (AND)   [facility] Surgery Coordinator Pool     Level   4   ROUTE this encounter to [facility] Surgery Coordinator Pool       HYDRATION CLEARANCES   ONLY ROUTE TO  [facility] Clearance Pool     Patient does not require any pre operative clearance. Yes, I have ROUTED this encounter to [facility] Surgery Coordinator and/or [facility] Clearance Pool.

## 2023-12-11 NOTE — ASSESSMENT & PLAN NOTE
69 yo F w/ diverticulosis, nonalcoholic steatohepatitis, hypothryoidism, hx of DVT, peripheral neuropathy, OA, RA, HLD, obesity, s/p L TKA, venous insufficiency s/p B/L GSV EVLT and stabs in 2022 presented back with left leg varicose veins and pain. Patient had seen Dr. Andria Morrison Doctor in September 2023 for this issue. She recommended using compression therapy again. She noted on her exam that the veins that the patient was most complaining of were more reticular in nature than varicosities. Patient had a lower extremity duplex with reflux that shows reflux within the GSV. This may be the true GSV or accessory vein and on my exam I was able to palpate the vein on the left medial lower thigh likely feeding this area of matted reticular veins. Patient has significant discomfort in her calf and foot despite wearing compressions daily. She feels as though she has pressure and aching pain. She also complains of an electric shock feeling at times. I do believe the patient does have symptoms from venous insufficiency but there is likely a mixed component of neuropathy. Given the findings on her duplex GSV reflux I would like to plan for an ablation of this vein using VenaSeal at the [address] [place] [address]. We do not have laser capabilities at this campus for an EVLT.     I did explain to her if her symptoms persist after we may need to chalked this up to her neuropathy and less likely her reticular veins however we can have her evaluated by one of our nurse practitioners if she is interested in sclerotherapy post ablation

## 2023-12-11 NOTE — PROGRESS NOTES
Assessment/Plan:    Venous insufficiency of left leg  71 yo F w/ diverticulosis, nonalcoholic steatohepatitis, hypothryoidism, hx of DVT, peripheral neuropathy, OA, RA, HLD, obesity, s/p L TKA, venous insufficiency s/p B/L GSV EVLT and stabs in 2022 presented back with left leg varicose veins and pain. Patient had seen Dr. Mendy Mccollum Doctor in September 2023 for this issue. She recommended using compression therapy again. She noted on her exam that the veins that the patient was most complaining of were more reticular in nature than varicosities. Patient had a lower extremity duplex with reflux that shows reflux within the GSV. This may be the true GSV or accessory vein and on my exam I was able to palpate the vein on the left medial lower thigh likely feeding this area of matted reticular veins. Patient has significant discomfort in her calf and foot despite wearing compressions daily. She feels as though she has pressure and aching pain. She also complains of an electric shock feeling at times. I do believe the patient does have symptoms from venous insufficiency but there is likely a mixed component of neuropathy. Given the findings on her duplex GSV reflux I would like to plan for an ablation of this vein using VenaSeal at the [address] [place] [address]. We do not have laser capabilities at this campus for an EVLT. I did explain to her if her symptoms persist after we may need to chalked this up to her neuropathy and less likely her reticular veins however we can have her evaluated by one of our nurse practitioners if she is interested in sclerotherapy post ablation       Diagnoses and all orders for this visit:    Venous insufficiency of left leg  -     Case request operating room: Venaseal of left great saphenous; Standing  -     CBC and Platelet; Future  -     Protime-INR; Future  -     Basic metabolic panel;  Future  -     Case request operating room: Venaseal of left great saphenous    Other orders  -     Diet NPO; Sips with meds; Standing  -     Void on call to OR; Standing  -     Insert peripheral IV; Standing  -     Nursing Communication CHG bath, have staff wash entire body (neck down) per pre op bathing protocol. Routine, evening prior to, and day of surgery.; Standing  -     Nursing Communication Swab both nares with Povidone-Iodine solution, EXCLUDE if patient has shellfish/Iodine allergy, and replace with nasal alcohol swabstick. Routine, day of surgery, on call to OR.; Standing  -     chlorhexidine (PERIDEX) 0.12 % oral rinse 15 mL  -     Place sequential compression device; Standing  -     ceFAZolin (ANCEF) IVPB (premix in dextrose) 1,000 mg 50 mL          Subjective:      Patient ID: Chon Solorio is a 70 y.o. female. Patient presents today for a 3 month follow up for painful varicose veins L media knee area. Pt reports pain and an electric shock like feeling to left leg and pain veins to back of knee and inner calf area along with a pressure feeling. Pt has been wearing compression. Pt is s/p L GSV EVLT 2/22, R GSV EVLT 8/22, L Stabs 11/22 all by DR. Bradley. Pt is taking Atorvastatin. Pt is a former smoker. HPI    The following portions of the patient's history were reviewed and updated as appropriate: allergies, current medications, past family history, past medical history, past social history, past surgical history, and problem list.    I have spent a total time of 35 minutes on 12/11/23 in caring for this patient including Diagnostic results, Prognosis, Risks and benefits of tx options, Instructions for management, Patient and family education, Importance of tx compliance, Risk factor reductions, Impressions, Counseling / Coordination of care, Documenting in the medical record, Reviewing / ordering tests, medicine, procedures  , and Obtaining or reviewing history  . Review of Systems   Constitutional: Negative. HENT: Negative. Eyes: Negative.     Respiratory: Negative. Cardiovascular:  Positive for leg swelling. Gastrointestinal: Negative. Endocrine: Negative. Genitourinary: Negative. Musculoskeletal: Negative. Skin: Negative. Allergic/Immunologic: Negative. Neurological: Negative. Hematological: Negative. Psychiatric/Behavioral: Negative. Objective:      /68 (BP Location: Right arm, Patient Position: Sitting, Cuff Size: Standard)   Pulse 73   Temp (!) 97.1 °F (36.2 °C) (Temporal)   Ht 5' 4.5" (1.638 m)   Wt 90.5 kg (199 lb 9.6 oz)   LMP  (LMP Unknown)   SpO2 96%   BMI 33.73 kg/m²          Physical Exam  Constitutional:       Appearance: Normal appearance. HENT:      Head: Normocephalic. Mouth/Throat:      Mouth: Mucous membranes are moist.      Pharynx: Oropharynx is clear. Eyes:      Extraocular Movements: Extraocular movements intact. Pupils: Pupils are equal, round, and reactive to light. Cardiovascular:      Rate and Rhythm: Normal rate and regular rhythm. Pulmonary:      Effort: Pulmonary effort is normal.   Abdominal:      General: Abdomen is flat. Tenderness: There is no abdominal tenderness. Musculoskeletal:      Cervical back: Normal range of motion. Right lower leg: No edema. Left lower leg: Edema present. Skin:     General: Skin is warm and dry. Comments: Large amount of reticular veins along the medial and lateral aspect of her left lower leg  Palpable engorged vein on medial/posterior lower thigh, not visible   Neurological:      General: No focal deficit present. Mental Status: She is alert and oriented to person, place, and time.    Psychiatric:         Mood and Affect: Mood normal.         Behavior: Behavior normal.           EVLT Operative Scheduling Information:    [facility]:  [place]    Physician:  Vu Colunga    Surgery: Left venaseal ablation of great saphenous vein    Urgency:  Standard    Level:  Level 4: Outpatients to be scheduled for screening procedures and elective surgery that can be delayed for longer than one month without reasonable expectation of detriment to patient. Case Length:  Normal    Post-op Bed:  Outpatient    OR Table:  Standard    Equipment Needs:  Vascular technologist and Rep: Venaseal    Medication Instructions:  None    Hydration:  No    Contrast Allergy:  No    Venous Clinical Severity Scores (VCSS)  Item Absent   (0 points) Mild   (1 point) Moderate   (2 points) Severe   (3 points)   Pain [] None [] Occasional [x] Daily [] Daily limiting   Varicose veins [] None [x] Few [] Calf or thigh [] Calf and thigh   Venous edema [] None [x] Foot and ankle [] Above ankle, below knee [] To knee of above   Skin pigmentation [x] None [] Perimalleolar [] Diffuse, lower 1/3 calf [] Wider, above lower 1/3 calf   Inflammation [x] None [] Perimalleolar [] Diffuse, lower 1/3 calf [] Wider, above lower 1/3 calf   Induration [x] None [] Perimalleolar [] Diffuse, lower 1/3 calf [] Wider, above lower 1/3 calf   No. active ulcers [x] None [] 1 [] 2 [] ? 3   Active ulcer size [x] None [] <2 cm [] 2 - 6 cm [] >6 cm   Ulcer duration [x] None [] <3 months [] 3 - 12 months [] >1 year   Compression therapy [] None [] Intermittent [] Most days [x] Fully comply   Total 7          CEAP Clinical Classification  [x] Symptomatic   [] Asymptomatic     [] Class 0 No visible or palpable signs of venous disease   [] Class 1 Telangiectasies or reticular veins   [] Class 2 Varicose veins; distinguished from reticular veins by a diameter of 3mm or more   [x] Class 3 Edema   [] Class 4 Changes in skin and subcutaneous tissue secondary to CVD    [] Class 4a Pigmentation or eczema   [] Class 4b Lipodermatosclerosis or atrophie dalton   [] Class 5 Healed venous ulcer   [] Class 6 Active venous ulcer

## 2023-12-11 NOTE — LETTER
23  RE: Medicare ID [id number]     To whom it may concern:    Patient, Margaret Aggarwal (1955), has a routine procedure scheduled on 24 at [facility] (Tax: [id number] / NPI: [id number]) [place] with Dr. Pete Maria // Hilda Dick (NPI: [id number]). We are requesting authorization for one (1) unit each of outpatient CPT code(s) 84562  . Patient's VCSS = 7 and CEAP Classification = Class 3. Please review the attached clinical documentation and provide your determination. Should you have any questions or concerns regarding the details of this case, please do not hesitate to reach out. Respectfully,      Serg Villaloboschpoppy  Prior Authorization & Referral  Vascular Center  [address]   P: [phone]  F: [phone]  [email or web address]  [email or web address]                              Please Do Not Copy        Prior [facility] Outpatient Procedure  Medicare Part A Fax/Mail Cover Sheet  Complete all fields; attach supporting medical documentation and fax to [phone] or mail to the applicable address/number provided at the bottom of the page. Complete ONE (1) Medicare Fax/ Mail Cover Sheet for each prior authorization request for which documentation is being submitted. Beneficiary Last Name  [facility] First Name  Mayelin Box ID   [id number]  Gender  [] Male [x] Female   1955    Facility/Agency NPIs  *** CMS Certification Number  ***   Facility Name and Address  {Vascular 2020 - Auth - Medicare Campuses w/ Address:[address]}   Provider's NPI  *** Provider's CMS Certification Number  ***   Provider's Name and Address  {Vascular 2020 - Auth - Medicare Physicians Names GEVC:[address]}     Requestor Name  [facility] Requestor Phone Number   [phone]   Requestor Fax Number/Email address  [phone] / [email or web address] Procedure Code(s)   32739 ***[{Right/left:93812}]   Paired Code(s) for Botulinum Toxin Injections                                                                                                  Not Applicable (N/A)    Diagnosis Codes (providers who submit using esMD must include diagnosis code(s)):  ***   Start Date of Authorization  *** State (location) of Authorization  [place] Units of Service  One (1) unit of each code   Request Completed by: (please print and sign)  Verl Brittanie Date  12/11/23        This document is intended solely for the use of the individual or entity to which it is addressed and may contain information that is privileged, confidential, and exempt from disclosure under applicable law. If the reader of this notice is not the intended recipient or individual responsible for delivering the message to the intended recipient, you are hereby advised that any dissemination, distribution or copying of this information is strictly prohibited. If you receive this communication in error, please advise us by telephone and destroy these papers.

## 2023-12-11 NOTE — H&P (VIEW-ONLY)
Assessment/Plan:    Venous insufficiency of left leg  69 yo F w/ diverticulosis, nonalcoholic steatohepatitis, hypothryoidism, hx of DVT, peripheral neuropathy, OA, RA, HLD, obesity, s/p L TKA, venous insufficiency s/p B/L GSV EVLT and stabs in 2022 presented back with left leg varicose veins and pain.      Patient had seen Dr. Riana Dodd in September 2023 for this issue.  She recommended using compression therapy again.  She noted on her exam that the veins that the patient was most complaining of were more reticular in nature than varicosities.      Patient had a lower extremity duplex with reflux that shows reflux within the GSV.  This may be the true GSV or accessory vein and on my exam I was able to palpate the vein on the left medial lower thigh likely feeding this area of matted reticular veins.    Patient has significant discomfort in her calf and foot despite wearing compressions daily.  She feels as though she has pressure and aching pain.  She also complains of an electric shock feeling at times.    I do believe the patient does have symptoms from venous insufficiency but there is likely a mixed component of neuropathy.  Given the findings on her duplex GSV reflux I would like to plan for an ablation of this vein using VenaSeal at the [facility].  We do not have laser capabilities at this [place] for an EVLT.    I did explain to her if her symptoms persist after we may need to chalked this up to her neuropathy and less likely her reticular veins however we can have her evaluated by one of our nurse practitioners if she is interested in sclerotherapy post ablation       Diagnoses and all orders for this visit:    Venous insufficiency of left leg  -     Case request operating room: Venaseal of left great saphenous; Standing  -     CBC and Platelet; Future  -     Protime-INR; Future  -     Basic metabolic panel; Future  -     Case request operating room: Venaseal of left great saphenous    Other  orders  -     Diet NPO; Sips with meds; Standing  -     Void on call to OR; Standing  -     Insert peripheral IV; Standing  -     Nursing Communication CHG bath, have staff wash entire body (neck down) per pre op bathing protocol. Routine, evening prior to, and day of surgery.; Standing  -     Nursing Communication Swab both nares with Povidone-Iodine solution, EXCLUDE if patient has shellfish/Iodine allergy, and replace with nasal alcohol swabstick. Routine, day of surgery, on call to OR.; Standing  -     chlorhexidine (PERIDEX) 0.12 % oral rinse 15 mL  -     Place sequential compression device; Standing  -     ceFAZolin (ANCEF) IVPB (premix in dextrose) 1,000 mg 50 mL          Subjective:      Patient ID: Waleska Owusu is a 68 y.o. female.      Patient presents today for a 3 month follow up for painful varicose veins L media knee area. Pt reports pain and an electric shock like feeling to left leg and pain veins to back of knee and inner calf area along with a pressure feeling. Pt has been wearing compression. Pt is s/p L GSV EVLT 2/22, R GSV EVLT 8/22, L Stabs 11/22 all by DR. Bradley. Pt is taking Atorvastatin. Pt is a former smoker.     HPI    The following portions of the patient's history were reviewed and updated as appropriate: allergies, current medications, past family history, past medical history, past social history, past surgical history, and problem list.    I have spent a total time of 35 minutes on 12/11/23 in caring for this patient including Diagnostic results, Prognosis, Risks and benefits of tx options, Instructions for management, Patient and family education, Importance of tx compliance, Risk factor reductions, Impressions, Counseling / Coordination of care, Documenting in the medical record, Reviewing / ordering tests, medicine, procedures  , and Obtaining or reviewing history  .    Review of Systems   Constitutional: Negative.    HENT: Negative.     Eyes: Negative.    Respiratory:  "Negative.     Cardiovascular:  Positive for leg swelling.   Gastrointestinal: Negative.    Endocrine: Negative.    Genitourinary: Negative.    Musculoskeletal: Negative.    Skin: Negative.    Allergic/Immunologic: Negative.    Neurological: Negative.    Hematological: Negative.    Psychiatric/Behavioral: Negative.           Objective:      /68 (BP Location: Right arm, Patient Position: Sitting, Cuff Size: Standard)   Pulse 73   Temp (!) 97.1 °F (36.2 °C) (Temporal)   Ht 5' 4.5\" (1.638 m)   Wt 90.5 kg (199 lb 9.6 oz)   LMP  (LMP Unknown)   SpO2 96%   BMI 33.73 kg/m²          Physical Exam  Constitutional:       Appearance: Normal appearance.   HENT:      Head: Normocephalic.      Mouth/Throat:      Mouth: Mucous membranes are moist.      Pharynx: Oropharynx is clear.   Eyes:      Extraocular Movements: Extraocular movements intact.      Pupils: Pupils are equal, round, and reactive to light.   Cardiovascular:      Rate and Rhythm: Normal rate and regular rhythm.   Pulmonary:      Effort: Pulmonary effort is normal.   Abdominal:      General: Abdomen is flat.      Tenderness: There is no abdominal tenderness.   Musculoskeletal:      Cervical back: Normal range of motion.      Right lower leg: No edema.      Left lower leg: Edema present.   Skin:     General: Skin is warm and dry.      Comments: Large amount of reticular veins along the medial and lateral aspect of her left lower leg  Palpable engorged vein on medial/posterior lower thigh, not visible   Neurological:      General: No focal deficit present.      Mental Status: She is alert and oriented to person, place, and time.   Psychiatric:         Mood and Affect: Mood normal.         Behavior: Behavior normal.           EVLT Operative Scheduling Information:    Hospital:  [facility]    Physician:  [facility]    Surgery: Left venaseal ablation of great saphenous vein    Urgency:  Standard    Level:  Level 4: Outpatients to be scheduled for screening procedures " and elective surgery that can be delayed for longer than one month without reasonable expectation of detriment to patient.    Case Length:  Normal    Post-op Bed:  Outpatient    OR Table:  Standard    Equipment Needs:  Vascular technologist and Rep: Venaseal    Medication Instructions:  None    Hydration:  No    Contrast Allergy:  No    Venous Clinical Severity Scores (VCSS)  Item Absent   (0 points) Mild   (1 point) Moderate   (2 points) Severe   (3 points)   Pain [] None [] Occasional [x] Daily [] Daily limiting   Varicose veins [] None [x] Few [] Calf or thigh [] Calf and thigh   Venous edema [] None [x] Foot and ankle [] Above ankle, below knee [] To knee of above   Skin pigmentation [x] None [] Perimalleolar [] Diffuse, lower 1/3 calf [] Wider, above lower 1/3 calf   Inflammation [x] None [] Perimalleolar [] Diffuse, lower 1/3 calf [] Wider, above lower 1/3 calf   Induration [x] None [] Perimalleolar [] Diffuse, lower 1/3 calf [] Wider, above lower 1/3 calf   No. active ulcers [x] None [] 1 [] 2 [] ?3   Active ulcer size [x] None [] <2 cm [] 2 - 6 cm [] >6 cm   Ulcer duration [x] None [] <3 months [] 3 - 12 months [] >1 year   Compression therapy [] None [] Intermittent [] Most days [x] Fully comply   Total 7          CEAP Clinical Classification  [x] Symptomatic   [] Asymptomatic     [] Class 0 No visible or palpable signs of venous disease   [] Class 1 Telangiectasies or reticular veins   [] Class 2 Varicose veins; distinguished from reticular veins by a diameter of 3mm or more   [x] Class 3 Edema   [] Class 4 Changes in skin and subcutaneous tissue secondary to CVD    [] Class 4a Pigmentation or eczema   [] Class 4b Lipodermatosclerosis or atrophie dalton   [] Class 5 Healed venous ulcer   [] Class 6 Active venous ulcer

## 2023-12-12 NOTE — TELEPHONE ENCOUNTER
Authorization requirements reviewed. Please refer to Tank Fournier / Desirae Mediate number [id number]  for case updates.

## 2023-12-21 ENCOUNTER — ANESTHESIA EVENT (OUTPATIENT)
Dept: PERIOP | Facility: HOSPITAL | Age: 68
End: 2023-12-21
Payer: MEDICARE

## 2023-12-22 DIAGNOSIS — E78.2 MIXED HYPERLIPIDEMIA: ICD-10-CM

## 2023-12-22 RX ORDER — ATORVASTATIN CALCIUM 10 MG/1
10 TABLET, FILM COATED ORAL DAILY
Qty: 30 TABLET | Refills: 0 | Status: SHIPPED | OUTPATIENT
Start: 2023-12-22

## 2023-12-26 ENCOUNTER — APPOINTMENT (OUTPATIENT)
Dept: LAB | Facility: HOSPITAL | Age: 68
End: 2023-12-26
Payer: MEDICARE

## 2023-12-26 DIAGNOSIS — I87.2 VENOUS INSUFFICIENCY OF LEFT LEG: ICD-10-CM

## 2023-12-26 LAB
ANION GAP SERPL CALCULATED.3IONS-SCNC: 6 MMOL/L
BUN SERPL-MCNC: 20 MG/DL (ref 5–25)
CALCIUM SERPL-MCNC: 10 MG/DL (ref 8.4–10.2)
CHLORIDE SERPL-SCNC: 106 MMOL/L (ref 96–108)
CO2 SERPL-SCNC: 29 MMOL/L (ref 21–32)
CREAT SERPL-MCNC: 0.76 MG/DL (ref 0.6–1.3)
ERYTHROCYTE [DISTWIDTH] IN BLOOD BY AUTOMATED COUNT: 12.7 % (ref 11.6–15.1)
GFR SERPL CREATININE-BSD FRML MDRD: 80 ML/MIN/1.73SQ M
GLUCOSE P FAST SERPL-MCNC: 96 MG/DL (ref 65–99)
HCT VFR BLD AUTO: 43.2 % (ref 34.8–46.1)
HGB BLD-MCNC: 14 G/DL (ref 11.5–15.4)
INR PPP: 0.94 (ref 0.84–1.19)
MCH RBC QN AUTO: 34.2 PG (ref 26.8–34.3)
MCHC RBC AUTO-ENTMCNC: 32.4 G/DL (ref 31.4–37.4)
MCV RBC AUTO: 106 FL (ref 82–98)
PLATELET # BLD AUTO: 181 THOUSANDS/UL (ref 149–390)
PMV BLD AUTO: 8.9 FL (ref 8.9–12.7)
POTASSIUM SERPL-SCNC: 4 MMOL/L (ref 3.5–5.3)
PROTHROMBIN TIME: 12.5 SECONDS (ref 11.6–14.5)
RBC # BLD AUTO: 4.09 MILLION/UL (ref 3.81–5.12)
SODIUM SERPL-SCNC: 141 MMOL/L (ref 135–147)
WBC # BLD AUTO: 5.81 THOUSAND/UL (ref 4.31–10.16)

## 2023-12-26 PROCEDURE — 85610 PROTHROMBIN TIME: CPT

## 2023-12-26 PROCEDURE — 36415 COLL VENOUS BLD VENIPUNCTURE: CPT

## 2023-12-26 PROCEDURE — 85027 COMPLETE CBC AUTOMATED: CPT

## 2023-12-26 PROCEDURE — 80048 BASIC METABOLIC PNL TOTAL CA: CPT

## 2023-12-26 RX ORDER — POLYETHYLENE GLYCOL 3350 17 G/17G
17 POWDER, FOR SOLUTION ORAL DAILY
COMMUNITY

## 2023-12-26 NOTE — PRE-PROCEDURE INSTRUCTIONS
Pre-Surgery Instructions:   Medication Instructions    atorvastatin (LIPITOR) 10 mg tablet Take day of surgery.    CALCIUM CITRATE PO Stop taking 7 days prior to surgery.    Cholecalciferol (VITAMIN D-3 PO) Stop taking 7 days prior to surgery.    meloxicam (MOBIC) 15 mg tablet Stop taking 7 days prior to surgery.    Multiple Vitamin (multivitamin) tablet Stop taking 7 days prior to surgery.    polyethylene glycol (GLYCOLAX) 17 GM/SCOOP powder Hold day of surgery.    vitamin B-12 (VITAMIN B-12) 1,000 mcg tablet Stop taking 7 days prior to surgery.    Medication instructions for day surgery reviewed. Please use only a sip of water to take your instructed medications. Avoid all over the counter vitamins, supplements and NSAIDS for one week prior to surgery per anesthesia guidelines. Tylenol is ok to take as needed.     You will receive a call one business day prior to surgery with an arrival time and hospital directions. If your surgery is scheduled on a Monday, the hospital will be calling you on the Friday prior to your surgery. If you have not heard from anyone by 8pm, please call the hospital supervisor through the hospital  at [phone]. (Rodolfo [phone]).    Do not eat or drink anything after midnight the night before your surgery, including candy, mints, lifesavers, or chewing gum. Do not drink alcohol 24hrs before your surgery. Try not to smoke at least 24hrs before your surgery.       Follow the pre surgery showering instructions as listed in the “My Surgical Experience Booklet” or otherwise provided by your surgeon's office. Do not use a blade to shave the surgical area 1 week before surgery. It is okay to use a clean electric clippers up to 24 hours before surgery. Do not apply any lotions, creams, including makeup, cologne, deodorant, or perfumes after showering on the day of your surgery. Do not use dry shampoo, hair spray, hair gel, or any type of hair products.     No contact lenses, eye  make-up, or artificial eyelashes. Remove nail polish, including gel polish, and any artificial, gel, or acrylic nails if possible. Remove all jewelry including rings and body piercing jewelry.     Wear causal clothing that is easy to take on and off. Consider your type of surgery.    Keep any valuables, jewelry, piercings at home. Please bring any specially ordered equipment (sling, braces) if indicated.    Arrange for a responsible person to drive you to and from the hospital on the day of your surgery. Visitor Guidelines discussed.     Call the surgeon's office with any new illnesses, exposures, or additional questions prior to surgery.    Please reference your “My Surgical Experience Booklet” for additional information to prepare for your upcoming surgery.

## 2024-01-02 DIAGNOSIS — G89.4 PAIN SYNDROME, CHRONIC: ICD-10-CM

## 2024-01-02 DIAGNOSIS — E78.2 MIXED HYPERLIPIDEMIA: ICD-10-CM

## 2024-01-02 DIAGNOSIS — M15.9 PRIMARY OSTEOARTHRITIS INVOLVING MULTIPLE JOINTS: ICD-10-CM

## 2024-01-02 RX ORDER — MELOXICAM 15 MG/1
15 TABLET ORAL DAILY
Qty: 90 TABLET | Refills: 3 | Status: SHIPPED | OUTPATIENT
Start: 2024-01-02 | End: 2024-06-30

## 2024-01-02 RX ORDER — ATORVASTATIN CALCIUM 10 MG/1
10 TABLET, FILM COATED ORAL DAILY
Qty: 90 TABLET | Refills: 3 | Status: SHIPPED | OUTPATIENT
Start: 2024-01-02

## 2024-01-05 ENCOUNTER — HOSPITAL ENCOUNTER (OUTPATIENT)
Facility: HOSPITAL | Age: 69
Setting detail: OUTPATIENT SURGERY
Discharge: HOME | End: 2024-01-05
Attending: SURGERY | Admitting: SURGERY
Payer: MEDICARE

## 2024-01-05 ENCOUNTER — APPOINTMENT (OUTPATIENT)
Dept: NON INVASIVE DIAGNOSTICS | Facility: HOSPITAL | Age: 69
End: 2024-01-05
Payer: MEDICARE

## 2024-01-05 ENCOUNTER — ANESTHESIA (OUTPATIENT)
Dept: PERIOP | Facility: HOSPITAL | Age: 69
End: 2024-01-05
Payer: MEDICARE

## 2024-01-05 VITALS
SYSTOLIC BLOOD PRESSURE: 133 MMHG | HEIGHT: 65 IN | RESPIRATION RATE: 18 BRPM | TEMPERATURE: 97.7 F | HEART RATE: 76 BPM | WEIGHT: 199 LBS | OXYGEN SATURATION: 99 % | BODY MASS INDEX: 33.15 KG/M2 | DIASTOLIC BLOOD PRESSURE: 61 MMHG

## 2024-01-05 DIAGNOSIS — I83.813 VARICOSE VEINS OF BOTH LOWER EXTREMITIES WITH PAIN: ICD-10-CM

## 2024-01-05 PROCEDURE — C1888 ENDOVAS NON-CARDIAC ABL CATH: HCPCS | Performed by: SURGERY

## 2024-01-05 PROCEDURE — C1894 INTRO/SHEATH, NON-LASER: HCPCS | Performed by: SURGERY

## 2024-01-05 PROCEDURE — 93971 EXTREMITY STUDY: CPT

## 2024-01-05 PROCEDURE — 36482 ENDOVEN THER CHEM ADHES 1ST: CPT | Performed by: PHYSICIAN ASSISTANT

## 2024-01-05 PROCEDURE — 36482 ENDOVEN THER CHEM ADHES 1ST: CPT | Performed by: SURGERY

## 2024-01-05 DEVICE — VENASEAL CLOSURE SYSTEM: Type: IMPLANTABLE DEVICE | Site: LEG | Status: FUNCTIONAL

## 2024-01-05 RX ORDER — SODIUM CHLORIDE, SODIUM LACTATE, POTASSIUM CHLORIDE, CALCIUM CHLORIDE 600; 310; 30; 20 MG/100ML; MG/100ML; MG/100ML; MG/100ML
20 INJECTION, SOLUTION INTRAVENOUS CONTINUOUS
Status: DISCONTINUED | OUTPATIENT
Start: 2024-01-05 | End: 2024-01-05 | Stop reason: HOSPADM

## 2024-01-05 RX ORDER — CHLORHEXIDINE GLUCONATE ORAL RINSE 1.2 MG/ML
15 SOLUTION DENTAL ONCE
Status: COMPLETED | OUTPATIENT
Start: 2024-01-05 | End: 2024-01-05

## 2024-01-05 RX ORDER — CEFAZOLIN SODIUM 1 G/50ML
1000 SOLUTION INTRAVENOUS ONCE
Status: COMPLETED | OUTPATIENT
Start: 2024-01-05 | End: 2024-01-05

## 2024-01-05 RX ORDER — SODIUM CHLORIDE, SODIUM LACTATE, POTASSIUM CHLORIDE, CALCIUM CHLORIDE 600; 310; 30; 20 MG/100ML; MG/100ML; MG/100ML; MG/100ML
INJECTION, SOLUTION INTRAVENOUS CONTINUOUS PRN
Status: DISCONTINUED | OUTPATIENT
Start: 2024-01-05 | End: 2024-01-05

## 2024-01-05 RX ORDER — FENTANYL CITRATE/PF 50 MCG/ML
25 SYRINGE (ML) INJECTION
Status: DISCONTINUED | OUTPATIENT
Start: 2024-01-05 | End: 2024-01-05 | Stop reason: HOSPADM

## 2024-01-05 RX ORDER — LIDOCAINE HCL/PF 100 MG/5ML
SYRINGE (ML) INJECTION AS NEEDED
Status: DISCONTINUED | OUTPATIENT
Start: 2024-01-05 | End: 2024-01-05

## 2024-01-05 RX ORDER — PROPOFOL 10 MG/ML
INJECTION, EMULSION INTRAVENOUS AS NEEDED
Status: DISCONTINUED | OUTPATIENT
Start: 2024-01-05 | End: 2024-01-05

## 2024-01-05 RX ORDER — ONDANSETRON 2 MG/ML
4 INJECTION INTRAMUSCULAR; INTRAVENOUS ONCE AS NEEDED
Status: DISCONTINUED | OUTPATIENT
Start: 2024-01-05 | End: 2024-01-05 | Stop reason: HOSPADM

## 2024-01-05 RX ORDER — KETAMINE HCL IN NACL, ISO-OSM 100MG/10ML
SYRINGE (ML) INJECTION AS NEEDED
Status: DISCONTINUED | OUTPATIENT
Start: 2024-01-05 | End: 2024-01-05

## 2024-01-05 RX ORDER — FENTANYL CITRATE 50 UG/ML
INJECTION, SOLUTION INTRAMUSCULAR; INTRAVENOUS AS NEEDED
Status: DISCONTINUED | OUTPATIENT
Start: 2024-01-05 | End: 2024-01-05

## 2024-01-05 RX ORDER — FENTANYL CITRATE 50 UG/ML
INJECTION, SOLUTION INTRAMUSCULAR; INTRAVENOUS
Status: COMPLETED
Start: 2024-01-05 | End: 2024-01-05

## 2024-01-05 RX ORDER — ONDANSETRON 2 MG/ML
INJECTION INTRAMUSCULAR; INTRAVENOUS AS NEEDED
Status: DISCONTINUED | OUTPATIENT
Start: 2024-01-05 | End: 2024-01-05

## 2024-01-05 RX ORDER — FENTANYL CITRATE/PF 50 MCG/ML
50 SYRINGE (ML) INJECTION
Status: DISCONTINUED | OUTPATIENT
Start: 2024-01-05 | End: 2024-01-05 | Stop reason: HOSPADM

## 2024-01-05 RX ORDER — ACETAMINOPHEN 325 MG/1
975 TABLET ORAL EVERY 6 HOURS PRN
Status: DISCONTINUED | OUTPATIENT
Start: 2024-01-05 | End: 2024-01-05 | Stop reason: HOSPADM

## 2024-01-05 RX ORDER — SODIUM CHLORIDE, SODIUM LACTATE, POTASSIUM CHLORIDE, CALCIUM CHLORIDE 600; 310; 30; 20 MG/100ML; MG/100ML; MG/100ML; MG/100ML
50 INJECTION, SOLUTION INTRAVENOUS CONTINUOUS
Status: DISCONTINUED | OUTPATIENT
Start: 2024-01-05 | End: 2024-01-05 | Stop reason: HOSPADM

## 2024-01-05 RX ORDER — HYDROMORPHONE HCL/PF 1 MG/ML
0.2 SYRINGE (ML) INJECTION
Status: DISCONTINUED | OUTPATIENT
Start: 2024-01-05 | End: 2024-01-05 | Stop reason: HOSPADM

## 2024-01-05 RX ORDER — MIDAZOLAM HYDROCHLORIDE 2 MG/2ML
INJECTION, SOLUTION INTRAMUSCULAR; INTRAVENOUS AS NEEDED
Status: DISCONTINUED | OUTPATIENT
Start: 2024-01-05 | End: 2024-01-05

## 2024-01-05 RX ORDER — METOCLOPRAMIDE HYDROCHLORIDE 5 MG/ML
10 INJECTION INTRAMUSCULAR; INTRAVENOUS ONCE AS NEEDED
Status: DISCONTINUED | OUTPATIENT
Start: 2024-01-05 | End: 2024-01-05 | Stop reason: HOSPADM

## 2024-01-05 RX ADMIN — Medication 15 MG: at 13:10

## 2024-01-05 RX ADMIN — CHLORHEXIDINE GLUCONATE 15 ML: 1.2 SOLUTION ORAL at 09:18

## 2024-01-05 RX ADMIN — SODIUM CHLORIDE, SODIUM LACTATE, POTASSIUM CHLORIDE, AND CALCIUM CHLORIDE: .6; .31; .03; .02 INJECTION, SOLUTION INTRAVENOUS at 11:26

## 2024-01-05 RX ADMIN — FENTANYL CITRATE 25 MCG: 50 INJECTION, SOLUTION INTRAMUSCULAR; INTRAVENOUS at 11:49

## 2024-01-05 RX ADMIN — ONDANSETRON 4 MG: 2 INJECTION INTRAMUSCULAR; INTRAVENOUS at 12:24

## 2024-01-05 RX ADMIN — FENTANYL CITRATE 25 MCG: 50 INJECTION INTRAMUSCULAR; INTRAVENOUS at 13:49

## 2024-01-05 RX ADMIN — SODIUM CHLORIDE, SODIUM LACTATE, POTASSIUM CHLORIDE, AND CALCIUM CHLORIDE: .6; .31; .03; .02 INJECTION, SOLUTION INTRAVENOUS at 13:13

## 2024-01-05 RX ADMIN — CEFAZOLIN SODIUM 1000 MG: 1 SOLUTION INTRAVENOUS at 11:26

## 2024-01-05 RX ADMIN — Medication 10 MG: at 13:05

## 2024-01-05 RX ADMIN — MIDAZOLAM 2 MG: 1 INJECTION INTRAMUSCULAR; INTRAVENOUS at 11:26

## 2024-01-05 RX ADMIN — FENTANYL CITRATE 25 MCG: 50 INJECTION, SOLUTION INTRAMUSCULAR; INTRAVENOUS at 12:15

## 2024-01-05 RX ADMIN — PROPOFOL 50 MG: 10 INJECTION, EMULSION INTRAVENOUS at 13:05

## 2024-01-05 RX ADMIN — FENTANYL CITRATE 25 MCG: 50 INJECTION INTRAMUSCULAR; INTRAVENOUS at 13:54

## 2024-01-05 RX ADMIN — LIDOCAINE HYDROCHLORIDE 60 MG: 20 INJECTION INTRAVENOUS at 11:30

## 2024-01-05 RX ADMIN — FENTANYL CITRATE 25 MCG: 50 INJECTION INTRAMUSCULAR; INTRAVENOUS at 13:59

## 2024-01-05 RX ADMIN — FENTANYL CITRATE 50 MCG: 50 INJECTION, SOLUTION INTRAMUSCULAR; INTRAVENOUS at 11:30

## 2024-01-05 RX ADMIN — SODIUM CHLORIDE, SODIUM LACTATE, POTASSIUM CHLORIDE, AND CALCIUM CHLORIDE 50 ML/HR: .6; .31; .03; .02 INJECTION, SOLUTION INTRAVENOUS at 09:19

## 2024-01-05 RX ADMIN — Medication 25 MG: at 11:30

## 2024-01-05 RX ADMIN — PROPOFOL 180 MG: 10 INJECTION, EMULSION INTRAVENOUS at 11:30

## 2024-01-05 NOTE — OP NOTE
OPERATIVE REPORT  PATIENT NAME: Waleska Owusu    :  1955  MRN: [id number]  Pt Location: [facility] OR ROOM 03    SURGERY DATE: 2024    Surgeons and Role:     * Jigar Boucher MD - Primary     * Soraya Carter PA-C - Assisting    Preop Diagnosis:  Venous insufficiency of left leg [I87.2]    Post-Op Diagnosis Codes:     * Venous insufficiency of left leg [I87.2]    Procedure(s):  Left - Venaseal of left great saphenous    Specimen(s):  * No specimens in log *    Estimated Blood Loss:   Minimal    Drains:  * No LDAs found *    Anesthesia Type:   Conscious Sedation     Operative Indications:  Venous insufficiency of left leg [I87.2]  67 yo F with h/o of bilateral EVLT here with complaints of left leg pain and large reticular veins. Venous duplex with reflux showed a patent and refluxing GSV    Operative Findings:  Large (0.95cm) GSV venaseal ablation  First access was at the distal lower leg and the acrylic was administered throughout the entire vein. Ultrasound evaluation showed the vein did not take proximally so another access in the vein just above the knee was performed and the rest of the acrylic was administered throughout the proximal GSV.    Ultrasound showed no flow through the GSV and no sign of DVT    Complications:   None    Procedure and Technique:  Duplex ultrasound was used to map out the insufficient great and short saphenous vein, and access was determined and marked on the   overlying skin. The depth and diameter of the vein to be treated was documented. The patient was placed supine on the procedure table and the left leg was prepped and draped using sterile technique.   Using ultrasound guidance, access was gained just above the knee with the using the micropuncture needle and wire. The micropuncture sheath was advanced. The 0.035 guidewire from the Venaseal kit was then introduced and positioned at the saphenofemoral junction using ultrasound guidance. The 7 cm 7 Fr introducer  "sheath/dilator was positioned 5cm. The 80 cm 7 Fr introducer sheath/dilator was positioned 5cm from the saphenofemoral junction. The guidewire and dilator were removed, and the remaining sheath was flushed with sterile saline, with the syringe remaining in place prior to the next steps.   The cyanoacrylate adhesive was precisely primed into the 5 F delivery catheter and this catheter/syringe combination was attached within the dispenser gun. This \"assembly\" was introduced through the 7 F sheath and positioned 5 cm caudal of the saphenofemoral junction under ultrasound guidance. The steps from the IFU were followed for dispensing amounts, locations and compression times, 2 aliquots proximally with 3 minutes of compression, and 1 aliquot every 3cm distally with 30 sec of compression along the course of the vessel. Following the last injection and compression sequence, the catheter was pulled into the introducer sheath and both were pulled out from the access site. Hemostasis was achieved with manual compression and an adhesive bandage was applied to the incision. Ultrasound showed partial closure but still patent flow through the vein of the proximal segments of the GSV. Vein length treated was 60 cm.     There was still some acrylic left in the syringe so I decided to re-treat the thigh segment. Using ultrasound guidance, access was gained just above the knee with the using the micropuncture needle and wire. The micropuncture sheath was advanced. The 0.035 guidewire from the Venaseal kit was then introduced and positioned at the saphenofemoral junction using ultrasound guidance. The 7 cm 7 Fr introducer sheath/dilator was positioned 5cm from the saphenopopliteal junction. The guidewire and dilator was removed.  The cyanoacrylate adhesive was precisely primed into the 5 F delivery catheter and this catheter/syringe combination was attached within the dispenser gun. This \"assembly\" was introduced through the 7 F sheath " and positioned 5 cm caudal of the saphenopopliteal junction under ultrasound guidance. The steps from the IFU were followed for dispensing amounts, locations and compression times, 2 aliquots proximally with 3 minutes of compression, and 1 aliquot every 3cm distally with 30 sec of compression along the course of the vessel. Following the last injection and compression sequence, the catheter was pulled into the introducer sheath and both were pulled out from the access site. Hemostasis was achieved with manual compression and an exofin was applied to the incision. Ultrasound confirmed complete coaptation and closure of the treated segments of the GSV, and the absence of any DVT at the saphenofemoral junction.   Vein length treated was 20 cm.     I was present for the entire procedure.     A qualified resident physician was not available and a physician assistant was required during the procedure for retraction, tissue handling, dissection, and suturing    Patient Disposition:  PACU         SIGNATURE: Jigar Boucher MD  DATE: January 5, 2024  TIME: 1:27 PM

## 2024-01-05 NOTE — ANESTHESIA PREPROCEDURE EVALUATION
Procedure:  Venaseal of left great saphenous (Left: Leg Upper)    Relevant Problems   ANESTHESIA (within normal limits)   (-) History of anesthesia complications      CARDIO   (+) Hyperlipidemia   (-) Chest pain   (-) GALVEZ (dyspnea on exertion)      ENDO   (+) Hypothyroidism      GI/HEPATIC  NPO confirmed  BMI 33.6   (+) Nonalcoholic steatohepatitis      /RENAL (within normal limits)      HEMATOLOGY (within normal limits)  Chronic DVT      MUSCULOSKELETAL   (+) Lower back pain   (+) Osteoarthritis   (+) Rheumatoid arthritis (HCC)      NEURO/PSYCH   (+) Pain syndrome, chronic (Hx LUE CRPS 1)      PULMONARY   (-) Smoking   (-) URI (upper respiratory infection)      Other   (+) Splenic cyst     Allergies   Allergen Reactions    Bee Venom Itching and Edema     Bee stings     Social History     Tobacco Use    Smoking status: Former     Current packs/day: 0.00     Types: Cigarettes     Quit date:      Years since quittin.0    Smokeless tobacco: Never   Vaping Use    Vaping status: Never Used   Substance Use Topics    Alcohol use: Yes     Comment: rare    Drug use: Never     Current Outpatient Medications   Medication Instructions    atorvastatin (LIPITOR) 10 mg, Oral, Daily    CALCIUM CITRATE  mg, Oral, Daily    Cholecalciferol (VITAMIN D-3 PO) 2,000 Int'l Units, Oral, Daily    meloxicam (MOBIC) 15 mg, Oral, Daily    Multiple Vitamin (multivitamin) tablet 1 tablet, Oral, Daily    polyethylene glycol (GLYCOLAX) 17 g, Oral, Daily, Equate brand    vitamin B-12 (VITAMIN B-12) 1,000 mcg, Oral, Daily     Lab Results   Component Value Date    WBC 5.81 2023    HGB 14.0 2023    HCT 43.2 2023     2023    SODIUM 141 2023    K 4.0 2023     2023    CO2 29 2023    BUN 20 2023    CREATININE 0.76 2023    GLUC 95 2022    AST 16 2023    ALT 13 2023    ALKPHOS 57 2023    TBILI 0.61 2023    ALB 3.8 2023    PROTIME 12.5  12/26/2023    PTT 26 06/23/2014    INR 0.94 12/26/2023     Vitals:    01/05/24 0905   BP: 141/71   Pulse: 65   Resp: 20   Temp: 98.2 °F (36.8 °C)   SpO2: 99%     EKG 10/14/22  Normal sinus rhythm  Normal ECG  When compared with ECG of 14-OCT-2022 08:58,  No significant change was found    Physical Exam    Airway    Mallampati score: II  TM Distance: >3 FB  Neck ROM: full     Dental   Comment: edentulous upper dentures and lower dentures    Cardiovascular  Rhythm: regular, Rate: normal    Pulmonary   Breath sounds clear to auscultation    Other Findings  post-pubertal.      Anesthesia Plan  ASA Score- 3     Anesthesia Type- general with ASA Monitors.         Additional Monitors:     Airway Plan: LMA.           Plan Factors-Exercise tolerance (METS): >4 METS.    Chart reviewed. EKG reviewed.  Existing labs reviewed. Patient summary reviewed.    Patient is not a current smoker.              Induction- intravenous.    Postoperative Plan- Plan for postoperative opioid use. Planned trial extubation    Informed Consent- Anesthetic plan and risks discussed with patient.  I personally reviewed this patient with the CRNA. Discussed and agreed on the Anesthesia Plan with the CRNA..

## 2024-01-05 NOTE — DISCHARGE INSTR - AVS FIRST PAGE
DISCHARGE INSTRUCTIONS  VARICOSE VEIN SURGERY    ACTIVITY:  On the day of your operation, “take it easy”. You can take short walks around the house. When sitting, the leg should be elevated. The preferred position is to have the leg at or above the level of the heart. Starting on the first day after surgery, light walking is encouraged as tolerated. After your ultrasound test, you can resume your normal activity, but no heavy lifting (do not lift more than 15 pounds) or strenuous exercise for 2 weeks.   You should not drive a car until your bandages are removed and you are off all narcotic pain medication.  You may ride in a car.      DIET: Resume your normal diet.  Good nutrition is important for healing of your incision.    DRESSINGS/SURGICAL SITE:  When released from the hospital, you should have a compression bandage in place on the operated leg.  This bandage should feel snug, but not too tight.  If the bandage becomes blood soaked or painfully tight, elevate your leg and call the office ([phone])  You may have surgical glue on your incision sites.   There are stitches present under the skin which will absorb on their own.   The glue is used to cover the incision, assist in closure, and prevent contamination. This adhesive will darken and peel away on its own within one to two weeks. Do not pick at it.    You should shower daily.  Wash incisions daily with soap and water, but do not rub or scrub the incisions; rinse thoroughly and pat dry.      If the operated leg becomes increasingly painful or swollen, or if there is increasing redness or pain around your incision, contact our office.  Some bruising of the skin is common after varicose vein surgery.  This can be lessened by elevation of the leg. Many patients will notice some numbness of the shin, ankle, calf, or the top of the foot. This usually improves with time but may be persistent.  After surgery you can expect bruising, swelling and hard knots on  your leg.  As your body heals the bruising will fade and the swelling and knots will subside.  Apply sunscreen with SPF 30 to incisions while sun bathing for up to one year after surgery to reduce the chances of your incisions darkening.    FOLLOW UP STUDIES:  Your first post-operative appointment will be 2-3 days after your surgery. At this appointment your bandages will be removed, and you will be seen by a Vascular Surgeon, Nurse Practitioner, or Physician Assistant. An appointment for a follow up Doppler ultrasound study will be scheduled on the same day as your follow up appointment.     FOLLOW UP APPOINTMENTS:  Making and keeping follow up appointments and ultrasound tests are important to your recovery.  If you have difficulty making it to or keeping your follow up appointments, call the office.    If you have increased pain, fever >101.5, increased drainage, redness or a bad smell at your surgery site, new coldness/numbness of your arm or leg, please call us immediately and GO directly to the ER.    PLEASE CALL THE OFFICE IF YOU HAVE ANY QUESTIONS  [phone]  [phone] [address], [address], [place] [address]  [address] [facility], [address], [place] [address]  [address] [place] [address]  [address] [facility] [address] [address]  [address] [facility], [place] [address]  [address] [facility], [address], [address] [address]  [address] [facility], [address], [place] [address]  [address] [facility], [address]st Floor, [address], [place] [address]  [address], [place] [address]  [address] [address]

## 2024-01-05 NOTE — ANESTHESIA POSTPROCEDURE EVALUATION
Post-Op Assessment Note    CV Status:  Stable  Pain Score: 0    Pain management: adequate       Mental Status:  Alert and awake   Hydration Status:  Euvolemic   PONV Controlled:  Controlled   Airway Patency:  Patent     Post Op Vitals Reviewed: Yes      Staff: CRNA               BP   126/70   Temp 97   Pulse 78   Resp 12   SpO2 99

## 2024-01-08 ENCOUNTER — OFFICE VISIT (OUTPATIENT)
Dept: VASCULAR SURGERY | Facility: HOSPITAL | Age: 69
End: 2024-01-08
Payer: MEDICARE

## 2024-01-08 ENCOUNTER — HOSPITAL ENCOUNTER (OUTPATIENT)
Dept: NON INVASIVE DIAGNOSTICS | Facility: HOSPITAL | Age: 69
Discharge: HOME | End: 2024-01-08
Attending: SURGERY
Payer: MEDICARE

## 2024-01-08 VITALS
HEART RATE: 72 BPM | BODY MASS INDEX: 33.15 KG/M2 | WEIGHT: 199 LBS | HEIGHT: 65 IN | SYSTOLIC BLOOD PRESSURE: 136 MMHG | DIASTOLIC BLOOD PRESSURE: 74 MMHG | TEMPERATURE: 98.4 F | OXYGEN SATURATION: 98 %

## 2024-01-08 DIAGNOSIS — I83.813 VARICOSE VEINS OF BOTH LOWER EXTREMITIES WITH PAIN: ICD-10-CM

## 2024-01-08 DIAGNOSIS — I87.2 VENOUS INSUFFICIENCY OF LEFT LEG: Primary | ICD-10-CM

## 2024-01-08 PROCEDURE — 93971 EXTREMITY STUDY: CPT | Performed by: SURGERY

## 2024-01-08 PROCEDURE — 93971 EXTREMITY STUDY: CPT

## 2024-01-08 PROCEDURE — 99213 OFFICE O/P EST LOW 20 MIN: CPT | Performed by: SURGERY

## 2024-01-08 PROCEDURE — NC001 PR NO CHARGE: Performed by: SURGERY

## 2024-01-08 NOTE — ASSESSMENT & PLAN NOTE
68-year-old female now status post left GSV VenaSeal from saphenofemoral junction down to mid calf.  During the case the patient had a large proximal GSV which required 2-3x the usual amount of acrylic for the pain to completely thrombose.      Patient is doing well today.  States her pain has improved significantly.  Patient will have a post EVLT duplex done today.  Recommend she continue wear compression daily.  We can have her follow-up in 3 months

## 2024-01-08 NOTE — PROGRESS NOTES
Assessment/Plan:    Venous insufficiency of left leg  68-year-old female now status post left GSV VenaSeal from saphenofemoral junction down to mid calf.  During the case the patient had a large proximal GSV which required 2-3x the usual amount of acrylic for the pain to completely thrombose.      Patient is doing well today.  States her pain has improved significantly.  Patient will have a post EVLT duplex done today.  Recommend she continue wear compression daily.  We can have her follow-up in 3 months       Diagnoses and all orders for this visit:    Venous insufficiency of left leg          Subjective:      Patient ID: Waleska Owusu is a 68 y.o. female.      Patient presents today post op vena seal left upper leg done 1/5/24 by Dr. Boucher with dulpex to follow appointment. Pt denies pain, numbness, dressing removed in office today no d/c noted scabbed areas noted. Pt is taking Atorvastatin. Pt is a former smoker.     HPI    The following portions of the patient's history were reviewed and updated as appropriate: allergies, current medications, past family history, past medical history, past social history, past surgical history, and problem list.    I have spent a total time of 25 minutes on 01/08/24 in caring for this patient including Diagnostic results, Prognosis, Risks and benefits of tx options, Instructions for management, Patient and family education, Importance of tx compliance, Risk factor reductions, Impressions, Counseling / Coordination of care, Documenting in the medical record, Reviewing / ordering tests, medicine, procedures  , and Obtaining or reviewing history  .    Review of Systems   Constitutional: Negative.    HENT: Negative.     Eyes: Negative.    Respiratory: Negative.     Cardiovascular: Negative.    Gastrointestinal: Negative.    Endocrine: Negative.    Genitourinary: Negative.    Musculoskeletal: Negative.    Skin: Negative.    Allergic/Immunologic: Negative.    Neurological:  "Negative.    Hematological: Negative.    Psychiatric/Behavioral: Negative.           Objective:      /74 (BP Location: Left arm, Patient Position: Sitting, Cuff Size: Standard)   Pulse 72   Temp 98.4 °F (36.9 °C) (Temporal)   Ht 5' 4.5\" (1.638 m)   Wt 90.3 kg (199 lb)   LMP  (LMP Unknown)   SpO2 98%   BMI 33.63 kg/m²          Physical Exam  Constitutional:       Appearance: Normal appearance.   HENT:      Head: Normocephalic.      Mouth/Throat:      Mouth: Mucous membranes are moist.      Pharynx: Oropharynx is clear.   Eyes:      Extraocular Movements: Extraocular movements intact.      Pupils: Pupils are equal, round, and reactive to light.   Cardiovascular:      Rate and Rhythm: Normal rate and regular rhythm.   Pulmonary:      Effort: Pulmonary effort is normal.   Abdominal:      General: Abdomen is flat.      Tenderness: There is no abdominal tenderness.   Musculoskeletal:      Cervical back: Normal range of motion.      Right lower leg: Edema present.      Left lower leg: Edema present.   Skin:     General: Skin is warm and dry.      Comments: Incisions c/d/i   Neurological:      General: No focal deficit present.      Mental Status: She is alert and oriented to person, place, and time.   Psychiatric:         Mood and Affect: Mood normal.         Behavior: Behavior normal.           "

## 2024-02-10 DIAGNOSIS — Z00.6 ENCOUNTER FOR EXAMINATION FOR NORMAL COMPARISON OR CONTROL IN CLINICAL RESEARCH PROGRAM: ICD-10-CM

## 2024-02-12 ENCOUNTER — APPOINTMENT (OUTPATIENT)
Dept: LAB | Facility: HOSPITAL | Age: 69
End: 2024-02-12

## 2024-02-12 DIAGNOSIS — Z00.6 ENCOUNTER FOR EXAMINATION FOR NORMAL COMPARISON OR CONTROL IN CLINICAL RESEARCH PROGRAM: ICD-10-CM

## 2024-02-12 PROCEDURE — 36415 COLL VENOUS BLD VENIPUNCTURE: CPT

## 2024-03-06 LAB
APOB+LDLR+PCSK9 GENE MUT ANL BLD/T: NOT DETECTED
BRCA1+BRCA2 DEL+DUP + FULL MUT ANL BLD/T: NOT DETECTED
MLH1+MSH2+MSH6+PMS2 GN DEL+DUP+FUL M: NOT DETECTED

## 2024-03-25 ENCOUNTER — TELEPHONE (OUTPATIENT)
Dept: VASCULAR SURGERY | Facility: CLINIC | Age: 69
End: 2024-03-25

## 2024-03-25 NOTE — TELEPHONE ENCOUNTER
----- Message from Aster Flanagan sent at 1/8/2024 11:11 AM EST -----  Regarding: 3 mon f/u  Please schedule 3 month f/u for VenaSeal of the L upper leg done 1/5/24 [facility] when April schedule's open

## 2024-03-25 NOTE — TELEPHONE ENCOUNTER
Pt is s/p  Venaseal of left great saphenous (Left: Leg Upper) on 1/5/2024. Received call from pt asking if she could use a leg massager. She states she has been wearing her compression stockings and she loves how they make her feel. She stated she was gifted a leg massager and wanted to know if this would be okay for her to use. Advised I would send a message to our triage provider to review and advise.

## 2024-04-11 ENCOUNTER — OFFICE VISIT (OUTPATIENT)
Dept: OBGYN CLINIC | Facility: CLINIC | Age: 69
End: 2024-04-11

## 2024-04-11 VITALS
DIASTOLIC BLOOD PRESSURE: 80 MMHG | HEIGHT: 65 IN | HEART RATE: 70 BPM | BODY MASS INDEX: 33.15 KG/M2 | WEIGHT: 199 LBS | SYSTOLIC BLOOD PRESSURE: 119 MMHG

## 2024-04-11 DIAGNOSIS — M70.61 TROCHANTERIC BURSITIS OF RIGHT HIP: Primary | ICD-10-CM

## 2024-04-11 DIAGNOSIS — M70.62 TROCHANTERIC BURSITIS OF LEFT HIP: ICD-10-CM

## 2024-04-11 RX ORDER — BUPIVACAINE HYDROCHLORIDE 2.5 MG/ML
4 INJECTION, SOLUTION INFILTRATION; PERINEURAL
Status: COMPLETED | OUTPATIENT
Start: 2024-04-11 | End: 2024-04-11

## 2024-04-11 RX ORDER — TRIAMCINOLONE ACETONIDE 40 MG/ML
80 INJECTION, SUSPENSION INTRA-ARTICULAR; INTRAMUSCULAR
Status: COMPLETED | OUTPATIENT
Start: 2024-04-11 | End: 2024-04-11

## 2024-04-11 RX ADMIN — TRIAMCINOLONE ACETONIDE 80 MG: 40 INJECTION, SUSPENSION INTRA-ARTICULAR; INTRAMUSCULAR at 11:45

## 2024-04-11 RX ADMIN — BUPIVACAINE HYDROCHLORIDE 4 ML: 2.5 INJECTION, SOLUTION INFILTRATION; PERINEURAL at 11:45

## 2024-04-11 NOTE — PROGRESS NOTES
Assessment/Plan:   Diagnoses and all orders for this visit:    Trochanteric bursitis of right hip  -     Large joint arthrocentesis: bilateral greater trochanteric bursa    Trochanteric bursitis of left hip  -     Large joint arthrocentesis: bilateral greater trochanteric bursa         Discussed with patient that her exam is consistent with bilateral trochanteric bursitis. She was offered and accepted an injection(s) of Kenalog and Marcaine to her Bilateral hip(s) for symptomatic relief of pain and inflammation. Patient tolerated the treatment(s) well. Ice and post injection protocol advised. Weightbearing activities as tolerated. She will be seen for follow-up in 3 months for re-evaluation and consideration for repeat injections as necessary. Patient expresses understanding and is in agreement with this treatment plan. The patient was given the opportunity to ask questions or present concerns.    The patient has trochanteric bursitis of her bilateral hips. Both hips were injected with Kenalog and marcaine,  Folllw up in 3 months      Subjective:   Patient ID: Waleska Owusu  1955     HPI  Patient is a 69 y.o. female who presents for follow-up evaluation of her  bilateral  hips(s). The patient was last seen on 10/12/2023, where she received bilateral CS injection for treatment of trochanteric bursitis. The patient reports significant long term relief following the previous CS injection. However, the pain returned approximately 2 weeks ago. On today's presentation, she reports pain along the lateral aspect of her hip(s). She states that the pain is exacerbated by weight bearing activities, side lying, and ambulating stairs. She also reports point tenderness. She denies feelings of instability or weakness. She denies numbness or tingling.        The following portions of the patient's history were reviewed and updated as appropriate:  Past medical history, past surgical history, Family history, social history,  "current medications and allergies    Past Medical History:   Diagnosis Date    Arthritis     Colon polyp     Constipation     DVT (deep venous thrombosis) (HCC)     11/14/22 Per pt back in the 80's - no further issues at this time    Fibromyalgia     GERD (gastroesophageal reflux disease)     11/14/22 Per pt resolved -\"doesnt have\"    Hyperlipemia     Hypothyroid     Neuropathy     per pt in feet    Osteoarthritis     Osteomyelitis of ankle (HCC)     Right    Peripheral neuropathy     Rheumatoid arthritis (HCC)     RSD (reflex sympathetic dystrophy)     Vaginal delivery     x1    Venous insufficiency        Past Surgical History:   Procedure Laterality Date    ANKLE FUSION Right     BREAST BIOPSY Left 12/18/2018    US guided; benign     BREAST EXCISIONAL BIOPSY Left     benign     CARPAL TUNNEL RELEASE      CHOLECYSTECTOMY      COLONOSCOPY      ENDOVASCULAR LASER THERAPY (EVLT) Left 1/5/2024    Procedure: Venaseal of left great saphenous;  Surgeon: Jigar Boucher MD;  Location: [facility];  Service: Vascular    ERCP      with insertion of tube into bile/pancreatic duct    FACIAL RECONSTRUCTION SURGERY      JOINT REPLACEMENT      tkr left     IN ENDOVEN ABLTJ INCMPTNT VEIN XTR LASER 1ST VEIN Left 02/15/2022    Procedure: EVLT left greater saphenous vein;  Surgeon: Jenna Bradley DO;  Location: [facility];  Service: Vascular    IN ENDOVEN ABLTJ INCMPTNT VEIN XTR LASER 1ST VEIN Right 08/16/2022    Procedure: EVLT;  Surgeon: Jenna Bradley DO;  Location: [facility];  Service: Vascular    IN STAB PHLEBT VARICOSE VEINS 1 XTR > 20 INCS Left 02/15/2022    Procedure: MULTIPLE STAB PHLEBECTOMIES LEFT LEG;  Surgeon: Jenna Bradley DO;  Location: [facility];  Service: Vascular    IN STAB PHLEBT VARICOSE VEINS 1 XTR > 20 INCS Right 08/16/2022    Procedure: MULTIPLE STAB PHLEBECTOMIES;  Surgeon: Jenna Bradley DO;  Location: [facility];  Service: Vascular    IN STAB PHLEBT VARICOSE VEINS 1 XTR > 20 INCS " Left 2022    Procedure: <20 STAB PHLEBECTOMIES;  Surgeon: Jenna Bradley DO;  Location: [facility];  Service: Vascular    SHOULDER SURGERY Right     for frozen shoulder/RSD    US GUIDED BREAST BIOPSY LEFT COMPLETE Left 2018    Duct ectasia, fibrosis, focal usual ductal hyperplasia    US GUIDED BREAST BIOPSY LEFT COMPLETE Left 2023    US GUIDED BREAST BIOPSY LEFT COMPLETE Left 2023       Family History   Problem Relation Age of Onset    Dementia Mother     Diabetes Mother     Anesthesia problems Father     Coronary artery disease Father     Hemochromatosis Sister     Cancer Sister     Pancreatic cancer Sister     Pancreatitis Sister     Cancer Brother     Prostate cancer Brother     No Known Problems Maternal Grandmother     No Known Problems Maternal Grandfather     No Known Problems Paternal Grandmother     No Known Problems Paternal Grandfather     Breast cancer Maternal Aunt     Breast cancer Cousin     Diabetes Family     Hypertension Neg Hx        Social History     Socioeconomic History    Marital status:      Spouse name: None    Number of children: None    Years of education: None    Highest education level: None   Occupational History    None   Tobacco Use    Smoking status: Former     Current packs/day: 0.00     Types: Cigarettes     Quit date:      Years since quittin.2    Smokeless tobacco: Never   Vaping Use    Vaping status: Never Used   Substance and Sexual Activity    Alcohol use: Yes     Comment: rare    Drug use: Never    Sexual activity: Not Currently     Comment: Not active at this time   Other Topics Concern    None   Social History Narrative    None     Social Determinants of Health     Financial Resource Strain: High Risk (2023)    Overall Financial Resource Strain (CARDIA)     Difficulty of Paying Living Expenses: Very hard   Food Insecurity: Not on file   Transportation Needs: No Transportation Needs (2023)    PRAPARE - Transportation      Lack of Transportation (Medical): No     Lack of Transportation (Non-Medical): No   Physical Activity: Not on file   Stress: Not on file   Social Connections: Not on file   Intimate Partner Violence: Not on file   Housing Stability: Not on file         Current Outpatient Medications:     atorvastatin (LIPITOR) 10 mg tablet, Take 1 tablet (10 mg total) by mouth daily, Disp: 90 tablet, Rfl: 3    CALCIUM CITRATE PO, Take 900 mg by mouth in the morning, Disp: , Rfl:     Cholecalciferol (VITAMIN D-3 PO), Take 2,000 Int'l Units by mouth in the morning, Disp: , Rfl:     meloxicam (MOBIC) 15 mg tablet, Take 1 tablet (15 mg total) by mouth daily, Disp: 90 tablet, Rfl: 3    Multiple Vitamin (multivitamin) tablet, Take 1 tablet by mouth daily, Disp: , Rfl:     polyethylene glycol (GLYCOLAX) 17 GM/SCOOP powder, Take 17 g by mouth daily Equate brand, Disp: , Rfl:     vitamin B-12 (VITAMIN B-12) 1,000 mcg tablet, Take 1 tablet (1,000 mcg total) by mouth daily, Disp: 90 tablet, Rfl: 1    Allergies   Allergen Reactions    Bee Venom Itching and Edema     Bee stings       Review of Systems   Constitutional:  Negative for chills, fever and unexpected weight change.   HENT:  Negative for hearing loss, nosebleeds and sore throat.    Eyes:  Negative for pain, redness and visual disturbance.   Respiratory:  Negative for cough, shortness of breath and wheezing.    Cardiovascular:  Negative for chest pain, palpitations and leg swelling.   Gastrointestinal:  Negative for abdominal pain, nausea and vomiting.   Endocrine: Negative for polydipsia and polyuria.   Genitourinary:  Negative for dysuria and hematuria.   Skin:  Negative for rash and wound.   Neurological:  Negative for dizziness, numbness and headaches.   Psychiatric/Behavioral:  Negative for decreased concentration and suicidal ideas. The patient is not nervous/anxious.    All other systems reviewed and are negative.       Objective:  /80 (BP Location: Left arm, Patient  "Position: Sitting, Cuff Size: Large)   Pulse 70   Ht 5' 4.5\" (1.638 m)   Wt 90.3 kg (199 lb) Comment: reported  LMP  (LMP Unknown)   BMI 33.63 kg/m²     Ortho Exam  bilateral Hip -  Patient presents with no anatomical deformity  Ambulates with antalgic gait pattern  Uses No assistive devices  TTP over  greater trochanteric bursa  Smooth passive circumduction   Strength: 5/5 throughout  Knee flexor and extensor mechanism intact  Ankle DF and PF mechanism intact  2+ TP and DP pulses with brisk capillary refill to the toes  Sural, saphenous, tibial, superficial and deep peroneal motor and sensory distribution intact  Sensation to light touch intact distally      Physical Exam  HENT:      Head: Normocephalic and atraumatic.      Nose: Nose normal.   Eyes:      Conjunctiva/sclera: Conjunctivae normal.   Cardiovascular:      Rate and Rhythm: Normal rate.   Pulmonary:      Effort: Pulmonary effort is normal.   Musculoskeletal:      Cervical back: Neck supple.   Skin:     General: Skin is warm and dry.      Capillary Refill: Capillary refill takes less than 2 seconds.   Neurological:      Mental Status: She is alert and oriented to person, place, and time.   Psychiatric:         Mood and Affect: Mood normal.         Behavior: Behavior normal.          Diagnostic Test Review:  No new imaging at time of visit.     Large joint arthrocentesis: bilateral greater trochanteric bursa  Universal Protocol:  Consent: Verbal consent obtained.  Risks and benefits: risks, benefits and alternatives were discussed  Consent given by: patient  Timeout called at: 4/11/2024 11:41 AM.  Patient understanding: patient states understanding of the procedure being performed  Site marked: the operative site was marked  Patient identity confirmed: verbally with patient  Supporting Documentation  Indications: pain and joint swelling   Procedure Details  Location: hip - bilateral greater trochanteric bursa  Needle gauge: spinal needle.  Ultrasound " guidance: no  Approach: lateral    Medications (Right): 4 mL bupivacaine 0.25 %; 80 mg triamcinolone acetonide 40 mg/mLMedications (Left): 4 mL bupivacaine 0.25 %; 80 mg triamcinolone acetonide 40 mg/mL   Patient tolerance: patient tolerated the procedure well with no immediate complications  Dressing:  Sterile dressing applied             Scribe Attestation      I,:  Neomi Mike am acting as a scribe while in the presence of the attending physician.:       I,:  Carlos Ray DO personally performed the services described in this documentation    as scribed in my presence.:

## 2024-05-09 ENCOUNTER — TELEPHONE (OUTPATIENT)
Age: 69
End: 2024-05-09

## 2024-05-09 ENCOUNTER — APPOINTMENT (OUTPATIENT)
Dept: LAB | Facility: HOSPITAL | Age: 69
End: 2024-05-09
Payer: MEDICARE

## 2024-05-09 DIAGNOSIS — R39.9 UTI SYMPTOMS: ICD-10-CM

## 2024-05-09 DIAGNOSIS — R39.9 UTI SYMPTOMS: Primary | ICD-10-CM

## 2024-05-09 LAB
BACTERIA UR QL AUTO: ABNORMAL /HPF
BILIRUB UR QL STRIP: NEGATIVE
CLARITY UR: ABNORMAL
COLOR UR: YELLOW
GLUCOSE UR STRIP-MCNC: NEGATIVE MG/DL
HGB UR QL STRIP.AUTO: ABNORMAL
KETONES UR STRIP-MCNC: NEGATIVE MG/DL
LEUKOCYTE ESTERASE UR QL STRIP: ABNORMAL
NITRITE UR QL STRIP: NEGATIVE
NON-SQ EPI CELLS URNS QL MICRO: ABNORMAL /HPF
PH UR STRIP.AUTO: 7 [PH]
PROT UR STRIP-MCNC: NEGATIVE MG/DL
RBC #/AREA URNS AUTO: ABNORMAL /HPF
SP GR UR STRIP.AUTO: 1.02 (ref 1–1.03)
UROBILINOGEN UR STRIP-ACNC: <2 MG/DL
WBC #/AREA URNS AUTO: ABNORMAL /HPF

## 2024-05-09 PROCEDURE — 87086 URINE CULTURE/COLONY COUNT: CPT

## 2024-05-09 PROCEDURE — 87077 CULTURE AEROBIC IDENTIFY: CPT

## 2024-05-09 PROCEDURE — 87186 SC STD MICRODIL/AGAR DIL: CPT

## 2024-05-09 PROCEDURE — 81001 URINALYSIS AUTO W/SCOPE: CPT

## 2024-05-09 RX ORDER — NITROFURANTOIN 25; 75 MG/1; MG/1
100 CAPSULE ORAL 2 TIMES DAILY
Qty: 10 CAPSULE | Refills: 0 | Status: SHIPPED | OUTPATIENT
Start: 2024-05-09 | End: 2024-05-14

## 2024-05-09 NOTE — TELEPHONE ENCOUNTER
Pt has pressure with urination and some discomfort this is going for 4 days. Also has an upcoming apt does the provider wants her to have labs done? Pls call back with recommendation.

## 2024-05-11 LAB
BACTERIA UR CULT: ABNORMAL
BACTERIA UR CULT: ABNORMAL

## 2024-05-13 ENCOUNTER — OFFICE VISIT (OUTPATIENT)
Dept: VASCULAR SURGERY | Facility: HOSPITAL | Age: 69
End: 2024-05-13
Payer: MEDICARE

## 2024-05-13 ENCOUNTER — TELEPHONE (OUTPATIENT)
Age: 69
End: 2024-05-13

## 2024-05-13 VITALS
HEART RATE: 80 BPM | TEMPERATURE: 97.7 F | BODY MASS INDEX: 33.15 KG/M2 | HEIGHT: 65 IN | OXYGEN SATURATION: 97 % | DIASTOLIC BLOOD PRESSURE: 74 MMHG | SYSTOLIC BLOOD PRESSURE: 128 MMHG | WEIGHT: 199 LBS

## 2024-05-13 DIAGNOSIS — I83.813 VARICOSE VEINS OF BOTH LOWER EXTREMITIES WITH PAIN: Primary | ICD-10-CM

## 2024-05-13 PROCEDURE — 99213 OFFICE O/P EST LOW 20 MIN: CPT | Performed by: SURGERY

## 2024-05-13 NOTE — PROGRESS NOTES
Assessment/Plan:    Varicose veins of both lower extremities with pain  69-year-old female now status post left lower extremity VenaSeal ablation of the GSV on 1/5/2024.  Patient states she is doing very well and is happy with her result.  She has no further left lower extremity discomfort/pain.  She also mentions some improvement of her reticular veins since procedure.    This point no further intervention or imaging necessary.  Will plan to have patient follow-up with me as needed       Diagnoses and all orders for this visit:    Varicose veins of both lower extremities with pain          Subjective:      Patient ID: Waleska Owusu is a 69 y.o. female.      Patient presents today 3 month post op venaseal of the left upper leg done 1/5/24 by Dr. Boucher. Pt denies pain, swelling numbness or open wounds. Pt is taking Atorvastatin. Pt is a former smoker.     HPI    The following portions of the patient's history were reviewed and updated as appropriate: allergies, current medications, past family history, past medical history, past social history, past surgical history, and problem list.    I have spent a total time of 25 minutes on 05/13/24 in caring for this patient including Prognosis, Risks and benefits of tx options, Patient and family education, Importance of tx compliance, Risk factor reductions, Impressions, Counseling / Coordination of care, Documenting in the medical record, Reviewing / ordering tests, medicine, procedures  , and Obtaining or reviewing history  .      Review of Systems   Constitutional: Negative.    HENT: Negative.     Eyes: Negative.    Respiratory: Negative.     Cardiovascular: Negative.    Gastrointestinal: Negative.    Endocrine: Negative.    Genitourinary: Negative.    Musculoskeletal: Negative.    Skin: Negative.    Allergic/Immunologic: Negative.    Neurological: Negative.    Hematological:  Bruises/bleeds easily.   Psychiatric/Behavioral: Negative.           Objective:      BP  "128/74 (BP Location: Right arm, Patient Position: Sitting, Cuff Size: Standard)   Pulse 80   Temp 97.7 °F (36.5 °C) (Temporal)   Ht 5' 4.5\" (1.638 m)   Wt 90.3 kg (199 lb)   LMP  (LMP Unknown)   SpO2 97%   BMI 33.63 kg/m²          Physical Exam  Constitutional:       Appearance: Normal appearance.   HENT:      Head: Normocephalic.      Mouth/Throat:      Mouth: Mucous membranes are moist.      Pharynx: Oropharynx is clear.   Eyes:      Extraocular Movements: Extraocular movements intact.      Pupils: Pupils are equal, round, and reactive to light.   Cardiovascular:      Rate and Rhythm: Normal rate and regular rhythm.   Pulmonary:      Effort: Pulmonary effort is normal.   Abdominal:      General: Abdomen is flat.      Tenderness: There is no abdominal tenderness.   Musculoskeletal:      Cervical back: Normal range of motion.      Right lower leg: No edema.      Left lower leg: No edema.   Skin:     General: Skin is warm and dry.   Neurological:      General: No focal deficit present.      Mental Status: She is alert and oriented to person, place, and time.   Psychiatric:         Mood and Affect: Mood normal.         Behavior: Behavior normal.           "

## 2024-05-13 NOTE — ASSESSMENT & PLAN NOTE
69-year-old female now status post left lower extremity VenaSeal ablation of the GSV on 1/5/2024.  Patient states she is doing very well and is happy with her result.  She has no further left lower extremity discomfort/pain.  She also mentions some improvement of her reticular veins since procedure.    This point no further intervention or imaging necessary.  Will plan to have patient follow-up with me as needed

## 2024-05-13 NOTE — TELEPHONE ENCOUNTER
Pt called back stating [facility]  Asking if you can review and let her know.

## 2024-06-13 ENCOUNTER — OFFICE VISIT (OUTPATIENT)
Dept: OBGYN CLINIC | Facility: CLINIC | Age: 69
End: 2024-06-13
Payer: MEDICARE

## 2024-06-13 ENCOUNTER — RA CDI HCC (OUTPATIENT)
Dept: OTHER | Facility: HOSPITAL | Age: 69
End: 2024-06-13

## 2024-06-13 VITALS — WEIGHT: 199 LBS | HEIGHT: 65 IN | BODY MASS INDEX: 33.15 KG/M2

## 2024-06-13 DIAGNOSIS — M70.62 TROCHANTERIC BURSITIS OF LEFT HIP: ICD-10-CM

## 2024-06-13 DIAGNOSIS — M70.61 TROCHANTERIC BURSITIS OF RIGHT HIP: Primary | ICD-10-CM

## 2024-06-13 PROCEDURE — 20610 DRAIN/INJ JOINT/BURSA W/O US: CPT | Performed by: ORTHOPAEDIC SURGERY

## 2024-06-13 PROCEDURE — 99213 OFFICE O/P EST LOW 20 MIN: CPT | Performed by: ORTHOPAEDIC SURGERY

## 2024-06-13 RX ORDER — TRIAMCINOLONE ACETONIDE 40 MG/ML
40 INJECTION, SUSPENSION INTRA-ARTICULAR; INTRAMUSCULAR
Status: COMPLETED | OUTPATIENT
Start: 2024-06-13 | End: 2024-06-13

## 2024-06-13 RX ORDER — BUPIVACAINE HYDROCHLORIDE 2.5 MG/ML
4 INJECTION, SOLUTION INFILTRATION; PERINEURAL
Status: COMPLETED | OUTPATIENT
Start: 2024-06-13 | End: 2024-06-13

## 2024-06-13 RX ADMIN — BUPIVACAINE HYDROCHLORIDE 4 ML: 2.5 INJECTION, SOLUTION INFILTRATION; PERINEURAL at 08:15

## 2024-06-13 RX ADMIN — TRIAMCINOLONE ACETONIDE 40 MG: 40 INJECTION, SUSPENSION INTRA-ARTICULAR; INTRAMUSCULAR at 08:15

## 2024-06-13 NOTE — PROGRESS NOTES
Assessment/Plan:   Diagnoses and all orders for this visit:    Trochanteric bursitis of right hip  -     Large joint arthrocentesis: bilateral greater trochanteric bursa    Trochanteric bursitis of left hip  -     Large joint arthrocentesis: bilateral greater trochanteric bursa         Discussed with patient that today's physical exam is consistent with flareup of trochanteric bursitis of the bilateral hips. Patient was offered, and accepted, Kenalog and Marcaine injection(s) to the bilateral trochanteric bursae for relief of pain and inflammation.  Patient tolerated treatment(s) well. She will be seen in 2 to 3 months for re-evaluation and consideration for repeat injections as necessary.  Patient expresses understanding and is in agreement with this treatment plan.    The patient has trochanteric bursitis of her bilateral hips.  Under aseptic technique, both hips were injected with Kenalog and Marcaine.  She tolerated procedures quite well.  Return back in 2 months for evaluation    Subjective:   Patient ID: Waleska Owusu  1955     HPI  Patient is a 69 y.o. female who presents for follow-up evaluation of trochanteric bursitis of the bilateral hips. She was last in regards to this issue on 4/11/2024, at which time she received bilateral corticosteroid injections. Patient states that she experienced significant relief following those injections, unfortunately her symptoms have returned. On today's presentation she reports symptom exacerbation in the lateral aspect of the bilateral hips with direct pressure such as lying on the affected sides. Denies any associated bruising, swelling, numbness, tingling, feelings of instability, or mechanical symptoms. She rates her pain today at 6/10.    The following portions of the patient's history were reviewed and updated as appropriate:  Past medical history, past surgical history, Family history, social history, current medications and allergies    Past Medical  "History:   Diagnosis Date    Arthritis     Colon polyp     Constipation     DVT (deep venous thrombosis) (HCC)     11/14/22 Per pt back in the 80's - no further issues at this time    Fibromyalgia     GERD (gastroesophageal reflux disease)     11/14/22 Per pt resolved -\"doesnt have\"    Hyperlipemia     Hypothyroid     Neuropathy     per pt in feet    Osteoarthritis     Osteomyelitis of ankle (HCC)     Right    Peripheral neuropathy     Rheumatoid arthritis (HCC)     RSD (reflex sympathetic dystrophy)     Vaginal delivery     x1    Venous insufficiency        Past Surgical History:   Procedure Laterality Date    ANKLE FUSION Right     BREAST BIOPSY Left 12/18/2018    US guided; benign     BREAST EXCISIONAL BIOPSY Left     benign     CARPAL TUNNEL RELEASE      CHOLECYSTECTOMY      COLONOSCOPY      ENDOVASCULAR LASER THERAPY (EVLT) Left 1/5/2024    Procedure: Venaseal of left great saphenous;  Surgeon: Jigar Boucher MD;  Location: [facility];  Service: Vascular    ERCP      with insertion of tube into bile/pancreatic duct    FACIAL RECONSTRUCTION SURGERY      JOINT REPLACEMENT      tkr left     NY ENDOVEN ABLTJ INCMPTNT VEIN XTR LASER 1ST VEIN Left 02/15/2022    Procedure: EVLT left greater saphenous vein;  Surgeon: Jenna Brdaley DO;  Location: [facility];  Service: Vascular    NY ENDOVEN ABLTJ INCMPTNT VEIN XTR LASER 1ST VEIN Right 08/16/2022    Procedure: EVLT;  Surgeon: Jenna Bradley DO;  Location: [facility];  Service: Vascular    NY STAB PHLEBT VARICOSE VEINS 1 XTR > 20 INCS Left 02/15/2022    Procedure: MULTIPLE STAB PHLEBECTOMIES LEFT LEG;  Surgeon: Jenna Bradley DO;  Location: [facility];  Service: Vascular    NY STAB PHLEBT VARICOSE VEINS 1 XTR > 20 INCS Right 08/16/2022    Procedure: MULTIPLE STAB PHLEBECTOMIES;  Surgeon: Jenna Bradley DO;  [facility];  Service: Vascular    NY STAB PHLEBT VARICOSE VEINS 1 XTR > 20 INCS Left 11/18/2022    Procedure: <20 STAB PHLEBECTOMIES; "  Surgeon: Jenna Bradley DO;  Location: [facility];  Service: Vascular    SHOULDER SURGERY Right     for frozen shoulder/RSD    US GUIDED BREAST BIOPSY LEFT COMPLETE Left 2018    Duct ectasia, fibrosis, focal usual ductal hyperplasia    US GUIDED BREAST BIOPSY LEFT COMPLETE Left 2023    US GUIDED BREAST BIOPSY LEFT COMPLETE Left 2023       Family History   Problem Relation Age of Onset    Dementia Mother     Diabetes Mother     Anesthesia problems Father     Coronary artery disease Father     Hemochromatosis Sister     Cancer Sister     Pancreatic cancer Sister     Pancreatitis Sister     Cancer Brother     Prostate cancer Brother     No Known Problems Maternal Grandmother     No Known Problems Maternal Grandfather     No Known Problems Paternal Grandmother     No Known Problems Paternal Grandfather     Breast cancer Maternal Aunt     Breast cancer Cousin     Diabetes Family     Hypertension Neg Hx        Social History     Socioeconomic History    Marital status:      Spouse name: None    Number of children: None    Years of education: None    Highest education level: None   Occupational History    None   Tobacco Use    Smoking status: Former     Current packs/day: 0.00     Types: Cigarettes     Quit date:      Years since quittin.4    Smokeless tobacco: Never   Vaping Use    Vaping status: Never Used   Substance and Sexual Activity    Alcohol use: Yes     Comment: rare    Drug use: Never    Sexual activity: Not Currently     Comment: Not active at this time   Other Topics Concern    None   Social History Narrative    None     Social Determinants of Health     Financial Resource Strain: High Risk (2023)    Overall Financial Resource Strain (CARDIA)     Difficulty of Paying Living Expenses: Very hard   Food Insecurity: Not on file   Transportation Needs: No Transportation Needs (2023)    PRAPARE - Transportation     Lack of Transportation (Medical): No     Lack of  "Transportation (Non-Medical): No   Physical Activity: Not on file   Stress: Not on file   Social Connections: Not on file   Intimate Partner Violence: Not on file   Housing Stability: Not on file         Current Outpatient Medications:     atorvastatin (LIPITOR) 10 mg tablet, Take 1 tablet (10 mg total) by mouth daily, Disp: 90 tablet, Rfl: 3    CALCIUM CITRATE PO, Take 900 mg by mouth in the morning, Disp: , Rfl:     Cholecalciferol (VITAMIN D-3 PO), Take 2,000 Int'l Units by mouth in the morning, Disp: , Rfl:     meloxicam (MOBIC) 15 mg tablet, Take 1 tablet (15 mg total) by mouth daily, Disp: 90 tablet, Rfl: 3    Multiple Vitamin (multivitamin) tablet, Take 1 tablet by mouth daily, Disp: , Rfl:     polyethylene glycol (GLYCOLAX) 17 GM/SCOOP powder, Take 17 g by mouth daily Equate brand, Disp: , Rfl:     vitamin B-12 (VITAMIN B-12) 1,000 mcg tablet, Take 1 tablet (1,000 mcg total) by mouth daily, Disp: 90 tablet, Rfl: 1    Allergies   Allergen Reactions    Bee Venom Itching and Edema     Bee stings       Review of Systems   Constitutional:  Negative for chills, fever and unexpected weight change.   HENT:  Negative for hearing loss, nosebleeds and sore throat.    Eyes:  Negative for pain, redness and visual disturbance.   Respiratory:  Negative for cough, shortness of breath and wheezing.    Cardiovascular:  Negative for chest pain, palpitations and leg swelling.   Gastrointestinal:  Negative for abdominal pain, nausea and vomiting.   Endocrine: Negative for polydipsia and polyuria.   Genitourinary:  Negative for dysuria and hematuria.   Musculoskeletal:         As noted in HPI   Skin:  Negative for rash and wound.   Neurological:  Negative for dizziness, numbness and headaches.   Psychiatric/Behavioral:  Negative for decreased concentration and suicidal ideas. The patient is not nervous/anxious.         Objective:  Ht 5' 4.5\" (1.638 m)   Wt 90.3 kg (199 lb)   LMP  (LMP Unknown)   BMI 33.63 kg/m²     Ortho " "Exam  Bilateral hips -   Patient presents with no obvious anatomical deformity  Ambulates with antalgic gait pattern  Uses no assistive device  TTP over greater trochanter/trochanteric bursa  Nontender over anterior hip joint/groin  ROM: Smooth passive circumduction  Knee flexor and extensor mechanisms intact  + CAROLIN for lateral pain exacerbation, + FADIR for lateral pain exacerbation  - log roll  2+ TP and DP pulses with brisk capillary refill to the toes  Sural, saphenous, tibial, superficial and deep peroneal motor and sensory distributions intact  Sensation to light touch intact distally    Physical Exam  Vitals and nursing note reviewed.   Constitutional:       General: She is not in acute distress.     Appearance: She is well-developed.   HENT:      Head: Normocephalic and atraumatic.   Eyes:      Conjunctiva/sclera: Conjunctivae normal.   Cardiovascular:      Rate and Rhythm: Normal rate.   Pulmonary:      Effort: Pulmonary effort is normal.   Musculoskeletal:      Cervical back: Neck supple.   Skin:     General: Skin is warm and dry.      Capillary Refill: Capillary refill takes less than 2 seconds.   Neurological:      Mental Status: She is alert and oriented to person, place, and time.   Psychiatric:         Mood and Affect: Mood normal.         Behavior: Behavior normal.          Diagnostic Test Review:  No new imaging reviewed this visit    Large joint arthrocentesis: bilateral greater trochanteric bursa  Universal Protocol:  Consent: Verbal consent obtained.  Risks and benefits: risks, benefits and alternatives were discussed  Consent given by: patient  Time out: Immediately prior to procedure a \"time out\" was called to verify the correct patient, procedure, equipment, support staff and site/side marked as required.  Timeout called at: 6/13/2024 8:22 AM.  Patient understanding: patient states understanding of the procedure being performed  Site marked: the operative site was marked  Patient identity " confirmed: verbally with patient  Supporting Documentation  Indications: pain and joint swelling   Procedure Details  Location: hip - bilateral greater trochanteric bursa  Preparation: Patient was prepped and draped in the usual sterile fashion  Needle gauge: 21G.  Ultrasound guidance: no  Approach: lateral    Medications (Right): 4 mL bupivacaine 0.25 %; 40 mg triamcinolone acetonide 40 mg/mLMedications (Left): 4 mL bupivacaine 0.25 %; 40 mg triamcinolone acetonide 40 mg/mL   Patient tolerance: patient tolerated the procedure well with no immediate complications  Dressing:  Sterile dressing applied           Scribe Attestation      I,:  To Nelson am acting as a scribe while in the presence of the attending physician.:       I,:  Carlos Ray, DO personally performed the services described in this documentation    as scribed in my presence.:

## 2024-06-20 ENCOUNTER — OFFICE VISIT (OUTPATIENT)
Dept: INTERNAL MEDICINE CLINIC | Facility: CLINIC | Age: 69
End: 2024-06-20
Payer: MEDICARE

## 2024-06-20 VITALS
OXYGEN SATURATION: 99 % | BODY MASS INDEX: 32.49 KG/M2 | DIASTOLIC BLOOD PRESSURE: 76 MMHG | HEIGHT: 65 IN | HEART RATE: 78 BPM | WEIGHT: 195 LBS | SYSTOLIC BLOOD PRESSURE: 122 MMHG | TEMPERATURE: 98.7 F

## 2024-06-20 DIAGNOSIS — E53.8 VITAMIN B12 DEFICIENCY: ICD-10-CM

## 2024-06-20 DIAGNOSIS — E83.110 HEREDITARY HEMOCHROMATOSIS (HCC): ICD-10-CM

## 2024-06-20 DIAGNOSIS — Z12.31 VISIT FOR SCREENING MAMMOGRAM: ICD-10-CM

## 2024-06-20 DIAGNOSIS — M06.9 RHEUMATOID ARTHRITIS INVOLVING MULTIPLE SITES, UNSPECIFIED WHETHER RHEUMATOID FACTOR PRESENT (HCC): ICD-10-CM

## 2024-06-20 DIAGNOSIS — I83.813 VARICOSE VEINS OF BOTH LOWER EXTREMITIES WITH PAIN: ICD-10-CM

## 2024-06-20 DIAGNOSIS — E78.2 MIXED HYPERLIPIDEMIA: Primary | ICD-10-CM

## 2024-06-20 DIAGNOSIS — G62.9 PERIPHERAL POLYNEUROPATHY: ICD-10-CM

## 2024-06-20 DIAGNOSIS — Z78.0 POSTMENOPAUSAL: ICD-10-CM

## 2024-06-20 DIAGNOSIS — K75.81 NONALCOHOLIC STEATOHEPATITIS: ICD-10-CM

## 2024-06-20 DIAGNOSIS — E55.9 VITAMIN D DEFICIENCY: ICD-10-CM

## 2024-06-20 PROBLEM — I82.591: Status: RESOLVED | Noted: 2021-06-15 | Resolved: 2024-06-20

## 2024-06-20 PROCEDURE — G0439 PPPS, SUBSEQ VISIT: HCPCS | Performed by: FAMILY MEDICINE

## 2024-06-20 PROCEDURE — 99214 OFFICE O/P EST MOD 30 MIN: CPT | Performed by: FAMILY MEDICINE

## 2024-06-20 NOTE — PROGRESS NOTES
Ambulatory Visit  Name: Waleska Owusu      : 1955      MRN: [id number]  Encounter Provider: Soraya Alanis MD  Encounter Date: 2024   Encounter department: [facility]    Assessment & Plan   1. Mixed hyperlipidemia  -     CBC and differential; Future  -     Comprehensive metabolic panel; Future  -     Lipid panel; Future  -     TSH, 3rd generation; Future  -     Vitamin B12; Future  2. Vitamin D deficiency  -     CBC and differential; Future  -     Vitamin D 25 hydroxy; Future  -     Vitamin B12; Future  3. Varicose veins of both lower extremities with pain  -     CBC and differential; Future  -     Vitamin B12; Future  4. Nonalcoholic steatohepatitis  -     CBC and differential; Future  -     Comprehensive metabolic panel; Future  -     Vitamin B12; Future  5. Peripheral polyneuropathy  -     CBC and differential; Future  -     Vitamin B12; Future  6. Hereditary hemochromatosis (HCC)  -     Iron Panel (Includes Ferritin, Iron Sat%, Iron, and TIBC); Future  7. Visit for screening mammogram  -     Mammo screening bilateral w 3d & cad; Future  8. Postmenopausal  -     DXA bone density spine hip and pelvis; Future; Expected date: 2024  9. Rheumatoid arthritis involving multiple sites, unspecified whether rheumatoid factor present (HCC)  10. Vitamin B12 deficiency  -     Vitamin B12; Future    Orders recommendations as noted above.  Slip given for routine laboratory testing.  Will be due for mammogram and bone density in the fall.  Continue with the atorvastatin.  Will check lipid panel and CMP.  Continue with Vitamin D supplementation.  Will check vitamin D level with upcoming laboratory testing.  Meloxicam for the joint symptoms.  Continue with the MiraLAX for the chronic constipation.  Will check laboratory testing for the history of the hereditary hemochromatosis.  Consider referral to hematology for this.  Watch for any worsening of joint symptoms.  Continue to  walk regularly.  Will have her follow-up in about 6 to 12 months or sooner if needed.    Depression Screening and Follow-up Plan: Patient was screened for depression during today's encounter. They screened negative with a PHQ-2 score of 0.      Preventive health issues were discussed with patient, and age appropriate screening tests were ordered as noted in patient's After Visit Summary. Personalized health advice and appropriate referrals for health education or preventive services given if needed, as noted in patient's After Visit Summary.    History of Present Illness     She presents for routine follow-up as well as Medicare wellness visit.  Has generally been doing relatively well.  Did have the surgery on the varicose veins over the winter.  This surgery worked out very well and she is pleased that her pain has decreased significantly.  Still has some aching into the legs.  Still has some small varicosities.  Has been walking regularly.  Will often get over 10,000 steps a day.  Tends to walk indoors.  Constipation has been controlled with the MiraLAX.  Joint symptoms persist.  Continues with the meloxicam.  Has difficulty finding a comfortable position to sleep.  Appetite has been generally stable.  Has been trying to eat healthy.  Tolerating the atorvastatin without difficulty.  Denies any significant muscle aches or weakness with this.  Denies any chest pain or palpitations.  Appetite has been generally good.       Patient Care Team:  Soraay Alanis MD as PCP - General  MD Brielle Fajardo MD Ayaz Matin, MD Robert Bruce Grob, DO (Orthopedic Surgery)    Review of Systems   Constitutional:  Negative for activity change, appetite change, chills and fever.   HENT:  Negative for congestion and rhinorrhea.    Eyes:  Negative for visual disturbance.   Respiratory:  Negative for chest tightness and shortness of breath.    Cardiovascular:  Negative for chest pain and palpitations.    Gastrointestinal:  Positive for constipation. Negative for abdominal pain, blood in stool, diarrhea, nausea and vomiting.   Endocrine: Negative for polydipsia, polyphagia and polyuria.   Genitourinary:  Negative for dysuria, frequency and urgency.   Musculoskeletal:  Positive for arthralgias and myalgias. Negative for gait problem.   Skin:  Negative for color change.   Neurological:  Negative for dizziness and headaches.   Hematological:  Does not bruise/bleed easily.   Psychiatric/Behavioral:  Positive for sleep disturbance. Negative for confusion. The patient is not nervous/anxious.      Medical History Reviewed by provider this encounter:       Annual Wellness Visit Questionnaire   Waleska is here for her Subsequent Wellness visit. Last Medicare Wellness visit information reviewed, patient interviewed and updates made to the record today.      Health Risk Assessment:   Patient rates overall health as good. Patient feels that their physical health rating is slightly worse. Patient is satisfied with their life. Eyesight was rated as same. Hearing was rated as same. Patient feels that their emotional and mental health rating is same. Patients states they are never, rarely angry. Patient states they are sometimes unusually tired/fatigued. Pain experienced in the last 7 days has been a lot. Patient's pain rating has been 5/10. Patient states that she has experienced no weight loss or gain in last 6 months.     Depression Screening:   PHQ-2 Score: 0      Fall Risk Screening:   In the past year, patient has experienced: no history of falling in past year      Urinary Incontinence Screening:   Patient has not leaked urine accidently in the last six months.     Home Safety:  Patient does not have trouble with stairs inside or outside of their home. Patient has working smoke alarms and has working carbon monoxide detector. Home safety hazards include: none.     Nutrition:   Current diet is Low Carb.     Medications:    Patient is currently taking over-the-counter supplements. OTC medications include: see medication list. Patient is able to manage medications.     Activities of Daily Living (ADLs)/Instrumental Activities of Daily Living (IADLs):   Walk and transfer into and out of bed and chair?: Yes  Dress and groom yourself?: Yes    Bathe or shower yourself?: Yes    Feed yourself? Yes  Do your laundry/housekeeping?: Yes  Manage your money, pay your bills and track your expenses?: Yes  Make your own meals?: Yes    Do your own shopping?: Yes    Previous Hospitalizations:   Any hospitalizations or ED visits within the last 12 months?: No      Advance Care Planning:   Living will: No    Durable POA for healthcare: No    Advanced directive: No      Cognitive Screening:   Provider or family/friend/caregiver concerned regarding cognition?: No    PREVENTIVE SCREENINGS      Cardiovascular Screening:    General: Screening Not Indicated, History Lipid Disorder and Risks and Benefits Discussed    Due for: Lipid Panel      Diabetes Screening:     General: Screening Current      Colorectal Cancer Screening:     General: Screening Current      Breast Cancer Screening:     General: Screening Current      Cervical Cancer Screening:    General: Screening Not Indicated      Osteoporosis Screening:    General: Screening Current      Abdominal Aortic Aneurysm (AAA) Screening:        General: Screening Not Indicated      Lung Cancer Screening:     General: Screening Not Indicated      Hepatitis C Screening:    General: Screening Current    Screening, Brief Intervention, and Referral to Treatment (SBIRT)    Screening  Typical number of drinks in a day: 0  Typical number of drinks in a week: 0  Interpretation: Low risk drinking behavior.    AUDIT-C Screenin) How often did you have a drink containing alcohol in the past year? never  2) How many drinks did you have on a typical day when you were drinking in the past year? 0  3) How often did you  "have 6 or more drinks on one occasion in the past year? never    AUDIT-C Score: 0  Interpretation: Score 0-2 (female): Negative screen for alcohol misuse    Single Item Drug Screening:  How often have you used an illegal drug (including marijuana) or a prescription medication for non-medical reasons in the past year? never    Single Item Drug Screen Score: 0  Interpretation: Negative screen for possible drug use disorder    Brief Intervention  Alcohol & drug use screenings were reviewed. No concerns regarding substance use disorder identified.     Social Determinants of Health     Financial Resource Strain: High Risk (6/12/2023)    Overall Financial Resource Strain (CARDIA)     Difficulty of Paying Living Expenses: Very hard   Food Insecurity: No Food Insecurity (6/20/2024)    Hunger Vital Sign     Worried About Running Out of Food in the Last Year: Never true     Ran Out of Food in the Last Year: Never true   Transportation Needs: No Transportation Needs (6/20/2024)    PRAPARE - Transportation     Lack of Transportation (Medical): No     Lack of Transportation (Non-Medical): No   Housing Stability: Low Risk  (6/20/2024)    Housing Stability Vital Sign     Unable to Pay for Housing in the Last Year: No     Number of Times Moved in the Last Year: 1     Homeless in the Last Year: No   Utilities: Not At Risk (6/20/2024)    [facility] Utilities     Threatened with loss of utilities: No     No results found.    Objective     /76 (BP Location: Left arm, Patient Position: Sitting, Cuff Size: Large)   Pulse 78   Temp 98.7 °F (37.1 °C)   Ht 5' 4.5\" (1.638 m)   Wt 88.5 kg (195 lb)   LMP  (LMP Unknown)   SpO2 99%   BMI 32.95 kg/m²     Physical Exam  Vitals and nursing note reviewed.   Constitutional:       General: She is not in acute distress.     Appearance: She is well-developed, well-groomed and overweight.   HENT:      Head: Normocephalic and atraumatic.   Eyes:      General:         Right eye: No discharge.         " Left eye: No discharge.      Conjunctiva/sclera: Conjunctivae normal.      Pupils: Pupils are equal, round, and reactive to light.   Cardiovascular:      Rate and Rhythm: Normal rate and regular rhythm.      Heart sounds: Normal heart sounds. No murmur heard.     No friction rub. No gallop.   Pulmonary:      Effort: No respiratory distress.      Breath sounds: No wheezing or rales.   Abdominal:      General: Bowel sounds are normal. There is no distension.      Tenderness: There is no abdominal tenderness.   Lymphadenopathy:      Cervical: No cervical adenopathy.   Skin:     General: Skin is warm and dry.      Comments: Venous stasis changes bilateral lower extremities with small varicose veins   Neurological:      Mental Status: She is alert and oriented to person, place, and time.   Psychiatric:         Mood and Affect: Mood and affect normal.         Speech: Speech normal.         Behavior: Behavior normal. Behavior is cooperative.         Cognition and Memory: Cognition and memory normal.       Administrative Statements

## 2024-06-21 ENCOUNTER — APPOINTMENT (OUTPATIENT)
Dept: LAB | Facility: HOSPITAL | Age: 69
End: 2024-06-21
Payer: MEDICARE

## 2024-06-21 DIAGNOSIS — K75.81 NONALCOHOLIC STEATOHEPATITIS: ICD-10-CM

## 2024-06-21 DIAGNOSIS — E83.110 HEREDITARY HEMOCHROMATOSIS (HCC): ICD-10-CM

## 2024-06-21 DIAGNOSIS — G62.9 PERIPHERAL POLYNEUROPATHY: ICD-10-CM

## 2024-06-21 DIAGNOSIS — E53.8 VITAMIN B12 DEFICIENCY: ICD-10-CM

## 2024-06-21 DIAGNOSIS — E55.9 VITAMIN D DEFICIENCY: ICD-10-CM

## 2024-06-21 DIAGNOSIS — I83.813 VARICOSE VEINS OF BOTH LOWER EXTREMITIES WITH PAIN: ICD-10-CM

## 2024-06-21 DIAGNOSIS — E78.2 MIXED HYPERLIPIDEMIA: ICD-10-CM

## 2024-06-21 LAB
25(OH)D3 SERPL-MCNC: 50.6 NG/ML (ref 30–100)
ALBUMIN SERPL BCG-MCNC: 4 G/DL (ref 3.5–5)
ALP SERPL-CCNC: 57 U/L (ref 34–104)
ALT SERPL W P-5'-P-CCNC: 11 U/L (ref 7–52)
ANION GAP SERPL CALCULATED.3IONS-SCNC: 6 MMOL/L (ref 4–13)
AST SERPL W P-5'-P-CCNC: 12 U/L (ref 13–39)
BASOPHILS # BLD AUTO: 0.01 THOUSANDS/ÂΜL (ref 0–0.1)
BASOPHILS NFR BLD AUTO: 0 % (ref 0–1)
BILIRUB SERPL-MCNC: 0.76 MG/DL (ref 0.2–1)
BUN SERPL-MCNC: 23 MG/DL (ref 5–25)
CALCIUM SERPL-MCNC: 9.8 MG/DL (ref 8.4–10.2)
CHLORIDE SERPL-SCNC: 107 MMOL/L (ref 96–108)
CHOLEST SERPL-MCNC: 148 MG/DL
CO2 SERPL-SCNC: 27 MMOL/L (ref 21–32)
CREAT SERPL-MCNC: 0.76 MG/DL (ref 0.6–1.3)
EOSINOPHIL # BLD AUTO: 0.05 THOUSAND/ÂΜL (ref 0–0.61)
EOSINOPHIL NFR BLD AUTO: 1 % (ref 0–6)
ERYTHROCYTE [DISTWIDTH] IN BLOOD BY AUTOMATED COUNT: 13.3 % (ref 11.6–15.1)
FERRITIN SERPL-MCNC: 158 NG/ML (ref 11–307)
GFR SERPL CREATININE-BSD FRML MDRD: 79 ML/MIN/1.73SQ M
GLUCOSE P FAST SERPL-MCNC: 97 MG/DL (ref 65–99)
HCT VFR BLD AUTO: 42.6 % (ref 34.8–46.1)
HDLC SERPL-MCNC: 63 MG/DL
HGB BLD-MCNC: 14 G/DL (ref 11.5–15.4)
IMM GRANULOCYTES # BLD AUTO: 0.08 THOUSAND/UL (ref 0–0.2)
IMM GRANULOCYTES NFR BLD AUTO: 1 % (ref 0–2)
IRON SATN MFR SERPL: 47 % (ref 15–50)
IRON SERPL-MCNC: 136 UG/DL (ref 50–212)
LDLC SERPL CALC-MCNC: 67 MG/DL (ref 0–100)
LYMPHOCYTES # BLD AUTO: 1.59 THOUSANDS/ÂΜL (ref 0.6–4.47)
LYMPHOCYTES NFR BLD AUTO: 19 % (ref 14–44)
MCH RBC QN AUTO: 34 PG (ref 26.8–34.3)
MCHC RBC AUTO-ENTMCNC: 32.9 G/DL (ref 31.4–37.4)
MCV RBC AUTO: 103 FL (ref 82–98)
MONOCYTES # BLD AUTO: 0.8 THOUSAND/ÂΜL (ref 0.17–1.22)
MONOCYTES NFR BLD AUTO: 10 % (ref 4–12)
NEUTROPHILS # BLD AUTO: 5.66 THOUSANDS/ÂΜL (ref 1.85–7.62)
NEUTS SEG NFR BLD AUTO: 69 % (ref 43–75)
NONHDLC SERPL-MCNC: 85 MG/DL
NRBC BLD AUTO-RTO: 0 /100 WBCS
PLATELET # BLD AUTO: 193 THOUSANDS/UL (ref 149–390)
PMV BLD AUTO: 9.7 FL (ref 8.9–12.7)
POTASSIUM SERPL-SCNC: 4.2 MMOL/L (ref 3.5–5.3)
PROT SERPL-MCNC: 6.5 G/DL (ref 6.4–8.4)
RBC # BLD AUTO: 4.12 MILLION/UL (ref 3.81–5.12)
SODIUM SERPL-SCNC: 140 MMOL/L (ref 135–147)
TIBC SERPL-MCNC: 287 UG/DL (ref 250–450)
TRIGL SERPL-MCNC: 89 MG/DL
TSH SERPL DL<=0.05 MIU/L-ACNC: 3.63 UIU/ML (ref 0.45–4.5)
UIBC SERPL-MCNC: 151 UG/DL (ref 155–355)
VIT B12 SERPL-MCNC: 1128 PG/ML (ref 180–914)
WBC # BLD AUTO: 8.19 THOUSAND/UL (ref 4.31–10.16)

## 2024-06-21 PROCEDURE — 36415 COLL VENOUS BLD VENIPUNCTURE: CPT

## 2024-06-21 PROCEDURE — 82306 VITAMIN D 25 HYDROXY: CPT

## 2024-06-21 PROCEDURE — 80053 COMPREHEN METABOLIC PANEL: CPT

## 2024-06-21 PROCEDURE — 82607 VITAMIN B-12: CPT

## 2024-06-21 PROCEDURE — 83540 ASSAY OF IRON: CPT

## 2024-06-21 PROCEDURE — 80061 LIPID PANEL: CPT

## 2024-06-21 PROCEDURE — 85025 COMPLETE CBC W/AUTO DIFF WBC: CPT

## 2024-06-21 PROCEDURE — 84443 ASSAY THYROID STIM HORMONE: CPT

## 2024-06-21 PROCEDURE — 82728 ASSAY OF FERRITIN: CPT

## 2024-06-21 PROCEDURE — 83550 IRON BINDING TEST: CPT

## 2024-06-26 ENCOUNTER — TELEPHONE (OUTPATIENT)
Age: 69
End: 2024-06-26

## 2024-06-26 NOTE — TELEPHONE ENCOUNTER
Pt called and said when she saw Dr Alanis on 6/20 they discussed starting the pt on Clorazepate again. She said dr alanis would send it in to the [facility] but hasn't gotten any notification its ready. When I looked, there isnt any meds in her chart for that. Please advise and notify.

## 2024-06-28 DIAGNOSIS — I83.813 VARICOSE VEINS OF BOTH LOWER EXTREMITIES WITH PAIN: ICD-10-CM

## 2024-06-28 DIAGNOSIS — G62.9 PERIPHERAL POLYNEUROPATHY: Primary | ICD-10-CM

## 2024-06-28 RX ORDER — CLORAZEPATE DIPOTASSIUM 3.75 MG/1
3.75 TABLET ORAL 2 TIMES DAILY PRN
Qty: 60 TABLET | Refills: 0 | Status: SHIPPED | OUTPATIENT
Start: 2024-06-28

## 2024-07-09 ENCOUNTER — TELEPHONE (OUTPATIENT)
Dept: INTERNAL MEDICINE CLINIC | Facility: CLINIC | Age: 69
End: 2024-07-09

## 2024-07-11 NOTE — TELEPHONE ENCOUNTER
Patient called in stating she was advised by [facility] that the medication was not approved and she should contact her providers office.      Please review status or script on 06/28/24 and prior auth.

## 2024-07-16 NOTE — TELEPHONE ENCOUNTER
PA for clorazepate (TRANXENE) 3.75 mg tablet     Submitted via    [x]CMM-KEY [id number]  []Surescripts-Case ID #   []Faxed to plan   []Other website   []Phone call Case ID #     Office notes sent, clinical questions answered. Awaiting determination    Turnaround time for your insurance to make a decision on your Prior Authorization can take 7-21 business days.

## 2024-07-17 NOTE — TELEPHONE ENCOUNTER
PA for clorazepate (TRANXENE) 3.75 mg tablet   Denied    Reason:(Screenshot if applicable)        Message sent to office clinical pool Yes    Denial letter scanned into Media Yes    Appeal started No (Provider will need to decide if appeal is warranted and send clinical documentation to PA team for initiation.)    **Please follow up with your patient regarding denial and next steps**

## 2024-07-17 NOTE — TELEPHONE ENCOUNTER
Her insurance has refused the prescription.  There may be other options available.  Would she consider other options?

## 2024-07-23 DIAGNOSIS — G89.4 PAIN SYNDROME, CHRONIC: ICD-10-CM

## 2024-07-23 DIAGNOSIS — G62.9 PERIPHERAL POLYNEUROPATHY: Primary | ICD-10-CM

## 2024-07-23 RX ORDER — LORAZEPAM 0.5 MG/1
0.5 TABLET ORAL
Qty: 30 TABLET | Refills: 0 | Status: SHIPPED | OUTPATIENT
Start: 2024-07-23

## 2024-08-08 ENCOUNTER — OFFICE VISIT (OUTPATIENT)
Dept: OBGYN CLINIC | Facility: CLINIC | Age: 69
End: 2024-08-08
Payer: MEDICARE

## 2024-08-08 VITALS
DIASTOLIC BLOOD PRESSURE: 76 MMHG | BODY MASS INDEX: 32.49 KG/M2 | WEIGHT: 195 LBS | SYSTOLIC BLOOD PRESSURE: 113 MMHG | HEIGHT: 65 IN | HEART RATE: 66 BPM

## 2024-08-08 DIAGNOSIS — M70.61 TROCHANTERIC BURSITIS OF RIGHT HIP: Primary | ICD-10-CM

## 2024-08-08 DIAGNOSIS — M70.62 TROCHANTERIC BURSITIS OF LEFT HIP: ICD-10-CM

## 2024-08-08 PROCEDURE — 20610 DRAIN/INJ JOINT/BURSA W/O US: CPT | Performed by: ORTHOPAEDIC SURGERY

## 2024-08-08 PROCEDURE — 99213 OFFICE O/P EST LOW 20 MIN: CPT | Performed by: ORTHOPAEDIC SURGERY

## 2024-08-08 RX ORDER — BUPIVACAINE HYDROCHLORIDE 2.5 MG/ML
4 INJECTION, SOLUTION INFILTRATION; PERINEURAL
Status: COMPLETED | OUTPATIENT
Start: 2024-08-08 | End: 2024-08-08

## 2024-08-08 RX ORDER — TRIAMCINOLONE ACETONIDE 40 MG/ML
80 INJECTION, SUSPENSION INTRA-ARTICULAR; INTRAMUSCULAR
Status: COMPLETED | OUTPATIENT
Start: 2024-08-08 | End: 2024-08-08

## 2024-08-08 RX ADMIN — TRIAMCINOLONE ACETONIDE 80 MG: 40 INJECTION, SUSPENSION INTRA-ARTICULAR; INTRAMUSCULAR at 11:15

## 2024-08-08 RX ADMIN — BUPIVACAINE HYDROCHLORIDE 4 ML: 2.5 INJECTION, SOLUTION INFILTRATION; PERINEURAL at 11:15

## 2024-08-08 NOTE — PROGRESS NOTES
"ASSESSMENT/PLAN:    Diagnoses and all orders for this visit:    Trochanteric bursitis of right hip    Trochanteric bursitis of left hip    Other orders  -     Large joint arthrocentesis        Both of the patient's bilateral trochanteric bursa were injected with Kenalog and Marcaine.  He tolerated the injections quite well.  She will follow-up with her office in 3 months.  She is acceptable to this plan.    Return in about 3 months (around 11/8/2024).    The patient has bilateral trochanteric hip bursitis.  Under aseptic technique, both hips were injected with Kenalog and Marcaine.  She tolerated procedures quite well.  Return back in 3 months for reevaluation      _____________________________________________________  CHIEF COMPLAINT:  Chief Complaint   Patient presents with    Left Hip - Follow-up         SUBJECTIVE:  Waleska Owusu is a 69 y.o. female who presents to our office complaining of bilateral hip pain.  The patient has a history of trochanteric bursitis of both hips.  She would like corticosteroid injections today, as they provide her with relief of her symptoms in the past.  She denies any numbness or tingling.  She denies any fever or chills.    The following portions of the patient's history were reviewed and updated as appropriate: allergies, current medications, past family history, past medical history, past social history, past surgical history and problem list.    PAST MEDICAL HISTORY:  Past Medical History:   Diagnosis Date    Arthritis     Colon polyp     Constipation     DVT (deep venous thrombosis) ([facility])     11/14/22 Per pt back in the 80's - no further issues at this time    Fibromyalgia     GERD (gastroesophageal reflux disease)     11/14/22 Per pt resolved -\"doesnt have\"    Hyperlipemia     Hypothyroid     Neuropathy     per pt in feet    Osteoarthritis     Osteomyelitis of ankle ([facility])     Right    Peripheral neuropathy     Rheumatoid arthritis ([facility])     RSD (reflex sympathetic " dystrophy)     Vaginal delivery     x1    Venous insufficiency        PAST SURGICAL HISTORY:  Past Surgical History:   Procedure Laterality Date    ANKLE FUSION Right     BREAST BIOPSY Left 12/18/2018    US guided; benign     BREAST EXCISIONAL BIOPSY Left     benign     CARPAL TUNNEL RELEASE      CHOLECYSTECTOMY      COLONOSCOPY      ENDOVASCULAR LASER THERAPY (EVLT) Left 1/5/2024    Procedure: Venaseal of left great saphenous;  Surgeon: Jigar Boucher MD;  [facility]: [facility];  Service: Vascular    ERCP      with insertion of tube into bile/pancreatic duct    FACIAL RECONSTRUCTION SURGERY      JOINT REPLACEMENT      tkr left     DC ENDOVEN ABLTJ INCMPTNT VEIN XTR LASER 1ST VEIN Left 02/15/2022    Procedure: EVLT left greater saphenous vein;  Surgeon: Jenna Bradley DO;  [facility];  Service: Vascular    DC ENDOVEN ABLTJ INCMPTNT VEIN XTR LASER 1ST VEIN Right 08/16/2022    Procedure: EVLT;  Surgeon: Jenna Bradley DO;  [facility];  Service: Vascular    DC STAB PHLEBT VARICOSE VEINS 1 XTR > 20 INCS Left 02/15/2022    Procedure: MULTIPLE STAB PHLEBECTOMIES LEFT LEG;  Surgeon: Jenna Bradley DO;  [facility];  Service: Vascular    DC STAB PHLEBT VARICOSE VEINS 1 XTR > 20 INCS Right 08/16/2022    Procedure: MULTIPLE STAB PHLEBECTOMIES;  Surgeon: Jenna Bradley DO;  [facility];  Service: Vascular    DC STAB PHLEBT VARICOSE VEINS 1 XTR > 20 INCS Left 11/18/2022    Procedure: <20 STAB PHLEBECTOMIES;  Surgeon: Jenna Bradley DO;  [facility];  Service: Vascular    SHOULDER SURGERY Right     for frozen shoulder/RSD    US GUIDED BREAST BIOPSY LEFT COMPLETE Left 12/18/2018    Duct ectasia, fibrosis, focal usual ductal hyperplasia    US GUIDED BREAST BIOPSY LEFT COMPLETE Left 01/04/2023    US GUIDED BREAST BIOPSY LEFT COMPLETE Left 1/4/2023       FAMILY HISTORY:  Family History   Problem Relation Age of Onset    Dementia Mother     Diabetes Mother      Anesthesia problems Father     Coronary artery disease Father     Hemochromatosis Sister     Cancer Sister     Pancreatic cancer Sister     Pancreatitis Sister     Cancer Brother     Prostate cancer Brother     No Known Problems Maternal Grandmother     No Known Problems Maternal Grandfather     No Known Problems Paternal Grandmother     No Known Problems Paternal Grandfather     Breast cancer Maternal Aunt     Breast cancer Cousin     Diabetes Family     Hypertension Neg Hx        SOCIAL HISTORY:  Social History     Tobacco Use    Smoking status: Former     Current packs/day: 0.00     Types: Cigarettes     Quit date:      Years since quittin.6    Smokeless tobacco: Never   Vaping Use    Vaping status: Never Used   Substance Use Topics    Alcohol use: Yes     Comment: rare    Drug use: Never       MEDICATIONS:    Current Outpatient Medications:     atorvastatin (LIPITOR) 10 mg tablet, Take 1 tablet (10 mg total) by mouth daily, Disp: 90 tablet, Rfl: 3    CALCIUM CITRATE PO, Take 900 mg by mouth in the morning, Disp: , Rfl:     Cholecalciferol (VITAMIN D-3 PO), Take 2,000 Int'l Units by mouth in the morning, Disp: , Rfl:     LORazepam (ATIVAN) 0.5 mg tablet, Take 1 tablet (0.5 mg total) by mouth daily at bedtime as needed (muscle tightness), Disp: 30 tablet, Rfl: 0    Multiple Vitamin (multivitamin) tablet, Take 1 tablet by mouth daily, Disp: , Rfl:     polyethylene glycol (GLYCOLAX) 17 GM/SCOOP powder, Take 17 g by mouth daily Equate brand, Disp: , Rfl:     vitamin B-12 (VITAMIN B-12) 1,000 mcg tablet, Take 1 tablet (1,000 mcg total) by mouth daily, Disp: 90 tablet, Rfl: 1    meloxicam (MOBIC) 15 mg tablet, Take 1 tablet (15 mg total) by mouth daily, Disp: 90 tablet, Rfl: 3    ALLERGIES:  Allergies   Allergen Reactions    Bee Venom Itching and Edema     Bee stings       ROS:  Review of Systems     Constitutional: Negative for fatigue, fever or loss of appetite.   HENT: Negative.    Respiratory: Negative for  "shortness of breath, dyspnea.    Cardiovascular: Negative for chest pain/tightness.   Gastrointestinal: Negative for abdominal pain, N/V.   Endocrine: Negative for cold/heat intolerance, unexplained weight loss/gain.   Genitourinary: Negative for flank pain, dysuria, hematuria.   Musculoskeletal: Positive for arthralgia   Skin: Negative for rash.    Neurological: Negative for numbness or tingling  Psychiatric/Behavioral: Negative for agitation.  _____________________________________________________  PHYSICAL EXAMINATION:    Blood pressure 113/76, pulse 66, height 5' 4.5\" (1.638 m), weight 88.5 kg (195 lb).    Constitutional: Oriented to person, place, and time. Appears well-developed and well-nourished. No distress.   HENT:   Head: Normocephalic.   Eyes: Conjunctivae are normal. Right eye exhibits no discharge. Left eye exhibits no discharge. No scleral icterus.   Cardiovascular: Normal rate.    Pulmonary/Chest: Effort normal.   Neurological: Alert and oriented to person, place, and time.   Skin: Skin is warm and dry. No rash noted. Not diaphoretic. No erythema. No pallor.   Psychiatric: Normal mood and affect. Behavior is normal. Judgment and thought content normal.      MUSCULOSKELETAL EXAMINATION:   Physical Exam  Ortho Exam    Bilateral lower extremities neuro vastly intact  Toes are pink and mobile  Compartments are soft  Tenderness to palpation along both trochanteric bursa  Brisk cap refill  Sensation intact  Objective:  BP Readings from Last 1 Encounters:   08/08/24 113/76      Wt Readings from Last 1 Encounters:   08/08/24 88.5 kg (195 lb)        BMI:   Estimated body mass index is 32.95 kg/m² as calculated from the following:    Height as of this encounter: 5' 4.5\" (1.638 m).    Weight as of this encounter: 88.5 kg (195 lb).      PROCEDURES PERFORMED:  Large joint arthrocentesis: bilateral greater trochanteric bursa  Universal Protocol:  Risks and benefits: risks, benefits and alternatives were " discussed  Consent given by: patient  Site marked: the operative site was marked  Supporting Documentation  Indications: pain   Procedure Details  Location: hip - bilateral greater trochanteric bursa  Needle size: 22 G  Ultrasound guidance: no  Approach: lateral    Medications (Right): 4 mL bupivacaine 0.25 %; 80 mg triamcinolone acetonide 40 mg/mLMedications (Left): 4 mL bupivacaine 0.25 %; 80 mg triamcinolone acetonide 40 mg/mL   Patient tolerance: patient tolerated the procedure well with no immediate complications  Dressing:  Sterile dressing applied            Scribe Attestation      I,:  Olivier Finney PA-C am acting as a scribe while in the presence of the attending physician.:       I,:  Calros Ray DO personally performed the services described in this documentation    as scribed in my presence.:

## 2024-08-15 ENCOUNTER — TELEPHONE (OUTPATIENT)
Age: 69
End: 2024-08-15

## 2024-08-15 NOTE — TELEPHONE ENCOUNTER
Patient called rx refill line stated the medication prescribed last month for     LORazepam (ATIVAN) 0.5 mg tablet     Is not working well patient still has pain and is still finding it difficulty to sleep     Patient requesting for the doctor to prescribe alternative

## 2024-08-16 NOTE — TELEPHONE ENCOUNTER
Is she looking for something more to help her sleep or something more to help with the pain symptoms?  Options like gabapentin or Lyrica can help with the neuropathy symptoms but may not help specifically with sleep.  Medications like trazodone or Remeron may help with sleep but may not help as much with the neuropathy symptoms.

## 2024-08-19 NOTE — TELEPHONE ENCOUNTER
Pt called this morning asking if anything is going to be prescribed for her Neuropathy pain?    Please advise

## 2024-08-20 DIAGNOSIS — G62.9 PERIPHERAL POLYNEUROPATHY: Primary | ICD-10-CM

## 2024-08-20 RX ORDER — GABAPENTIN 100 MG/1
100 CAPSULE ORAL
Qty: 90 CAPSULE | Refills: 3 | Status: SHIPPED | OUTPATIENT
Start: 2024-08-20

## 2024-08-20 NOTE — TELEPHONE ENCOUNTER
Pt called back and was very annoyed that nothing has been sent to the pharmacy yet for her Neuropathy.     CTS spoke with Radha from the office who spoke with Doctor.  CTS informed patient that doctor was not in office on Friday and out sick on Monday. Also, made her aware doctor promised to send in a prescription for Gabapentin to the pharmacy by the end of the day today.

## 2024-08-20 NOTE — TELEPHONE ENCOUNTER
Pt called today to access center, was not very nice to them... wanting to know why her medications were not sent in. Did speak with Dr. Alanis- last communication was on 8/16 which is a Friday, patient does know provider is off on a Friday. Provider was out sick yesterday 8/19. Provider states she'll call in gabapentin by the end of today.

## 2024-10-29 ENCOUNTER — HOSPITAL ENCOUNTER (OUTPATIENT)
Dept: BONE DENSITY | Facility: HOSPITAL | Age: 69
Discharge: HOME | End: 2024-10-29
Payer: MEDICARE

## 2024-10-29 ENCOUNTER — HOSPITAL ENCOUNTER (OUTPATIENT)
Dept: MAMMOGRAPHY | Facility: HOSPITAL | Age: 69
Discharge: HOME | End: 2024-10-29
Payer: MEDICARE

## 2024-10-29 VITALS — WEIGHT: 195 LBS | HEIGHT: 65 IN | BODY MASS INDEX: 32.49 KG/M2

## 2024-10-29 DIAGNOSIS — Z78.0 POSTMENOPAUSAL: ICD-10-CM

## 2024-10-29 DIAGNOSIS — Z12.31 VISIT FOR SCREENING MAMMOGRAM: ICD-10-CM

## 2024-10-29 PROCEDURE — 77067 SCR MAMMO BI INCL CAD: CPT

## 2024-10-29 PROCEDURE — 77063 BREAST TOMOSYNTHESIS BI: CPT

## 2024-10-30 ENCOUNTER — HOSPITAL ENCOUNTER (OUTPATIENT)
Dept: BONE DENSITY | Facility: HOSPITAL | Age: 69
Discharge: HOME | End: 2024-10-30
Payer: MEDICARE

## 2024-10-30 DIAGNOSIS — Z78.0 POSTMENOPAUSAL: ICD-10-CM

## 2024-10-30 PROCEDURE — 77080 DXA BONE DENSITY AXIAL: CPT

## 2024-10-31 ENCOUNTER — TELEPHONE (OUTPATIENT)
Age: 69
End: 2024-10-31

## 2024-10-31 NOTE — TELEPHONE ENCOUNTER
Patient called in request an excuse from jury duty on 12/9. Patient states she has a hard time sitting for long periods of time. Please advise and fax to: [phone].  Please call patient once faxed.

## 2024-11-06 NOTE — ANESTHESIA POSTPROCEDURE EVALUATION
Post-Op Assessment Note    CV Status:  Stable  Pain Score: 2    Pain management: adequate     Mental Status:  Alert and awake   Hydration Status:  Euvolemic and stable   PONV Controlled:  Controlled   Airway Patency:  Patent      Post Op Vitals Reviewed: Yes      Staff: Anesthesiologist         No notable events documented      BP      Temp      Pulse     Resp      SpO2      /55   Pulse 80   Temp 97 9 °F (36 6 °C) (Temporal)   Resp 16   Ht 5' 4 5" (1 638 m)   Wt 84 9 kg (187 lb 2 7 oz)   LMP  (LMP Unknown)   SpO2 99%   BMI 31 63 kg/m² not applicable (Male)

## 2024-11-07 ENCOUNTER — OFFICE VISIT (OUTPATIENT)
Dept: OBGYN CLINIC | Facility: CLINIC | Age: 69
End: 2024-11-07
Payer: MEDICARE

## 2024-11-07 VITALS
TEMPERATURE: 98.4 F | WEIGHT: 195 LBS | HEART RATE: 69 BPM | DIASTOLIC BLOOD PRESSURE: 77 MMHG | OXYGEN SATURATION: 98 % | SYSTOLIC BLOOD PRESSURE: 130 MMHG | HEIGHT: 65 IN | BODY MASS INDEX: 32.49 KG/M2

## 2024-11-07 DIAGNOSIS — M70.62 TROCHANTERIC BURSITIS OF LEFT HIP: ICD-10-CM

## 2024-11-07 DIAGNOSIS — M70.61 TROCHANTERIC BURSITIS OF RIGHT HIP: Primary | ICD-10-CM

## 2024-11-07 PROCEDURE — 20610 DRAIN/INJ JOINT/BURSA W/O US: CPT | Performed by: ORTHOPAEDIC SURGERY

## 2024-11-07 PROCEDURE — 99213 OFFICE O/P EST LOW 20 MIN: CPT | Performed by: ORTHOPAEDIC SURGERY

## 2024-11-07 RX ORDER — TRIAMCINOLONE ACETONIDE 40 MG/ML
80 INJECTION, SUSPENSION INTRA-ARTICULAR; INTRAMUSCULAR
Status: COMPLETED | OUTPATIENT
Start: 2024-11-07 | End: 2024-11-07

## 2024-11-07 RX ORDER — BUPIVACAINE HYDROCHLORIDE 2.5 MG/ML
4 INJECTION, SOLUTION INFILTRATION; PERINEURAL
Status: COMPLETED | OUTPATIENT
Start: 2024-11-07 | End: 2024-11-07

## 2024-11-07 RX ADMIN — BUPIVACAINE HYDROCHLORIDE 4 ML: 2.5 INJECTION, SOLUTION INFILTRATION; PERINEURAL at 09:00

## 2024-11-07 RX ADMIN — TRIAMCINOLONE ACETONIDE 80 MG: 40 INJECTION, SUSPENSION INTRA-ARTICULAR; INTRAMUSCULAR at 09:00

## 2024-11-07 NOTE — PROGRESS NOTES
Assessment/Plan:   Diagnoses and all orders for this visit:    Trochanteric bursitis of right hip  -     Large joint arthrocentesis    Trochanteric bursitis of left hip  -     Large joint arthrocentesis      The patient continues to experience pain related to the bilateral hip trochanteric bursitis. She was offered and accepted an injection(s) of Kenalog and Marcaine to her Bilateral hip(s) for symptomatic relief of pain and inflammation. Patient tolerated the treatment(s) well. Ice and post injection protocol advised. Weightbearing activities as tolerated. She will be seen for follow-up in 3 months for re-evaluation and consideration for repeat injections as necessary. Patient expresses understanding and is in agreement with this treatment plan. The patient was given the opportunity to ask questions or present concerns.    The patient has bilateral trochanteric bursitis of her hips.  Under aseptic technique, both structures were injected with Kenalog and Marcaine.  She tolerated the procedures well.  Return back in 3 months for reevaluation        Subjective:   Patient ID: Waleska Owusu  1955     HPI  Patient is a 69 y.o. female who presents for follow-up evaluation of her  bilateral  hips(s). The patient was last seen on 8/8/2024, where she received bilateral CS injection for treatment of trochanteric bursitis. The patient reports significant relief following the previous CS injection. However, the pain returned approximately 2-3 weeks ago. On today's presentation, she reports pain along the lateral aspect of her hip(s). She states that the pain is exacerbated by weight bearing activities, side lying, and ambulating stairs. She also reports point tenderness. She has increased pain with prolonged walking. She denies feelings of instability or weakness. She denies numbness or tingling.        The following portions of the patient's history were reviewed and updated as appropriate:  Past medical history, past  "surgical history, Family history, social history, current medications and allergies    Past Medical History:   Diagnosis Date    Arthritis     Colon polyp     Constipation     DVT (deep venous thrombosis) ([facility])     11/14/22 Per pt back in the 80's - no further issues at this time    Fibromyalgia     GERD (gastroesophageal reflux disease)     11/14/22 Per pt resolved -\"doesnt have\"    Hyperlipemia     Hypothyroid     Neuropathy     per pt in feet    Osteoarthritis     Osteomyelitis of ankle ([facility])     Right    Peripheral neuropathy     Rheumatoid arthritis ([facility])     RSD (reflex sympathetic dystrophy)     Vaginal delivery     x1    Venous insufficiency        Past Surgical History:   Procedure Laterality Date    ANKLE FUSION Right     BREAST BIOPSY Left 12/18/2018    US guided; benign     BREAST EXCISIONAL BIOPSY Left     benign     CARPAL TUNNEL RELEASE      CHOLECYSTECTOMY      COLONOSCOPY      ENDOVASCULAR LASER THERAPY (EVLT) Left 1/5/2024    Procedure: Venaseal of left great saphenous;  Surgeon: Jigar Boucher MD;  [facility]: [facility];  Service: Vascular    ERCP      with insertion of tube into bile/pancreatic duct    FACIAL RECONSTRUCTION SURGERY      JOINT REPLACEMENT      tkr left     TX ENDOVEN ABLTJ INCMPTNT VEIN XTR LASER 1ST VEIN Left 02/15/2022    Procedure: EVLT left greater saphenous vein;  Surgeon: Jenna Bradley DO;  [facility];  Service: Vascular    TX ENDOVEN ABLTJ INCMPTNT VEIN XTR LASER 1ST VEIN Right 08/16/2022    Procedure: EVLT;  Surgeon: Jenna Bradley DO;  [facility];  Service: Vascular    TX STAB PHLEBT VARICOSE VEINS 1 XTR > 20 INCS Left 02/15/2022    Procedure: MULTIPLE STAB PHLEBECTOMIES LEFT LEG;  Surgeon: Jenna Bradley DO;  [facility];  Service: Vascular    TX STAB PHLEBT VARICOSE VEINS 1 XTR > 20 INCS Right 08/16/2022    Procedure: MULTIPLE STAB PHLEBECTOMIES;  Surgeon: Jenna Bradley DO;  [facility];  Service: Vascular    " SC STAB PHLEBT VARICOSE VEINS 1 XTR > 20 INCS Left 2022    Procedure: <20 STAB PHLEBECTOMIES;  Surgeon: Jenna Bradley DO;  [facility];  Service: Vascular    SHOULDER SURGERY Right     for frozen shoulder/RSD    US GUIDED BREAST BIOPSY LEFT COMPLETE Left 2018    Duct ectasia, fibrosis, focal usual ductal hyperplasia    US GUIDED BREAST BIOPSY LEFT COMPLETE Left 2023    US GUIDED BREAST BIOPSY LEFT COMPLETE Left 2023       Family History   Problem Relation Age of Onset    Dementia Mother     Diabetes Mother     Anesthesia problems Father     Coronary artery disease Father     Hemochromatosis Sister     Cancer Sister     Pancreatic cancer Sister     Pancreatitis Sister     Cancer Brother     Prostate cancer Brother     No Known Problems Maternal Grandmother     No Known Problems Maternal Grandfather     No Known Problems Paternal Grandmother     No Known Problems Paternal Grandfather     Breast cancer Maternal Aunt     Breast cancer Cousin     Diabetes Family     Hypertension Neg Hx        Social History     Socioeconomic History    Marital status:      Spouse name: None    Number of children: None    Years of education: None    Highest education level: None   Occupational History    None   Tobacco Use    Smoking status: Former     Current packs/day: 0.00     Types: Cigarettes     Quit date:      Years since quittin.8    Smokeless tobacco: Never   Vaping Use    Vaping status: Never Used   Substance and Sexual Activity    Alcohol use: Yes     Comment: rare    Drug use: Never    Sexual activity: Not Currently     Comment: Not active at this time   Other Topics Concern    None   Social History Narrative    None     Social Determinants of Health     Financial Resource Strain: High Risk (2023)    Overall Financial Resource Strain (CARDIA)     Difficulty of Paying Living Expenses: Very hard   Food Insecurity: No Food Insecurity (2024)    Nursing - Inadequate  Food Risk Classification     Worried About Running Out of Food in the Last Year: Never true     Ran Out of Food in the Last Year: Never true     Ran Out of Food in the Last Year: Not on file   Transportation Needs: No Transportation Needs (6/20/2024)    PRAPARE - Transportation     Lack of Transportation (Medical): No     Lack of Transportation (Non-Medical): No   Physical Activity: Not on file   Stress: Not on file   Social Connections: Unknown (6/18/2024)    Received from [employer]    Social [employer]     How often do you feel lonely or isolated from those around you? (Adult - for ages 18 years and over): Not on file   Intimate Partner Violence: Not on file   Housing Stability: Low Risk  (6/20/2024)    Housing Stability Vital Sign     Unable to Pay for Housing in the Last Year: No     Number of Times Moved in the Last Year: 1     Homeless in the Last Year: No         Current Outpatient Medications:     atorvastatin (LIPITOR) 10 mg tablet, Take 1 tablet (10 mg total) by mouth daily, Disp: 90 tablet, Rfl: 3    CALCIUM CITRATE PO, Take 900 mg by mouth in the morning, Disp: , Rfl:     Cholecalciferol (VITAMIN D-3 PO), Take 2,000 Int'l Units by mouth in the morning, Disp: , Rfl:     gabapentin (NEURONTIN) 100 mg capsule, Take 1 capsule (100 mg total) by mouth daily at bedtime, Disp: 90 capsule, Rfl: 3    Multiple Vitamin (multivitamin) tablet, Take 1 tablet by mouth daily, Disp: , Rfl:     polyethylene glycol (GLYCOLAX) 17 GM/SCOOP powder, Take 17 g by mouth daily Equate brand, Disp: , Rfl:     vitamin B-12 (VITAMIN B-12) 1,000 mcg tablet, Take 1 tablet (1,000 mcg total) by mouth daily, Disp: 90 tablet, Rfl: 1    meloxicam (MOBIC) 15 mg tablet, Take 1 tablet (15 mg total) by mouth daily, Disp: 90 tablet, Rfl: 3    Allergies   Allergen Reactions    Bee Venom Itching and Edema     Bee stings       Review of Systems   Constitutional:  Negative for chills, fever and unexpected weight change.   HENT:  Negative for hearing  "loss, nosebleeds and sore throat.    Eyes:  Negative for pain, redness and visual disturbance.   Respiratory:  Negative for cough, shortness of breath and wheezing.    Cardiovascular:  Negative for chest pain, palpitations and leg swelling.   Gastrointestinal:  Negative for abdominal pain, nausea and vomiting.   Endocrine: Negative for polydipsia and polyuria.   Genitourinary:  Negative for dysuria and hematuria.   Skin:  Negative for rash and wound.   Neurological:  Negative for dizziness, numbness and headaches.   Psychiatric/Behavioral:  Negative for decreased concentration and suicidal ideas. The patient is not nervous/anxious.    All other systems reviewed and are negative.       Objective:  /77 (BP Location: Right arm, Patient Position: Sitting, Cuff Size: Large)   Pulse 69   Temp 98.4 °F (36.9 °C) (Temporal)   Ht 5' 4.5\" (1.638 m)   Wt 88.5 kg (195 lb) Comment: reported  LMP  (LMP Unknown)   SpO2 98%   BMI 32.95 kg/m²     Ortho Exam  bilateral Hip -  Patient presents with no anatomical deformity  Ambulates with antalgic gait pattern  Uses No assistive devices  TTP over  greater trochanteric bursa  Smooth passive circumduction   Strength: 5/5 throughout  Knee flexor and extensor mechanism intact  Ankle DF and PF mechanism intact  2+ TP and DP pulses with brisk capillary refill to the toes  Sural, saphenous, tibial, superficial and deep peroneal motor and sensory distribution intact  Sensation to light touch intact distally      Physical Exam  HENT:      Head: Normocephalic and atraumatic.      Nose: Nose normal.   Eyes:      Conjunctiva/sclera: Conjunctivae normal.   Cardiovascular:      Rate and Rhythm: Normal rate.   Pulmonary:      Effort: Pulmonary effort is normal.   Musculoskeletal:      Cervical back: Neck supple.   Skin:     General: Skin is warm and dry.      Capillary Refill: Capillary refill takes less than 2 seconds.   Neurological:      Mental Status: She is alert and oriented to " person, place, and time.   Psychiatric:         Mood and Affect: Mood normal.         Behavior: Behavior normal.          Diagnostic Test Review:  No new imaging at time of visit.     Large joint arthrocentesis: bilateral greater trochanteric bursa  Universal Protocol:  procedure performed by consultantConsent: Verbal consent obtained.  Risks and benefits: risks, benefits and alternatives were discussed  Consent given by: patient  Timeout called at: 11/7/2024 9:12 AM.  Patient understanding: patient states understanding of the procedure being performed  Patient consent: the patient's understanding of the procedure matches consent given  Site marked: the operative site was marked  Radiology Images displayed and confirmed. If images not available, report reviewed: imaging studies available  Patient identity confirmed: verbally with patient  Supporting Documentation  Indications: pain and joint swelling   Procedure Details  Location: hip - bilateral greater trochanteric bursa  Needle gauge: spinal needle.  Ultrasound guidance: no  Approach: lateral    Medications (Right): 4 mL bupivacaine 0.25 %; 80 mg triamcinolone acetonide 40 mg/mLMedications (Left): 4 mL bupivacaine 0.25 %; 80 mg triamcinolone acetonide 40 mg/mL   Patient tolerance: patient tolerated the procedure well with no immediate complications  Dressing:  Sterile dressing applied             Scribe Attestation      I,:  Noemi Hirsch am acting as a scribe while in the presence of the attending physician.:       I,:  Carlos Ray DO personally performed the services described in this documentation    as scribed in my presence.:

## 2024-11-26 DIAGNOSIS — G89.4 PAIN SYNDROME, CHRONIC: ICD-10-CM

## 2024-11-26 DIAGNOSIS — E78.2 MIXED HYPERLIPIDEMIA: ICD-10-CM

## 2024-11-26 DIAGNOSIS — M15.0 PRIMARY OSTEOARTHRITIS INVOLVING MULTIPLE JOINTS: ICD-10-CM

## 2024-11-27 RX ORDER — MELOXICAM 15 MG/1
15 TABLET ORAL DAILY
Qty: 90 TABLET | Refills: 1 | Status: SHIPPED | OUTPATIENT
Start: 2024-11-27 | End: 2025-05-26

## 2024-11-27 RX ORDER — ATORVASTATIN CALCIUM 10 MG/1
10 TABLET, FILM COATED ORAL DAILY
Qty: 90 TABLET | Refills: 1 | Status: SHIPPED | OUTPATIENT
Start: 2024-11-27

## 2024-11-30 ENCOUNTER — HOSPITAL ENCOUNTER (EMERGENCY)
Facility: HOSPITAL | Age: 69
Discharge: HOME | End: 2024-11-30
Attending: EMERGENCY MEDICINE
Payer: MEDICARE

## 2024-11-30 ENCOUNTER — APPOINTMENT (EMERGENCY)
Dept: NON INVASIVE DIAGNOSTICS | Facility: HOSPITAL | Age: 69
End: 2024-11-30
Payer: MEDICARE

## 2024-11-30 VITALS
SYSTOLIC BLOOD PRESSURE: 144 MMHG | RESPIRATION RATE: 14 BRPM | TEMPERATURE: 98.7 F | WEIGHT: 195 LBS | HEART RATE: 57 BPM | DIASTOLIC BLOOD PRESSURE: 75 MMHG | BODY MASS INDEX: 32.95 KG/M2 | OXYGEN SATURATION: 99 %

## 2024-11-30 DIAGNOSIS — I82.461 ACUTE DEEP VEIN THROMBOSIS (DVT) OF CALF MUSCLE VEIN OF RIGHT LOWER EXTREMITY (HCC): Primary | ICD-10-CM

## 2024-11-30 PROCEDURE — 99285 EMERGENCY DEPT VISIT HI MDM: CPT | Performed by: EMERGENCY MEDICINE

## 2024-11-30 PROCEDURE — 99284 EMERGENCY DEPT VISIT MOD MDM: CPT

## 2024-11-30 PROCEDURE — 93971 EXTREMITY STUDY: CPT

## 2024-11-30 PROCEDURE — 93971 EXTREMITY STUDY: CPT | Performed by: STUDENT IN AN ORGANIZED HEALTH CARE EDUCATION/TRAINING PROGRAM

## 2024-11-30 RX ORDER — RIVAROXABAN 15 MG-20MG
KIT ORAL
Qty: 51 EACH | Refills: 0 | Status: SHIPPED | OUTPATIENT
Start: 2024-11-30

## 2024-11-30 RX ORDER — METHOCARBAMOL 500 MG/1
500 TABLET, FILM COATED ORAL 2 TIMES DAILY
Qty: 20 TABLET | Refills: 0 | Status: SHIPPED | OUTPATIENT
Start: 2024-11-30

## 2024-11-30 RX ORDER — METHOCARBAMOL 500 MG/1
500 TABLET, FILM COATED ORAL ONCE
Status: COMPLETED | OUTPATIENT
Start: 2024-11-30 | End: 2024-11-30

## 2024-11-30 RX ADMIN — METHOCARBAMOL TABLETS 500 MG: 500 TABLET, COATED ORAL at 14:31

## 2024-11-30 RX ADMIN — RIVAROXABAN 20 MG: 10 TABLET, FILM COATED ORAL at 14:31

## 2024-11-30 NOTE — ED PROVIDER NOTES
Time reflects when diagnosis was documented in both MDM as applicable and the Disposition within this note       Time User Action Codes Description Comment    11/30/2024  2:23 PM Howie Herring Add [I82.461] Acute deep vein thrombosis (DVT) of calf muscle vein of right lower extremity (HCC)           ED Disposition       ED Disposition   Discharge    Condition   Stable    Date/Time   Sat Nov 30, 2024  2:21 PM    Comment   Waleska Owusu discharge to home/self care.                   Assessment & Plan       Medical Decision Making  6-year-old female presenting with left calf pain    Problems Addressed:  Acute deep vein thrombosis (DVT) of calf muscle vein of right lower extremity (HCC): acute illness or injury    Amount and/or Complexity of Data Reviewed  Radiology: ordered and independent interpretation performed. Decision-making details documented in ED Course.    Risk  OTC drugs.  Prescription drug management.  Decision regarding hospitalization.  Risk Details: Will treat with Xarelto, follow-up with vascular, PCP.  Reviewed return precautions and follow-up information.             Medications   rivaroxaban (XARELTO) tablet 20 mg (20 mg Oral Given 11/30/24 1431)   methocarbamol (ROBAXIN) tablet 500 mg (500 mg Oral Given 11/30/24 1431)       ED Risk Strat Scores                   I personally discussed return precautions with this patient and family. I provided the patient with written discharge instructions and particularly highlighted specific areas of interest to this patient, including but not limited to: medications for symptom managment, follow up recommendations, and return precautions. Patient and family are in agreement with this plan as outlined above.        SBIRT 20yo+      Flowsheet Row Most Recent Value   Initial Alcohol Screen: US AUDIT-C     1. How often do you have a drink containing alcohol? 0 Filed at: 11/30/2024 1029   2. How many drinks containing alcohol do you have on a typical day you  "are drinking?  0 Filed at: 11/30/2024 1029   3a. Male UNDER 65: How often do you have five or more drinks on one occasion? 0 Filed at: 11/30/2024 1029   3b. FEMALE Any Age, or MALE 65+: How often do you have 4 or more drinks on one occassion? 0 Filed at: 11/30/2024 1029   Audit-C Score 0 Filed at: 11/30/2024 1029   RAFAEL: How many times in the past year have you...    Used an illegal drug or used a prescription medication for non-medical reasons? Never Filed at: 11/30/2024 1029                            History of Present Illness       Chief Complaint   Patient presents with    Pain     Patient reports chronic L leg pain that has been getting worse, noticed a lump behind her L knee. Also reports pain along her whole L side.        Past Medical History:   Diagnosis Date    Arthritis     Colon polyp     Constipation     DVT (deep venous thrombosis) ([facility])     11/14/22 Per pt back in the 80's - no further issues at this time    Fibromyalgia     GERD (gastroesophageal reflux disease)     11/14/22 Per pt resolved -\"doesnt have\"    Hyperlipemia     Hypothyroid     Neuropathy     per pt in feet    Osteoarthritis     Osteomyelitis of ankle (HCC)     Right    Peripheral neuropathy     Rheumatoid arthritis (HCC)     RSD (reflex sympathetic dystrophy)     Vaginal delivery     x1    Venous insufficiency       Past Surgical History:   Procedure Laterality Date    ANKLE FUSION Right     BREAST BIOPSY Left 12/18/2018    US guided; benign     BREAST EXCISIONAL BIOPSY Left     benign     CARPAL TUNNEL RELEASE      CHOLECYSTECTOMY      COLONOSCOPY      ENDOVASCULAR LASER THERAPY (EVLT) Left 1/5/2024    Procedure: Venaseal of left great saphenous;  Surgeon: Jigar Boucher MD;  Location: [facility];  Service: Vascular    ERCP      with insertion of tube into bile/pancreatic duct    FACIAL RECONSTRUCTION SURGERY      JOINT REPLACEMENT      tkr left     IA ENDOVEN ABLTJ INCMPTNT VEIN XTR LASER 1ST VEIN Left 02/15/2022    Procedure: " EVLT left greater saphenous vein;  Surgeon: Jenna Bradley DO;  Location: [facility];  Service: Vascular    MN ENDOVEN ABLTJ INCMPTNT VEIN XTR LASER 1ST VEIN Right 2022    Procedure: EVLT;  Surgeon: Jenna Bradley DO;  Location: [facility];  Service: Vascular    MN STAB PHLEBT VARICOSE VEINS 1 XTR > 20 INCS Left 02/15/2022    Procedure: MULTIPLE STAB PHLEBECTOMIES LEFT LEG;  Surgeon: Jenna Bradley DO;  Location: [facility];  Service: Vascular    MN STAB PHLEBT VARICOSE VEINS 1 XTR > 20 INCS Right 2022    Procedure: MULTIPLE STAB PHLEBECTOMIES;  Surgeon: Jenna Bradley DO;  Location[facility];  Service: Vascular    MN STAB PHLEBT VARICOSE VEINS 1 XTR > 20 INCS Left 2022    Procedure: <20 STAB PHLEBECTOMIES;  Surgeon: Jenna Bradley DO;  Location: [facility];  Service: Vascular    SHOULDER SURGERY Right     for frozen shoulder/RSD    US GUIDED BREAST BIOPSY LEFT COMPLETE Left 2018    Duct ectasia, fibrosis, focal usual ductal hyperplasia    US GUIDED BREAST BIOPSY LEFT COMPLETE Left 2023    US GUIDED BREAST BIOPSY LEFT COMPLETE Left 2023      Family History   Problem Relation Age of Onset    Dementia Mother     Diabetes Mother     Anesthesia problems Father     Coronary artery disease Father     Hemochromatosis Sister     Cancer Sister     Pancreatic cancer Sister     Pancreatitis Sister     Cancer Brother     Prostate cancer Brother     No Known Problems Maternal Grandmother     No Known Problems Maternal Grandfather     No Known Problems Paternal Grandmother     No Known Problems Paternal Grandfather     Breast cancer Maternal Aunt     Breast cancer Cousin     Diabetes Family     Hypertension Neg Hx       Social History     Tobacco Use    Smoking status: Former     Current packs/day: 0.00     Types: Cigarettes     Quit date:      Years since quittin.9    Smokeless tobacco: Never   Vaping Use    Vaping status: Never Used   Substance Use Topics     Alcohol use: Yes     Comment: rare    Drug use: Never      E-Cigarette/Vaping    E-Cigarette Use Never User     Comments Denies any use       E-Cigarette/Vaping Substances    Nicotine No     THC No     CBD No       I have reviewed and agree with the history as documented.     Patient complains of left lower leg pain x 1 week.  History of knee replacement on that knee.  No recent trauma injury or illness.  Pain worsened with ambulating and direct pressure.          Review of Systems   Constitutional:  Negative for appetite change, chills, fatigue, fever and unexpected weight change.   HENT:  Negative for congestion, ear pain, rhinorrhea and sore throat.    Eyes:  Negative for pain and visual disturbance.   Respiratory:  Negative for cough, chest tightness, shortness of breath and wheezing.    Cardiovascular:  Negative for chest pain, palpitations and leg swelling.   Gastrointestinal:  Negative for abdominal pain, constipation, diarrhea, nausea and vomiting.   Genitourinary:  Negative for difficulty urinating, dysuria, frequency, hematuria, menstrual problem, pelvic pain, vaginal bleeding and vaginal discharge.   Musculoskeletal:  Negative for arthralgias, back pain and neck pain.        Left leg pain as per HPI   Skin:  Negative for color change and rash.   Neurological:  Negative for dizziness, seizures, syncope, light-headedness and headaches.   Psychiatric/Behavioral:  Negative for sleep disturbance.    All other systems reviewed and are negative.          Objective       ED Triage Vitals [11/30/24 1029]   Temperature Pulse Blood Pressure Respirations SpO2 Patient Position - Orthostatic VS   98.1 °F (36.7 °C) 86 144/72 18 99 % Sitting      Temp Source Heart Rate Source BP Location FiO2 (%) Pain Score    Temporal Monitor Left arm -- 7      Vitals      Date and Time Temp Pulse SpO2 Resp BP Pain Score FACES Pain Rating User   11/30/24 1421 98.7 °F (37.1 °C) 57 99 % 14 144/75 No Pain -- AB   11/30/24 1220 97.8 °F  (36.6 °C) 55 99 % 14 145/70 No Pain -- AB   11/30/24 1029 98.1 °F (36.7 °C) 86 99 % 18 144/72 7 -- SK            Physical Exam  Vitals and nursing note reviewed.   Constitutional:       General: She is not in acute distress.     Appearance: Normal appearance. She is well-developed and normal weight. She is not ill-appearing, toxic-appearing or diaphoretic.   HENT:      Head: Normocephalic and atraumatic.      Nose: Nose normal.      Mouth/Throat:      Mouth: Mucous membranes are moist.      Pharynx: Oropharynx is clear.   Eyes:      General: No scleral icterus.     Extraocular Movements: Extraocular movements intact.      Conjunctiva/sclera: Conjunctivae normal.   Cardiovascular:      Rate and Rhythm: Normal rate and regular rhythm.      Pulses: Normal pulses.      Heart sounds: Normal heart sounds. No murmur heard.     No friction rub. No gallop.   Pulmonary:      Effort: Pulmonary effort is normal. No respiratory distress.      Breath sounds: Normal breath sounds. No wheezing or rales.   Abdominal:      Palpations: Abdomen is soft. There is no mass.      Tenderness: There is no abdominal tenderness. There is no right CVA tenderness, left CVA tenderness, guarding or rebound.      Hernia: No hernia is present.   Musculoskeletal:         General: Tenderness present. No swelling. Normal range of motion.      Cervical back: Normal range of motion and neck supple. No rigidity or tenderness.      Right lower leg: Edema present.      Left lower leg: Edema present.        Legs:    Lymphadenopathy:      Cervical: No cervical adenopathy.   Skin:     General: Skin is warm and dry.      Capillary Refill: Capillary refill takes less than 2 seconds.      Coloration: Skin is not jaundiced or pale.      Findings: No lesion or rash.   Neurological:      General: No focal deficit present.      Mental Status: She is alert and oriented to person, place, and time.   Psychiatric:         Mood and Affect: Mood normal.         Behavior:  Behavior normal.         Results Reviewed       None            VAS lower limb venous duplex study, unilateral/limited    (Results Pending)       Procedures    ED Medication and Procedure Management   Prior to Admission Medications   Prescriptions Last Dose Informant Patient Reported? Taking?   CALCIUM CITRATE PO  Self Yes No   Sig: Take 900 mg by mouth in the morning   Cholecalciferol (VITAMIN D-3 PO)  Self Yes No   Sig: Take 2,000 Int'l Units by mouth in the morning   Multiple Vitamin (multivitamin) tablet  Self Yes No   Sig: Take 1 tablet by mouth daily   atorvastatin (LIPITOR) 10 mg tablet   No No   Sig: TAKE 1 TABLET BY MOUTH EVERY DAY   gabapentin (NEURONTIN) 100 mg capsule   No No   Sig: Take 1 capsule (100 mg total) by mouth daily at bedtime   meloxicam (MOBIC) 15 mg tablet   No No   Sig: TAKE 1 TABLET (15 MG TOTAL) BY MOUTH DAILY.   polyethylene glycol (GLYCOLAX) 17 GM/SCOOP powder  Self Yes No   Sig: Take 17 g by mouth daily Equate brand   vitamin B-12 (VITAMIN B-12) 1,000 mcg tablet  Self No No   Sig: Take 1 tablet (1,000 mcg total) by mouth daily      Facility-Administered Medications: None     Discharge Medication List as of 11/30/2024  2:24 PM        START taking these medications    Details   methocarbamol (ROBAXIN) 500 mg tablet Take 1 tablet (500 mg total) by mouth 2 (two) times a day, Starting Sat 11/30/2024, Normal      rivaroxaban (Xarelto Starter Pack) 15 & 20 MG starter pack Take 15 mg by mouth twice daily for 21 days, then 20 mg once daily thereafter., Normal           CONTINUE these medications which have NOT CHANGED    Details   atorvastatin (LIPITOR) 10 mg tablet TAKE 1 TABLET BY MOUTH EVERY DAY, Starting Wed 11/27/2024, Normal      CALCIUM CITRATE PO Take 900 mg by mouth in the morning, Historical Med      Cholecalciferol (VITAMIN D-3 PO) Take 2,000 Int'l Units by mouth in the morning, Historical Med      gabapentin (NEURONTIN) 100 mg capsule Take 1 capsule (100 mg total) by mouth daily at  bedtime, Starting Tue 8/20/2024, Normal      meloxicam (MOBIC) 15 mg tablet TAKE 1 TABLET (15 MG TOTAL) BY MOUTH DAILY., Starting Wed 11/27/2024, Until Mon 5/26/2025, Normal      Multiple Vitamin (multivitamin) tablet Take 1 tablet by mouth daily, Historical Med      polyethylene glycol (GLYCOLAX) 17 GM/SCOOP powder Take 17 g by mouth daily Equate brand, Historical Med      vitamin B-12 (VITAMIN B-12) 1,000 mcg tablet Take 1 tablet (1,000 mcg total) by mouth daily, Starting Mon 7/10/2023, Normal             ED SEPSIS DOCUMENTATION   Time reflects when diagnosis was documented in both MDM as applicable and the Disposition within this note       Time User Action Codes Description Comment    11/30/2024  2:23 PM Howie Herring Add [I82.461] Acute deep vein thrombosis (DVT) of calf muscle vein of right lower extremity (HCC)                  Howie Herring,   11/30/24 1531

## 2024-12-02 ENCOUNTER — TELEPHONE (OUTPATIENT)
Age: 69
End: 2024-12-02

## 2024-12-02 ENCOUNTER — TELEPHONE (OUTPATIENT)
Dept: INTERNAL MEDICINE CLINIC | Facility: CLINIC | Age: 69
End: 2024-12-02

## 2024-12-02 NOTE — TELEPHONE ENCOUNTER
Patient was seen in the ER on 11/30 an treated for for DVT in legs was told to get in touch with PCP I advise patient to schedule an OV she stated that provider knows all about her leg issue and she is requesting a call back. Patient is waiting on vascular to schedule an appointment.

## 2024-12-02 NOTE — TELEPHONE ENCOUNTER
Hello,    The following message was sent via e-mail to the leadership team:     Please advise if you can help facilitate the following overbook request:    Patient Name: Waleska Owusu    Patient MRN: [id number]    Call back #: [phone]    Insurance: Medicare/[employer]    Department:Vascular    Speciality:     Reason for overbook request: PROVIDER REQUEST Howie Herring DO [facility] ED    Comments  Patient seen in ED 11/30/2 and was instructed to schedule consult with vascular ASAP.   LEV 11/30/24 showed Acute DVT of calf muscle vein of left lower extremity. Patient is taking blood thinners.    Requested doctor and location: [place] but will travel to [place]. Patient is not able to drive any further.    Date of current appointment: 1/17/24      Thank you.

## 2024-12-02 NOTE — TELEPHONE ENCOUNTER
I called and spoke to patient asking if she wants a follow up appointment with Dr. Alanis.  Patient states that she does not know what to do because she doesn't have the car all the time and that she does have an appointment with vascular.  Patient states that vascular told her that they are trying to get her in for an appointment sooner.

## 2024-12-10 ENCOUNTER — RA CDI HCC (OUTPATIENT)
Dept: OTHER | Facility: HOSPITAL | Age: 69
End: 2024-12-10

## 2024-12-17 ENCOUNTER — OFFICE VISIT (OUTPATIENT)
Dept: INTERNAL MEDICINE CLINIC | Facility: CLINIC | Age: 69
End: 2024-12-17
Payer: MEDICARE

## 2024-12-17 VITALS
DIASTOLIC BLOOD PRESSURE: 60 MMHG | BODY MASS INDEX: 34.54 KG/M2 | HEART RATE: 67 BPM | SYSTOLIC BLOOD PRESSURE: 120 MMHG | HEIGHT: 65 IN | TEMPERATURE: 97.9 F | OXYGEN SATURATION: 98 % | WEIGHT: 207.3 LBS

## 2024-12-17 DIAGNOSIS — I82.461 ACUTE DEEP VEIN THROMBOSIS (DVT) OF CALF MUSCLE VEIN OF RIGHT LOWER EXTREMITY (HCC): ICD-10-CM

## 2024-12-17 DIAGNOSIS — I87.2 VENOUS INSUFFICIENCY OF LEFT LEG: ICD-10-CM

## 2024-12-17 DIAGNOSIS — I82.4Y2 ACUTE DEEP VEIN THROMBOSIS (DVT) OF PROXIMAL VEIN OF LEFT LOWER EXTREMITY (HCC): Primary | ICD-10-CM

## 2024-12-17 DIAGNOSIS — M71.22 SYNOVIAL CYST OF LEFT KNEE: ICD-10-CM

## 2024-12-17 PROCEDURE — 99214 OFFICE O/P EST MOD 30 MIN: CPT | Performed by: FAMILY MEDICINE

## 2024-12-17 PROCEDURE — G2211 COMPLEX E/M VISIT ADD ON: HCPCS | Performed by: FAMILY MEDICINE

## 2024-12-17 RX ORDER — METHOCARBAMOL 500 MG/1
500 TABLET, FILM COATED ORAL 3 TIMES DAILY PRN
Qty: 90 TABLET | Refills: 0 | Status: SHIPPED | OUTPATIENT
Start: 2024-12-17

## 2024-12-18 NOTE — PROGRESS NOTES
Name: Waleska Owusu      : 1955      MRN: [id number]  Encounter Provider: Soraya Alanis MD  Encounter Date: 2024   Encounter department: [facility] [place]  :  Assessment & Plan  Acute deep vein thrombosis (DVT) of proximal vein of left lower extremity (HCC)  Somewhat provoked DVT given her extensive varicosities and previous surgery on the leg.  Follow-up with vascular as scheduled.  Continue with the Xarelto.  Be very careful to avoid falls.  Normal course of clot in the leg discussed.  Will refer her to hematology for evaluation for possible hypercoagulable state.  Orders:  •  rivaroxaban (XARELTO) 20 mg tablet; Take 1 tablet (20 mg total) by mouth daily with breakfast  •  Ambulatory Referral to Hematology / Oncology; Future    Venous insufficiency of left leg  Follow-up with vascular as scheduled.       Synovial cyst of left knee  Known Baker's cyst of the left knee.  This may be contributing somewhat to the swelling.       Acute deep vein thrombosis (DVT) of calf muscle vein of right lower extremity (HCC)  Muscle tightness and soreness discussed.  The Robaxin has lessened symptoms somewhat and gave for better sleep.  Continue current dosage.  Orders:  •  methocarbamol (ROBAXIN) 500 mg tablet; Take 1 tablet (500 mg total) by mouth 3 (three) times a day as needed for muscle spasms          Depression Screening and Follow-up Plan: Patient was screened for depression during today's encounter. They screened negative with a PHQ-2 score of 0.      History of Present Illness     She presents for emergency room follow-up.  Had generally been feeling okay but then had noticed swelling into her left leg.  This is not unusual with her because of the varicosities but this felt different.  She began with left leg pain and swelling and some bumpy areas.  Was evaluated through the emergency room and found to have an acute DVT on the left.  They did start her on Xarelto but recommended  "follow-up.  Has been doing the ramping dosages of the Xarelto.  Has been tolerating this without difficulty.  Was given the methocarbamol to help with the muscle symptoms and she has noticed a difference with this especially at night.      Review of Systems   Constitutional:  Positive for activity change. Negative for appetite change, chills and fever.   HENT:  Negative for congestion and rhinorrhea.    Eyes:  Negative for visual disturbance.   Respiratory:  Negative for chest tightness and shortness of breath.    Cardiovascular:  Positive for leg swelling. Negative for chest pain and palpitations.   Gastrointestinal:  Negative for abdominal pain, blood in stool, diarrhea, nausea and vomiting.   Endocrine: Negative for polydipsia, polyphagia and polyuria.   Genitourinary:  Negative for dysuria, frequency and urgency.   Musculoskeletal:  Positive for arthralgias, joint swelling and myalgias. Negative for gait problem.   Skin:  Negative for color change.   Neurological:  Negative for dizziness and headaches.   Hematological:  Does not bruise/bleed easily.   Psychiatric/Behavioral:  Negative for confusion and sleep disturbance. The patient is not nervous/anxious.        Objective   /60   Pulse 67   Temp 97.9 °F (36.6 °C)   Ht 5' 4.5\" (1.638 m)   Wt 94 kg (207 lb 4.8 oz)   LMP  (LMP Unknown)   SpO2 98%   BMI 35.03 kg/m²      Physical Exam  Vitals and nursing note reviewed.   Constitutional:       Appearance: She is well-developed and well-groomed.   HENT:      Head:      Comments: Moist mucous membranes  Cardiovascular:      Rate and Rhythm: Normal rate and regular rhythm.      Heart sounds: No murmur heard.  Pulmonary:      Effort: No tachypnea or respiratory distress.      Breath sounds: No decreased breath sounds, wheezing or rhonchi.   Musculoskeletal:      Comments: Postsurgical changes left knee; slight fluid in the popliteal area palpable;; slight swelling around the left knee   Skin:     Comments: " Left lower extremity with varicosities; medial calf and into the medial popliteal area with of firm, cordlike area with some tenderness   Neurological:      Mental Status: She is alert.   Psychiatric:         Behavior: Behavior is cooperative.

## 2024-12-18 NOTE — ASSESSMENT & PLAN NOTE
Somewhat provoked DVT given her extensive varicosities and previous surgery on the leg.  Follow-up with vascular as scheduled.  Continue with the Xarelto.  Be very careful to avoid falls.  Normal course of clot in the leg discussed.  Will refer her to hematology for evaluation for possible hypercoagulable state.  Orders:  •  rivaroxaban (XARELTO) 20 mg tablet; Take 1 tablet (20 mg total) by mouth daily with breakfast  •  Ambulatory Referral to Hematology / Oncology; Future

## 2024-12-19 ENCOUNTER — TELEPHONE (OUTPATIENT)
Age: 69
End: 2024-12-19

## 2024-12-19 NOTE — TELEPHONE ENCOUNTER
Patient states pharmacist told her that she should not take both meloxicam and Xarelto, when she picked up Xarelto today.    She states she has been taking both medications without issue so far, started Xarelto on 12/1/2024.    She requests PCP recommendation.    She requests return call as soon as possible, as she is due to take Xarelto again in the morning.

## 2024-12-20 NOTE — TELEPHONE ENCOUNTER
I consulted Dr. Alanis and she states that she is taking the Meloxicam daily.  I told her as instructed, not to take Meloxicam daily.  If she takes it PRN, then it's okay.  Patient understood and states that she only wanted to call since the pharmacist told her this.  Patient fully understands.

## 2025-01-17 ENCOUNTER — CONSULT (OUTPATIENT)
Dept: HEMATOLOGY ONCOLOGY | Facility: CLINIC | Age: 70
End: 2025-01-17
Payer: MEDICARE

## 2025-01-17 ENCOUNTER — APPOINTMENT (OUTPATIENT)
Dept: LAB | Facility: HOSPITAL | Age: 70
End: 2025-01-17
Payer: MEDICARE

## 2025-01-17 VITALS
SYSTOLIC BLOOD PRESSURE: 115 MMHG | BODY MASS INDEX: 34.49 KG/M2 | WEIGHT: 207 LBS | OXYGEN SATURATION: 97 % | DIASTOLIC BLOOD PRESSURE: 72 MMHG | TEMPERATURE: 97.7 F | HEIGHT: 65 IN | HEART RATE: 72 BPM

## 2025-01-17 DIAGNOSIS — Z87.59 HISTORY OF MISCARRIAGE: ICD-10-CM

## 2025-01-17 DIAGNOSIS — I82.4Y2 ACUTE DEEP VEIN THROMBOSIS (DVT) OF PROXIMAL VEIN OF LEFT LOWER EXTREMITY (HCC): Primary | ICD-10-CM

## 2025-01-17 DIAGNOSIS — Z86.718 HISTORY OF BLOOD CLOTS: ICD-10-CM

## 2025-01-17 DIAGNOSIS — I82.4Y2 ACUTE DEEP VEIN THROMBOSIS (DVT) OF PROXIMAL VEIN OF LEFT LOWER EXTREMITY (HCC): ICD-10-CM

## 2025-01-17 PROCEDURE — 86147 CARDIOLIPIN ANTIBODY EA IG: CPT

## 2025-01-17 PROCEDURE — 36415 COLL VENOUS BLD VENIPUNCTURE: CPT

## 2025-01-17 PROCEDURE — 99204 OFFICE O/P NEW MOD 45 MIN: CPT | Performed by: NURSE PRACTITIONER

## 2025-01-17 PROCEDURE — 86146 BETA-2 GLYCOPROTEIN ANTIBODY: CPT

## 2025-01-17 NOTE — ASSESSMENT & PLAN NOTE
Patient presented to the ER on 11/30/2024 with left calf pain.  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  She was started on Xarelto 15 mg p.o. twice daily for 21 days then decrease to 20 milligrams once daily which she continues.  Recent DVT is considered somewhat provoked given varicosities and previous surgery on the left leg.   We discussed previous history of blood clot in the right lower extremity in the 80s.  Patient states she was on Coumadin for a period of time.  She does not have a history of miscarriages or other blood clots.  She is unaware of any family history.  Since she has had 2 separate thrombosis events she needs to be on lifelong anticoagulation.  Although we could check factor V Leiden and prothrombin gene mutation it would not change our recommendations if they were positive.  I explained the only consideration that would change anticoagulation would be if patient has antiphospholipid syndrome.  The lupus anticoagulant could be affected by Xarelto.  Given her history I hesitate holding Xarelto for the appropriate length of time.  We will just request beta-2 glycoprotein and cardiolipin antibodies.  If those are positive then further investigation into antiphospholipid syndrome with lupus anticoagulant as patient would need to transition to Coumadin.  I explained Coumadin is recommended in patients that have antiphospholipid syndrome.  I will contact her with the results and we will follow-up as needed.  Patient is up-to-date on age-related health screenings.    Orders:    Ambulatory Referral to Hematology / Oncology    Beta-2 glycoprotein antibodies; Future    Cardiolipin antibody; Future

## 2025-01-17 NOTE — PROGRESS NOTES
Name: Waleska Owusu      : 1955      MRN: [id number]  Encounter Provider: TONY Patterson  Encounter Date: 2025   Encounter department: [facility] [place]  :  Assessment & Plan  Acute deep vein thrombosis (DVT) of proximal vein of left lower extremity (HCC)     Patient presented to the ER on 2024 with left calf pain.  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  She was started on Xarelto 15 mg p.o. twice daily for 21 days then decrease to 20 milligrams once daily which she continues.  Recent DVT is considered somewhat provoked given varicosities and previous surgery on the left leg.   We discussed previous history of blood clot in the right lower extremity in the 80s.  Patient states she was on Coumadin for a period of time.  She does not have a history of miscarriages or other blood clots.  She is unaware of any family history.  Since she has had 2 separate thrombosis events she needs to be on lifelong anticoagulation.  Although we could check factor V Leiden and prothrombin gene mutation it would not change our recommendations if they were positive.  I explained the only consideration that would change anticoagulation would be if patient has antiphospholipid syndrome.  The lupus anticoagulant could be affected by Xarelto.  Given her history I hesitate holding Xarelto for the appropriate length of time.  We will just request beta-2 glycoprotein and cardiolipin antibodies.  If those are positive then further investigation into antiphospholipid syndrome with lupus anticoagulant as patient would need to transition to Coumadin.  I explained Coumadin is recommended in patients that have antiphospholipid syndrome.  I will contact her with the results  and we will follow-up as needed.  Patient is up-to-date on age-related health screenings.    Orders:    Ambulatory Referral to Hematology / Oncology    Beta-2 glycoprotein antibodies; Future    Cardiolipin antibody; Future    History of blood clots    History of miscarriage    Addendum: Beta-2 glycoprotein and anticardiolipin antibodies are within normal limits indicating patient likely does not have antiphospholipid syndrome.  I recommend she continue on lifelong anticoagulation with Xarelto.  If patient has another thrombotic event in the future while on Xarelto she will need to be transitioned to either Coumadin, Pradaxa, or Lovenox.  Eliquis would not be recommended as it is in the same drug class as Xarelto.    History of Present Illness   No chief complaint on file.  Consult  Pertinent Medical History   01/17/25: Patient is a 69-year-old female with a history of arthritis, colon polyp, constipation, DVT back in the 80s, fibromyalgia, GERD, hyperlipidemia, hypothyroid, osteoarthritis, osteomyelitis of the ankle, peripheral neuropathy, rheumatoid arthritis, RSD who was referred for further evaluation of recent DVT.  She also has a history of endovenous laser treatment (EVLT) with stab phlebectomies in bilateral lower extremities.  She was a previous smoker quit greater than 10 years ago.     Review of Systems   Constitutional:  Negative for activity change, appetite change, fatigue, fever and unexpected weight change.   Respiratory:  Negative for cough and shortness of breath.    Cardiovascular:  Positive for leg swelling. Negative for chest pain.   Gastrointestinal:  Negative for abdominal pain, constipation, diarrhea and nausea.   Endocrine: Negative for cold intolerance and heat intolerance.   Musculoskeletal:  Negative for arthralgias and myalgias.   Skin: Negative.    Neurological:  Negative for dizziness, weakness and headaches.   Hematological:  Negative for adenopathy. Does not bruise/bleed easily.     "  Objective   /72   Pulse 72   Temp 97.7 °F (36.5 °C) (Temporal)   Ht 5' 4.5\" (1.638 m)   Wt 93.9 kg (207 lb)   LMP  (LMP Unknown)   SpO2 97%   BMI 34.98 kg/m²     Physical Exam  Vitals reviewed.   Constitutional:       Appearance: Normal appearance. She is well-developed.   HENT:      Head: Normocephalic and atraumatic.   Eyes:      Conjunctiva/sclera: Conjunctivae normal.      Pupils: Pupils are equal, round, and reactive to light.   Pulmonary:      Effort: Pulmonary effort is normal. No respiratory distress.   Musculoskeletal:         General: Normal range of motion.      Cervical back: Normal range of motion.   Lymphadenopathy:      Cervical: No cervical adenopathy.   Skin:     General: Skin is dry.   Neurological:      Mental Status: She is alert and oriented to person, place, and time.   Psychiatric:         Behavior: Behavior normal.     Labs: I have reviewed the following labs:  Results for orders placed or performed in visit on 06/21/24   CBC and differential   Result Value Ref Range    WBC 8.19 4.31 - 10.16 Thousand/uL    RBC 4.12 3.81 - 5.12 Million/uL    Hemoglobin 14.0 11.5 - 15.4 g/dL    Hematocrit 42.6 34.8 - 46.1 %     (H) 82 - 98 fL    MCH 34.0 26.8 - 34.3 pg    MCHC 32.9 31.4 - 37.4 g/dL    RDW 13.3 11.6 - 15.1 %    MPV 9.7 8.9 - 12.7 fL    Platelets 193 149 - 390 Thousands/uL    nRBC 0 /100 WBCs    Segmented % 69 43 - 75 %    Immature Grans % 1 0 - 2 %    Lymphocytes % 19 14 - 44 %    Monocytes % 10 4 - 12 %    Eosinophils Relative 1 0 - 6 %    Basophils Relative 0 0 - 1 %    Absolute Neutrophils 5.66 1.85 - 7.62 Thousands/µL    Absolute Immature Grans 0.08 0.00 - 0.20 Thousand/uL    Absolute Lymphocytes 1.59 0.60 - 4.47 Thousands/µL    Absolute Monocytes 0.80 0.17 - 1.22 Thousand/µL    Eosinophils Absolute 0.05 0.00 - 0.61 Thousand/µL    Basophils Absolute 0.01 0.00 - 0.10 Thousands/µL   Comprehensive metabolic panel   Result Value Ref Range    Sodium 140 135 - 147 mmol/L    " Potassium 4.2 3.5 - 5.3 mmol/L    Chloride 107 96 - 108 mmol/L    CO2 27 21 - 32 mmol/L    ANION GAP 6 4 - 13 mmol/L    BUN 23 5 - 25 mg/dL    Creatinine 0.76 0.60 - 1.30 mg/dL    Glucose, Fasting 97 65 - 99 mg/dL    Calcium 9.8 8.4 - 10.2 mg/dL    AST 12 (L) 13 - 39 U/L    ALT 11 7 - 52 U/L    Alkaline Phosphatase 57 34 - 104 U/L    Total Protein 6.5 6.4 - 8.4 g/dL    Albumin 4.0 3.5 - 5.0 g/dL    Total Bilirubin 0.76 0.20 - 1.00 mg/dL    eGFR 79 ml/min/1.73sq m   Lipid panel   Result Value Ref Range    Cholesterol 148 See Comment mg/dL    Triglycerides 89 See Comment mg/dL    HDL, Direct 63 >=50 mg/dL    LDL Calculated 67 0 - 100 mg/dL    Non-HDL-Chol (CHOL-HDL) 85 mg/dl   TSH, 3rd generation   Result Value Ref Range    TSH 3RD GENERATON 3.626 0.450 - 4.500 uIU/mL   Vitamin D 25 hydroxy   Result Value Ref Range    Vit D, 25-Hydroxy 50.6 30.0 - 100.0 ng/mL   Vitamin B12   Result Value Ref Range    Vitamin B-12 1,128 (H) 180 - 914 pg/mL   TIBC Panel (incl. Iron, TIBC, % Iron Saturation)   Result Value Ref Range    Iron Saturation 47 15 - 50 %    TIBC 287 250 - 450 ug/dL    Iron 136 50 - 212 ug/dL    UIBC 151 (L) 155 - 355 ug/dL   Result Value Ref Range    Ferritin 158 11 - 307 ng/mL

## 2025-01-17 NOTE — LETTER
2025     Soraya Alanis MD  [address] [facility] [address]    Patient: Waleska Owusu   YOB: 1955   Date of Visit: 2025       Dear Dr. Alanis:    Thank you for referring Waleska Owusu to me for evaluation. Below are my notes for this consultation.    If you have questions, please do not hesitate to call me. I look forward to following your patient along with you.         Sincerely,        TONY Patterson        CC: No Recipients    TONY Patterson  2025 10:12 AM  Addendum  Name: Waleska Owusu      : 1955      MRN: [id number]  Encounter Provider: TONY Patterson  Encounter Date: 2025   Encounter department: [facility] [place]  :  Assessment & Plan  Acute deep vein thrombosis (DVT) of proximal vein of left lower extremity (HCC)     Patient presented to the ER on 2024 with left calf pain.  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  She was started on Xarelto 15 mg p.o. twice daily for 21 days then decrease to 20 milligrams once daily which she continues.  Recent DVT is considered somewhat provoked given varicosities and previous surgery on the left leg.   We discussed previous history of blood clot in the right lower extremity in the 80s.  Patient states she was on Coumadin for a period of time.  She does not have a history of miscarriages or other blood clots.  She is unaware of any family history.  Since she has had 2 separate thrombosis events she needs to be on lifelong anticoagulation.  Although we could check factor V Leiden and prothrombin gene mutation it would not change our recommendations if they were positive.  I explained the only consideration that would change  anticoagulation would be if patient has antiphospholipid syndrome.  The lupus anticoagulant could be affected by Xarelto.  Given her history I hesitate holding Xarelto for the appropriate length of time.  We will just request beta-2 glycoprotein and cardiolipin antibodies.  If those are positive then further investigation into antiphospholipid syndrome with lupus anticoagulant as patient would need to transition to Coumadin.  I explained Coumadin is recommended in patients that have antiphospholipid syndrome.  I will contact her with the results and we will follow-up as needed.  Patient is up-to-date on age-related health screenings.    Orders:  •  Ambulatory Referral to Hematology / Oncology  •  Beta-2 glycoprotein antibodies; Future  •  Cardiolipin antibody; Future    History of blood clots    History of miscarriage    Addendum: Beta-2 glycoprotein and anticardiolipin antibodies are within normal limits indicating patient likely does not have antiphospholipid syndrome.  I recommend she continue on lifelong anticoagulation with Xarelto.  If patient has another thrombotic event in the future while on Xarelto she will need to be transitioned to either Coumadin, Pradaxa, or Lovenox.  Eliquis would not be recommended as it is in the same drug class as Xarelto.    History of Present Illness  No chief complaint on file.  Consult  Pertinent Medical History  01/17/25: Patient is a 69-year-old female with a history of arthritis, colon polyp, constipation, DVT back in the 80s, fibromyalgia, GERD, hyperlipidemia, hypothyroid, osteoarthritis, osteomyelitis of the ankle, peripheral neuropathy, rheumatoid arthritis, RSD who was referred for further evaluation of recent DVT.  She also has a history of endovenous laser treatment (EVLT) with stab phlebectomies in bilateral lower extremities.  She was a previous smoker quit greater than 10 years ago.     Review of Systems   Constitutional:  Negative for activity change, appetite  "change, fatigue, fever and unexpected weight change.   Respiratory:  Negative for cough and shortness of breath.    Cardiovascular:  Positive for leg swelling. Negative for chest pain.   Gastrointestinal:  Negative for abdominal pain, constipation, diarrhea and nausea.   Endocrine: Negative for cold intolerance and heat intolerance.   Musculoskeletal:  Negative for arthralgias and myalgias.   Skin: Negative.    Neurological:  Negative for dizziness, weakness and headaches.   Hematological:  Negative for adenopathy. Does not bruise/bleed easily.      Objective  /72   Pulse 72   Temp 97.7 °F (36.5 °C) (Temporal)   Ht 5' 4.5\" (1.638 m)   Wt 93.9 kg (207 lb)   LMP  (LMP Unknown)   SpO2 97%   BMI 34.98 kg/m²     Physical Exam  Vitals reviewed.   Constitutional:       Appearance: Normal appearance. She is well-developed.   HENT:      Head: Normocephalic and atraumatic.   Eyes:      Conjunctiva/sclera: Conjunctivae normal.      Pupils: Pupils are equal, round, and reactive to light.   Pulmonary:      Effort: Pulmonary effort is normal. No respiratory distress.   Musculoskeletal:         General: Normal range of motion.      Cervical back: Normal range of motion.   Lymphadenopathy:      Cervical: No cervical adenopathy.   Skin:     General: Skin is dry.   Neurological:      Mental Status: She is alert and oriented to person, place, and time.   Psychiatric:         Behavior: Behavior normal.     Labs: I have reviewed the following labs:  Results for orders placed or performed in visit on 06/21/24   CBC and differential   Result Value Ref Range    WBC 8.19 4.31 - 10.16 Thousand/uL    RBC 4.12 3.81 - 5.12 Million/uL    Hemoglobin 14.0 11.5 - 15.4 g/dL    Hematocrit 42.6 34.8 - 46.1 %     (H) 82 - 98 fL    MCH 34.0 26.8 - 34.3 pg    MCHC 32.9 31.4 - 37.4 g/dL    RDW 13.3 11.6 - 15.1 %    MPV 9.7 8.9 - 12.7 fL    Platelets 193 149 - 390 Thousands/uL    nRBC 0 /100 WBCs    Segmented % 69 43 - 75 %    Immature " Grans % 1 0 - 2 %    Lymphocytes % 19 14 - 44 %    Monocytes % 10 4 - 12 %    Eosinophils Relative 1 0 - 6 %    Basophils Relative 0 0 - 1 %    Absolute Neutrophils 5.66 1.85 - 7.62 Thousands/µL    Absolute Immature Grans 0.08 0.00 - 0.20 Thousand/uL    Absolute Lymphocytes 1.59 0.60 - 4.47 Thousands/µL    Absolute Monocytes 0.80 0.17 - 1.22 Thousand/µL    Eosinophils Absolute 0.05 0.00 - 0.61 Thousand/µL    Basophils Absolute 0.01 0.00 - 0.10 Thousands/µL   Comprehensive metabolic panel   Result Value Ref Range    Sodium 140 135 - 147 mmol/L    Potassium 4.2 3.5 - 5.3 mmol/L    Chloride 107 96 - 108 mmol/L    CO2 27 21 - 32 mmol/L    ANION GAP 6 4 - 13 mmol/L    BUN 23 5 - 25 mg/dL    Creatinine 0.76 0.60 - 1.30 mg/dL    Glucose, Fasting 97 65 - 99 mg/dL    Calcium 9.8 8.4 - 10.2 mg/dL    AST 12 (L) 13 - 39 U/L    ALT 11 7 - 52 U/L    Alkaline Phosphatase 57 34 - 104 U/L    Total Protein 6.5 6.4 - 8.4 g/dL    Albumin 4.0 3.5 - 5.0 g/dL    Total Bilirubin 0.76 0.20 - 1.00 mg/dL    eGFR 79 ml/min/1.73sq m   Lipid panel   Result Value Ref Range    Cholesterol 148 See Comment mg/dL    Triglycerides 89 See Comment mg/dL    HDL, Direct 63 >=50 mg/dL    LDL Calculated 67 0 - 100 mg/dL    Non-HDL-Chol (CHOL-HDL) 85 mg/dl   TSH, 3rd generation   Result Value Ref Range    TSH 3RD GENERATON 3.626 0.450 - 4.500 uIU/mL   Vitamin D 25 hydroxy   Result Value Ref Range    Vit D, 25-Hydroxy 50.6 30.0 - 100.0 ng/mL   Vitamin B12   Result Value Ref Range    Vitamin B-12 1,128 (H) 180 - 914 pg/mL   TIBC Panel (incl. Iron, TIBC, % Iron Saturation)   Result Value Ref Range    Iron Saturation 47 15 - 50 %    TIBC 287 250 - 450 ug/dL    Iron 136 50 - 212 ug/dL    UIBC 151 (L) 155 - 355 ug/dL   Result Value Ref Range    Ferritin 158 11 - 307 ng/mL

## 2025-01-18 LAB
B2 GLYCOPROT1 IGA SERPL IA-ACNC: 1.6
B2 GLYCOPROT1 IGG SERPL IA-ACNC: <0.8
B2 GLYCOPROT1 IGM SERPL IA-ACNC: <2.4
CARDIOLIPIN IGA SER IA-ACNC: 1.4
CARDIOLIPIN IGG SER IA-ACNC: 1.7
CARDIOLIPIN IGM SER IA-ACNC: 3.6

## 2025-01-22 ENCOUNTER — TELEPHONE (OUTPATIENT)
Age: 70
End: 2025-01-22

## 2025-01-22 NOTE — TELEPHONE ENCOUNTER
Pt called asking for recommendations from PCP about what are appropriate cold and flu medications to take while on Xarelto. Patient is also inquiring if their is a safe nasal spray to use as well as her nose has been quiet dry recently. Please give Madeline a call back at [phone] with recommendations.

## 2025-01-23 NOTE — TELEPHONE ENCOUNTER
Pt called back requesting PCP recommendation, as she has not yet heard back following yesterday's call.    She asks what medication is safe to take, as she is on blood thinners. She reports cough and wakes up at night with dry mouth. She provided additional details about symptoms yesterday, as noted.    She requests return call today.

## 2025-01-23 NOTE — TELEPHONE ENCOUNTER
Relayed provider recommendations to pt.  Pt verbalized understanding.  No further questions or concerns at this time.

## 2025-01-29 ENCOUNTER — OFFICE VISIT (OUTPATIENT)
Dept: VASCULAR SURGERY | Facility: HOSPITAL | Age: 70
End: 2025-01-29
Attending: EMERGENCY MEDICINE
Payer: MEDICARE

## 2025-01-29 VITALS
HEIGHT: 65 IN | BODY MASS INDEX: 33.82 KG/M2 | SYSTOLIC BLOOD PRESSURE: 118 MMHG | DIASTOLIC BLOOD PRESSURE: 76 MMHG | HEART RATE: 72 BPM | WEIGHT: 203 LBS

## 2025-01-29 DIAGNOSIS — I82.461 ACUTE DEEP VEIN THROMBOSIS (DVT) OF CALF MUSCLE VEIN OF RIGHT LOWER EXTREMITY (HCC): ICD-10-CM

## 2025-01-29 PROCEDURE — 99213 OFFICE O/P EST LOW 20 MIN: CPT

## 2025-01-29 NOTE — PROGRESS NOTES
Name: Waleska Owusu      : 1955      MRN: [id number]  Encounter Provider: Theresa Yi PA-C  Encounter Date: 2025   Encounter department: [facility]  :  70 year old female former smoker w/ HTN, HLD, hypothyroidism, PÉREZ, L TKR, OA, hx of DVT of the LLE s/p venogram, painful truncal varicose veins of the bilateral lower extremities, L>R. She is s/p LLE GSV EVLT 2/15/22, RLE GSV EVLT with stab phlebectomies x20 on 22, LLE stab phlebectomies 2022 (Prosper), and LLE venoseal ablation of GSV 24 (Qaqish), and recently found to have an acute LLE DVT 2024.   Assessment & Plan  Acute deep vein thrombosis (DVT) of calf muscle vein of right lower extremity (HCC)  -Acute LLE calf DVT noted on duplex 24. Started on xarelto in ED. Hx of LLE DVT s/p venogram in 80s.   -Reported painful lumps on the left posterior calf at that time which have resolved.   -Patient continues to report left knee swelling that has not improved. She was wearing compression stockings, however was told to avoid compression stocking use and has not been wearing.   -Seen by heme/onc; had lab work ordered which did not reveal any hypercoagulable disorders, however patient was instructed to continue xarelto indefinitely.     Plan-  - Unprovoked LLE DVT with prior hx of DVT in the 80s. Not on long term anticoagulation.   - We discussed the pathophysiology of DVT, risk factors, symptom management, and indications for intervention.   - Recommend use of daily compression hose and leg elevation to assist with post thrombotic syndrome symptoms.   - Continue xarelto indefinitely as per hematology recommendations.   - Follow up PRN.  - Instructed to call the office in the interim with any questions, concerns, or new symptoms.       Orders:    Ambulatory Referral to Vascular Surgery        History of Present Illness   HPI  Waleska Owusu is a 70 y.o. female who presents today for evaluation of left  "lower extremity swelling after being diagnosed with an acute left calf DVT in November 2024.  She denies any provoking factors including recent long car rides or flights, trauma, immobility, or history of hypercoagulable disorders.  She has been taking Xarelto as prescribed.  She was seen by hematology and had blood work done which did not reveal any known hypercoagulable disorders.  Per hematology note, patient was instructed to continue on lifelong Xarelto.  Madeline reports swelling of the left knee that has not improved.  She was previously wearing compression stockings however was instructed in early January to stop wearing compression stockings, unknown why.  She denies any significant pain, discoloration.  She does report a remote history of a DVT back in the 80s and was placed on Coumadin at that time.   History obtained from: patient    Review of Systems   Constitutional: Negative.    HENT: Negative.     Eyes: Negative.    Respiratory: Negative.     Cardiovascular: Negative.    Gastrointestinal: Negative.    Endocrine: Negative.    Genitourinary: Negative.    Musculoskeletal: Negative.    Skin: Negative.    Allergic/Immunologic: Negative.    Neurological: Negative.    Hematological: Negative.    Psychiatric/Behavioral: Negative.       I have personally reviewed and made appropriate changes to the ROS that was input by the medical assistant.         Vitals:    01/29/25 1448   BP: 118/76   BP Location: Right arm   Patient Position: Sitting   Cuff Size: Standard   Pulse: 72   Weight: 92.1 kg (203 lb)   Height: 5' 4.5\" (1.638 m)       Patient Active Problem List   Diagnosis    Adenomatous colon polyp    Baker's cyst of knee    Colon, diverticulosis    Constipation    Hemochromatosis    Hot flashes due to menopause    Hyperlipidemia    Hypothyroidism    Lower back pain    Nonalcoholic steatohepatitis    Osteoarthritis    Pain syndrome, chronic    Peripheral neuropathy    Splenic cyst    Tendonitis of elbow, left "    Vitamin D deficiency    Varicose veins of both lower extremities with pain    Left hip pain    Leg swelling    Trochanteric bursitis of left hip    Family history of malignant neoplasm of pancreas    Obesity (BMI 30-39.9)    Rheumatoid arthritis (HCC)    Macrocytosis    Left knee pain    Status post total knee replacement, left    Chronic knee instability, left    Venous insufficiency of left leg    Vitamin B12 deficiency    Acute deep vein thrombosis (DVT) of proximal vein of left lower extremity (HCC)       Past Surgical History:   Procedure Laterality Date    ANKLE FUSION Right     BREAST BIOPSY Left 12/18/2018    US guided; benign     BREAST EXCISIONAL BIOPSY Left     benign     CARPAL TUNNEL RELEASE      CHOLECYSTECTOMY      COLONOSCOPY      ENDOVASCULAR LASER THERAPY (EVLT) Left 1/5/2024    Procedure: Venaseal of left great saphenous;  Surgeon: Jigar Boucher MD;  Location: [facility];  Service: Vascular    ERCP      with insertion of tube into bile/pancreatic duct    FACIAL RECONSTRUCTION SURGERY      JOINT REPLACEMENT      tkr left     NC ENDOVEN ABLTJ INCMPTNT VEIN XTR LASER 1ST VEIN Left 02/15/2022    Procedure: EVLT left greater saphenous vein;  Surgeon: Jenna Bradley DO;  Location: [facility];  Service: Vascular    NC ENDOVEN ABLTJ INCMPTNT VEIN XTR LASER 1ST VEIN Right 08/16/2022    Procedure: EVLT;  Surgeon: Jenna Bradley DO;  Location: [facility];  Service: Vascular    NC STAB PHLEBT VARICOSE VEINS 1 XTR > 20 INCS Left 02/15/2022    Procedure: MULTIPLE STAB PHLEBECTOMIES LEFT LEG;  Surgeon: Jenna Bradley DO;  Location: [facility];  Service: Vascular    NC STAB PHLEBT VARICOSE VEINS 1 XTR > 20 INCS Right 08/16/2022    Procedure: MULTIPLE STAB PHLEBECTOMIES;  Surgeon: Jenna Bradley DO;  Location[facility];  Service: Vascular    NC STAB PHLEBT VARICOSE VEINS 1 XTR > 20 INCS Left 11/18/2022    Procedure: <20 STAB PHLEBECTOMIES;  Surgeon: Jenna Bradley DO;  Location: [facility];  Service: Vascular    SHOULDER SURGERY Right     for frozen shoulder/RSD    US GUIDED BREAST BIOPSY LEFT COMPLETE Left 2018    Duct ectasia, fibrosis, focal usual ductal hyperplasia    US GUIDED BREAST BIOPSY LEFT COMPLETE Left 2023    US GUIDED BREAST BIOPSY LEFT COMPLETE Left 2023       Family History   Problem Relation Age of Onset    Dementia Mother     Diabetes Mother     Anesthesia problems Father     Coronary artery disease Father     Hemochromatosis Sister     Cancer Sister     Pancreatic cancer Sister     Pancreatitis Sister     Cancer Brother     Prostate cancer Brother     No Known Problems Maternal Grandmother     No Known Problems Maternal Grandfather     No Known Problems Paternal Grandmother     No Known Problems Paternal Grandfather     Breast cancer Maternal Aunt     Breast cancer Cousin     Diabetes Family     Hypertension Neg Hx        Social History     Socioeconomic History    Marital status:      Spouse name: Not on file    Number of children: Not on file    Years of education: Not on file    Highest education level: Not on file   Occupational History    Not on file   Tobacco Use    Smoking status: Former     Current packs/day: 0.00     Types: Cigarettes     Quit date:      Years since quittin.0    Smokeless tobacco: Never   Vaping Use    Vaping status: Never Used   Substance and Sexual Activity    Alcohol use: Yes     Comment: rare    Drug use: Never    Sexual activity: Not Currently     Comment: Not active at this time   Other Topics Concern    Not on file   Social History Narrative    Not on file     Social Drivers of Health     Financial Resource Strain: High Risk (2023)    Overall Financial Resource Strain (CARDIA)     Difficulty of Paying Living Expenses: Very hard   Food Insecurity: No Food Insecurity (2024)    Nursing - Inadequate Food Risk Classification     Worried About Running Out of Food in the Last Year: Never true     Ran Out of  Food in the Last Year: Never true     Ran Out of Food in the Last Year: Not on file   Transportation Needs: No Transportation Needs (6/20/2024)    PRAPARE - Transportation     Lack of Transportation (Medical): No     Lack of Transportation (Non-Medical): No   Physical Activity: Not on file   Stress: Not on file   Social Connections: Unknown (6/18/2024)    Received from [facility]    Social Connections     How often do you feel lonely or isolated from those around you? (Adult - for ages 18 years and over): Not on file   Intimate Partner Violence: Not on file   Housing Stability: Low Risk  (6/20/2024)    Housing Stability Vital Sign     Unable to Pay for Housing in the Last Year: No     Number of Times Moved in the Last Year: 1     Homeless in the Last Year: No       Allergies   Allergen Reactions    Bee Venom Itching and Edema     Bee stings         Current Outpatient Medications:     atorvastatin (LIPITOR) 10 mg tablet, TAKE 1 TABLET BY MOUTH EVERY DAY, Disp: 90 tablet, Rfl: 1    CALCIUM CITRATE PO, Take 900 mg by mouth in the morning, Disp: , Rfl:     Cholecalciferol (VITAMIN D-3 PO), Take 2,000 Int'l Units by mouth in the morning, Disp: , Rfl:     gabapentin (NEURONTIN) 100 mg capsule, Take 1 capsule (100 mg total) by mouth daily at bedtime, Disp: 90 capsule, Rfl: 3    methocarbamol (ROBAXIN) 500 mg tablet, Take 1 tablet (500 mg total) by mouth 3 (three) times a day as needed for muscle spasms, Disp: 90 tablet, Rfl: 0    Multiple Vitamin (multivitamin) tablet, Take 1 tablet by mouth daily, Disp: , Rfl:     polyethylene glycol (GLYCOLAX) 17 GM/SCOOP powder, Take 17 g by mouth daily Equate brand, Disp: , Rfl:     rivaroxaban (XARELTO) 20 mg tablet, Take 1 tablet (20 mg total) by mouth daily with breakfast, Disp: 90 tablet, Rfl: 3    vitamin B-12 (VITAMIN B-12) 1,000 mcg tablet, Take 1 tablet (1,000 mcg total) by mouth daily, Disp: 90 tablet, Rfl: 1       Objective   /76 (BP Location: Right arm, Patient  "Position: Sitting, Cuff Size: Standard)   Pulse 72   Ht 5' 4.5\" (1.638 m)   Wt 92.1 kg (203 lb)   LMP  (LMP Unknown)   BMI 34.31 kg/m²      Physical Exam  Vitals and nursing note reviewed.   Constitutional:       Appearance: Normal appearance.   HENT:      Head: Normocephalic and atraumatic.   Cardiovascular:      Rate and Rhythm: Normal rate and regular rhythm.      Pulses:           Carotid pulses are 2+ on the right side and 2+ on the left side.       Dorsalis pedis pulses are 2+ on the right side and 2+ on the left side.      Heart sounds: Normal heart sounds.   Pulmonary:      Effort: Pulmonary effort is normal.      Breath sounds: Normal breath sounds.   Musculoskeletal:         General: Swelling and tenderness present. Normal range of motion.      Cervical back: Normal range of motion and neck supple.      Left lower leg: No edema.      Comments: Swelling at the left knee.    Skin:     General: Skin is warm and dry.      Findings: No erythema.   Neurological:      General: No focal deficit present.      Mental Status: She is alert and oriented to person, place, and time.   Psychiatric:         Mood and Affect: Mood normal.         Behavior: Behavior normal.         Thought Content: Thought content normal.         Judgment: Judgment normal.       Administrative Statements   I have spent a total time of 20 minutes in caring for this patient on the day of the visit/encounter including Diagnostic results, Prognosis, Risks and benefits of tx options, Instructions for management, Patient and family education, Importance of tx compliance, Risk factor reductions, Impressions, Counseling / Coordination of care, Documenting in the medical record, Reviewing / ordering tests, medicine, procedures  , and Obtaining or reviewing history  .   "

## 2025-02-20 ENCOUNTER — TELEPHONE (OUTPATIENT)
Dept: INTERNAL MEDICINE CLINIC | Facility: CLINIC | Age: 70
End: 2025-02-20

## 2025-02-20 DIAGNOSIS — E78.2 MIXED HYPERLIPIDEMIA: ICD-10-CM

## 2025-02-20 DIAGNOSIS — I82.4Y2 ACUTE DEEP VEIN THROMBOSIS (DVT) OF PROXIMAL VEIN OF LEFT LOWER EXTREMITY (HCC): ICD-10-CM

## 2025-02-20 NOTE — TELEPHONE ENCOUNTER
Reason for call: Not duplicates. Requesting a different pharmacy  [x] Refill   [] Prior Auth  [] Other:     Office:   [x] PCP/Provider -  Soraya Alanis MD / [facility]   [] Specialty/Provider -     rivaroxaban (XARELTO) 20 mg tablet / Take 1 tablet (20 mg total) by mouth daily with breakfast / Qty 90      atorvastatin (LIPITOR) 10 mg tablet/ Take 1 tablet (20 mg total) by mouth daily with breakfast / Qty 90    [address]     Does the patient have enough for 3 days?   [x] Yes   [] No - Send as HP to POD

## 2025-02-20 NOTE — TELEPHONE ENCOUNTER
Patient called the RX Refill Line. Message is being forwarded to the office.     Patient is requesting a refill for gabapentin. Patient would like to know if her dose could be be increased because the 100mg does not seem to be working as well for her anymore. Please review.     Patient is also requesting a refill for clorazepate (TRANXENE) 3.75 mg tablet which is not on her current medication list. Patient stated that the medication worked very well for her but because her insurance would not cover the medication she was not taking it. Patient has new insurance and would like to see if it  will now be covered. Please review    Patient would like refill sent [facility] [address] [place] [address], [address].      Please contact patient at [phone]

## 2025-02-21 DIAGNOSIS — G62.9 PERIPHERAL POLYNEUROPATHY: ICD-10-CM

## 2025-02-21 RX ORDER — ATORVASTATIN CALCIUM 10 MG/1
10 TABLET, FILM COATED ORAL DAILY
Qty: 90 TABLET | Refills: 1 | Status: SHIPPED | OUTPATIENT
Start: 2025-02-21

## 2025-02-21 RX ORDER — GABAPENTIN 300 MG/1
300 CAPSULE ORAL
Qty: 90 CAPSULE | Refills: 1 | Status: SHIPPED | OUTPATIENT
Start: 2025-02-21

## 2025-02-21 RX ORDER — CLORAZEPATE DIPOTASSIUM 3.75 MG/1
3.75 TABLET ORAL
Qty: 30 TABLET | Refills: 0 | Status: SHIPPED | OUTPATIENT
Start: 2025-02-21

## 2025-02-21 NOTE — TELEPHONE ENCOUNTER
Tranxene still looks like it is not covered but prescription was sent.  It looks like there is an age cutoff of 64 years old.  Gabapentin was increased to 300 mg at bedtime.  If she needs additional medication changes, she should likely come in to discuss this since other options may be available.

## 2025-02-24 ENCOUNTER — TELEPHONE (OUTPATIENT)
Age: 70
End: 2025-02-24

## 2025-02-24 NOTE — TELEPHONE ENCOUNTER
Madeline want PCP to know script for clorazepate 3.75 mg was covered by her [employer] at $5 count of 30.

## 2025-03-18 ENCOUNTER — TELEPHONE (OUTPATIENT)
Age: 70
End: 2025-03-18

## 2025-03-18 NOTE — TELEPHONE ENCOUNTER
Patient reports increased burning sensation in feet, ankles, and hips since starting blood thinner a few months ago. She asks if she may take gabapentin during the day, in addition to taking at bedtime.     She also requests prescription refill for clorazepate. She has 6 tablets left, but wanted to provide adequate time for refill to be processed.     She requests return call with update.

## 2025-03-19 DIAGNOSIS — G62.9 PERIPHERAL POLYNEUROPATHY: ICD-10-CM

## 2025-03-19 RX ORDER — GABAPENTIN 300 MG/1
300 CAPSULE ORAL 2 TIMES DAILY
Qty: 180 CAPSULE | Refills: 1 | Status: SHIPPED | OUTPATIENT
Start: 2025-03-19

## 2025-03-19 NOTE — TELEPHONE ENCOUNTER
I can increase the gabapentin to take during the day but it may make her more sedated.  I will send this in.

## 2025-03-19 NOTE — TELEPHONE ENCOUNTER
Patient called to follow up on prescription.     I spoke with Brii in office who stated that she will follow up with Dr Alanis tomorrow 3/20.     Message relayed to patient. Patient expressed understanding.

## 2025-03-20 RX ORDER — CLORAZEPATE DIPOTASSIUM 3.75 MG/1
3.75 TABLET ORAL
Qty: 30 TABLET | Refills: 1 | Status: SHIPPED | OUTPATIENT
Start: 2025-03-20

## 2025-03-20 NOTE — TELEPHONE ENCOUNTER
Patient called to follow up on refill request.     Please call patient back to notify that refill was sent to pharmacy.    Patient states she has another refill at the pharmacy to be picked up, and she does not want to make two trips.

## 2025-05-16 DIAGNOSIS — G62.9 PERIPHERAL POLYNEUROPATHY: ICD-10-CM

## 2025-05-19 RX ORDER — CLORAZEPATE DIPOTASSIUM 3.75 MG/1
TABLET ORAL
Qty: 30 TABLET | Refills: 0 | Status: SHIPPED | OUTPATIENT
Start: 2025-05-19

## 2025-06-23 ENCOUNTER — APPOINTMENT (OUTPATIENT)
Dept: LAB | Facility: HOSPITAL | Age: 70
End: 2025-06-23
Payer: MEDICARE

## 2025-06-23 ENCOUNTER — OFFICE VISIT (OUTPATIENT)
Dept: INTERNAL MEDICINE CLINIC | Facility: CLINIC | Age: 70
End: 2025-06-23
Payer: MEDICARE

## 2025-06-23 VITALS
HEART RATE: 61 BPM | HEIGHT: 65 IN | OXYGEN SATURATION: 99 % | SYSTOLIC BLOOD PRESSURE: 130 MMHG | WEIGHT: 202.4 LBS | TEMPERATURE: 98.3 F | DIASTOLIC BLOOD PRESSURE: 78 MMHG | BODY MASS INDEX: 33.72 KG/M2

## 2025-06-23 DIAGNOSIS — I82.4Y2 ACUTE DEEP VEIN THROMBOSIS (DVT) OF PROXIMAL VEIN OF LEFT LOWER EXTREMITY (HCC): ICD-10-CM

## 2025-06-23 DIAGNOSIS — E78.2 MIXED HYPERLIPIDEMIA: Primary | ICD-10-CM

## 2025-06-23 DIAGNOSIS — E55.9 VITAMIN D DEFICIENCY: ICD-10-CM

## 2025-06-23 DIAGNOSIS — K75.81 NONALCOHOLIC STEATOHEPATITIS: ICD-10-CM

## 2025-06-23 DIAGNOSIS — G62.9 PERIPHERAL POLYNEUROPATHY: ICD-10-CM

## 2025-06-23 DIAGNOSIS — E78.2 MIXED HYPERLIPIDEMIA: ICD-10-CM

## 2025-06-23 DIAGNOSIS — Z12.31 VISIT FOR SCREENING MAMMOGRAM: ICD-10-CM

## 2025-06-23 DIAGNOSIS — M06.9 RHEUMATOID ARTHRITIS INVOLVING MULTIPLE SITES, UNSPECIFIED WHETHER RHEUMATOID FACTOR PRESENT (HCC): ICD-10-CM

## 2025-06-23 DIAGNOSIS — E83.110 HEREDITARY HEMOCHROMATOSIS (HCC): ICD-10-CM

## 2025-06-23 DIAGNOSIS — M79.7 FIBROMYALGIA: ICD-10-CM

## 2025-06-23 DIAGNOSIS — E53.8 VITAMIN B12 DEFICIENCY: ICD-10-CM

## 2025-06-23 LAB
25(OH)D3 SERPL-MCNC: 56.4 NG/ML (ref 30–100)
ALBUMIN SERPL BCG-MCNC: 4.3 G/DL (ref 3.5–5)
ALP SERPL-CCNC: 77 U/L (ref 34–104)
ALT SERPL W P-5'-P-CCNC: 14 U/L (ref 7–52)
ANION GAP SERPL CALCULATED.3IONS-SCNC: 6 MMOL/L (ref 4–13)
AST SERPL W P-5'-P-CCNC: 18 U/L (ref 13–39)
BASOPHILS # BLD AUTO: 0.03 THOUSANDS/ÂΜL (ref 0–0.1)
BASOPHILS NFR BLD AUTO: 1 % (ref 0–1)
BILIRUB SERPL-MCNC: 0.52 MG/DL (ref 0.2–1)
BUN SERPL-MCNC: 20 MG/DL (ref 5–25)
CALCIUM SERPL-MCNC: 9.9 MG/DL (ref 8.4–10.2)
CHLORIDE SERPL-SCNC: 106 MMOL/L (ref 96–108)
CHOLEST SERPL-MCNC: 138 MG/DL (ref ?–200)
CO2 SERPL-SCNC: 29 MMOL/L (ref 21–32)
CREAT SERPL-MCNC: 0.76 MG/DL (ref 0.6–1.3)
EOSINOPHIL # BLD AUTO: 0.09 THOUSAND/ÂΜL (ref 0–0.61)
EOSINOPHIL NFR BLD AUTO: 2 % (ref 0–6)
ERYTHROCYTE [DISTWIDTH] IN BLOOD BY AUTOMATED COUNT: 12.2 % (ref 11.6–15.1)
FERRITIN SERPL-MCNC: 114 NG/ML (ref 30–307)
GFR SERPL CREATININE-BSD FRML MDRD: 79 ML/MIN/1.73SQ M
GLUCOSE P FAST SERPL-MCNC: 94 MG/DL (ref 65–99)
HCT VFR BLD AUTO: 42 % (ref 34.8–46.1)
HDLC SERPL-MCNC: 54 MG/DL
HGB BLD-MCNC: 14 G/DL (ref 11.5–15.4)
IMM GRANULOCYTES # BLD AUTO: 0.02 THOUSAND/UL (ref 0–0.2)
IMM GRANULOCYTES NFR BLD AUTO: 0 % (ref 0–2)
IRON SATN MFR SERPL: 40 % (ref 15–50)
IRON SERPL-MCNC: 117 UG/DL (ref 50–212)
LDLC SERPL CALC-MCNC: 66 MG/DL (ref 0–100)
LYMPHOCYTES # BLD AUTO: 1.46 THOUSANDS/ÂΜL (ref 0.6–4.47)
LYMPHOCYTES NFR BLD AUTO: 26 % (ref 14–44)
MCH RBC QN AUTO: 34 PG (ref 26.8–34.3)
MCHC RBC AUTO-ENTMCNC: 33.3 G/DL (ref 31.4–37.4)
MCV RBC AUTO: 102 FL (ref 82–98)
MONOCYTES # BLD AUTO: 0.54 THOUSAND/ÂΜL (ref 0.17–1.22)
MONOCYTES NFR BLD AUTO: 10 % (ref 4–12)
NEUTROPHILS # BLD AUTO: 3.49 THOUSANDS/ÂΜL (ref 1.85–7.62)
NEUTS SEG NFR BLD AUTO: 61 % (ref 43–75)
NONHDLC SERPL-MCNC: 84 MG/DL
NRBC BLD AUTO-RTO: 0 /100 WBCS
PLATELET # BLD AUTO: 154 THOUSANDS/UL (ref 149–390)
PMV BLD AUTO: 9.9 FL (ref 8.9–12.7)
POTASSIUM SERPL-SCNC: 4.4 MMOL/L (ref 3.5–5.3)
PROT SERPL-MCNC: 6.8 G/DL (ref 6.4–8.4)
RBC # BLD AUTO: 4.12 MILLION/UL (ref 3.81–5.12)
SODIUM SERPL-SCNC: 141 MMOL/L (ref 135–147)
TIBC SERPL-MCNC: 292.6 UG/DL (ref 250–450)
TRANSFERRIN SERPL-MCNC: 209 MG/DL (ref 203–362)
TRIGL SERPL-MCNC: 91 MG/DL (ref ?–150)
TSH SERPL DL<=0.05 MIU/L-ACNC: 3.99 UIU/ML (ref 0.45–4.5)
UIBC SERPL-MCNC: 176 UG/DL (ref 155–355)
VIT B12 SERPL-MCNC: 546 PG/ML (ref 180–914)
WBC # BLD AUTO: 5.63 THOUSAND/UL (ref 4.31–10.16)

## 2025-06-23 PROCEDURE — G2211 COMPLEX E/M VISIT ADD ON: HCPCS | Performed by: FAMILY MEDICINE

## 2025-06-23 PROCEDURE — 99214 OFFICE O/P EST MOD 30 MIN: CPT | Performed by: FAMILY MEDICINE

## 2025-06-23 PROCEDURE — 80061 LIPID PANEL: CPT

## 2025-06-23 PROCEDURE — 83550 IRON BINDING TEST: CPT

## 2025-06-23 PROCEDURE — 82306 VITAMIN D 25 HYDROXY: CPT

## 2025-06-23 PROCEDURE — 85025 COMPLETE CBC W/AUTO DIFF WBC: CPT

## 2025-06-23 PROCEDURE — 82728 ASSAY OF FERRITIN: CPT

## 2025-06-23 PROCEDURE — 36415 COLL VENOUS BLD VENIPUNCTURE: CPT

## 2025-06-23 PROCEDURE — 83540 ASSAY OF IRON: CPT

## 2025-06-23 PROCEDURE — G0439 PPPS, SUBSEQ VISIT: HCPCS | Performed by: FAMILY MEDICINE

## 2025-06-23 PROCEDURE — 84443 ASSAY THYROID STIM HORMONE: CPT

## 2025-06-23 PROCEDURE — 82607 VITAMIN B-12: CPT

## 2025-06-23 PROCEDURE — 80053 COMPREHEN METABOLIC PANEL: CPT

## 2025-06-23 RX ORDER — CLORAZEPATE DIPOTASSIUM 7.5 MG/1
7.5 TABLET ORAL
Qty: 90 TABLET | Refills: 0 | Status: SHIPPED | OUTPATIENT
Start: 2025-06-23

## 2025-06-23 NOTE — ASSESSMENT & PLAN NOTE
Continue to maintain weight loss.  Will check LFTs with upcoming laboratory testing and make recommendations thereafter.  Orders:  •  CBC and differential; Future  •  Comprehensive metabolic panel; Future

## 2025-06-23 NOTE — ASSESSMENT & PLAN NOTE
Not currently taking vitamin B12 supplementation.  Vitamin B12 level has been low in the past.  Will check with upcoming laboratory testing and make recommendations thereafter.  Orders:  •  CBC and differential; Future  •  Vitamin B12; Future

## 2025-06-23 NOTE — ASSESSMENT & PLAN NOTE
Continue with vitamin D supplementation.  Will check vitamin D level and make recommendations thereafter.  Orders:  •  CBC and differential; Future  •  Vitamin D 25 hydroxy; Future

## 2025-06-23 NOTE — ASSESSMENT & PLAN NOTE
Continue with the chronic anticoagulation.  Previous evaluation through hematology discussed.  Continue with the Xarelto  Orders:  •  CBC and differential; Future

## 2025-06-23 NOTE — PATIENT INSTRUCTIONS
Medicare Preventive Visit Patient Instructions  Thank you for completing your Welcome to Medicare Visit or Medicare Annual Wellness Visit today. Your next wellness visit will be due in one year (6/24/2026).  The screening/preventive services that you may require over the next 5-10 years are detailed below. Some tests may not apply to you based off risk factors and/or age. Screening tests ordered at today's visit but not completed yet may show as past due. Also, please note that scanned in results may not display below.  Preventive Screenings:  Service Recommendations Previous Testing/Comments   Colorectal Cancer Screening  * Colonoscopy    * Fecal Occult Blood Test (FOBT)/Fecal Immunochemical Test (FIT)  * Fecal DNA/Cologuard Test  * Flexible Sigmoidoscopy Age: 45-75 years old   Colonoscopy: every 10 years (may be performed more frequently if at higher risk)  OR  FOBT/FIT: every 1 year  OR  Cologuard: every 3 years  OR  Sigmoidoscopy: every 5 years  Screening may be recommended earlier than age 45 if at higher risk for colorectal cancer. Also, an individualized decision between you and your healthcare provider will decide whether screening between the ages of 76-85 would be appropriate. Colonoscopy: 12/21/2022  FOBT/FIT: Not on file  Cologuard: Not on file  Sigmoidoscopy: Not on file    Screening Current     Breast Cancer Screening Age: 40+ years old  Frequency: every 1-2 years  Not required if history of left and right mastectomy Mammogram: 10/29/2024    Screening Current   Cervical Cancer Screening Between the ages of 21-29, pap smear recommended once every 3 years.   Between the ages of 30-65, can perform pap smear with HPV co-testing every 5 years.   Recommendations may differ for women with a history of total hysterectomy, cervical cancer, or abnormal pap smears in past. Pap Smear: 06/13/2022    Screening Not Indicated   Hepatitis C Screening Once for adults born between 1945 and 1965  More frequently in  patients at high risk for Hepatitis C Hep C Antibody: 05/13/2020    Screening Current   Diabetes Screening 1-2 times per year if you're at risk for diabetes or have pre-diabetes Fasting glucose: 97 mg/dL (6/21/2024)  A1C: No results in last 5 years (No results in last 5 years)      Cholesterol Screening Once every 5 years if you don't have a lipid disorder. May order more often based on risk factors. Lipid panel: 06/21/2024    Screening Not Indicated  History Lipid Disorder     Other Preventive Screenings Covered by Medicare:  Abdominal Aortic Aneurysm (AAA) Screening: covered once if your at risk. You're considered to be at risk if you have a family history of AAA.  Lung Cancer Screening: covers low dose CT scan once per year if you meet all of the following conditions: (1) Age 55-77; (2) No signs or symptoms of lung cancer; (3) Current smoker or have quit smoking within the last 15 years; (4) You have a tobacco smoking history of at least 20 pack years (packs per day multiplied by number of years you smoked); (5) You get a written order from a healthcare provider.  Glaucoma Screening: covered annually if you're considered high risk: (1) You have diabetes OR (2) Family history of glaucoma OR (3)  aged 50 and older OR (4)  American aged 65 and older  Osteoporosis Screening: covered every 2 years if you meet one of the following conditions: (1) You're estrogen deficient and at risk for osteoporosis based off medical history and other findings; (2) Have a vertebral abnormality; (3) On glucocorticoid therapy for more than 3 months; (4) Have primary hyperparathyroidism; (5) On osteoporosis medications and need to assess response to drug therapy.   Last bone density test (DXA Scan): 10/30/2024.  HIV Screening: covered annually if you're between the age of 15-65. Also covered annually if you are younger than 15 and older than 65 with risk factors for HIV infection. For pregnant patients, it is  covered up to 3 times per pregnancy.    Immunizations:  Immunization Recommendations   Influenza Vaccine Annual influenza vaccination during flu season is recommended for all persons aged >= 6 months who do not have contraindications   Pneumococcal Vaccine   * Pneumococcal conjugate vaccine = PCV13 (Prevnar 13), PCV15 (Vaxneuvance), PCV20 (Prevnar 20)  * Pneumococcal polysaccharide vaccine = PPSV23 (Pneumovax) Adults 19-65 yo with certain risk factors or if 65+ yo  If never received any pneumonia vaccine: recommend Prevnar 20 (PCV20)  Give PCV20 if previously received 1 dose of PCV13 or PPSV23   Hepatitis B Vaccine 3 dose series if at intermediate or high risk (ex: diabetes, end stage renal disease, liver disease)   Respiratory syncytial virus (RSV) Vaccine - COVERED BY MEDICARE PART D  * RSVPreF3 (Arexvy) CDC recommends that adults 60 years of age and older may receive a single dose of RSV vaccine using shared clinical decision-making (SCDM)   Tetanus (Td) Vaccine - COST NOT COVERED BY MEDICARE PART B Following completion of primary series, a booster dose should be given every 10 years to maintain immunity against tetanus. Td may also be given as tetanus wound prophylaxis.   Tdap Vaccine - COST NOT COVERED BY MEDICARE PART B Recommended at least once for all adults. For pregnant patients, recommended with each pregnancy.   Shingles Vaccine (Shingrix) - COST NOT COVERED BY MEDICARE PART B  2 shot series recommended in those 19 years and older who have or will have weakened immune systems or those 50 years and older     Health Maintenance Due:      Topic Date Due   • Breast Cancer Screening: Mammogram  10/29/2025   • DXA SCAN  10/30/2026   • Colorectal Cancer Screening  12/20/2027   • Hepatitis C Screening  Completed     Immunizations Due:      Topic Date Due   • Pneumococcal Vaccine: 50+ Years (1 of 1 - PCV) Never done   • COVID-19 Vaccine (1 - 2024-25 season) Never done   • Influenza Vaccine (Season Ended)  09/01/2025     Advance Directives   What are advance directives?  Advance directives are legal documents that state your wishes and plans for medical care. These plans are made ahead of time in case you lose your ability to make decisions for yourself. Advance directives can apply to any medical decision, such as the treatments you want, and if you want to donate organs.   What are the types of advance directives?  There are many types of advance directives, and each state has rules about how to use them. You may choose a combination of any of the following:  Living will:  This is a written record of the treatment you want. You can also choose which treatments you do not want, which to limit, and which to stop at a certain time. This includes surgery, medicine, IV fluid, and tube feedings.   Durable power of  for healthcare (DPAHC):  This is a written record that states who you want to make healthcare choices for you when you are unable to make them for yourself. This person, called a proxy, is usually a family member or a friend. You may choose more than 1 proxy.  Do not resuscitate (DNR) order:  A DNR order is used in case your heart stops beating or you stop breathing. It is a request not to have certain forms of treatment, such as CPR. A DNR order may be included in other types of advance directives.  Medical directive:  This covers the care that you want if you are in a coma, near death, or unable to make decisions for yourself. You can list the treatments you want for each condition. Treatment may include pain medicine, surgery, blood transfusions, dialysis, IV or tube feedings, and a ventilator (breathing machine).  Values history:  This document has questions about your views, beliefs, and how you feel and think about life. This information can help others choose the care that you would choose.  Why are advance directives important?  An advance directive helps you control your care. Although spoken  wishes may be used, it is better to have your wishes written down. Spoken wishes can be misunderstood, or not followed. Treatments may be given even if you do not want them. An advance directive may make it easier for your family to make difficult choices about your care.   Weight Management   Why it is important to manage your weight:  Being overweight increases your risk of health conditions such as heart disease, high blood pressure, type 2 diabetes, and certain types of cancer. It can also increase your risk for osteoarthritis, sleep apnea, and other respiratory problems. Aim for a slow, steady weight loss. Even a small amount of weight loss can lower your risk of health problems.  How to lose weight safely:  A safe and healthy way to lose weight is to eat fewer calories and get regular exercise. You can lose up about 1 pound a week by decreasing the number of calories you eat by 500 calories each day.   Healthy meal plan for weight management:  A healthy meal plan includes a variety of foods, contains fewer calories, and helps you stay healthy. A healthy meal plan includes the following:  Eat whole-grain foods more often.  A healthy meal plan should contain fiber. Fiber is the part of grains, fruits, and vegetables that is not broken down by your body. Whole-grain foods are healthy and provide extra fiber in your diet. Some examples of whole-grain foods are whole-wheat breads and pastas, oatmeal, brown rice, and bulgur.  Eat a variety of vegetables every day.  Include dark, leafy greens such as spinach, kale, neha greens, and mustard greens. Eat yellow and orange vegetables such as carrots, sweet potatoes, and winter squash.   Eat a variety of fruits every day.  Choose fresh or canned fruit (canned in its own juice or light syrup) instead of juice. Fruit juice has very little or no fiber.  Eat low-fat dairy foods.  Drink fat-free (skim) milk or 1% milk. Eat fat-free yogurt and low-fat cottage cheese. Try  low-fat cheeses such as mozzarella and other reduced-fat cheeses.  Choose meat and other protein foods that are low in fat.  Choose beans or other legumes such as split peas or lentils. Choose fish, skinless poultry (chicken or turkey), or lean cuts of red meat (beef or pork). Before you cook meat or poultry, cut off any visible fat.   Use less fat and oil.  Try baking foods instead of frying them. Add less fat, such as margarine, sour cream, regular salad dressing and mayonnaise to foods. Eat fewer high-fat foods. Some examples of high-fat foods include french fries, doughnuts, ice cream, and cakes.  Eat fewer sweets.  Limit foods and drinks that are high in sugar. This includes candy, cookies, regular soda, and sweetened drinks.  Exercise:  Exercise at least 30 minutes per day on most days of the week. Some examples of exercise include walking, biking, dancing, and swimming. You can also fit in more physical activity by taking the stairs instead of the elevator or parking farther away from stores. Ask your healthcare provider about the best exercise plan for you.   Narcotic (Opioid) Safety    Use narcotics safely:  Take prescribed narcotics exactly as directed  Do not give narcotics to others or take narcotics that belong to someone else  Do not mix narcotics without medicines or alcohol  Do not drive or operate heavy machinery after you take the narcotic  Monitor for side effects and notify your healthcare provider if you experienced side effects such as nausea, sleepiness, itching, or trouble thinking clearly.    Manage constipation:    Constipation is the most common side effect of narcotic medicine. Constipation is when you have hard, dry bowel movements, or you go longer than usual between bowel movements. Tell your healthcare provider about all changes in your bowel movements while you are taking narcotics. He or she may recommend laxative medicine to help you have a bowel movement. He or she may also change  the kind of narcotic you are taking, or change when you take it. The following are more ways you can prevent or relieve constipation:    Drink liquids as directed.  You may need to drink extra liquids to help soften and move your bowels. Ask how much liquid to drink each day and which liquids are best for you.  Eat high-fiber foods.  This may help decrease constipation by adding bulk to your bowel movements. High-fiber foods include fruits, vegetables, whole-grain breads and cereals, and beans. Your healthcare provider or dietitian can help you create a high-fiber meal plan. Your provider may also recommend a fiber supplement if you cannot get enough fiber from food.  Exercise regularly.  Regular physical activity can help stimulate your intestines. Walking is a good exercise to prevent or relieve constipation. Ask which exercises are best for you.  Schedule a time each day to have a bowel movement.  This may help train your body to have regular bowel movements. Bend forward while you are on the toilet to help move the bowel movement out. Sit on the toilet for at least 10 minutes, even if you do not have a bowel movement.    Store narcotics safely:   Store narcotics where others cannot easily get them.  Keep them in a locked cabinet or secure area. Do not  keep them in a purse or other bag you carry with you. A person may be looking for something else and find the narcotics.  Make sure narcotics are stored out of the reach of children.  A child can easily overdose on narcotics. Narcotics may look like candy to a small child.    The best way to dispose of narcotics:      The laws vary by country and area. In the [place], the best way is to return the narcotics through a take-back program. This program is offered by the [place] Drug Enforcement Agency ([employer]). The following are options for using the program:  Take the narcotics to a [employer] collection site.  The site is often a law enforcement center. Call your local law  enforcement center for scheduled take-back days in your area. You will be given information on where to go if the collection site is in a different location.  Take the narcotics to an approved pharmacy or hospital.  A pharmacy or hospital may be set up as a collection site. You will need to ask if it is a [employer] collection site if you were not directed there. A pharmacy or doctor's office may not be able to take back narcotics unless it is a [employer] site.  Use a mail-back system.  This means you are given containers to put the narcotics into. You will then mail them in the containers.  Use a take-back drop box.  This is a place to leave the narcotics at any time. People and animals will not be able to get into the box. Your local law enforcement agency can tell you where to find a drop box in your area.    Other ways to manage pain:   Ask your healthcare provider about non-narcotic medicines to control pain.  Nonprescription medicines include NSAIDs (such as ibuprofen) and acetaminophen. Prescription medicines include muscle relaxers, antidepressants, and steroids.  Pain may be managed without any medicines.  Some ways to relieve pain include massage, aromatherapy, or meditation. Physical or occupational therapy may also help.    For more information:   Drug Enforcement Administration  [address] [place] [id number]  Phone: 1- 668 - 280-0273  Web Address: https://www.deadiversion.[facility] Cent[facility].gov/drug_disposal/    US Food and Drug Administration  [address] [place] [address]  Phone: 7- 560 - 784-4459  Web Address: http://www.fda.gov   © Copyright [employer] 2018 Information is for End User's use only and may not be sold, redistributed or otherwise used for commercial purposes. All illustrations and images included in CareNotes® are the copyrighted property of [employer] or "[employer]"

## 2025-06-23 NOTE — ASSESSMENT & PLAN NOTE
Will increase the dose of the Tranxene to help with sleep.  Risks and benefits of this discussed.  PDMP reviewed and usage is consistent with prescribing.  Is due for yearly laboratory testing.  Orders:  •  CBC and differential; Future  •  clorazepate (TRANXENE) 7.5 mg tablet; Take 1 tablet (7.5 mg total) by mouth daily at bedtime

## 2025-06-23 NOTE — ASSESSMENT & PLAN NOTE
Will continue to follow iron panel with routine laboratory testing and make recommendations thereafter.  Orders:  •  CBC and differential; Future  •  Iron Panel (Includes Ferritin, Iron Sat%, Iron, and TIBC); Future

## 2025-06-23 NOTE — PROGRESS NOTES
Name: Waleska Owusu      : 1955      MRN: [id number]  Encounter Provider: Soraya Alanis MD  Encounter Date: 2025   Encounter department: [facility] [place]  :  Assessment & Plan  Peripheral polyneuropathy  Will increase the dose of the Tranxene to help with sleep.  Risks and benefits of this discussed.  PDMP reviewed and usage is consistent with prescribing.  Is due for yearly laboratory testing.  Orders:  •  CBC and differential; Future  •  clorazepate (TRANXENE) 7.5 mg tablet; Take 1 tablet (7.5 mg total) by mouth daily at bedtime    Mixed hyperlipidemia  Is due for yearly laboratory testing.  Continue with the atorvastatin.  Watch diet.  Increase fiber in diet.  Orders:  •  CBC and differential; Future  •  Comprehensive metabolic panel; Future  •  Lipid panel; Future  •  TSH, 3rd generation; Future    Hereditary hemochromatosis (HCC)  Will continue to follow iron panel with routine laboratory testing and make recommendations thereafter.  Orders:  •  CBC and differential; Future  •  Iron Panel (Includes Ferritin, Iron Sat%, Iron, and TIBC); Future    Vitamin B12 deficiency  Not currently taking vitamin B12 supplementation.  Vitamin B12 level has been low in the past.  Will check with upcoming laboratory testing and make recommendations thereafter.  Orders:  •  CBC and differential; Future  •  Vitamin B12; Future    Vitamin D deficiency  Continue with vitamin D supplementation.  Will check vitamin D level and make recommendations thereafter.  Orders:  •  CBC and differential; Future  •  Vitamin D 25 hydroxy; Future    Acute deep vein thrombosis (DVT) of proximal vein of left lower extremity (HCC)  Continue with the chronic anticoagulation.  Previous evaluation through hematology discussed.  Continue with the Xarelto  Orders:  •  CBC and differential; Future    Nonalcoholic steatohepatitis  Continue to maintain weight loss.  Will check LFTs with upcoming laboratory testing and  make recommendations thereafter.  Orders:  •  CBC and differential; Future  •  Comprehensive metabolic panel; Future    Fibromyalgia  Chronic joint and muscle symptoms discussed.  Continue with the gabapentin.  Continue with the Tranxene.  Dosing of this discussed.  Orders:  •  CBC and differential; Future    Visit for screening mammogram    Orders:  •  Mammo screening bilateral w 3d and cad; Future    Rheumatoid arthritis involving multiple sites, unspecified whether rheumatoid factor present (HCC)  Continue with the gabapentin.  Try to remain as active as possible.  Watch for any worsening.         Depression Screening and Follow-up Plan: Patient was screened for depression during today's encounter. They screened negative with a PHQ-2 score of 0.        Preventive health issues were discussed with patient, and age appropriate screening tests were ordered as noted in patient's After Visit Summary. Personalized health advice and appropriate referrals for health education or preventive services given if needed, as noted in patient's After Visit Summary.    History of Present Illness     She presents for routine follow-up as well as Medicare has generally been doing about the same.  Continues with significant joint and muscle ache.  Gabapentin does help somewhat.  Tries to remain as active as possible has difficulty with sleeping which she feels affects her overall pain level.  Current low dosing of the Tranxene has not been as effective.  Is requesting a higher dose for bedtime.  Tolerating her atorvastatin denies any significant side effects from this.  Continues with the anticoagulation.  Denies any significant bruising or tries to remain as active as denies any chest pain or palp appetite has been generally normal.  Has been able to maintain previous weight loss but has not been able to lose still occasional constipation but not year.       Patient Care Team:  Soraya Alanis MD as PCP - [facility] Chowdary  MD Brielle Jacob MD Ayaz Matin, MD Robert Bruce Grob, DO (Orthopedic Surgery)    Review of Systems   Constitutional:  Negative for activity change, appetite change, chills and fever.   HENT:  Negative for congestion and rhinorrhea.    Eyes:  Negative for visual disturbance.   Respiratory:  Negative for chest tightness and shortness of breath.    Cardiovascular:  Positive for leg swelling. Negative for chest pain and palpitations.   Gastrointestinal:  Negative for abdominal pain, blood in stool, diarrhea, nausea and vomiting.   Endocrine: Negative for polydipsia, polyphagia and polyuria.   Genitourinary:  Negative for dysuria, frequency and urgency.   Musculoskeletal:  Positive for arthralgias and myalgias. Negative for gait problem.   Skin:  Negative for color change.   Neurological:  Negative for dizziness and headaches.   Hematological:  Does not bruise/bleed easily.   Psychiatric/Behavioral:  Positive for sleep disturbance. Negative for confusion. The patient is not nervous/anxious.      Medical History Reviewed by provider this encounter:       Annual Wellness Visit Questionnaire   Last Medicare Wellness visit information reviewed, patient interviewed and updates made to the record today.      Health Risk Assessment:   Patient rates overall health as fair. Patient feels that their physical health rating is much worse. Patient is dissatisfied with their life. Eyesight was rated as same. Hearing was rated as same. Patient feels that their emotional and mental health rating is same. Patients states they are never, rarely angry. Patient states they are often unusually tired/fatigued. Pain experienced in the last 7 days has been a lot. Patient's pain rating has been 7/10. Patient states that she has experienced weight loss or gain in last 6 months.     Depression Screening:   PHQ-2 Score: 0      Fall Risk Screening:   In the past year, patient has experienced: no history of falling in past year       Urinary Incontinence Screening:   Patient has not leaked urine accidently in the last six months.     Home Safety:  Patient has trouble with stairs inside or outside of their home. Patient has working smoke alarms and has working carbon monoxide detector. Home safety hazards include: none.     Nutrition:   Current diet is Regular.     Medications:   Patient is currently taking over-the-counter supplements. OTC medications include: see medication list. Patient is able to manage medications.     Activities of Daily Living (ADLs)/Instrumental Activities of Daily Living (IADLs):   Walk and transfer into and out of bed and chair?: Yes  Dress and groom yourself?: Yes    Bathe or shower yourself?: Yes    Feed yourself? Yes  Do your laundry/housekeeping?: Yes  Manage your money, pay your bills and track your expenses?: Yes  Make your own meals?: Yes    Do your own shopping?: Yes    Previous Hospitalizations:   Any hospitalizations or ED visits within the last 12 months?: Yes    How many hospitalizations have you had in the last year?: 1-2    Advance Care Planning:   Living will: No    Durable POA for healthcare: No    Advanced directive: No      Cognitive Screening:   Provider or family/friend/caregiver concerned regarding cognition?: No    Preventive Screenings      Cardiovascular Screening:    General: Screening Not Indicated, History Lipid Disorder and Risks and Benefits Discussed    Due for: Lipid Panel      Diabetes Screening:     General: Screening Current and Risks and Benefits Discussed    Due for: Blood Glucose      Colorectal Cancer Screening:     General: Screening Current      Breast Cancer Screening:     General: Screening Current      Cervical Cancer Screening:    General: Screening Not Indicated      Osteoporosis Screening:    General: Screening Current      Abdominal Aortic Aneurysm (AAA) Screening:        General: Screening Not Indicated      Lung Cancer Screening:     General: Screening Not  Indicated      Hepatitis C Screening:    General: Screening Current    Immunizations:  - Immunizations due: Prevnar 20 and Zoster (Shingrix)    Screening, Brief Intervention, and Referral to Treatment (SBIRT)     Screening  Typical number of drinks in a day: 0  Typical number of drinks in a week: 0  Interpretation: Low risk drinking behavior.    AUDIT-C Screenin) How often did you have a drink containing alcohol in the past year? never  2) How many drinks did you have on a typical day when you were drinking in the past year? 0  3) How often did you have 6 or more drinks on one occasion in the past year? never    AUDIT-C Score: 0  Interpretation: Score 0-2 (female): Negative screen for alcohol misuse    Single Item Drug Screening:  How often have you used an illegal drug (including marijuana) or a prescription medication for non-medical reasons in the past year? never    Single Item Drug Screen Score: 0  Interpretation: Negative screen for possible drug use disorder    Brief Intervention  Alcohol & drug use screenings were reviewed. No concerns regarding substance use disorder identified.     Review of Current Opioid Use    Opioid Risk Tool (ORT) Interpretation: Complete Opioid Risk Tool (ORT)    Social Drivers of Health     Financial Resource Strain: High Risk (2023)    Overall Financial Resource Strain (CARDIA)    • Difficulty of Paying Living Expenses: Very hard   Food Insecurity: Food Insecurity Present (2025)    Nursing - Inadequate Food Risk Classification    • Worried About Running Out of Food in the Last Year: Sometimes true    • Ran Out of Food in the Last Year: Never true   Transportation Needs: No Transportation Needs (2025)    PRAPARE - Transportation    • Lack of Transportation (Medical): No    • Lack of Transportation (Non-Medical): No   Housing Stability: Unknown (2025)    Housing Stability Vital Sign    • Unable to Pay for Housing in the Last Year: No    • Homeless in the Last  "Year: No   Utilities: Not At Risk (6/18/2025)    [facility] Utilities    • Threatened with loss of utilities: No     No results found.    Objective   /78 (BP Location: Left arm, Patient Position: Sitting, Cuff Size: Standard)   Pulse 61   Temp 98.3 °F (36.8 °C) (Temporal)   Ht 5' 4.5\" (1.638 m)   Wt 91.8 kg (202 lb 6.4 oz)   LMP  (LMP Unknown)   SpO2 99%   BMI 34.21 kg/m²     Physical Exam  Vitals and nursing note reviewed.   Constitutional:       General: She is not in acute distress.     Appearance: She is well-developed, well-groomed and overweight.   HENT:      Head: Normocephalic and atraumatic.     Eyes:      General:         Right eye: No discharge.         Left eye: No discharge.      Conjunctiva/sclera: Conjunctivae normal.      Pupils: Pupils are equal, round, and reactive to light.       Cardiovascular:      Rate and Rhythm: Normal rate and regular rhythm.      Heart sounds: Normal heart sounds. No murmur heard.     No friction rub. No gallop.   Pulmonary:      Effort: No respiratory distress.      Breath sounds: No wheezing or rales.   Abdominal:      General: Bowel sounds are normal. There is no distension.      Tenderness: There is no abdominal tenderness.     Musculoskeletal:      Comments: Some degenerative changes bilateral knees; varicosities bilateral lower extremities left greater than right   Lymphadenopathy:      Cervical: No cervical adenopathy.     Skin:     General: Skin is warm and dry.     Neurological:      Mental Status: She is alert and oriented to person, place, and time.     Psychiatric:         Mood and Affect: Mood and affect normal.         Speech: Speech normal.         Behavior: Behavior normal. Behavior is cooperative.         Cognition and Memory: Cognition and memory normal.     He    "

## 2025-06-23 NOTE — ASSESSMENT & PLAN NOTE
Chronic joint and muscle symptoms discussed.  Continue with the gabapentin.  Continue with the Tranxene.  Dosing of this discussed.  Orders:  •  CBC and differential; Future

## 2025-06-23 NOTE — ASSESSMENT & PLAN NOTE
Is due for yearly laboratory testing.  Continue with the atorvastatin.  Watch diet.  Increase fiber in diet.  Orders:  •  CBC and differential; Future  •  Comprehensive metabolic panel; Future  •  Lipid panel; Future  •  TSH, 3rd generation; Future

## 2025-08-18 ENCOUNTER — TELEPHONE (OUTPATIENT)
Age: 70
End: 2025-08-18

## 2025-08-18 DIAGNOSIS — E78.2 MIXED HYPERLIPIDEMIA: ICD-10-CM

## 2025-08-18 DIAGNOSIS — I82.4Y2 ACUTE DEEP VEIN THROMBOSIS (DVT) OF PROXIMAL VEIN OF LEFT LOWER EXTREMITY (HCC): ICD-10-CM

## 2025-08-18 RX ORDER — ATORVASTATIN CALCIUM 10 MG/1
10 TABLET, FILM COATED ORAL DAILY
Qty: 90 TABLET | Refills: 1 | Status: CANCELLED | OUTPATIENT
Start: 2025-08-18

## (undated) DEVICE — Device

## (undated) DEVICE — SPONGE LAP 18 X 18 IN STRL RFD

## (undated) DEVICE — CHLORAPREP HI-LITE 26ML ORANGE

## (undated) DEVICE — KERLIX BANDAGE ROLL: Brand: KERLIX

## (undated) DEVICE — ACE WRAP 6 IN XL STERILE

## (undated) DEVICE — HALF SHEET: Brand: CONVERTORS

## (undated) DEVICE — DRAPE SHEET THREE QUARTER

## (undated) DEVICE — ACE WRAP 6 IN STERILE

## (undated) DEVICE — ACE WRAP 4 IN STERILE

## (undated) DEVICE — DISPOSABLE OR TOWEL: Brand: CARDINAL HEALTH

## (undated) DEVICE — IRR SET 4M PG F/TUMESCENT

## (undated) DEVICE — GAUZE SPONGES,16 PLY: Brand: CURITY

## (undated) DEVICE — INTENDED FOR TISSUE SEPARATION, AND OTHER PROCEDURES THAT REQUIRE A SHARP SURGICAL BLADE TO PUNCTURE OR CUT.: Brand: BARD-PARKER SAFETY BLADES SIZE 11, STERILE

## (undated) DEVICE — SCD SEQUENTIAL COMPRESSION COMFORT SLEEVE MEDIUM KNEE LENGTH: Brand: KENDALL SCD

## (undated) DEVICE — BETHLEHEM UNIVERSAL MINOR GEN: Brand: CARDINAL HEALTH

## (undated) DEVICE — TONGUE DEPRESSOR STERILE

## (undated) DEVICE — GLOVE SRG BIOGEL 7.5

## (undated) DEVICE — DRAPE INTESTINAL ISOLATION BAG

## (undated) DEVICE — SUT MONOCRYL 4-0 PS-2 27 IN Y426H

## (undated) DEVICE — ALL PURPOSE SPONGES,NON-WOVEN, 4 PLY: Brand: CURITY

## (undated) DEVICE — GLOVE SRG BIOGEL 7

## (undated) DEVICE — DRAPE SHEET X-LG

## (undated) DEVICE — COBAN 4 IN STERILE

## (undated) DEVICE — 2000CC GUARDIAN II: Brand: GUARDIAN

## (undated) DEVICE — TIBURON SPLIT SHEET: Brand: CONVERTORS

## (undated) DEVICE — PROXIMATE SKIN STAPLERS (35 WIDE) CONTAINS 35 STAINLESS STEEL STAPLES (FIXED HEAD): Brand: PROXIMATE

## (undated) DEVICE — ADHESIVE SKIN HIGH VISCOSITY EXOFIN 1ML

## (undated) DEVICE — PROVE COVER: Brand: UNBRANDED

## (undated) DEVICE — FIBER PROC KIT GOLD TIP 21G 45CM NEVERTOUCH

## (undated) DEVICE — GLOVE INDICATOR PI UNDERGLOVE SZ 8 BLUE

## (undated) DEVICE — 3M™ STERI-STRIP™ REINFORCED ADHESIVE SKIN CLOSURES, R1547, 1/2 IN X 4 IN (12 MM X 100 MM), 6 STRIPS/ENVELOPE: Brand: 3M™ STERI-STRIP™

## (undated) DEVICE — COBAN 6 IN STERILE

## (undated) DEVICE — PREP PAD BNS: Brand: CONVERTORS

## (undated) DEVICE — SUT VICRYL 2-0 REEL 54 IN J286G

## (undated) DEVICE — ULTRASOUND GEL STERILE FOIL PK

## (undated) DEVICE — SUT MONOCRYL 3-0 SH 27 IN Y416H

## (undated) DEVICE — LIGHT GLOVE GREEN